# Patient Record
Sex: MALE | Race: WHITE | NOT HISPANIC OR LATINO | Employment: UNEMPLOYED | URBAN - METROPOLITAN AREA
[De-identification: names, ages, dates, MRNs, and addresses within clinical notes are randomized per-mention and may not be internally consistent; named-entity substitution may affect disease eponyms.]

---

## 2023-10-26 ENCOUNTER — TELEPHONE (OUTPATIENT)
Dept: PRIMARY CARE | Facility: CLINIC | Age: 53
End: 2023-10-26
Payer: COMMERCIAL

## 2023-10-26 DIAGNOSIS — M10.9 ACUTE GOUT OF FOOT, UNSPECIFIED CAUSE, UNSPECIFIED LATERALITY: Primary | ICD-10-CM

## 2023-10-26 RX ORDER — NAPROXEN 500 MG/1
500 TABLET ORAL 2 TIMES DAILY PRN
Qty: 14 TABLET | Refills: 0 | Status: SHIPPED | OUTPATIENT
Start: 2023-10-26 | End: 2023-11-02

## 2023-10-26 NOTE — TELEPHONE ENCOUNTER
"Pt states having gout attack and Dr Farris would prescribe him Naproxen, \"it would be gone in 2 days\". Pt requests rx.    Also states he is sure he has DM, c/o ED, tingling in feet in mornings and feet swell.   Last visit 6/9/23  next appt 11/13.   "

## 2023-11-09 PROBLEM — R51.9 FACIAL PAIN: Status: ACTIVE | Noted: 2023-11-09

## 2023-11-09 PROBLEM — R06.89 DIFFICULTY BREATHING: Status: ACTIVE | Noted: 2023-11-09

## 2023-11-09 PROBLEM — T78.3XXA ANGIOEDEMA: Status: ACTIVE | Noted: 2023-11-09

## 2023-11-09 PROBLEM — F41.9 ANXIETY: Status: ACTIVE | Noted: 2022-10-19

## 2023-11-09 PROBLEM — M25.469 KNEE JOINT EFFUSION: Status: ACTIVE | Noted: 2023-11-09

## 2023-11-09 PROBLEM — M10.9 GOUT: Status: ACTIVE | Noted: 2023-11-09

## 2023-11-09 PROBLEM — F10.11 ALCOHOL ABUSE, IN REMISSION: Status: ACTIVE | Noted: 2023-11-09

## 2023-11-09 PROBLEM — R73.9 HYPERGLYCEMIA: Status: ACTIVE | Noted: 2023-11-09

## 2023-11-09 PROBLEM — R11.2 NAUSEA AND VOMITING: Status: ACTIVE | Noted: 2023-11-09

## 2023-11-09 PROBLEM — M51.9 DISORDER OF INTERVERTEBRAL DISC OF LUMBAR SPINE: Status: ACTIVE | Noted: 2023-11-09

## 2023-11-09 PROBLEM — E78.5 HYPERLIPIDEMIA: Status: ACTIVE | Noted: 2023-11-09

## 2023-11-09 PROBLEM — R19.8 TENESMUS: Status: ACTIVE | Noted: 2023-11-09

## 2023-11-09 PROBLEM — I10 HYPERTENSION: Status: ACTIVE | Noted: 2023-11-09

## 2023-11-09 PROBLEM — E78.00 PURE HYPERCHOLESTEROLEMIA: Status: ACTIVE | Noted: 2023-11-09

## 2023-11-09 PROBLEM — K21.9 GASTROESOPHAGEAL REFLUX DISEASE: Status: ACTIVE | Noted: 2023-11-09

## 2023-11-09 PROBLEM — K59.4 RECTAL SPASM: Status: ACTIVE | Noted: 2023-11-09

## 2023-11-09 PROBLEM — B35.1 ONYCHOMYCOSIS: Status: ACTIVE | Noted: 2023-11-09

## 2023-11-09 PROBLEM — K40.20 BILATERAL INGUINAL HERNIA: Status: ACTIVE | Noted: 2023-11-09

## 2023-11-09 PROBLEM — F32.9 MAJOR DEPRESSIVE DISORDER, SINGLE EPISODE, UNSPECIFIED: Status: ACTIVE | Noted: 2023-11-09

## 2023-11-09 PROBLEM — R51.9 HEADACHE: Status: ACTIVE | Noted: 2023-11-09

## 2023-11-09 PROBLEM — F17.200 NICOTINE DEPENDENCE: Status: ACTIVE | Noted: 2023-11-09

## 2023-11-10 RX ORDER — GABAPENTIN 300 MG/1
CAPSULE ORAL
COMMUNITY
Start: 2023-08-24 | End: 2023-11-13 | Stop reason: ALTCHOICE

## 2023-11-10 RX ORDER — COLCHICINE 0.6 MG/1
TABLET ORAL
COMMUNITY
Start: 2022-12-26 | End: 2023-11-13 | Stop reason: ALTCHOICE

## 2023-11-10 RX ORDER — ESCITALOPRAM OXALATE 10 MG/1
TABLET ORAL
COMMUNITY
Start: 2022-12-23 | End: 2024-03-31 | Stop reason: HOSPADM

## 2023-11-10 RX ORDER — AMLODIPINE BESYLATE 5 MG/1
TABLET ORAL
COMMUNITY
End: 2023-11-13 | Stop reason: ALTCHOICE

## 2023-11-10 RX ORDER — KETOROLAC TROMETHAMINE 10 MG/1
TABLET, FILM COATED ORAL
COMMUNITY
Start: 2013-09-26 | End: 2023-11-13 | Stop reason: ALTCHOICE

## 2023-11-10 RX ORDER — HYDROXYZINE PAMOATE 50 MG/1
CAPSULE ORAL
COMMUNITY
Start: 2022-12-23 | End: 2023-11-13 | Stop reason: ALTCHOICE

## 2023-11-10 RX ORDER — DICYCLOMINE HYDROCHLORIDE 20 MG/1
TABLET ORAL
COMMUNITY
End: 2023-11-13 | Stop reason: ALTCHOICE

## 2023-11-10 RX ORDER — THIAMINE HCL 100 MG
TABLET ORAL
COMMUNITY
Start: 2022-12-05 | End: 2023-11-13 | Stop reason: ALTCHOICE

## 2023-11-10 RX ORDER — METOPROLOL SUCCINATE 50 MG/1
TABLET, EXTENDED RELEASE ORAL
COMMUNITY
End: 2023-11-21

## 2023-11-10 RX ORDER — FAMOTIDINE 20 MG/1
TABLET, FILM COATED ORAL
COMMUNITY
Start: 2022-10-20 | End: 2024-03-08 | Stop reason: WASHOUT

## 2023-11-10 RX ORDER — NAPROXEN 500 MG/1
TABLET ORAL
COMMUNITY
Start: 2023-11-02 | End: 2023-11-13 | Stop reason: ALTCHOICE

## 2023-11-10 RX ORDER — METHYLPHENIDATE HYDROCHLORIDE 10 MG/1
10 CAPSULE, EXTENDED RELEASE ORAL EVERY MORNING
Status: ON HOLD | COMMUNITY
Start: 2023-11-01 | End: 2024-06-04

## 2023-11-10 RX ORDER — ACAMPROSATE CALCIUM 333 MG/1
TABLET, DELAYED RELEASE ORAL
COMMUNITY
Start: 2022-12-05 | End: 2023-11-13 | Stop reason: ALTCHOICE

## 2023-11-10 RX ORDER — MIRTAZAPINE 7.5 MG/1
TABLET, FILM COATED ORAL
COMMUNITY
Start: 2023-02-14 | End: 2023-11-13 | Stop reason: ALTCHOICE

## 2023-11-10 RX ORDER — EPINEPHRINE 0.3 MG/.3ML
INJECTION SUBCUTANEOUS
COMMUNITY
Start: 2022-10-20

## 2023-11-10 RX ORDER — FOLIC ACID 1 MG/1
TABLET ORAL
COMMUNITY
Start: 2022-12-05 | End: 2023-11-13 | Stop reason: ALTCHOICE

## 2023-11-10 RX ORDER — FLUTICASONE PROPIONATE 50 MCG
SPRAY, SUSPENSION (ML) NASAL
COMMUNITY
Start: 2013-09-21 | End: 2023-11-13 | Stop reason: ALTCHOICE

## 2023-11-10 RX ORDER — NORTRIPTYLINE HYDROCHLORIDE 10 MG/1
CAPSULE ORAL
COMMUNITY
Start: 2013-09-26 | End: 2023-11-13 | Stop reason: ALTCHOICE

## 2023-11-10 RX ORDER — GABAPENTIN 100 MG/1
CAPSULE ORAL
COMMUNITY
Start: 2023-07-21 | End: 2023-11-13 | Stop reason: ALTCHOICE

## 2023-11-10 RX ORDER — BUPROPION HYDROCHLORIDE 150 MG/1
TABLET ORAL
COMMUNITY
Start: 2022-12-05 | End: 2023-11-21

## 2023-11-10 RX ORDER — ONDANSETRON 4 MG/1
TABLET, FILM COATED ORAL
COMMUNITY
Start: 2023-06-09 | End: 2023-11-13 | Stop reason: ALTCHOICE

## 2023-11-10 RX ORDER — PANTOPRAZOLE SODIUM 40 MG/1
TABLET, DELAYED RELEASE ORAL
COMMUNITY
Start: 2023-06-09 | End: 2023-11-13 | Stop reason: ALTCHOICE

## 2023-11-10 RX ORDER — CLONIDINE HYDROCHLORIDE 0.1 MG/1
TABLET ORAL
COMMUNITY
Start: 2023-01-13 | End: 2023-11-13 | Stop reason: ALTCHOICE

## 2023-11-13 ENCOUNTER — LAB (OUTPATIENT)
Dept: LAB | Facility: LAB | Age: 53
End: 2023-11-13
Payer: COMMERCIAL

## 2023-11-13 ENCOUNTER — OFFICE VISIT (OUTPATIENT)
Dept: PRIMARY CARE | Facility: CLINIC | Age: 53
End: 2023-11-13
Payer: COMMERCIAL

## 2023-11-13 VITALS
BODY MASS INDEX: 25.5 KG/M2 | OXYGEN SATURATION: 98 % | DIASTOLIC BLOOD PRESSURE: 100 MMHG | HEART RATE: 106 BPM | WEIGHT: 188 LBS | SYSTOLIC BLOOD PRESSURE: 166 MMHG | TEMPERATURE: 99 F

## 2023-11-13 DIAGNOSIS — Z13.1 SCREENING FOR DIABETES MELLITUS: ICD-10-CM

## 2023-11-13 DIAGNOSIS — F10.10 ALCOHOL ABUSE: ICD-10-CM

## 2023-11-13 DIAGNOSIS — I10 PRIMARY HYPERTENSION: Primary | ICD-10-CM

## 2023-11-13 PROBLEM — E44.0 MALNUTRITION OF MODERATE DEGREE (MULTI): Status: ACTIVE | Noted: 2022-12-02

## 2023-11-13 PROBLEM — M54.16 LUMBAR RADICULOPATHY: Status: ACTIVE | Noted: 2023-11-13

## 2023-11-13 PROBLEM — K57.90 DIVERTICULOSIS OF INTESTINE, PART UNSPECIFIED, WITHOUT PERFORATION OR ABSCESS WITHOUT BLEEDING: Status: ACTIVE | Noted: 2022-10-19

## 2023-11-13 PROBLEM — M51.26 DISPLACEMENT OF LUMBAR INTERVERTEBRAL DISC WITHOUT MYELOPATHY: Status: ACTIVE | Noted: 2023-11-13

## 2023-11-13 LAB
ALBUMIN SERPL-MCNC: 4.2 G/DL (ref 3.5–5)
ALP BLD-CCNC: 332 U/L (ref 35–125)
ALT SERPL-CCNC: 76 U/L (ref 5–40)
ANION GAP SERPL CALC-SCNC: 15 MMOL/L
AST SERPL-CCNC: 140 U/L (ref 5–40)
BILIRUB SERPL-MCNC: 0.6 MG/DL (ref 0.1–1.2)
BUN SERPL-MCNC: 8 MG/DL (ref 8–25)
CALCIUM SERPL-MCNC: 9.9 MG/DL (ref 8.5–10.4)
CHLORIDE SERPL-SCNC: 97 MMOL/L (ref 97–107)
CO2 SERPL-SCNC: 28 MMOL/L (ref 24–31)
CREAT SERPL-MCNC: 0.6 MG/DL (ref 0.4–1.6)
EST. AVERAGE GLUCOSE BLD GHB EST-MCNC: 111 MG/DL
GFR SERPL CREATININE-BSD FRML MDRD: >90 ML/MIN/1.73M*2
GLUCOSE SERPL-MCNC: 128 MG/DL (ref 65–99)
HBA1C MFR BLD: 5.5 %
POTASSIUM SERPL-SCNC: 4 MMOL/L (ref 3.4–5.1)
PROT SERPL-MCNC: 7.2 G/DL (ref 5.9–7.9)
SODIUM SERPL-SCNC: 140 MMOL/L (ref 133–145)

## 2023-11-13 PROCEDURE — 36415 COLL VENOUS BLD VENIPUNCTURE: CPT

## 2023-11-13 PROCEDURE — 83036 HEMOGLOBIN GLYCOSYLATED A1C: CPT

## 2023-11-13 PROCEDURE — 3080F DIAST BP >= 90 MM HG: CPT | Performed by: INTERNAL MEDICINE

## 2023-11-13 PROCEDURE — 99214 OFFICE O/P EST MOD 30 MIN: CPT | Performed by: INTERNAL MEDICINE

## 2023-11-13 PROCEDURE — 3077F SYST BP >= 140 MM HG: CPT | Performed by: INTERNAL MEDICINE

## 2023-11-13 PROCEDURE — 80053 COMPREHEN METABOLIC PANEL: CPT

## 2023-11-13 RX ORDER — LOSARTAN POTASSIUM 100 MG/1
100 TABLET ORAL DAILY
Qty: 90 TABLET | Refills: 1 | Status: SHIPPED | OUTPATIENT
Start: 2023-11-13 | End: 2024-03-31 | Stop reason: HOSPADM

## 2023-11-13 RX ORDER — PREDNISONE 10 MG/1
TABLET ORAL
COMMUNITY
End: 2023-11-13 | Stop reason: ALTCHOICE

## 2023-11-13 RX ORDER — CELECOXIB 200 MG/1
CAPSULE ORAL
COMMUNITY
End: 2023-11-13 | Stop reason: ALTCHOICE

## 2023-11-13 ASSESSMENT — PATIENT HEALTH QUESTIONNAIRE - PHQ9
2. FEELING DOWN, DEPRESSED OR HOPELESS: NOT AT ALL
SUM OF ALL RESPONSES TO PHQ9 QUESTIONS 1 AND 2: 0
1. LITTLE INTEREST OR PLEASURE IN DOING THINGS: NOT AT ALL

## 2023-11-13 ASSESSMENT — PAIN SCALES - GENERAL: PAINLEVEL: 0-NO PAIN

## 2023-11-13 ASSESSMENT — ENCOUNTER SYMPTOMS
DEPRESSION: 0
LOSS OF SENSATION IN FEET: 0
OCCASIONAL FEELINGS OF UNSTEADINESS: 0

## 2023-11-13 NOTE — PROGRESS NOTES
[facility]: [facility]  PROGRESS NOTE      Leonardo Garcia is a 53 y.o. male that is being seen  today for Establish Care.  Subjective   Patient is a 53-year-old male with past medical history of alcohol abuse, hypertension and anxiety who is being seen for acute visit with a complaint of numbness and tingling in his feet.  Patient was seen few months ago and was started on blood pressure medication and was recommended to follow-up for hypertension but he canceled his appointment.  Patient's blood pressure is not very well controlled.  Patient has family history of diabetes and would like to be checked for diabetes.  Patient still drinks alcohol and is not ready to quit.  Patient has been on medications for anxiety.      ROS  Negative for fever or chills  Negative for sore throat, ear pain, nasal discharge  Negative for cough, shortness of breath or wheezing  Negative for chest pain, palpitations, swelling of legs  Negative for abdominal pain, constipation, diarrhea, blood in the stools  Negative for urinary complaints  Negative for headache, dizziness, weakness or numbness  Negative for joint pain  Positive  for depression or anxiety  All other systems reviewed and were negative   Vitals:    11/13/23 0925   BP: (!) 166/100   Pulse: 106   Temp: 37.2 °C (99 °F)   SpO2: 98%      Vitals:    11/13/23 0925   Weight: 85.3 kg (188 lb)     Body mass index is 25.5 kg/m².  Physical Exam  Constitutional: Patient does not appear to be in any acute distress  Head and Face: NCAT  Eyes: Normal external exam, EOMI  ENT: Normal external inspection of ears and nose. Oropharynx normal.  Cardiovascular: RRR, S1/S2, no murmurs, rubs, or gallops, radial pulses +2, no edema of extremities  Pulmonary: CTAB, no respiratory distress.  Abdomen: +BS, soft, non-tender, nondistended, no guarding or rebound, no masses noted  MSK: No joint swelling, normal movements of all extremities. Range of motion- normal.  Skin- No  lesions, contusions, or erythema.  Peripheral puslses palpable bilaterally 2+  Neuro: AAO X3, Cranial nerves 2-12 grossly intact,DTR 2+ in all 4 limbs   Psychiatric: Judgment intact. Anxious  LABS   [unfilled]  Lab Results   Component Value Date    GLUCOSE 105 (H) 06/09/2023    CALCIUM 9.5 06/09/2023     06/09/2023    K 3.6 06/09/2023    CO2 25 06/09/2023     06/09/2023    BUN 7 (L) 06/09/2023    CREATININE 0.7 06/09/2023     Lab Results   Component Value Date     (H) 06/09/2023     (H) 06/09/2023    ALKPHOS 120 06/09/2023    BILITOT 0.3 06/09/2023     Lab Results   Component Value Date    WBC 6.0 06/09/2023    HGB 14.3 06/09/2023    HCT 41.6 06/09/2023    MCV 96.5 06/09/2023     06/09/2023     Lab Results   Component Value Date    CHOL 209 (H) 01/24/2020     Lab Results   Component Value Date    HDL 30 (L) 01/24/2020     Lab Results   Component Value Date    LDLCALC 144 (H) 01/24/2020     Lab Results   Component Value Date    TRIG 173 (H) 01/24/2020     Lab Results   Component Value Date    HGBA1C 5.9 01/24/2020     Other labs not included in the list above were reviewed either before or during this encounter.    History    History reviewed. No pertinent past medical history.  Past Surgical History:   Procedure Laterality Date    SINUS SURGERY  04/29/2013    Sinus Surgery     Family History   Problem Relation Name Age of Onset    Hypertension Mother      Heart attack Father      Brain cancer Father      Diabetes Father      Cancer Father       Allergies   Allergen Reactions    Clams Shortness of breath     Current Outpatient Medications on File Prior to Visit   Medication Sig Dispense Refill    buPROPion XL (Wellbutrin XL) 150 mg 24 hr tablet buPROPion HCl ER (XL) 150 MG 1 tablet in the morning Orally Once a day for 30 days Active      EPINEPHrine 0.3 mg/0.3 mL injection syringe INJECT INTO THE MUSCLE ONCE AS NEEDED FOR ALLERGIC REACTIONS      escitalopram (Lexapro) 10 mg tablet 1  tablet Orally Once a day for 30 days      famotidine (Pepcid) 20 mg tablet TAKE 1 TABLET BY MOUTH EVERY DAY      methylphenidate LA (Ritalin LA) 10 mg 24 hr capsule       metoprolol succinate XL (Toprol-XL) 50 mg 24 hr tablet TAKE 1 TABLET BY MOUTH EVERY DAY      [DISCONTINUED] acamprosate (Campral) 333 mg EC tablet       [DISCONTINUED] amLODIPine (Norvasc) 5 mg tablet amLODIPine 5 mg Tablet, Ordered By: Eliel Duvall MD Directions: 1 tablet oral daily      [DISCONTINUED] celecoxib (CeleBREX) 200 mg capsule Take 1 capsule once A DAY as needed by oral route.      [DISCONTINUED] cloNIDine (Catapres) 0.1 mg tablet       [DISCONTINUED] colchicine 0.6 mg tablet TAKE 1 TABLET BY MOUTH ONCE DAILY AS NEEDED FOR GOUT PAIN      [DISCONTINUED] dicyclomine (Bentyl) 20 mg tablet dicyclomine 20 mg Tablet, Ordered By: Eliel Duvall MD Directions: 1 tablet oral every six hours PRN pain      [DISCONTINUED] fluticasone (Flonase) 50 mcg/actuation nasal spray Fluticasone Propionate 50 MCG/ACT Nasal Suspension   Quantity: 16  Refills: 0        Start : 21-Sep-2013   Active      [DISCONTINUED] folic acid (Folvite) 1 mg tablet       [DISCONTINUED] gabapentin (Neurontin) 100 mg capsule       [DISCONTINUED] gabapentin (Neurontin) 300 mg capsule       [DISCONTINUED] hydrOXYzine pamoate (Vistaril) 50 mg capsule TAKE 1 CAPSULE BY MOUTH THREE TIMES DAILY AS NEEDED FOR ANXIETY      [DISCONTINUED] ketorolac (Toradol) 10 mg tablet TAKE 1 TABLET 3 TIMES DAILY AS NEEDED.      [DISCONTINUED] mirtazapine (Remeron) 7.5 mg tablet       [DISCONTINUED] naproxen (Naprosyn) 500 mg tablet       [DISCONTINUED] nortriptyline (Pamelor) 10 mg capsule TAKE 2 CAPSULE Bedtime      [DISCONTINUED] ondansetron (Zofran) 4 mg tablet Ondansetron HCl 4 MG 1 tablet Orally every 8 hrs as needed for 15 day(s) 09 Jun, 2023 Active      [DISCONTINUED] pantoprazole (ProtoNix) 40 mg EC tablet Pantoprazole Sodium 40 MG 1 tablet Orally Once a day for 30 day(s) 09 Jun, 2023  Active      [DISCONTINUED] predniSONE (Deltasone) 10 mg tablet 7 tablets po day #1, decrease by 10 mg a day for total 7 days therapy      [DISCONTINUED] Vitamin B-1 100 mg tablet        No current facility-administered medications on file prior to visit.       There is no immunization history on file for this patient.  Patient's medical history was reviewed and updated either before or during this encounter.  ASSESSMENT / PLAN:  Diagnoses and all orders for this visit:  Primary hypertension  -     losartan (Cozaar) 100 mg tablet; Take 1 tablet (100 mg) by mouth once daily.  Screening for diabetes mellitus  -     Hemoglobin A1C; Future  Alcohol abuse  -     Comprehensive Metabolic Panel; Future    Patient's blood pressure is not very well controlled.  Patient is still abusing alcohol.  Patient has been on antianxiety medication.  Patient was taking metoprolol.  Patient is being started on losartan.  Patient told that he is going to leave for [place] in the next 2 weeks and will be there for next 4 months.  Patient recommended to establish with physician over there in [place] for follow-up of his blood pressure.    Gabino Hoffman MD

## 2024-02-08 ENCOUNTER — APPOINTMENT (OUTPATIENT)
Dept: GASTROENTEROLOGY | Facility: CLINIC | Age: 54
End: 2024-02-08
Payer: MEDICARE

## 2024-03-05 DIAGNOSIS — I10 PRIMARY HYPERTENSION: ICD-10-CM

## 2024-03-05 RX ORDER — FOLIC ACID 1 MG/1
1 TABLET ORAL DAILY
COMMUNITY
Start: 2024-01-15 | End: 2024-03-08 | Stop reason: SDUPTHER

## 2024-03-05 RX ORDER — SPIRONOLACTONE 50 MG/1
150 TABLET, FILM COATED ORAL DAILY
COMMUNITY
Start: 2024-01-05 | End: 2024-03-08 | Stop reason: WASHOUT

## 2024-03-05 RX ORDER — NAPROXEN 500 MG/1
500 TABLET ORAL AS NEEDED
COMMUNITY
End: 2024-03-08 | Stop reason: SDUPTHER

## 2024-03-05 RX ORDER — FUROSEMIDE 40 MG/1
40 TABLET ORAL DAILY
COMMUNITY
Start: 2024-01-05 | End: 2024-03-08 | Stop reason: WASHOUT

## 2024-03-05 RX ORDER — PROPRANOLOL HYDROCHLORIDE 10 MG/1
10 TABLET ORAL DAILY
COMMUNITY
Start: 2024-01-05 | End: 2024-03-08 | Stop reason: WASHOUT

## 2024-03-05 NOTE — TELEPHONE ENCOUNTER
Refill    [facility] [place]     Losartan 100 mg once daily - out of med   Naproxen 500 mg  PRN    LV 11/23/23  [place] - 3/8/24 transferring to Dr Wood

## 2024-03-06 PROBLEM — K59.4 ANAL SPASM: Status: ACTIVE | Noted: 2023-11-09

## 2024-03-06 RX ORDER — NAPROXEN 500 MG/1
500 TABLET ORAL AS NEEDED
Qty: 90 TABLET | Refills: 0 | OUTPATIENT
Start: 2024-03-06

## 2024-03-06 RX ORDER — LOSARTAN POTASSIUM 100 MG/1
100 TABLET ORAL DAILY
Qty: 90 TABLET | Refills: 0 | OUTPATIENT
Start: 2024-03-06

## 2024-03-08 ENCOUNTER — OFFICE VISIT (OUTPATIENT)
Dept: PRIMARY CARE | Facility: CLINIC | Age: 54
End: 2024-03-08
Payer: MEDICARE

## 2024-03-08 ENCOUNTER — LAB (OUTPATIENT)
Dept: LAB | Facility: LAB | Age: 54
End: 2024-03-08
Payer: MEDICARE

## 2024-03-08 VITALS
SYSTOLIC BLOOD PRESSURE: 126 MMHG | WEIGHT: 180 LBS | HEART RATE: 85 BPM | OXYGEN SATURATION: 97 % | BODY MASS INDEX: 24.38 KG/M2 | DIASTOLIC BLOOD PRESSURE: 80 MMHG | HEIGHT: 72 IN | TEMPERATURE: 97.5 F

## 2024-03-08 DIAGNOSIS — Z76.89 ENCOUNTER TO ESTABLISH CARE WITH NEW DOCTOR: Primary | ICD-10-CM

## 2024-03-08 DIAGNOSIS — I10 PRIMARY HYPERTENSION: ICD-10-CM

## 2024-03-08 DIAGNOSIS — M10.9 GOUTY ARTHRITIS: ICD-10-CM

## 2024-03-08 DIAGNOSIS — Z12.5 ENCOUNTER FOR PROSTATE CANCER SCREENING: ICD-10-CM

## 2024-03-08 DIAGNOSIS — R73.02 GLUCOSE INTOLERANCE (IMPAIRED GLUCOSE TOLERANCE): ICD-10-CM

## 2024-03-08 DIAGNOSIS — E55.9 VITAMIN D DEFICIENCY: ICD-10-CM

## 2024-03-08 DIAGNOSIS — Z11.59 NEED FOR HEPATITIS C SCREENING TEST: ICD-10-CM

## 2024-03-08 DIAGNOSIS — E78.00 PURE HYPERCHOLESTEROLEMIA: ICD-10-CM

## 2024-03-08 DIAGNOSIS — K70.31 ALCOHOLIC CIRRHOSIS OF LIVER WITH ASCITES (MULTI): ICD-10-CM

## 2024-03-08 DIAGNOSIS — Z01.89 ENCOUNTER FOR ROUTINE LABORATORY TESTING: ICD-10-CM

## 2024-03-08 DIAGNOSIS — K21.9 GASTROESOPHAGEAL REFLUX DISEASE WITHOUT ESOPHAGITIS: ICD-10-CM

## 2024-03-08 PROBLEM — E78.5 HYPERLIPIDEMIA: Status: RESOLVED | Noted: 2023-11-09 | Resolved: 2024-03-08

## 2024-03-08 LAB
ALBUMIN SERPL-MCNC: 4.2 G/DL (ref 3.5–5)
ALP BLD-CCNC: 359 U/L (ref 35–125)
ALT SERPL-CCNC: 18 U/L (ref 5–40)
ANION GAP SERPL CALC-SCNC: 12 MMOL/L
AST SERPL-CCNC: 34 U/L (ref 5–40)
BILIRUB SERPL-MCNC: 1.7 MG/DL (ref 0.1–1.2)
BUN SERPL-MCNC: 8 MG/DL (ref 8–25)
CALCIUM SERPL-MCNC: 10 MG/DL (ref 8.5–10.4)
CHLORIDE SERPL-SCNC: 96 MMOL/L (ref 97–107)
CO2 SERPL-SCNC: 28 MMOL/L (ref 24–31)
CREAT SERPL-MCNC: 0.7 MG/DL (ref 0.4–1.6)
EGFRCR SERPLBLD CKD-EPI 2021: >90 ML/MIN/1.73M*2
GLUCOSE SERPL-MCNC: 131 MG/DL (ref 65–99)
POTASSIUM SERPL-SCNC: 4.1 MMOL/L (ref 3.4–5.1)
PROT SERPL-MCNC: 7.3 G/DL (ref 5.9–7.9)
SODIUM SERPL-SCNC: 136 MMOL/L (ref 133–145)
URATE SERPL-MCNC: 6.3 MG/DL (ref 3.6–7.7)

## 2024-03-08 PROCEDURE — 84550 ASSAY OF BLOOD/URIC ACID: CPT

## 2024-03-08 PROCEDURE — 80053 COMPREHEN METABOLIC PANEL: CPT

## 2024-03-08 PROCEDURE — 3074F SYST BP LT 130 MM HG: CPT | Performed by: INTERNAL MEDICINE

## 2024-03-08 PROCEDURE — 99214 OFFICE O/P EST MOD 30 MIN: CPT | Performed by: INTERNAL MEDICINE

## 2024-03-08 PROCEDURE — 3079F DIAST BP 80-89 MM HG: CPT | Performed by: INTERNAL MEDICINE

## 2024-03-08 PROCEDURE — 36415 COLL VENOUS BLD VENIPUNCTURE: CPT

## 2024-03-08 PROCEDURE — 4004F PT TOBACCO SCREEN RCVD TLK: CPT | Performed by: INTERNAL MEDICINE

## 2024-03-08 RX ORDER — FOLIC ACID 1 MG/1
1 TABLET ORAL DAILY
Qty: 90 TABLET | Refills: 0 | Status: ON HOLD | OUTPATIENT
Start: 2024-03-08 | End: 2024-06-04

## 2024-03-08 RX ORDER — OMEPRAZOLE 20 MG/1
20 CAPSULE, DELAYED RELEASE ORAL DAILY
Qty: 30 CAPSULE | Refills: 11 | Status: SHIPPED | OUTPATIENT
Start: 2024-03-08 | End: 2025-03-08

## 2024-03-08 RX ORDER — NAPROXEN 500 MG/1
500 TABLET ORAL
Qty: 60 TABLET | Refills: 0 | Status: SHIPPED | OUTPATIENT
Start: 2024-03-08 | End: 2024-03-31 | Stop reason: HOSPADM

## 2024-03-08 RX ORDER — OMEPRAZOLE 20 MG/1
20 TABLET, DELAYED RELEASE ORAL
COMMUNITY
End: 2024-03-08 | Stop reason: WASHOUT

## 2024-03-08 ASSESSMENT — PATIENT HEALTH QUESTIONNAIRE - PHQ9
10. IF YOU CHECKED OFF ANY PROBLEMS, HOW DIFFICULT HAVE THESE PROBLEMS MADE IT FOR YOU TO DO YOUR WORK, TAKE CARE OF THINGS AT HOME, OR GET ALONG WITH OTHER PEOPLE: SOMEWHAT DIFFICULT
7. TROUBLE CONCENTRATING ON THINGS, SUCH AS READING THE NEWSPAPER OR WATCHING TELEVISION: NEARLY EVERY DAY
1. LITTLE INTEREST OR PLEASURE IN DOING THINGS: NEARLY EVERY DAY
5. POOR APPETITE OR OVEREATING: SEVERAL DAYS
6. FEELING BAD ABOUT YOURSELF - OR THAT YOU ARE A FAILURE OR HAVE LET YOURSELF OR YOUR FAMILY DOWN: MORE THAN HALF THE DAYS
9. THOUGHTS THAT YOU WOULD BE BETTER OFF DEAD, OR OF HURTING YOURSELF: NOT AT ALL
4. FEELING TIRED OR HAVING LITTLE ENERGY: NEARLY EVERY DAY
2. FEELING DOWN, DEPRESSED OR HOPELESS: SEVERAL DAYS
SUM OF ALL RESPONSES TO PHQ QUESTIONS 1-9: 13
8. MOVING OR SPEAKING SO SLOWLY THAT OTHER PEOPLE COULD HAVE NOTICED. OR THE OPPOSITE, BEING SO FIGETY OR RESTLESS THAT YOU HAVE BEEN MOVING AROUND A LOT MORE THAN USUAL: NOT AT ALL
3. TROUBLE FALLING OR STAYING ASLEEP OR SLEEPING TOO MUCH: NOT AT ALL
SUM OF ALL RESPONSES TO PHQ9 QUESTIONS 1 AND 2: 4

## 2024-03-08 ASSESSMENT — ENCOUNTER SYMPTOMS
OCCASIONAL FEELINGS OF UNSTEADINESS: 0
DEPRESSION: 1
LOSS OF SENSATION IN FEET: 0

## 2024-03-08 ASSESSMENT — PAIN SCALES - GENERAL: PAINLEVEL: 0-NO PAIN

## 2024-03-08 NOTE — PROGRESS NOTES
[facility]: [facility]  PROGRESS NOTE      Leonardo Garcia is a 53 y.o. male that is presenting today for Follow-up.    Assessment/Plan   Diagnoses and all orders for this visit:  Encounter to establish care with new doctor     - Reviewed patient's available records, discussed PMH, Current active problems Meds and allergies.  Primary hypertension  Under control with current treatment   Continue Metoprolol  Alcoholic cirrhosis of liver with ascites (CMS/HCC)  -     folic acid (Folvite) 1 mg tablet; Take 1 tablet (1 mg) by mouth once daily.  -     Comprehensive metabolic panel; Future  -     Referral to Gastroenterology; Future  Gouty arthritis  -     naproxen (Naprosyn) 500 mg tablet; Take 1 tablet (500 mg) by mouth 2 times a day with meals. As needed  -     Uric acid; Future  Pure hypercholesterolemia      Per previous BW results        -     Lipid Panel; Future  Gastroesophageal reflux disease without esophagitis  -     omeprazole (PriLOSEC) 20 mg DR capsule; Take 1 capsule (20 mg) by mouth once daily. Do not crush or chew.  Glucose intolerance (impaired glucose tolerance)  -     Hemoglobin A1C; Future  -     TSH with reflex to Free T4 if abnormal; Future  Encounter for routine laboratory testing  -     Comprehensive Metabolic Panel; Future  -     CBC and Auto Differential; Future  -     Lipid Panel; Future  -     Hemoglobin A1C; Future  -     Vitamin D 25-Hydroxy,Total (for eval of Vitamin D levels); Future  -     TSH with reflex to Free T4 if abnormal; Future  -     Prostate Specific Antigen; Future  Vitamin D deficiency  -     Vitamin D 25-Hydroxy,Total (for eval of Vitamin D levels); Future  Encounter for prostate cancer screening  -     Prostate Specific Antigen; Future  Need for hepatitis C screening test  -     Hepatitis C antibody; Future  Other orders  -     Follow Up In Primary Care; Future  Subjective   HPI  - Patient is here today to establish care with me (* Saw VD ) and has a  form to complete for going back to work    - Patient denies any symptoms or concerns at this time.    - patient denies any adverse reactions to or concerns with his/her meds.    Review of Systems   All pertinent POSITIVES as noted per HPI.  All other systems have been reviewed and are NEGATIVE and /or Noncontributory to this patient current visit or complaint.    Objective   Vitals:    03/08/24 0829   BP: 126/80   Pulse: 85   Temp: 36.4 °C (97.5 °F)   SpO2: 97%      Body mass index is 24.41 kg/m².  Physical Exam  Vitals and nursing note reviewed.   Constitutional:       Appearance: Normal appearance.   HENT:      Head: Normocephalic and atraumatic.   Neck:      Vascular: No carotid bruit.   Cardiovascular:      Rate and Rhythm: Normal rate and regular rhythm.      Pulses: Normal pulses.      Heart sounds: Normal heart sounds.   Pulmonary:      Effort: Pulmonary effort is normal.      Breath sounds: Normal breath sounds.   Abdominal:      General: Abdomen is flat. Bowel sounds are normal.      Palpations: Abdomen is soft.   Musculoskeletal:         General: No swelling. Normal range of motion.      Cervical back: Neck supple.   Lymphadenopathy:      Cervical: No cervical adenopathy.   Skin:     General: Skin is warm and dry.   Neurological:      Mental Status: He is alert.     Diagnostic Results   Lab Results   Component Value Date    GLUCOSE 131 (H) 03/08/2024    CALCIUM 10.0 03/08/2024     03/08/2024    K 4.1 03/08/2024    CO2 28 03/08/2024    CL 96 (L) 03/08/2024    BUN 8 03/08/2024    CREATININE 0.70 03/08/2024     Lab Results   Component Value Date    ALT 18 03/08/2024    AST 34 03/08/2024    ALKPHOS 359 (H) 03/08/2024    BILITOT 1.7 (H) 03/08/2024     Lab Results   Component Value Date    WBC 6.0 06/09/2023    HGB 14.3 06/09/2023    HCT 41.6 06/09/2023    MCV 96.5 06/09/2023     06/09/2023     Lab Results   Component Value Date    CHOL 209 (H) 01/24/2020     Lab Results   Component Value Date     "HDL 30 (L) 01/24/2020     Lab Results   Component Value Date    LDLCALC 144 (H) 01/24/2020     Lab Results   Component Value Date    TRIG 173 (H) 01/24/2020     No components found for: \"CHOLHDL\"  Lab Results   Component Value Date    HGBA1C 5.5 11/13/2023     Other labs not included in the list above were reviewed either before or during this encounter.    History    History reviewed. No pertinent past medical history.  Past Surgical History:   Procedure Laterality Date    SINUS SURGERY  04/29/2013    Sinus Surgery     Family History   Problem Relation Name Age of Onset    Hypertension Mother      Heart attack Father      Brain cancer Father      Diabetes Father      Cancer Father       Social History     Socioeconomic History    Marital status:      Spouse name: Not on file    Number of children: Not on file    Years of education: Not on file    Highest education level: Not on file   Occupational History    Not on file   Tobacco Use    Smoking status: Every Day     Packs/day: .5     Types: Cigarettes    Smokeless tobacco: Never   Vaping Use    Vaping Use: Some days    Substances: Nicotine    Devices: Disposable, Pre-filled or refillable cartridge   Substance and Sexual Activity    Alcohol use: Yes    Drug use: Never    Sexual activity: Not on file   Other Topics Concern    Not on file   Social History Narrative    Not on file     Social Determinants of Health     Financial Resource Strain: Not on file   Food Insecurity: Not on file   Transportation Needs: Not on file   Physical Activity: Not on file   Stress: Not on file   Social Connections: Not on file   Intimate Partner Violence: Not on file   Housing Stability: Not on file     Allergies   Allergen Reactions    Clams Shortness of breath     Current Outpatient Medications on File Prior to Visit   Medication Sig Dispense Refill    buPROPion XL (Wellbutrin XL) 150 mg 24 hr tablet TAKE 1 TABLET BY MOUTH EVERY DAY IN THE MORNING 90 tablet 0    EPINEPHrine 0.3 " mg/0.3 mL injection syringe INJECT INTO THE MUSCLE ONCE AS NEEDED FOR ALLERGIC REACTIONS      escitalopram (Lexapro) 10 mg tablet 1 tablet Orally Once a day for 30 days      losartan (Cozaar) 100 mg tablet Take 1 tablet (100 mg) by mouth once daily. 90 tablet 1    methylphenidate LA (Ritalin LA) 10 mg 24 hr capsule       metoprolol succinate XL (Toprol-XL) 50 mg 24 hr tablet TAKE 1 TABLET BY MOUTH EVERY DAY 90 tablet 0     No current facility-administered medications on file prior to visit.       There is no immunization history on file for this patient.  Patient's medical history was reviewed and updated either before or during this encounter.       Pascale Wood MD

## 2024-03-13 ENCOUNTER — TELEPHONE (OUTPATIENT)
Dept: PRIMARY CARE | Facility: CLINIC | Age: 54
End: 2024-03-13
Payer: MEDICARE

## 2024-03-19 ENCOUNTER — TELEPHONE (OUTPATIENT)
Dept: GASTROENTEROLOGY | Facility: CLINIC | Age: 54
End: 2024-03-19
Payer: MEDICARE

## 2024-03-26 ENCOUNTER — LAB (OUTPATIENT)
Dept: LAB | Facility: LAB | Age: 54
End: 2024-03-26
Payer: MEDICARE

## 2024-03-26 DIAGNOSIS — K70.30 ALCOHOLIC CIRRHOSIS OF LIVER WITHOUT ASCITES (MULTI): Primary | ICD-10-CM

## 2024-03-26 LAB
ALBUMIN SERPL-MCNC: 4.5 G/DL (ref 3.5–5)
ALP BLD-CCNC: 349 U/L (ref 35–125)
ALT SERPL-CCNC: 17 U/L (ref 5–40)
ANION GAP SERPL CALC-SCNC: 17 MMOL/L
AST SERPL-CCNC: 29 U/L (ref 5–40)
BASOPHILS # BLD AUTO: 0.1 X10*3/UL (ref 0–0.1)
BASOPHILS NFR BLD AUTO: 1.1 %
BILIRUB SERPL-MCNC: 1.1 MG/DL (ref 0.1–1.2)
BUN SERPL-MCNC: 62 MG/DL (ref 8–25)
CALCIUM SERPL-MCNC: 10.6 MG/DL (ref 8.5–10.4)
CHLORIDE SERPL-SCNC: 92 MMOL/L (ref 97–107)
CO2 SERPL-SCNC: 26 MMOL/L (ref 24–31)
CREAT SERPL-MCNC: 4.5 MG/DL (ref 0.4–1.6)
EGFRCR SERPLBLD CKD-EPI 2021: 15 ML/MIN/1.73M*2
EOSINOPHIL # BLD AUTO: 0.11 X10*3/UL (ref 0–0.7)
EOSINOPHIL NFR BLD AUTO: 1.2 %
ERYTHROCYTE [DISTWIDTH] IN BLOOD BY AUTOMATED COUNT: 13.2 % (ref 11.5–14.5)
GLUCOSE SERPL-MCNC: 122 MG/DL (ref 65–99)
HCT VFR BLD AUTO: 49.4 % (ref 41–52)
HGB BLD-MCNC: 16.3 G/DL (ref 13.5–17.5)
IMM GRANULOCYTES # BLD AUTO: 0.03 X10*3/UL (ref 0–0.7)
IMM GRANULOCYTES NFR BLD AUTO: 0.3 % (ref 0–0.9)
LYMPHOCYTES # BLD AUTO: 1.88 X10*3/UL (ref 1.2–4.8)
LYMPHOCYTES NFR BLD AUTO: 20.3 %
MCH RBC QN AUTO: 33.1 PG (ref 26–34)
MCHC RBC AUTO-ENTMCNC: 33 G/DL (ref 32–36)
MCV RBC AUTO: 100 FL (ref 80–100)
MONOCYTES # BLD AUTO: 0.81 X10*3/UL (ref 0.1–1)
MONOCYTES NFR BLD AUTO: 8.7 %
NEUTROPHILS # BLD AUTO: 6.33 X10*3/UL (ref 1.2–7.7)
NEUTROPHILS NFR BLD AUTO: 68.4 %
NRBC BLD-RTO: 0 /100 WBCS (ref 0–0)
PLATELET # BLD AUTO: 203 X10*3/UL (ref 150–450)
POTASSIUM SERPL-SCNC: 4.6 MMOL/L (ref 3.4–5.1)
PROT SERPL-MCNC: 8.3 G/DL (ref 5.9–7.9)
RBC # BLD AUTO: 4.93 X10*6/UL (ref 4.5–5.9)
SODIUM SERPL-SCNC: 135 MMOL/L (ref 133–145)
WBC # BLD AUTO: 9.3 X10*3/UL (ref 4.4–11.3)

## 2024-03-26 PROCEDURE — 84165 PROTEIN E-PHORESIS SERUM: CPT

## 2024-03-26 PROCEDURE — 86334 IMMUNOFIX E-PHORESIS SERUM: CPT

## 2024-03-26 PROCEDURE — 85025 COMPLETE CBC W/AUTO DIFF WBC: CPT

## 2024-03-26 PROCEDURE — 86320 SERUM IMMUNOELECTROPHORESIS: CPT | Performed by: INTERNAL MEDICINE

## 2024-03-26 PROCEDURE — 80053 COMPREHEN METABOLIC PANEL: CPT

## 2024-03-26 PROCEDURE — 84165 PROTEIN E-PHORESIS SERUM: CPT | Performed by: INTERNAL MEDICINE

## 2024-03-26 PROCEDURE — 36415 COLL VENOUS BLD VENIPUNCTURE: CPT

## 2024-03-28 ENCOUNTER — HOSPITAL ENCOUNTER (INPATIENT)
Facility: HOSPITAL | Age: 54
LOS: 3 days | Discharge: HOME | DRG: 684 | End: 2024-03-31
Attending: STUDENT IN AN ORGANIZED HEALTH CARE EDUCATION/TRAINING PROGRAM | Admitting: INTERNAL MEDICINE
Payer: MEDICARE

## 2024-03-28 ENCOUNTER — APPOINTMENT (OUTPATIENT)
Dept: RADIOLOGY | Facility: HOSPITAL | Age: 54
DRG: 684 | End: 2024-03-28
Payer: MEDICARE

## 2024-03-28 DIAGNOSIS — K70.31 ALCOHOLIC CIRRHOSIS OF LIVER WITH ASCITES (MULTI): ICD-10-CM

## 2024-03-28 DIAGNOSIS — N17.9 ACUTE RENAL FAILURE (ARF) (CMS-HCC): Primary | ICD-10-CM

## 2024-03-28 DIAGNOSIS — I10 HYPERTENSION, UNSPECIFIED TYPE: ICD-10-CM

## 2024-03-28 LAB
ALBUMIN SERPL-MCNC: 4.9 G/DL (ref 3.5–5)
ALP BLD-CCNC: 358 U/L (ref 35–125)
ALT SERPL-CCNC: 22 U/L (ref 5–40)
AMPHETAMINES UR QL SCN>1000 NG/ML: NEGATIVE
ANION GAP SERPL CALC-SCNC: 15 MMOL/L
APPEARANCE UR: CLEAR
AST SERPL-CCNC: 34 U/L (ref 5–40)
BACTERIA #/AREA URNS AUTO: ABNORMAL /HPF
BARBITURATES UR QL SCN>300 NG/ML: NEGATIVE
BASOPHILS # BLD AUTO: 0.1 X10*3/UL (ref 0–0.1)
BASOPHILS NFR BLD AUTO: 1.2 %
BENZODIAZ UR QL SCN>300 NG/ML: NEGATIVE
BILIRUB SERPL-MCNC: 1.4 MG/DL (ref 0.1–1.2)
BILIRUB UR STRIP.AUTO-MCNC: NEGATIVE MG/DL
BUN SERPL-MCNC: 63 MG/DL (ref 8–25)
BZE UR QL SCN>300 NG/ML: NEGATIVE
CALCIUM SERPL-MCNC: 11.4 MG/DL (ref 8.5–10.4)
CANNABINOIDS UR QL SCN>50 NG/ML: POSITIVE
CHLORIDE SERPL-SCNC: 90 MMOL/L (ref 97–107)
CK SERPL-CCNC: 41 U/L (ref 24–195)
CO2 SERPL-SCNC: 28 MMOL/L (ref 24–31)
COLOR UR: ABNORMAL
CREAT SERPL-MCNC: 5 MG/DL (ref 0.4–1.6)
CREAT UR-MCNC: 94.2 MG/DL
EGFRCR SERPLBLD CKD-EPI 2021: 13 ML/MIN/1.73M*2
EOSINOPHIL # BLD AUTO: 0.09 X10*3/UL (ref 0–0.7)
EOSINOPHIL NFR BLD AUTO: 1.1 %
ERYTHROCYTE [DISTWIDTH] IN BLOOD BY AUTOMATED COUNT: 13.1 % (ref 11.5–14.5)
ETHANOL SERPL-MCNC: <0.01 G/DL
FENTANYL+NORFENTANYL UR QL SCN: NEGATIVE
FLUAV RNA RESP QL NAA+PROBE: NOT DETECTED
FLUBV RNA RESP QL NAA+PROBE: NOT DETECTED
GLUCOSE SERPL-MCNC: 146 MG/DL (ref 65–99)
GLUCOSE UR STRIP.AUTO-MCNC: ABNORMAL MG/DL
HCT VFR BLD AUTO: 52 % (ref 41–52)
HGB BLD-MCNC: 17.5 G/DL (ref 13.5–17.5)
IMM GRANULOCYTES # BLD AUTO: 0.01 X10*3/UL (ref 0–0.7)
IMM GRANULOCYTES NFR BLD AUTO: 0.1 % (ref 0–0.9)
KETONES UR STRIP.AUTO-MCNC: NEGATIVE MG/DL
LEUKOCYTE ESTERASE UR QL STRIP.AUTO: NEGATIVE
LYMPHOCYTES # BLD AUTO: 1.81 X10*3/UL (ref 1.2–4.8)
LYMPHOCYTES NFR BLD AUTO: 22.2 %
MCH RBC QN AUTO: 33 PG (ref 26–34)
MCHC RBC AUTO-ENTMCNC: 33.7 G/DL (ref 32–36)
MCV RBC AUTO: 98 FL (ref 80–100)
METHADONE UR QL SCN>300 NG/ML: NEGATIVE
MONOCYTES # BLD AUTO: 0.63 X10*3/UL (ref 0.1–1)
MONOCYTES NFR BLD AUTO: 7.7 %
NEUTROPHILS # BLD AUTO: 5.5 X10*3/UL (ref 1.2–7.7)
NEUTROPHILS NFR BLD AUTO: 67.7 %
NITRITE UR QL STRIP.AUTO: NEGATIVE
NRBC BLD-RTO: 0 /100 WBCS (ref 0–0)
OPIATES UR QL SCN>300 NG/ML: NEGATIVE
OXYCODONE UR QL: NEGATIVE
PCP UR QL SCN>25 NG/ML: NEGATIVE
PH UR STRIP.AUTO: 5.5 [PH]
PHOSPHATE SERPL-MCNC: 6 MG/DL (ref 2.5–4.5)
PLATELET # BLD AUTO: 233 X10*3/UL (ref 150–450)
POTASSIUM SERPL-SCNC: 5.1 MMOL/L (ref 3.4–5.1)
PROT SERPL-MCNC: 10.2 G/DL (ref 5.9–7.9)
PROT SERPL-MCNC: 8.9 G/DL (ref 6.4–8.2)
PROT UR STRIP.AUTO-MCNC: ABNORMAL MG/DL
PROT UR-MCNC: 74 MG/DL
PROT/CREAT UR: 0.79 MG/MG CREAT
RBC # BLD AUTO: 5.31 X10*6/UL (ref 4.5–5.9)
RBC # UR STRIP.AUTO: ABNORMAL /UL
RBC #/AREA URNS AUTO: ABNORMAL /HPF
SARS-COV-2 RNA RESP QL NAA+PROBE: NOT DETECTED
SODIUM SERPL-SCNC: 133 MMOL/L (ref 133–145)
SP GR UR STRIP.AUTO: 1.01
UROBILINOGEN UR STRIP.AUTO-MCNC: NORMAL MG/DL
WBC # BLD AUTO: 8.1 X10*3/UL (ref 4.4–11.3)
WBC #/AREA URNS AUTO: ABNORMAL /HPF

## 2024-03-28 PROCEDURE — 76700 US EXAM ABDOM COMPLETE: CPT

## 2024-03-28 PROCEDURE — 36415 COLL VENOUS BLD VENIPUNCTURE: CPT | Performed by: STUDENT IN AN ORGANIZED HEALTH CARE EDUCATION/TRAINING PROGRAM

## 2024-03-28 PROCEDURE — 82077 ASSAY SPEC XCP UR&BREATH IA: CPT | Performed by: PHYSICIAN ASSISTANT

## 2024-03-28 PROCEDURE — 84166 PROTEIN E-PHORESIS/URINE/CSF: CPT | Performed by: INTERNAL MEDICINE

## 2024-03-28 PROCEDURE — 2500000004 HC RX 250 GENERAL PHARMACY W/ HCPCS (ALT 636 FOR OP/ED): Performed by: PHYSICIAN ASSISTANT

## 2024-03-28 PROCEDURE — 80307 DRUG TEST PRSMV CHEM ANLYZR: CPT | Performed by: PHYSICIAN ASSISTANT

## 2024-03-28 PROCEDURE — 76770 US EXAM ABDO BACK WALL COMP: CPT | Performed by: RADIOLOGY

## 2024-03-28 PROCEDURE — 87636 SARSCOV2 & INF A&B AMP PRB: CPT | Performed by: PHYSICIAN ASSISTANT

## 2024-03-28 PROCEDURE — 51798 US URINE CAPACITY MEASURE: CPT

## 2024-03-28 PROCEDURE — 86335 IMMUNFIX E-PHORSIS/URINE/CSF: CPT | Mod: TRILAB | Performed by: INTERNAL MEDICINE

## 2024-03-28 PROCEDURE — 99285 EMERGENCY DEPT VISIT HI MDM: CPT | Mod: 25

## 2024-03-28 PROCEDURE — 84100 ASSAY OF PHOSPHORUS: CPT | Performed by: INTERNAL MEDICINE

## 2024-03-28 PROCEDURE — 82570 ASSAY OF URINE CREATININE: CPT | Performed by: INTERNAL MEDICINE

## 2024-03-28 PROCEDURE — 76770 US EXAM ABDO BACK WALL COMP: CPT

## 2024-03-28 PROCEDURE — 83970 ASSAY OF PARATHORMONE: CPT | Mod: TRILAB | Performed by: INTERNAL MEDICINE

## 2024-03-28 PROCEDURE — 80053 COMPREHEN METABOLIC PANEL: CPT | Performed by: STUDENT IN AN ORGANIZED HEALTH CARE EDUCATION/TRAINING PROGRAM

## 2024-03-28 PROCEDURE — 36415 COLL VENOUS BLD VENIPUNCTURE: CPT | Performed by: PHYSICIAN ASSISTANT

## 2024-03-28 PROCEDURE — 1100000001 HC PRIVATE ROOM DAILY

## 2024-03-28 PROCEDURE — 2500000001 HC RX 250 WO HCPCS SELF ADMINISTERED DRUGS (ALT 637 FOR MEDICARE OP): Performed by: NURSE PRACTITIONER

## 2024-03-28 PROCEDURE — 82550 ASSAY OF CK (CPK): CPT | Performed by: INTERNAL MEDICINE

## 2024-03-28 PROCEDURE — 82652 VIT D 1 25-DIHYDROXY: CPT | Performed by: INTERNAL MEDICINE

## 2024-03-28 PROCEDURE — 76705 ECHO EXAM OF ABDOMEN: CPT

## 2024-03-28 PROCEDURE — 86325 OTHER IMMUNOELECTROPHORESIS: CPT | Performed by: INTERNAL MEDICINE

## 2024-03-28 PROCEDURE — 2500000004 HC RX 250 GENERAL PHARMACY W/ HCPCS (ALT 636 FOR OP/ED): Performed by: INTERNAL MEDICINE

## 2024-03-28 PROCEDURE — 82306 VITAMIN D 25 HYDROXY: CPT | Mod: TRILAB | Performed by: INTERNAL MEDICINE

## 2024-03-28 PROCEDURE — 2500000004 HC RX 250 GENERAL PHARMACY W/ HCPCS (ALT 636 FOR OP/ED)

## 2024-03-28 PROCEDURE — C9113 INJ PANTOPRAZOLE SODIUM, VIA: HCPCS

## 2024-03-28 PROCEDURE — 76705 ECHO EXAM OF ABDOMEN: CPT | Performed by: RADIOLOGY

## 2024-03-28 PROCEDURE — 85025 COMPLETE CBC W/AUTO DIFF WBC: CPT | Performed by: STUDENT IN AN ORGANIZED HEALTH CARE EDUCATION/TRAINING PROGRAM

## 2024-03-28 PROCEDURE — 81001 URINALYSIS AUTO W/SCOPE: CPT | Performed by: STUDENT IN AN ORGANIZED HEALTH CARE EDUCATION/TRAINING PROGRAM

## 2024-03-28 RX ORDER — PANTOPRAZOLE SODIUM 40 MG/10ML
40 INJECTION, POWDER, LYOPHILIZED, FOR SOLUTION INTRAVENOUS 2 TIMES DAILY
Status: COMPLETED | OUTPATIENT
Start: 2024-03-28 | End: 2024-03-31

## 2024-03-28 RX ORDER — SPIRONOLACTONE 50 MG/1
50 TABLET, FILM COATED ORAL DAILY
COMMUNITY
Start: 2024-03-12 | End: 2024-03-28 | Stop reason: ENTERED-IN-ERROR

## 2024-03-28 RX ORDER — METOPROLOL SUCCINATE 50 MG/1
50 TABLET, EXTENDED RELEASE ORAL DAILY
Status: DISCONTINUED | OUTPATIENT
Start: 2024-03-29 | End: 2024-03-31 | Stop reason: HOSPADM

## 2024-03-28 RX ORDER — FUROSEMIDE 40 MG/1
40 TABLET ORAL DAILY
Status: DISCONTINUED | OUTPATIENT
Start: 2024-03-28 | End: 2024-03-31 | Stop reason: HOSPADM

## 2024-03-28 RX ORDER — FUROSEMIDE 40 MG/1
40 TABLET ORAL DAILY
COMMUNITY
Start: 2024-03-12 | End: 2024-03-31 | Stop reason: HOSPADM

## 2024-03-28 RX ORDER — SODIUM CHLORIDE 9 MG/ML
80 INJECTION, SOLUTION INTRAVENOUS CONTINUOUS
Status: DISCONTINUED | OUTPATIENT
Start: 2024-03-28 | End: 2024-03-31

## 2024-03-28 RX ORDER — BUPROPION HYDROCHLORIDE 150 MG/1
150 TABLET ORAL
Status: DISCONTINUED | OUTPATIENT
Start: 2024-03-29 | End: 2024-03-31 | Stop reason: HOSPADM

## 2024-03-28 RX ORDER — LANOLIN ALCOHOL/MO/W.PET/CERES
100 CREAM (GRAM) TOPICAL DAILY
Status: DISCONTINUED | OUTPATIENT
Start: 2024-03-28 | End: 2024-03-31 | Stop reason: HOSPADM

## 2024-03-28 RX ORDER — IBUPROFEN 200 MG
1 TABLET ORAL DAILY
Status: DISCONTINUED | OUTPATIENT
Start: 2024-03-29 | End: 2024-03-31 | Stop reason: HOSPADM

## 2024-03-28 RX ORDER — HEPARIN SODIUM 5000 [USP'U]/ML
5000 INJECTION, SOLUTION INTRAVENOUS; SUBCUTANEOUS EVERY 8 HOURS SCHEDULED
Status: DISCONTINUED | OUTPATIENT
Start: 2024-03-28 | End: 2024-03-31 | Stop reason: HOSPADM

## 2024-03-28 RX ORDER — HEPARIN SODIUM 5000 [USP'U]/ML
5000 INJECTION, SOLUTION INTRAVENOUS; SUBCUTANEOUS EVERY 8 HOURS SCHEDULED
Status: DISCONTINUED | OUTPATIENT
Start: 2024-03-28 | End: 2024-03-28

## 2024-03-28 RX ORDER — POLYETHYLENE GLYCOL 3350 17 G/17G
17 POWDER, FOR SOLUTION ORAL DAILY PRN
Status: DISCONTINUED | OUTPATIENT
Start: 2024-03-28 | End: 2024-03-31 | Stop reason: HOSPADM

## 2024-03-28 RX ORDER — PANTOPRAZOLE SODIUM 20 MG/1
20 TABLET, DELAYED RELEASE ORAL
Status: DISCONTINUED | OUTPATIENT
Start: 2024-03-29 | End: 2024-03-28

## 2024-03-28 RX ORDER — MULTIVIT-MIN/IRON FUM/FOLIC AC 7.5 MG-4
1 TABLET ORAL DAILY
Status: DISCONTINUED | OUTPATIENT
Start: 2024-03-28 | End: 2024-03-31 | Stop reason: HOSPADM

## 2024-03-28 RX ORDER — LOSARTAN POTASSIUM 100 MG/1
100 TABLET ORAL DAILY
Status: DISCONTINUED | OUTPATIENT
Start: 2024-03-28 | End: 2024-03-31 | Stop reason: HOSPADM

## 2024-03-28 RX ORDER — ESCITALOPRAM OXALATE 10 MG/1
10 TABLET ORAL DAILY
Status: DISCONTINUED | OUTPATIENT
Start: 2024-03-29 | End: 2024-03-31 | Stop reason: HOSPADM

## 2024-03-28 RX ORDER — ONDANSETRON HYDROCHLORIDE 2 MG/ML
4 INJECTION, SOLUTION INTRAVENOUS EVERY 8 HOURS PRN
Status: DISCONTINUED | OUTPATIENT
Start: 2024-03-28 | End: 2024-03-31 | Stop reason: HOSPADM

## 2024-03-28 RX ORDER — FOLIC ACID 1 MG/1
1 TABLET ORAL DAILY
Status: DISCONTINUED | OUTPATIENT
Start: 2024-03-29 | End: 2024-03-31 | Stop reason: HOSPADM

## 2024-03-28 RX ORDER — ONDANSETRON 4 MG/1
4 TABLET, ORALLY DISINTEGRATING ORAL EVERY 8 HOURS PRN
Status: DISCONTINUED | OUTPATIENT
Start: 2024-03-28 | End: 2024-03-31 | Stop reason: HOSPADM

## 2024-03-28 RX ADMIN — Medication 100 MG: at 14:28

## 2024-03-28 RX ADMIN — SODIUM CHLORIDE 80 ML/HR: 900 INJECTION, SOLUTION INTRAVENOUS at 15:51

## 2024-03-28 RX ADMIN — Medication 1 TABLET: at 14:28

## 2024-03-28 RX ADMIN — PANTOPRAZOLE SODIUM 40 MG: 40 INJECTION, POWDER, FOR SOLUTION INTRAVENOUS at 22:11

## 2024-03-28 RX ADMIN — SODIUM CHLORIDE 1000 ML: 900 INJECTION, SOLUTION INTRAVENOUS at 10:20

## 2024-03-28 SDOH — SOCIAL STABILITY: SOCIAL INSECURITY: HAS ANYONE EVER THREATENED TO HURT YOUR FAMILY OR YOUR PETS?: NO

## 2024-03-28 SDOH — SOCIAL STABILITY: SOCIAL INSECURITY: ABUSE: ADULT

## 2024-03-28 SDOH — SOCIAL STABILITY: SOCIAL INSECURITY: DO YOU FEEL UNSAFE GOING BACK TO THE PLACE WHERE YOU ARE LIVING?: NO

## 2024-03-28 SDOH — SOCIAL STABILITY: SOCIAL INSECURITY: DOES ANYONE TRY TO KEEP YOU FROM HAVING/CONTACTING OTHER FRIENDS OR DOING THINGS OUTSIDE YOUR HOME?: NO

## 2024-03-28 SDOH — SOCIAL STABILITY: SOCIAL INSECURITY: ARE YOU OR HAVE YOU BEEN THREATENED OR ABUSED PHYSICALLY, EMOTIONALLY, OR SEXUALLY BY ANYONE?: NO

## 2024-03-28 SDOH — SOCIAL STABILITY: SOCIAL INSECURITY: ARE THERE ANY APPARENT SIGNS OF INJURIES/BEHAVIORS THAT COULD BE RELATED TO ABUSE/NEGLECT?: NO

## 2024-03-28 SDOH — SOCIAL STABILITY: SOCIAL INSECURITY: HAVE YOU HAD THOUGHTS OF HARMING ANYONE ELSE?: NO

## 2024-03-28 SDOH — SOCIAL STABILITY: SOCIAL INSECURITY: DO YOU FEEL ANYONE HAS EXPLOITED OR TAKEN ADVANTAGE OF YOU FINANCIALLY OR OF YOUR PERSONAL PROPERTY?: NO

## 2024-03-28 SDOH — SOCIAL STABILITY: SOCIAL INSECURITY: WERE YOU ABLE TO COMPLETE ALL THE BEHAVIORAL HEALTH SCREENINGS?: YES

## 2024-03-28 ASSESSMENT — PAIN SCALES - GENERAL
PAINLEVEL_OUTOF10: 0 - NO PAIN

## 2024-03-28 ASSESSMENT — ENCOUNTER SYMPTOMS
UNEXPECTED WEIGHT CHANGE: 0
CHOKING: 0
DIZZINESS: 0
SHORTNESS OF BREATH: 0
EYES NEGATIVE: 1
BLOOD IN STOOL: 0
FREQUENCY: 0
SINUS PRESSURE: 0
DIARRHEA: 1
SINUS PAIN: 0
VOMITING: 1
PSYCHIATRIC NEGATIVE: 1
NAUSEA: 1
WOUND: 0
HALLUCINATIONS: 0
GASTROINTESTINAL NEGATIVE: 1
BACK PAIN: 0
WHEEZING: 0
MUSCULOSKELETAL NEGATIVE: 1
POLYDIPSIA: 0
TROUBLE SWALLOWING: 0
NEUROLOGICAL NEGATIVE: 1
FEVER: 0
AGITATION: 0
MYALGIAS: 0
ROS GI COMMENTS: HEMATEMESIS
COLOR CHANGE: 0
FATIGUE: 1
DIFFICULTY URINATING: 0
CARDIOVASCULAR NEGATIVE: 1
CONFUSION: 0
APPETITE CHANGE: 1
CHEST TIGHTNESS: 0
RESPIRATORY NEGATIVE: 1
CONSTITUTIONAL NEGATIVE: 1
COUGH: 0
APNEA: 0
ENDOCRINE NEGATIVE: 1
PALPITATIONS: 0
ACTIVITY CHANGE: 1
POLYPHAGIA: 0
ARTHRALGIAS: 0
CHILLS: 0
HEADACHES: 0
HEMATOLOGIC/LYMPHATIC NEGATIVE: 1
DIAPHORESIS: 0
ABDOMINAL PAIN: 0
STRIDOR: 0
ABDOMINAL DISTENTION: 0
ALLERGIC/IMMUNOLOGIC NEGATIVE: 1
DYSURIA: 0
HEMATURIA: 0
SORE THROAT: 0

## 2024-03-28 ASSESSMENT — COGNITIVE AND FUNCTIONAL STATUS - GENERAL
DAILY ACTIVITIY SCORE: 24
MOBILITY SCORE: 24
PATIENT BASELINE BEDBOUND: NO
MOBILITY SCORE: 24
DAILY ACTIVITIY SCORE: 24

## 2024-03-28 ASSESSMENT — ACTIVITIES OF DAILY LIVING (ADL)
ADEQUATE_TO_COMPLETE_ADL: YES
BATHING: INDEPENDENT
PATIENT'S MEMORY ADEQUATE TO SAFELY COMPLETE DAILY ACTIVITIES?: YES
JUDGMENT_ADEQUATE_SAFELY_COMPLETE_DAILY_ACTIVITIES: YES
GROOMING: INDEPENDENT
HEARING - RIGHT EAR: FUNCTIONAL
WALKS IN HOME: INDEPENDENT
FEEDING YOURSELF: INDEPENDENT
TOILETING: INDEPENDENT
HEARING - LEFT EAR: FUNCTIONAL
DRESSING YOURSELF: INDEPENDENT
LACK_OF_TRANSPORTATION: NO

## 2024-03-28 ASSESSMENT — LIFESTYLE VARIABLES
PAROXYSMAL SWEATS: NO SWEAT VISIBLE
NAUSEA AND VOMITING: NO NAUSEA AND NO VOMITING
ORIENTATION AND CLOUDING OF SENSORIUM: ORIENTED AND CAN DO SERIAL ADDITIONS
AUDITORY DISTURBANCES: NOT PRESENT
HEADACHE, FULLNESS IN HEAD: NOT PRESENT
SKIP TO QUESTIONS 9-10: 0
AGITATION: NORMAL ACTIVITY
AUDIT-C TOTAL SCORE: 9
ANXIETY: MILDLY ANXIOUS
ANXIETY: NO ANXIETY, AT EASE
PAROXYSMAL SWEATS: BARELY PERCEPTIBLE SWEATING, PALMS MOIST
HOW OFTEN DO YOU HAVE A DRINK CONTAINING ALCOHOL: 4 OR MORE TIMES A WEEK
TOTAL SCORE: 3
VISUAL DISTURBANCES: NOT PRESENT
HOW MANY STANDARD DRINKS CONTAINING ALCOHOL DO YOU HAVE ON A TYPICAL DAY: 3 OR 4
AUDIT-C TOTAL SCORE: 9
HEADACHE, FULLNESS IN HEAD: NOT PRESENT
TREMOR: NOT VISIBLE, BUT CAN BE FELT FINGERTIP TO FINGERTIP
PRESCIPTION_ABUSE_PAST_12_MONTHS: NO
HOW OFTEN DO YOU HAVE 6 OR MORE DRINKS ON ONE OCCASION: DAILY OR ALMOST DAILY
NAUSEA AND VOMITING: NO NAUSEA AND NO VOMITING
TOTAL SCORE: 2
VISUAL DISTURBANCES: NOT PRESENT
AGITATION: SOMEWHAT MORE THAN NORMAL ACTIVITY
AUDITORY DISTURBANCES: NOT PRESENT
TREMOR: NOT VISIBLE, BUT CAN BE FELT FINGERTIP TO FINGERTIP
SUBSTANCE_ABUSE_PAST_12_MONTHS: YES
ORIENTATION AND CLOUDING OF SENSORIUM: ORIENTED AND CAN DO SERIAL ADDITIONS

## 2024-03-28 ASSESSMENT — PATIENT HEALTH QUESTIONNAIRE - PHQ9
2. FEELING DOWN, DEPRESSED OR HOPELESS: NOT AT ALL
SUM OF ALL RESPONSES TO PHQ9 QUESTIONS 1 & 2: 0
1. LITTLE INTEREST OR PLEASURE IN DOING THINGS: NOT AT ALL

## 2024-03-28 ASSESSMENT — COLUMBIA-SUICIDE SEVERITY RATING SCALE - C-SSRS
6. HAVE YOU EVER DONE ANYTHING, STARTED TO DO ANYTHING, OR PREPARED TO DO ANYTHING TO END YOUR LIFE?: NO
1. IN THE PAST MONTH, HAVE YOU WISHED YOU WERE DEAD OR WISHED YOU COULD GO TO SLEEP AND NOT WAKE UP?: NO
2. HAVE YOU ACTUALLY HAD ANY THOUGHTS OF KILLING YOURSELF?: NO

## 2024-03-28 ASSESSMENT — PAIN - FUNCTIONAL ASSESSMENT: PAIN_FUNCTIONAL_ASSESSMENT: 0-10

## 2024-03-28 NOTE — CONSULTS
"Consults    Reason For Consult  Acute kidney injury    History Of Present Illness  Leonardo Garcia \"Aamir\" is a 53 y.o. male with a past medical history of alcohol abuse, alcoholic liver cirrhosis, hypertension who presented to the hospital with nausea, hematemesis, diarrhea, decreased urine output today.  Patient was also sent here for acute kidney injury.  He denies having seen a nephrologist before.  He was also found to be hypercalcemic.  Denies any use of calcium or vitamin D supplements at home.  He was started on Lasix and spironolactone 2 weeks ago he says.  Not on any thiazide diuretics however.  He does use naproxen rarely.  He is alert and oriented x 3. Says he is a heavy drinker. We are consulted for management of acute kidney injury.     Past Medical History  He has a past medical history of Alcohol dependence (CMS/HCC), Alcoholic cirrhosis of liver with ascites (CMS/HCC), GERD (gastroesophageal reflux disease), Hyperlipidemia, and Hypertension.    Surgical History  He has a past surgical history that includes Sinus surgery (2013) and Paracentesis.     Social History  He reports that he has been smoking cigarettes. He has been smoking an average of .5 packs per day. He has never used smokeless tobacco. He reports current alcohol use. He reports that he does not use drugs.    Family History  Family History   Problem Relation Name Age of Onset    Hypertension Mother           age 64    Heart attack Father           at age 62    Other (Glioblastoma) Father      Diabetes Father      Cancer Father          Allergies  Clams    Review of Systems   Constitutional:  Negative for chills and fever.   HENT:  Negative for congestion and sinus pain.    Respiratory:  Negative for cough, chest tightness and shortness of breath.    Cardiovascular:  Negative for chest pain and leg swelling.   Gastrointestinal:  Positive for diarrhea, nausea and vomiting.        Hematemesis   Endocrine: Negative for polydipsia, " polyphagia and polyuria.   Genitourinary:  Positive for decreased urine volume. Negative for dysuria and hematuria.   Musculoskeletal:  Negative for arthralgias and myalgias.   Skin:  Negative for color change and rash.   Neurological:  Negative for dizziness, syncope and headaches.   Psychiatric/Behavioral:  Negative for confusion and hallucinations.           Physical Exam  Constitutional:       General: He is awake. He is not in acute distress.     Appearance: He is not toxic-appearing.   HENT:      Head: Normocephalic and atraumatic.      Mouth/Throat:      Mouth: Mucous membranes are moist.   Eyes:      General: No scleral icterus.     Comments: Conjunctiva clear   Neck:      Vascular: No JVD.   Cardiovascular:      Rate and Rhythm: Regular rhythm.      Heart sounds:      No friction rub.   Pulmonary:      Effort: Pulmonary effort is normal.      Breath sounds: Normal breath sounds.   Abdominal:      General: Bowel sounds are normal.      Palpations: Abdomen is soft.      Tenderness: There is no guarding or rebound.   Musculoskeletal:      Cervical back: Neck supple.      Right lower leg: No edema.      Left lower leg: No edema.   Skin:     General: Skin is warm and dry.   Neurological:      Mental Status: He is alert and oriented to person, place, and time.      Comments: Cooperative with exam   Psychiatric:         Mood and Affect: Mood and affect normal.            Last Recorded Vitals  Blood pressure 103/70, pulse 61, temperature 37 °C (98.6 °F), temperature source Tympanic, resp. rate 19, height 1.829 m (6'), weight 72.1 kg (158 lb 15.2 oz), SpO2 96 %.    Relevant Results  Results for orders placed or performed during the hospital encounter of 03/28/24 (from the past 24 hour(s))   Comprehensive metabolic panel   Result Value Ref Range    Glucose 146 (H) 65 - 99 mg/dL    Sodium 133 133 - 145 mmol/L    Potassium 5.1 3.4 - 5.1 mmol/L    Chloride 90 (L) 97 - 107 mmol/L    Bicarbonate 28 24 - 31 mmol/L    Urea  Nitrogen 63 (H) 8 - 25 mg/dL    Creatinine 5.00 (H) 0.40 - 1.60 mg/dL    eGFR 13 (L) >60 mL/min/1.73m*2    Calcium 11.4 (H) 8.5 - 10.4 mg/dL    Albumin 4.9 3.5 - 5.0 g/dL    Alkaline Phosphatase 358 (H) 35 - 125 U/L    Total Protein 10.2 (H) 5.9 - 7.9 g/dL    AST 34 5 - 40 U/L    Bilirubin, Total 1.4 (H) 0.1 - 1.2 mg/dL    ALT 22 5 - 40 U/L    Anion Gap 15 <=19 mmol/L   CBC and Auto Differential   Result Value Ref Range    WBC 8.1 4.4 - 11.3 x10*3/uL    nRBC 0.0 0.0 - 0.0 /100 WBCs    RBC 5.31 4.50 - 5.90 x10*6/uL    Hemoglobin 17.5 13.5 - 17.5 g/dL    Hematocrit 52.0 41.0 - 52.0 %    MCV 98 80 - 100 fL    MCH 33.0 26.0 - 34.0 pg    MCHC 33.7 32.0 - 36.0 g/dL    RDW 13.1 11.5 - 14.5 %    Platelets 233 150 - 450 x10*3/uL    Neutrophils % 67.7 40.0 - 80.0 %    Immature Granulocytes %, Automated 0.1 0.0 - 0.9 %    Lymphocytes % 22.2 13.0 - 44.0 %    Monocytes % 7.7 2.0 - 10.0 %    Eosinophils % 1.1 0.0 - 6.0 %    Basophils % 1.2 0.0 - 2.0 %    Neutrophils Absolute 5.50 1.20 - 7.70 x10*3/uL    Immature Granulocytes Absolute, Automated 0.01 0.00 - 0.70 x10*3/uL    Lymphocytes Absolute 1.81 1.20 - 4.80 x10*3/uL    Monocytes Absolute 0.63 0.10 - 1.00 x10*3/uL    Eosinophils Absolute 0.09 0.00 - 0.70 x10*3/uL    Basophils Absolute 0.10 0.00 - 0.10 x10*3/uL   Urinalysis with Reflex Microscopic   Result Value Ref Range    Color, Urine Light-Yellow Light-Yellow, Yellow, Dark-Yellow    Appearance, Urine Clear Clear    Specific Gravity, Urine 1.011 1.005 - 1.035    pH, Urine 5.5 5.0, 5.5, 6.0, 6.5, 7.0, 7.5, 8.0    Protein, Urine 50 (1+) (A) NEGATIVE, 10 (TRACE), 20 (TRACE) mg/dL    Glucose, Urine 150 (2+) (A) Normal mg/dL    Blood, Urine 0.03 (TRACE) (A) NEGATIVE    Ketones, Urine NEGATIVE NEGATIVE mg/dL    Bilirubin, Urine NEGATIVE NEGATIVE    Urobilinogen, Urine Normal Normal mg/dL    Nitrite, Urine NEGATIVE NEGATIVE    Leukocyte Esterase, Urine NEGATIVE NEGATIVE   Microscopic Only, Urine   Result Value Ref Range    WBC,  Urine 1-5 1-5, NONE /HPF    RBC, Urine NONE NONE, 1-2, 3-5 /HPF    Bacteria, Urine 1+ (A) NONE SEEN /HPF   Drug Screen, Urine   Result Value Ref Range    Amphetamine Screen, Urine Negative      Barbiturate Screen, Urine Negative      Benzodiazepines Screen, Urine Negative      Cannabinoid Screen, Urine Positive (A)      Cocaine Metabolite Screen, Urine Negative      Fentanyl Screen, Urine Negative       Methadone Screen, Urine Negative      Opiate Screen, Urine Negative      Oxycodone Screen, Urine Negative      PCP Screen, Urine Negative     Ethanol   Result Value Ref Range    Alcohol <0.010 0.000 - 0.010 g/dL   Sars-CoV-2 and Influenza A/B PCR   Result Value Ref Range    Flu A Result Not Detected Not Detected    Flu B Result Not Detected Not Detected    Coronavirus 2019, PCR Not Detected Not Detected          Assessment/Plan   Acute kidney injury  - Likely secondary to volume depletion with GI losses, diuretics, however rule out other etiologies  Hypercalcemia  Hypertension  Hematemesis  Alcohol abuse    Plan: Check a renal ultrasound with postvoid residual bladder scan.  Start IV fluids normal saline.  Agree with holding diuretics.  Agree with holding ARB.  Counseled on avoiding NSAIDs.  GI consulted.  Workup hypercalcemia.  Check urine protein/creatinine ratio.  Check CK level.  Hold parameters on any blood pressure medications to avoid hypotension.  No indications for dialysis at this time but will monitor closely.  Renal diet.  Thank you for your consultation.    Onesimo Gonzalez MD

## 2024-03-28 NOTE — PROGRESS NOTES
"Pharmacy Medication History Review    Leonardo Garcia \"Aamir\" is a 53 y.o. male admitted for Acute renal failure (ARF) (CMS/[facility]). Pharmacy reviewed the patient's [id number] medications and allergies for accuracy.    The list below reflectives the updated PTA list. Please review each medication in order reconciliation for additional clarification and justification.  Prior to Admission Medications   Prescriptions Last Dose Informant   EPINEPHrine 0.3 mg/0.3 mL injection syringe Unknown    Sig: INJECT INTO THE MUSCLE ONCE AS NEEDED FOR ALLERGIC REACTIONS   buPROPion XL (Wellbutrin XL) 150 mg 24 hr tablet 3/28/2024 at am    Sig: TAKE 1 TABLET BY MOUTH EVERY DAY IN THE MORNING   Patient taking differently: Take 1 tablet (150 mg) by mouth 2 times a day.   escitalopram (Lexapro) 10 mg tablet 3/28/2024 at am    Si tablet Orally Once a day for 30 days   folic acid (Folvite) 1 mg tablet 3/28/2024 at am    Sig: Take 1 tablet (1 mg) by mouth once daily.   furosemide (Lasix) 40 mg tablet 3/28/2024    Sig: Take 1 tablet (40 mg) by mouth once daily.   losartan (Cozaar) 100 mg tablet 3/28/2024 at am    Sig: Take 1 tablet (100 mg) by mouth once daily.   methylphenidate LA (Ritalin LA) 10 mg 24 hr capsule More than a month    Sig: Take 1 capsule (10 mg) by mouth once daily in the morning.   metoprolol succinate XL (Toprol-XL) 50 mg 24 hr tablet 3/28/2024 at am    Sig: TAKE 1 TABLET BY MOUTH EVERY DAY   naproxen (Naprosyn) 500 mg tablet Past Week    Sig: Take 1 tablet (500 mg) by mouth 2 times a day with meals. As needed   omeprazole (PriLOSEC) 20 mg DR capsule 3/28/2024 at am    Sig: Take 1 capsule (20 mg) by mouth once daily. Do not crush or chew.      Facility-Administered Medications: None          The list below reflectives the updated allergy list. Please review each documented allergy for additional clarification and justification.  Allergies  Reviewed by Catrachita Diallo CPhT on 3/28/2024        Severity Reactions " Comments    Clams High Shortness of breath             Below are additional concerns with the patient's PTA list.  - pt was advised to stop taking Spironolactone.  - pt has been unable to take Ritalin prescription for at least a month due to national back order.    EBEN JONES, CPhT

## 2024-03-28 NOTE — ED PROVIDER NOTES
HPI   Chief Complaint   Patient presents with    abnormal labs     Patient has abnormal labs, specifically, a high creatinine. Blood draw was 2 days ago.       This is a 53-year-old male with a past medical history of alcohol induced cirrhosis with ascites who presents emergency department due to abnormal labs.  He was seen by his gastroenterologist, Dr. Sullivan on March 26 and had labs done.  His creatinine was elevated at 4.5 and GI physician called him telling him to come to the emergency department.  Patient states that his last drink was yesterday.  He drank liquor.  He cannot quantify how much.  He has not had anything to drink today.  He states that he started a new medication for his ascites 2 weeks ago and he believes it is spironolactone.  He states that since during that medication he has been urinating frequently.  He denies any back pain or abdominal pain.  No nausea or vomiting.  No fever or chills.  No diarrhea or constipation.  He states that he did not take the medication today and has urinated a small amount.  Patient states that he is been to an inpatient alcohol detox facility prior and is attempting to go back to that facility.       Please see HPI for pertinent positive and negative ROS.                    Tristin Coma Scale Score: 15                     Patient History   History reviewed. No pertinent past medical history.  Past Surgical History:   Procedure Laterality Date    SINUS SURGERY  04/29/2013    Sinus Surgery     Family History   Problem Relation Name Age of Onset    Hypertension Mother      Heart attack Father      Brain cancer Father      Diabetes Father      Cancer Father       Social History     Tobacco Use    Smoking status: Every Day     Packs/day: .5     Types: Cigarettes    Smokeless tobacco: Never   Vaping Use    Vaping Use: Some days    Substances: Nicotine    Devices: Disposable, Pre-filled or refillable cartridge   Substance Use Topics    Alcohol use: Yes    Drug use:  Never       Physical Exam   ED Triage Vitals [03/28/24 0937]   Temperature Heart Rate Respirations BP   36 °C (96.8 °F) 66 18 106/69      Pulse Ox Temp Source Heart Rate Source Patient Position   100 % Temporal -- Sitting      BP Location FiO2 (%)     Right arm --       Physical Exam  GENERAL APPEARANCE: Awake and alert. No acute distress.   VITAL SIGNS: As per the nurses' triage record.  HEENT: Normocephalic, atraumatic. Extraocular muscles are intact. Conjunctiva are pink. Negative scleral icterus. Mucous membranes are moist. Tongue in the midline. Oropharynx clear, uvula midline.  No fasciculations of the tongue.  NECK: Soft, nontender and supple, full gross ROM, no meningeal signs.  CHEST: Nontender to palpation. Clear to auscultation bilaterally. No rales, rhonchi, or wheezing. Symmetric rise and fall of chest wall.   HEART: Clear S1 and S2. Regular rate and rhythm. No murmurs appreciated on auscultation.  Strong and equal pulses in the extremities.  ABDOMEN: Soft, nontender, nondistended, positive bowel sounds, no palpable masses.  Negative CVA tenderness bilaterally.  MUSCULOSKELETAL: The calves are nontender to palpation. Full gross active range of motion. Ambulating on own with no acute difficulties  NEUROLOGICAL: Awake, alert and oriented x 3. Motor power intact in the upper and lower extremities. Sensation is intact to light touch in the upper and lower extremities. Patient answering questions appropriately.   IMMUNOLOGICAL: No lymphatic streaking noted  DERMATOLOGIC: Warm and dry without petechiae, rashes, or ecchymosis noted on visible skin.   PYSCH: Cooperative with appropriate mood and affect.  ED Course & MDM   ED Course as of 03/28/24 1120   u Mar 28, 2024   1106 Bladder scan shows 65 cc of urine in bladder. [SC]      ED Course User Index  [SC] Didi Peres PA-C         Diagnoses as of 03/28/24 1120   Acute renal failure (ARF) (CMS/[facility])       Medical Decision Making  Parts of this chart  have been completed using voice recognition software. Please excuse any errors of transcription.  My thought process and reason for plan has been formulated from the time that I saw the patient until the time of disposition and is not specific to one specific moment during their visit and furthermore my MDM encompasses this entire chart and not only this text box.      HPI: Detailed above.    Exam: A medically appropriate exam performed, outlined above, given the known history and presentation.    History obtained from: Patient    Medications given during visit:  Medications   sodium chloride 0.9 % bolus 1,000 mL (1,000 mL intravenous New Bag 3/28/24 1020)        Diagnostic/tests  Labs Reviewed   COMPREHENSIVE METABOLIC PANEL - Abnormal       Result Value    Glucose 146 (*)     Sodium 133      Potassium 5.1      Chloride 90 (*)     Bicarbonate 28      Urea Nitrogen 63 (*)     Creatinine 5.00 (*)     eGFR 13 (*)     Calcium 11.4 (*)     Albumin 4.9      Alkaline Phosphatase 358 (*)     Total Protein 10.2 (*)     AST 34      Bilirubin, Total 1.4 (*)     ALT 22      Anion Gap 15     URINALYSIS WITH REFLEX MICROSCOPIC - Abnormal    Color, Urine Light-Yellow      Appearance, Urine Clear      Specific Gravity, Urine 1.011      pH, Urine 5.5      Protein, Urine 50 (1+) (*)     Glucose, Urine 150 (2+) (*)     Blood, Urine 0.03 (TRACE) (*)     Ketones, Urine NEGATIVE      Bilirubin, Urine NEGATIVE      Urobilinogen, Urine Normal      Nitrite, Urine NEGATIVE      Leukocyte Esterase, Urine NEGATIVE     MICROSCOPIC ONLY, URINE - Abnormal    WBC, Urine 1-5      RBC, Urine NONE      Bacteria, Urine 1+ (*)    ALCOHOL - Normal    Alcohol <0.010     SARS-COV-2 AND INFLUENZA A/B PCR - Normal    Flu A Result Not Detected      Flu B Result Not Detected      Coronavirus 2019, PCR Not Detected      Narrative:     This assay has received FDA Emergency Use Authorization (EUA) and  is only authorized for the duration of time that  circumstances exist to justify the authorization of the emergency use of in vitro diagnostic tests for the detection of SARS-CoV-2 virus and/or diagnosis of COVID-19 infection under section 564(b)(1) of the Act, 21 U.S.C. 360bbb-3(b)(1). Testing for SARS-CoV-2 is only recommended for patients who meet current clinical and/or epidemiological criteria as defined by federal, state, or local public health directives. This assay is an in vitro diagnostic nucleic acid amplification test for the qualitative detection of SARS-CoV-2, Influenza A, and Influenza B from nasopharyngeal specimens and has been validated for use at [facility]. Negative results do not preclude COVID-19 infections or Influenza A/B infections, and should not be used as the sole basis for diagnosis, treatment, or other management decisions. If Influenza A/B and RSV PCR results are negative, testing for Parainfluenza virus, Adenovirus and Metapneumovirus is routinely performed for [facility] pediatric oncology and intensive care inpatients, and is available on other patients by placing an add-on request.    CBC WITH AUTO DIFFERENTIAL    WBC 8.1      nRBC 0.0      RBC 5.31      Hemoglobin 17.5      Hematocrit 52.0      MCV 98      MCH 33.0      MCHC 33.7      RDW 13.1      Platelets 233      Neutrophils % 67.7      Immature Granulocytes %, Automated 0.1      Lymphocytes % 22.2      Monocytes % 7.7      Eosinophils % 1.1      Basophils % 1.2      Neutrophils Absolute 5.50      Immature Granulocytes Absolute, Automated 0.01      Lymphocytes Absolute 1.81      Monocytes Absolute 0.63      Eosinophils Absolute 0.09      Basophils Absolute 0.10     DRUG SCREEN,URINE    Amphetamine Screen, Urine Negative      Barbiturate Screen, Urine Negative      Benzodiazepines Screen, Urine Negative      Cannabinoid Screen, Urine        Cocaine Metabolite Screen, Urine Negative      Fentanyl Screen, Urine Negative      Methadone Screen, Urine Negative       Opiate Screen, Urine Negative      Oxycodone Screen, Urine Negative      PCP Screen, Urine Negative      Narrative:     These toxicological screening tests provide unconfirmed qualitative measurements to aid in treatment and diagnosis in cases of drug use or overdose. This test is used only for medical purposes. A positive result does not indicate or measure intoxication. For specific test performance or pathologist consultation, please contact the Laboratory.    The following threshold concentrations are used for these analyses.Values at or above the threshold concentration are reported as positive. Values below the threshold are reported as negative.    Drug /Screening Threshold                                                                                                 THC/CANNABINOIDS................50 ng/ml  METHADONE......................300 ng/ml  COCAINE METABOLITES............300 ng/ml  BENZODIAZEPINE.................300 ng/ml  PCP.............................25 ng/ml  OPIATE.........................300 ng/ml  AMPHETAMINE/ECSTASY...........1000 ng/ml  BARBITURATE....................200 ng/ml  OXYCODONE......................100 ng/ml  FENTANYL.........................5 ng/ml          No orders to display        Considerations/further MDM:  Patient was seen in conjucntion with my supervising physician,  Dr. aPstrana. Please refer to his note.    Patient presents afebrile, no tachycardia, normotensive, no tachypnea, 100% on room air    Differential diagnosis includes but is not limited to acute kidney injury versus electrolyte disturbance    Labs from March 26, 2024 revealed a creatinine of 4.5, BUN of 62, GFR of 15, sodium of 135, and potassium of 4.6.  Today, March 28 creatinine 5.0, BUN 63, GFR 13, sodium 133, potassium 5.1.  Patient's baseline creatinine is 0.7 2 weeks ago    Bladder scan revealed 65 mL in bladder.  Patient does state that he has noted a decrease in urine production since stopping the  spironolactone yesterday.  No evidence of infection.  Alkaline phosphatase elevated but likely due to alcoholic induced cirrhosis.  Total bilirubin, AST, ALT and albumin unremarkable for cirrhosis.     Alcohol level less than 0.010, urine does not show evidence of UTI, laboratory evaluation unremarkable.    The patient was evaluated in the emergency department and an etiology requiring emergent treatment or admission to hospital was identified.  The patient/family was counseled on clinical expression, expectations, and plan along with recommendations for admission.  All questions were answered and involved parties were understanding and agreeable to course of treatment.  Case was discussed with admitting physician, Cecilia.  Bed type ED treatment and further ED work-up decided by joint decision making with admitting team and any consultants.  Patient stable for admission per my assessment and further management of patient will be deferred to the inpatient setting.    Procedure  Procedures     Didi Peres PA-C  03/28/24 1120

## 2024-03-28 NOTE — H&P
History Of Present Illness  Aamir Garcia is a 53 y.o. male with a history of alcohol dependence, alcoholic cirrhosis of liver with ascites, GERD, hyperlipidemia, hypertension who was sent to the ED by his GI Dr. Sullivan for abnormal labs, elevated BUN and creatinine.  Patient was diagnosed with alcoholic liver disease in January of this year while traveling in [place]. He was hospitalized and had a paracentesis where several liters were removed. Patient came back to [place] and has established a PCP, Dr Wood and GI specialist Dr. Porter. Lab work from 3/8/2024 shows BUN of 28 and creatine of 0.7, today BUN is 63 and creatinine 5. Patient had been taking lasix and started on spirolactone recently. He also endorses using Naproxen frequently. For the past week patient has been weak, nauseous, had a decrease in urinary output, and diarrhea. Yesterday he vomited a large amount of bright red blood one time and nothing since then. Patient drinks several servings of liquor daily (unwilling to give specific amounts) and admits to dependency. Last drink was yesterday evening.  Patient denies any fever or chills.      Past Medical History  Past Medical History:   Diagnosis Date    Alcohol dependence (CMS/HCC)     Alcoholic cirrhosis of liver with ascites (CMS/HCC)     GERD (gastroesophageal reflux disease)     Hyperlipidemia     Hypertension        Surgical History  Past Surgical History:   Procedure Laterality Date    PARACENTESIS      2x in 2024    SINUS SURGERY  2013    Sinus Surgery        Social History  He reports that he has been smoking cigarettes. He has been smoking an average of .5 packs per day. He has never used smokeless tobacco. He reports current alcohol use. He reports that he does not use drugs.    Family History  Family History   Problem Relation Name Age of Onset    Hypertension Mother           age 64    Heart attack Father           at age 62    Other (Glioblastoma) Father       Diabetes Father      Cancer Father          Allergies  Clams    Review of Systems   Constitutional:  Positive for activity change, appetite change and fatigue. Negative for chills, diaphoresis, fever and unexpected weight change.   HENT:  Negative for congestion, postnasal drip, sinus pressure, sinus pain, sore throat and trouble swallowing.    Respiratory:  Negative for apnea, cough, choking, chest tightness, shortness of breath, wheezing and stridor.    Cardiovascular:  Negative for chest pain, palpitations and leg swelling.   Gastrointestinal:  Positive for diarrhea, nausea and vomiting. Negative for abdominal distention, abdominal pain and blood in stool.   Genitourinary:  Positive for decreased urine volume. Negative for difficulty urinating, frequency, hematuria, penile swelling, scrotal swelling, testicular pain and urgency.   Musculoskeletal:  Negative for arthralgias and back pain.   Skin:  Negative for pallor, rash and wound.   Psychiatric/Behavioral:  Negative for agitation, behavioral problems, confusion and hallucinations.         Physical Exam  Constitutional:       Appearance: Normal appearance.   Cardiovascular:      Rate and Rhythm: Normal rate and regular rhythm.      Pulses: Normal pulses.      Heart sounds: Normal heart sounds.   Pulmonary:      Effort: Pulmonary effort is normal.      Breath sounds: Normal breath sounds.   Abdominal:      General: Abdomen is flat. Bowel sounds are normal. There is no distension.      Palpations: Abdomen is soft.      Tenderness: There is no abdominal tenderness. There is no right CVA tenderness, left CVA tenderness, guarding or rebound.   Musculoskeletal:         General: Normal range of motion.   Skin:     General: Skin is warm and dry.   Neurological:      Mental Status: He is alert.          Last Recorded Vitals  Blood pressure 103/70, pulse 61, temperature 37 °C (98.6 °F), temperature source Tympanic, resp. rate 19, height 1.829 m (6'), weight 72.1 kg (158  lb 15.2 oz), SpO2 96 %.    Relevant Results        Results for orders placed or performed during the hospital encounter of 03/28/24 (from the past 24 hour(s))   Comprehensive metabolic panel   Result Value Ref Range    Glucose 146 (H) 65 - 99 mg/dL    Sodium 133 133 - 145 mmol/L    Potassium 5.1 3.4 - 5.1 mmol/L    Chloride 90 (L) 97 - 107 mmol/L    Bicarbonate 28 24 - 31 mmol/L    Urea Nitrogen 63 (H) 8 - 25 mg/dL    Creatinine 5.00 (H) 0.40 - 1.60 mg/dL    eGFR 13 (L) >60 mL/min/1.73m*2    Calcium 11.4 (H) 8.5 - 10.4 mg/dL    Albumin 4.9 3.5 - 5.0 g/dL    Alkaline Phosphatase 358 (H) 35 - 125 U/L    Total Protein 10.2 (H) 5.9 - 7.9 g/dL    AST 34 5 - 40 U/L    Bilirubin, Total 1.4 (H) 0.1 - 1.2 mg/dL    ALT 22 5 - 40 U/L    Anion Gap 15 <=19 mmol/L   CBC and Auto Differential   Result Value Ref Range    WBC 8.1 4.4 - 11.3 x10*3/uL    nRBC 0.0 0.0 - 0.0 /100 WBCs    RBC 5.31 4.50 - 5.90 x10*6/uL    Hemoglobin 17.5 13.5 - 17.5 g/dL    Hematocrit 52.0 41.0 - 52.0 %    MCV 98 80 - 100 fL    MCH 33.0 26.0 - 34.0 pg    MCHC 33.7 32.0 - 36.0 g/dL    RDW 13.1 11.5 - 14.5 %    Platelets 233 150 - 450 x10*3/uL    Neutrophils % 67.7 40.0 - 80.0 %    Immature Granulocytes %, Automated 0.1 0.0 - 0.9 %    Lymphocytes % 22.2 13.0 - 44.0 %    Monocytes % 7.7 2.0 - 10.0 %    Eosinophils % 1.1 0.0 - 6.0 %    Basophils % 1.2 0.0 - 2.0 %    Neutrophils Absolute 5.50 1.20 - 7.70 x10*3/uL    Immature Granulocytes Absolute, Automated 0.01 0.00 - 0.70 x10*3/uL    Lymphocytes Absolute 1.81 1.20 - 4.80 x10*3/uL    Monocytes Absolute 0.63 0.10 - 1.00 x10*3/uL    Eosinophils Absolute 0.09 0.00 - 0.70 x10*3/uL    Basophils Absolute 0.10 0.00 - 0.10 x10*3/uL   Urinalysis with Reflex Microscopic   Result Value Ref Range    Color, Urine Light-Yellow Light-Yellow, Yellow, Dark-Yellow    Appearance, Urine Clear Clear    Specific Gravity, Urine 1.011 1.005 - 1.035    pH, Urine 5.5 5.0, 5.5, 6.0, 6.5, 7.0, 7.5, 8.0    Protein, Urine 50 (1+) (A)  NEGATIVE, 10 (TRACE), 20 (TRACE) mg/dL    Glucose, Urine 150 (2+) (A) Normal mg/dL    Blood, Urine 0.03 (TRACE) (A) NEGATIVE    Ketones, Urine NEGATIVE NEGATIVE mg/dL    Bilirubin, Urine NEGATIVE NEGATIVE    Urobilinogen, Urine Normal Normal mg/dL    Nitrite, Urine NEGATIVE NEGATIVE    Leukocyte Esterase, Urine NEGATIVE NEGATIVE   Microscopic Only, Urine   Result Value Ref Range    WBC, Urine 1-5 1-5, NONE /HPF    RBC, Urine NONE NONE, 1-2, 3-5 /HPF    Bacteria, Urine 1+ (A) NONE SEEN /HPF   Drug Screen, Urine   Result Value Ref Range    Amphetamine Screen, Urine Negative      Barbiturate Screen, Urine Negative      Benzodiazepines Screen, Urine Negative      Cannabinoid Screen, Urine Positive (A)      Cocaine Metabolite Screen, Urine Negative      Fentanyl Screen, Urine Negative       Methadone Screen, Urine Negative      Opiate Screen, Urine Negative      Oxycodone Screen, Urine Negative      PCP Screen, Urine Negative     Ethanol   Result Value Ref Range    Alcohol <0.010 0.000 - 0.010 g/dL   Sars-CoV-2 and Influenza A/B PCR   Result Value Ref Range    Flu A Result Not Detected Not Detected    Flu B Result Not Detected Not Detected    Coronavirus 2019, PCR Not Detected Not Detected          Assessment/Plan   Principal Problem:    Acute renal failure (ARF) (CMS/[facility])    Acute Renal Failure  Consult nephrology, Dr. Gonzalez  Hold diuretics  Potasium restricted diet    Cirrhosis with Ascites and Alcohol dependency  DVT/GI prophylaxis  Heparin subcutaneous  Sequential TEDs  Protonix  CIWA    Plan of Care  Home medication reviewed.  Advanced directives discussed with patient. He does not had a Living Will. He wishes to be a Full Code.     Plan of care reviewed with patient and collaborating physician, Dr. Brooks.            I spent 50 minutes in the professional and overall care of this patient.      Bina Ricci, APRN-CNP

## 2024-03-28 NOTE — CARE PLAN
Problem: Pain  Goal: My pain/discomfort is manageable  Outcome: Progressing     Problem: Safety  Goal: Patient will be injury free during hospitalization  Outcome: Progressing  Goal: I will remain free of falls  Outcome: Progressing     Problem: Daily Care  Goal: Daily care needs are met  Outcome: Progressing     Problem: Psychosocial Needs  Goal: Demonstrates ability to cope with hospitalization/illness  Outcome: Progressing  Goal: Collaborate with me, my family, and caregiver to identify my specific goals  Outcome: Progressing  Flowsheets (Taken 3/28/2024 7150)  Cultural Requests During Hospitalization: denies  Spiritual Requests During Hospitalization: denies     Problem: Discharge Barriers  Goal: My discharge needs are met  Outcome: Progressing   The patient's goals for the shift include      The clinical goals for the shift include saftey and comfort    Over the shift, the patient did not make progress toward the following goals. Barriers to progression include kidney injury. Recommendations to address these barriers include renal diet, ivf.

## 2024-03-29 LAB
25(OH)D3 SERPL-MCNC: 32 NG/ML (ref 30–100)
AFP SERPL-MCNC: <4 NG/ML (ref 0–9)
ANION GAP SERPL CALC-SCNC: 12 MMOL/L
BUN SERPL-MCNC: 52 MG/DL (ref 8–25)
CALCIUM SERPL-MCNC: 10 MG/DL (ref 8.5–10.4)
CHLORIDE SERPL-SCNC: 99 MMOL/L (ref 97–107)
CO2 SERPL-SCNC: 24 MMOL/L (ref 24–31)
CREAT SERPL-MCNC: 3.2 MG/DL (ref 0.4–1.6)
EGFRCR SERPLBLD CKD-EPI 2021: 22 ML/MIN/1.73M*2
ERYTHROCYTE [DISTWIDTH] IN BLOOD BY AUTOMATED COUNT: 12.8 % (ref 11.5–14.5)
GLUCOSE SERPL-MCNC: 110 MG/DL (ref 65–99)
HAV IGM SER QL: NONREACTIVE
HBV CORE IGM SER QL: NONREACTIVE
HBV SURFACE AG SERPL QL IA: NONREACTIVE
HCT VFR BLD AUTO: 45 % (ref 41–52)
HCV AB SER QL: NONREACTIVE
HGB BLD-MCNC: 15.7 G/DL (ref 13.5–17.5)
INR PPP: 1.3 (ref 0.9–1.2)
MCH RBC QN AUTO: 34 PG (ref 26–34)
MCHC RBC AUTO-ENTMCNC: 34.9 G/DL (ref 32–36)
MCV RBC AUTO: 97 FL (ref 80–100)
NRBC BLD-RTO: 0 /100 WBCS (ref 0–0)
PLATELET # BLD AUTO: 155 X10*3/UL (ref 150–450)
POTASSIUM SERPL-SCNC: 4 MMOL/L (ref 3.4–5.1)
PROT UR-ACNC: 90 MG/DL (ref 5–25)
PROTHROMBIN TIME: 13.2 SECONDS (ref 9.3–12.7)
PTH-INTACT SERPL-MCNC: 56.4 PG/ML (ref 18.5–88)
RBC # BLD AUTO: 4.62 X10*6/UL (ref 4.5–5.9)
SODIUM SERPL-SCNC: 135 MMOL/L (ref 133–145)
TSH SERPL DL<=0.05 MIU/L-ACNC: 1.38 MIU/L (ref 0.27–4.2)
WBC # BLD AUTO: 6.9 X10*3/UL (ref 4.4–11.3)

## 2024-03-29 PROCEDURE — 82105 ALPHA-FETOPROTEIN SERUM: CPT | Mod: TRILAB,WESLAB

## 2024-03-29 PROCEDURE — 36415 COLL VENOUS BLD VENIPUNCTURE: CPT | Performed by: NURSE PRACTITIONER

## 2024-03-29 PROCEDURE — 85610 PROTHROMBIN TIME: CPT | Performed by: NURSE PRACTITIONER

## 2024-03-29 PROCEDURE — 85027 COMPLETE CBC AUTOMATED: CPT | Performed by: NURSE PRACTITIONER

## 2024-03-29 PROCEDURE — C9113 INJ PANTOPRAZOLE SODIUM, VIA: HCPCS

## 2024-03-29 PROCEDURE — 2500000004 HC RX 250 GENERAL PHARMACY W/ HCPCS (ALT 636 FOR OP/ED): Performed by: INTERNAL MEDICINE

## 2024-03-29 PROCEDURE — 1100000001 HC PRIVATE ROOM DAILY

## 2024-03-29 PROCEDURE — 2500000001 HC RX 250 WO HCPCS SELF ADMINISTERED DRUGS (ALT 637 FOR MEDICARE OP): Performed by: NURSE PRACTITIONER

## 2024-03-29 PROCEDURE — 80048 BASIC METABOLIC PNL TOTAL CA: CPT | Performed by: NURSE PRACTITIONER

## 2024-03-29 PROCEDURE — 2500000001 HC RX 250 WO HCPCS SELF ADMINISTERED DRUGS (ALT 637 FOR MEDICARE OP): Performed by: INTERNAL MEDICINE

## 2024-03-29 PROCEDURE — 2500000004 HC RX 250 GENERAL PHARMACY W/ HCPCS (ALT 636 FOR OP/ED): Performed by: NURSE PRACTITIONER

## 2024-03-29 PROCEDURE — S4991 NICOTINE PATCH NONLEGEND: HCPCS | Performed by: NURSE PRACTITIONER

## 2024-03-29 PROCEDURE — 2500000004 HC RX 250 GENERAL PHARMACY W/ HCPCS (ALT 636 FOR OP/ED)

## 2024-03-29 PROCEDURE — 2500000002 HC RX 250 W HCPCS SELF ADMINISTERED DRUGS (ALT 637 FOR MEDICARE OP, ALT 636 FOR OP/ED): Performed by: NURSE PRACTITIONER

## 2024-03-29 PROCEDURE — 80074 ACUTE HEPATITIS PANEL: CPT | Mod: TRILAB,WESLAB

## 2024-03-29 PROCEDURE — 2500000005 HC RX 250 GENERAL PHARMACY W/O HCPCS: Performed by: NURSE PRACTITIONER

## 2024-03-29 PROCEDURE — 84443 ASSAY THYROID STIM HORMONE: CPT

## 2024-03-29 RX ADMIN — ESCITALOPRAM OXALATE 10 MG: 10 TABLET ORAL at 10:02

## 2024-03-29 RX ADMIN — Medication 1 PATCH: at 10:02

## 2024-03-29 RX ADMIN — ONDANSETRON 4 MG: 4 TABLET, ORALLY DISINTEGRATING ORAL at 19:17

## 2024-03-29 RX ADMIN — FOLIC ACID 1 MG: 1 TABLET ORAL at 10:02

## 2024-03-29 RX ADMIN — METOPROLOL SUCCINATE 50 MG: 50 TABLET, EXTENDED RELEASE ORAL at 10:02

## 2024-03-29 RX ADMIN — PANTOPRAZOLE SODIUM 40 MG: 40 INJECTION, POWDER, FOR SOLUTION INTRAVENOUS at 10:02

## 2024-03-29 RX ADMIN — PANTOPRAZOLE SODIUM 40 MG: 40 INJECTION, POWDER, FOR SOLUTION INTRAVENOUS at 20:28

## 2024-03-29 RX ADMIN — SODIUM CHLORIDE 80 ML/HR: 900 INJECTION, SOLUTION INTRAVENOUS at 01:33

## 2024-03-29 RX ADMIN — Medication 100 MG: at 10:02

## 2024-03-29 RX ADMIN — Medication 1 TABLET: at 10:02

## 2024-03-29 RX ADMIN — BUPROPION HYDROCHLORIDE 150 MG: 150 TABLET, EXTENDED RELEASE ORAL at 10:02

## 2024-03-29 RX ADMIN — SODIUM CHLORIDE 80 ML/HR: 900 INJECTION, SOLUTION INTRAVENOUS at 13:59

## 2024-03-29 ASSESSMENT — LIFESTYLE VARIABLES
TREMOR: NO TREMOR
TOTAL SCORE: 0
PAROXYSMAL SWEATS: NO SWEAT VISIBLE
NAUSEA AND VOMITING: NO NAUSEA AND NO VOMITING
TOTAL SCORE: 0
HEADACHE, FULLNESS IN HEAD: NOT PRESENT
TREMOR: NO TREMOR
AGITATION: NORMAL ACTIVITY
TOTAL SCORE: 0
ANXIETY: NO ANXIETY, AT EASE
ORIENTATION AND CLOUDING OF SENSORIUM: ORIENTED AND CAN DO SERIAL ADDITIONS
VISUAL DISTURBANCES: NOT PRESENT
ANXIETY: NO ANXIETY, AT EASE
AUDITORY DISTURBANCES: NOT PRESENT
AUDITORY DISTURBANCES: NOT PRESENT
PAROXYSMAL SWEATS: NO SWEAT VISIBLE
PULSE: 70
NAUSEA AND VOMITING: NO NAUSEA AND NO VOMITING
ANXIETY: NO ANXIETY, AT EASE
ANXIETY: NO ANXIETY, AT EASE
AGITATION: NORMAL ACTIVITY
BLOOD PRESSURE: 125/74
AGITATION: NORMAL ACTIVITY
TOTAL SCORE: 0
ORIENTATION AND CLOUDING OF SENSORIUM: ORIENTED AND CAN DO SERIAL ADDITIONS
NAUSEA AND VOMITING: NO NAUSEA AND NO VOMITING
NAUSEA AND VOMITING: NO NAUSEA AND NO VOMITING
ANXIETY: NO ANXIETY, AT EASE
PAROXYSMAL SWEATS: NO SWEAT VISIBLE
NAUSEA AND VOMITING: NO NAUSEA AND NO VOMITING
NAUSEA AND VOMITING: NO NAUSEA AND NO VOMITING
AUDITORY DISTURBANCES: NOT PRESENT
ANXIETY: NO ANXIETY, AT EASE
ORIENTATION AND CLOUDING OF SENSORIUM: ORIENTED AND CAN DO SERIAL ADDITIONS
VISUAL DISTURBANCES: NOT PRESENT
ORIENTATION AND CLOUDING OF SENSORIUM: ORIENTED AND CAN DO SERIAL ADDITIONS
AGITATION: NORMAL ACTIVITY
TREMOR: NO TREMOR
AUDITORY DISTURBANCES: NOT PRESENT
AUDITORY DISTURBANCES: NOT PRESENT
HEADACHE, FULLNESS IN HEAD: NOT PRESENT
ANXIETY: NO ANXIETY, AT EASE
PULSE: 66
TREMOR: NO TREMOR
VISUAL DISTURBANCES: NOT PRESENT
HEADACHE, FULLNESS IN HEAD: NOT PRESENT
HEADACHE, FULLNESS IN HEAD: NOT PRESENT
TREMOR: NO TREMOR
PAROXYSMAL SWEATS: NO SWEAT VISIBLE
AGITATION: NORMAL ACTIVITY
AGITATION: NORMAL ACTIVITY
TOTAL SCORE: 0
TREMOR: NO TREMOR
PAROXYSMAL SWEATS: NO SWEAT VISIBLE
AGITATION: NORMAL ACTIVITY
ORIENTATION AND CLOUDING OF SENSORIUM: ORIENTED AND CAN DO SERIAL ADDITIONS
HEADACHE, FULLNESS IN HEAD: NOT PRESENT
TOTAL SCORE: 0
TREMOR: NO TREMOR
TOTAL SCORE: 0
HEADACHE, FULLNESS IN HEAD: NOT PRESENT
HEADACHE, FULLNESS IN HEAD: NOT PRESENT
ORIENTATION AND CLOUDING OF SENSORIUM: ORIENTED AND CAN DO SERIAL ADDITIONS
AUDITORY DISTURBANCES: NOT PRESENT
ANXIETY: NO ANXIETY, AT EASE
AGITATION: NORMAL ACTIVITY
VISUAL DISTURBANCES: NOT PRESENT
TREMOR: NO TREMOR
AGITATION: NORMAL ACTIVITY
NAUSEA AND VOMITING: NO NAUSEA AND NO VOMITING
VISUAL DISTURBANCES: NOT PRESENT
PAROXYSMAL SWEATS: NO SWEAT VISIBLE
TOTAL SCORE: 0
PAROXYSMAL SWEATS: NO SWEAT VISIBLE
VISUAL DISTURBANCES: NOT PRESENT
TOTAL SCORE: 0
ORIENTATION AND CLOUDING OF SENSORIUM: ORIENTED AND CAN DO SERIAL ADDITIONS
VISUAL DISTURBANCES: NOT PRESENT
AUDITORY DISTURBANCES: NOT PRESENT
PAROXYSMAL SWEATS: NO SWEAT VISIBLE
VISUAL DISTURBANCES: NOT PRESENT
VISUAL DISTURBANCES: NOT PRESENT
HEADACHE, FULLNESS IN HEAD: NOT PRESENT
NAUSEA AND VOMITING: NO NAUSEA AND NO VOMITING
ORIENTATION AND CLOUDING OF SENSORIUM: ORIENTED AND CAN DO SERIAL ADDITIONS
HEADACHE, FULLNESS IN HEAD: NOT PRESENT
AUDITORY DISTURBANCES: NOT PRESENT
TREMOR: NO TREMOR
AUDITORY DISTURBANCES: NOT PRESENT
BLOOD PRESSURE: 112/66
ORIENTATION AND CLOUDING OF SENSORIUM: ORIENTED AND CAN DO SERIAL ADDITIONS
PAROXYSMAL SWEATS: NO SWEAT VISIBLE
NAUSEA AND VOMITING: NO NAUSEA AND NO VOMITING
ANXIETY: NO ANXIETY, AT EASE

## 2024-03-29 ASSESSMENT — COGNITIVE AND FUNCTIONAL STATUS - GENERAL
MOBILITY SCORE: 24
MOBILITY SCORE: 24
DAILY ACTIVITIY SCORE: 24
DAILY ACTIVITIY SCORE: 24

## 2024-03-29 ASSESSMENT — PAIN SCALES - GENERAL
PAINLEVEL_OUTOF10: 0 - NO PAIN
PAINLEVEL_OUTOF10: 0 - NO PAIN

## 2024-03-29 NOTE — CONSULTS
Inpatient consult to gastroenterology  Consult performed by: LILO López-CNP  Consult ordered by: LILO Molina-BALTA          Reason For Consult  Alcohol Cirrhosis    History Of Present Illness  Aamir Garcia is a 53 y.o. male presenting with past medical history of alcohol induced cirrhosis with ascites who presents emergency department due to abnormal labs.  He was seen by his Dr. Sullivan on March 27 for follow up and was advised yesterday to come to ER for acute renal failure. His creatinine was elevated at 4.5. ER labs show creatinine 5.0, BUN 63, GFR 13, sodium 133, potassium 5.1.  Patient's baseline creatinine is 0.7 2 weeks ago.  He reports he drinks daily, last drink yesterday. Has been to rehab/detox facilities in the past. Patient had been taking lasix and started was started on spirolactone recently. He admits frequent use of Naproxen. Patient was seen and evaluated in ED. He states he had one episode of emesis with large amount of bright red blood nothing since then. Patient also states intermittent diarrhea, no black tarry stools.  Last colon 2020 by Dr. Swenson showed diverticulosis of large intestine without complication.  No mass or colitis seen, Biopsy negative. EGD in 2010 by Dr. Sullivan showed healing ulcerative esophagitis.     Past Medical History  He has a past medical history of Alcohol dependence (CMS/HCC), Alcoholic cirrhosis of liver with ascites (CMS/HCC), GERD (gastroesophageal reflux disease), Hyperlipidemia, and Hypertension.    Surgical History  He has a past surgical history that includes Sinus surgery (04/29/2013) and Paracentesis.     Social History  He reports that he has been smoking cigarettes. He has been smoking an average of .5 packs per day. He has never used smokeless tobacco. He reports current alcohol use. He reports that he does not use drugs.    Family History  Family History   Problem Relation Name Age of Onset    Hypertension Mother            age 64    Heart attack Father           at age 62    Other (Glioblastoma) Father      Diabetes Father      Cancer Father          Allergies  Clams    Review of Systems   Constitutional: Negative.    HENT: Negative.     Eyes: Negative.    Respiratory: Negative.     Cardiovascular: Negative.    Gastrointestinal: Negative.    Endocrine: Negative.    Genitourinary: Negative.    Musculoskeletal: Negative.    Skin: Negative.    Allergic/Immunologic: Negative.    Neurological: Negative.    Hematological: Negative.    Psychiatric/Behavioral: Negative.          Physical Exam  HENT:      Head: Normocephalic.      Nose: Nose normal.   Eyes:      Pupils: Pupils are equal, round, and reactive to light.   Cardiovascular:      Rate and Rhythm: Normal rate.   Pulmonary:      Effort: Pulmonary effort is normal.   Abdominal:      Palpations: Abdomen is soft.   Musculoskeletal:         General: Normal range of motion.      Cervical back: Normal range of motion.   Skin:     General: Skin is warm.   Neurological:      General: No focal deficit present.      Mental Status: He is alert.   Psychiatric:         Mood and Affect: Mood normal.          Last Recorded Vitals  Blood pressure 124/77, pulse 68, temperature 37.3 °C (99.1 °F), temperature source Temporal, resp. rate 16, height 1.829 m (6'), weight 72.1 kg (158 lb 15.2 oz), SpO2 100 %.    Relevant Results  US renal complete    Result Date: 3/28/2024  Interpreted By:  Julio Valentine, STUDY: US RENAL COMPLETE;  3/28/2024 3:03 pm   INDICATION: Signs/Symptoms:DIMITRI.   COMPARISON: None.   ACCESSION NUMBER(S): [id number]   ORDERING CLINICIAN: TYSON ARRINGTON   TECHNIQUE: Multiple images of the kidneys were obtained  .   FINDINGS: RIGHT KIDNEY: The right kidney measures 11.0 cm in length. The renal cortical echogenicity and thickness are within normal limits. No hydronephrosis is present; no evidence of nephrolithiasis.   LEFT KIDNEY: The left kidney measures 12.0 cm in length. The  renal cortical echogenicity and thickness are within normal limits. No hydronephrosis is present; no evidence of nephrolithiasis.   BLADDER: The bladder is normally distended. No evidence for wall thickening. Ureteral jets are noted. Prevoid bladder volume = 128.6 cc. Postvoid residual = 0.04%.       Renal and bladder ultrasound is within normal limits.   MACRO: None   Signed by: Julio Valentine 3/28/2024 3:28 PM Dictation workstation:   [id number]      Scheduled medications  [START ON 3/29/2024] buPROPion XL, 150 mg, oral, Daily before breakfast  [START ON 3/29/2024] escitalopram, 10 mg, oral, Daily  [START ON 3/29/2024] folic acid, 1 mg, oral, Daily  [Held by provider] furosemide, 40 mg, oral, Daily  heparin (porcine), 5,000 Units, subcutaneous, q8h ELIANE  [Held by provider] losartan, 100 mg, oral, Daily  [START ON 3/29/2024] metoprolol succinate XL, 50 mg, oral, Daily  multivitamin with minerals, 1 tablet, oral, Daily  [START ON 3/29/2024] nicotine, 1 patch, transdermal, Daily  [START ON 3/29/2024] pantoprazole, 20 mg, oral, Daily before breakfast  thiamine, 100 mg, oral, Daily      Continuous medications  sodium chloride 0.9%, 80 mL/hr, Last Rate: 80 mL/hr (03/28/24 1551)      PRN medications  PRN medications: ondansetron ODT **OR** ondansetron, polyethylene glycol  Results for orders placed or performed during the hospital encounter of 03/28/24 (from the past 24 hour(s))   Comprehensive metabolic panel   Result Value Ref Range    Glucose 146 (H) 65 - 99 mg/dL    Sodium 133 133 - 145 mmol/L    Potassium 5.1 3.4 - 5.1 mmol/L    Chloride 90 (L) 97 - 107 mmol/L    Bicarbonate 28 24 - 31 mmol/L    Urea Nitrogen 63 (H) 8 - 25 mg/dL    Creatinine 5.00 (H) 0.40 - 1.60 mg/dL    eGFR 13 (L) >60 mL/min/1.73m*2    Calcium 11.4 (H) 8.5 - 10.4 mg/dL    Albumin 4.9 3.5 - 5.0 g/dL    Alkaline Phosphatase 358 (H) 35 - 125 U/L    Total Protein 10.2 (H) 5.9 - 7.9 g/dL    AST 34 5 - 40 U/L    Bilirubin, Total 1.4 (H) 0.1 - 1.2  mg/dL    ALT 22 5 - 40 U/L    Anion Gap 15 <=19 mmol/L   CBC and Auto Differential   Result Value Ref Range    WBC 8.1 4.4 - 11.3 x10*3/uL    nRBC 0.0 0.0 - 0.0 /100 WBCs    RBC 5.31 4.50 - 5.90 x10*6/uL    Hemoglobin 17.5 13.5 - 17.5 g/dL    Hematocrit 52.0 41.0 - 52.0 %    MCV 98 80 - 100 fL    MCH 33.0 26.0 - 34.0 pg    MCHC 33.7 32.0 - 36.0 g/dL    RDW 13.1 11.5 - 14.5 %    Platelets 233 150 - 450 x10*3/uL    Neutrophils % 67.7 40.0 - 80.0 %    Immature Granulocytes %, Automated 0.1 0.0 - 0.9 %    Lymphocytes % 22.2 13.0 - 44.0 %    Monocytes % 7.7 2.0 - 10.0 %    Eosinophils % 1.1 0.0 - 6.0 %    Basophils % 1.2 0.0 - 2.0 %    Neutrophils Absolute 5.50 1.20 - 7.70 x10*3/uL    Immature Granulocytes Absolute, Automated 0.01 0.00 - 0.70 x10*3/uL    Lymphocytes Absolute 1.81 1.20 - 4.80 x10*3/uL    Monocytes Absolute 0.63 0.10 - 1.00 x10*3/uL    Eosinophils Absolute 0.09 0.00 - 0.70 x10*3/uL    Basophils Absolute 0.10 0.00 - 0.10 x10*3/uL   Urinalysis with Reflex Microscopic   Result Value Ref Range    Color, Urine Light-Yellow Light-Yellow, Yellow, Dark-Yellow    Appearance, Urine Clear Clear    Specific Gravity, Urine 1.011 1.005 - 1.035    pH, Urine 5.5 5.0, 5.5, 6.0, 6.5, 7.0, 7.5, 8.0    Protein, Urine 50 (1+) (A) NEGATIVE, 10 (TRACE), 20 (TRACE) mg/dL    Glucose, Urine 150 (2+) (A) Normal mg/dL    Blood, Urine 0.03 (TRACE) (A) NEGATIVE    Ketones, Urine NEGATIVE NEGATIVE mg/dL    Bilirubin, Urine NEGATIVE NEGATIVE    Urobilinogen, Urine Normal Normal mg/dL    Nitrite, Urine NEGATIVE NEGATIVE    Leukocyte Esterase, Urine NEGATIVE NEGATIVE   Microscopic Only, Urine   Result Value Ref Range    WBC, Urine 1-5 1-5, NONE /HPF    RBC, Urine NONE NONE, 1-2, 3-5 /HPF    Bacteria, Urine 1+ (A) NONE SEEN /HPF   Drug Screen, Urine   Result Value Ref Range    Amphetamine Screen, Urine Negative      Barbiturate Screen, Urine Negative      Benzodiazepines Screen, Urine Negative      Cannabinoid Screen, Urine Positive (A)       Cocaine Metabolite Screen, Urine Negative      Fentanyl Screen, Urine Negative       Methadone Screen, Urine Negative      Opiate Screen, Urine Negative      Oxycodone Screen, Urine Negative      PCP Screen, Urine Negative     Protein, Urine Random   Result Value Ref Range    Total Protein, Urine Random 74 NOT ESTABLISHED mg/dL    Creatinine, Urine Random 94.2 NOT ESTABLISHED mg/dL    T. Protein/Creatinine Ratio 0.79 (H) <0.20 mg/mg Creat   Ethanol   Result Value Ref Range    Alcohol <0.010 0.000 - 0.010 g/dL   Creatine Kinase   Result Value Ref Range    Creatine Kinase 41 24 - 195 U/L   Phosphorus   Result Value Ref Range    Phosphorus 6.0 (H) 2.5 - 4.5 mg/dL   Sars-CoV-2 and Influenza A/B PCR   Result Value Ref Range    Flu A Result Not Detected Not Detected    Flu B Result Not Detected Not Detected    Coronavirus 2019, PCR Not Detected Not Detected        Assessment/Plan   Decompensated Alcoholic Cirrhosis   Will calculate MELD score with INR results   HCC- AFP for survelliance  HE: Monitor mental status, Currently A/O x 3 . No asterixis .No lactulose indicated .  EV: Will need EGD to monitor for varices, timing per attending.  Last EGD 2010. Patient reported one episode of hematemesis yesterday , nothing since arrival   Ascites: Will order US. Diuretics on hold due to DIMITRI    Low NA diet   Hep panel ordered       Hematemesis  1 episode reported PTA, Possible varices vs josué wise tear  EGD recommended, timing per attending   Changed PPI to IV bid   H/H stable     Acute kidney injury   Managed per nephrology     Nano Joya, APRN-CNP

## 2024-03-29 NOTE — PROGRESS NOTES
Aamir Garcia is a 53 y.o. male on day 1 of admission presenting with Acute renal failure (ARF) (CMS/[facility]).      Subjective   Patient reports fatigue and diarrhea otherwise no complaints.  Creatinine is trending down to 3.2.       Objective          Vitals 24HR  Heart Rate:  [61-76]   Temp:  [36.8 °C (98.2 °F)-37.3 °C (99.1 °F)]   Resp:  [15-19]   BP: (100-125)/(66-80)   SpO2:  [96 %-100 %]       Intake/Output last 3 Shifts:    Intake/Output Summary (Last 24 hours) at 3/29/2024 1207  Last data filed at 3/29/2024 0400  Gross per 24 hour   Intake 782.67 ml   Output 550 ml   Net 232.67 ml       Physical Exam  Constitutional:       General: He is awake. He is not in acute distress.  Cardiovascular:      Rate and Rhythm: Regular rhythm.      Heart sounds:      No friction rub.   Pulmonary:      Effort: Pulmonary effort is normal.      Breath sounds: Normal breath sounds.   Abdominal:      General: Bowel sounds are normal.      Palpations: Abdomen is soft.      Tenderness: There is no guarding or rebound.   Musculoskeletal:      Right lower leg: No edema.      Left lower leg: No edema.   Neurological:      Mental Status: He is alert.         Relevant Results  Results for orders placed or performed during the hospital encounter of 03/28/24 (from the past 24 hour(s))   Parathyroid Hormone, Intact   Result Value Ref Range    Parathyroid Hormone, Intact 56.4 18.5 - 88.0 pg/mL   Basic metabolic panel   Result Value Ref Range    Glucose 110 (H) 65 - 99 mg/dL    Sodium 135 133 - 145 mmol/L    Potassium 4.0 3.4 - 5.1 mmol/L    Chloride 99 97 - 107 mmol/L    Bicarbonate 24 24 - 31 mmol/L    Urea Nitrogen 52 (H) 8 - 25 mg/dL    Creatinine 3.20 (H) 0.40 - 1.60 mg/dL    eGFR 22 (L) >60 mL/min/1.73m*2    Calcium 10.0 8.5 - 10.4 mg/dL    Anion Gap 12 <=19 mmol/L   CBC   Result Value Ref Range    WBC 6.9 4.4 - 11.3 x10*3/uL    nRBC 0.0 0.0 - 0.0 /100 WBCs    RBC 4.62 4.50 - 5.90 x10*6/uL    Hemoglobin 15.7 13.5 - 17.5 g/dL    Hematocrit  45.0 41.0 - 52.0 %    MCV 97 80 - 100 fL    MCH 34.0 26.0 - 34.0 pg    MCHC 34.9 32.0 - 36.0 g/dL    RDW 12.8 11.5 - 14.5 %    Platelets 155 150 - 450 x10*3/uL   Protime-INR   Result Value Ref Range    Protime 13.2 (H) 9.3 - 12.7 seconds    INR 1.3 (H) 0.9 - 1.2   TSH   Result Value Ref Range    Thyroid Stimulating Hormone 1.38 0.27 - 4.20 mIU/L   Hepatitis Panel, Acute   Result Value Ref Range    Hepatitis B Surface AG Nonreactive Nonreactive    Hepatitis A  AB- IgM Nonreactive Nonreactive    Hepatitis B Core AB; IgM Nonreactive Nonreactive    Hepatitis C AB Nonreactive Nonreactive   Alpha-Fetoprotein   Result Value Ref Range    Alpha-Fetoprotein <4 0 - 9 ng/mL            Assessment/Plan   Acute kidney injury  - Likely secondary to volume depletion with GI losses, diuretics, improving  Hypercalcemia, resolved however the inappropriately normal PTH on presentation with a high calcium indicates possible primary hyperparathyroidism which will need to be worked up  Hypertension  Hematemesis  Alcohol abuse     Plan: Continue IV normal saline.  Continue holding diuretics, ARB.  Counseled on avoiding NSAIDs.  GI consulted.  Workup hypercalcemia is pending and he will need a parathyroid nuclear imaging study in the near future, can be done as an outpatient.  Hold parameters on any blood pressure medications to avoid hypotension.  No indications for dialysis at this time.  Renal diet.     Onesimo Gonzalez MD

## 2024-03-29 NOTE — PROGRESS NOTES
Aamir Garcia is a 53 y.o. male on day 1 of admission presenting with Acute renal failure (ARF) (CMS/[facility]).    Subjective   Patient denies black or bloody stools. He is still nauseated. Denies further hematemesis        Objective     Physical Exam  Vitals reviewed.   Constitutional:       General: He is awake.      Appearance: Normal appearance.   HENT:      Head: Normocephalic and atraumatic.      Mouth/Throat:      Mouth: Mucous membranes are moist.   Cardiovascular:      Rate and Rhythm: Normal rate and regular rhythm.   Pulmonary:      Effort: Pulmonary effort is normal.      Breath sounds: Normal breath sounds.   Abdominal:      General: There is no distension.      Palpations: Abdomen is soft.      Tenderness: There is no abdominal tenderness. There is no guarding.   Musculoskeletal:      Cervical back: Normal range of motion and neck supple.   Skin:     General: Skin is warm and dry.   Neurological:      General: No focal deficit present.      Mental Status: He is alert and oriented to person, place, and time. Mental status is at baseline.   Psychiatric:         Attention and Perception: Attention and perception normal.         Mood and Affect: Mood normal.         Behavior: Behavior normal.         Last Recorded Vitals  Blood pressure 125/74, pulse 70, temperature 37.3 °C (99.1 °F), temperature source Oral, resp. rate 18, height 1.829 m (6'), weight 72.1 kg (158 lb 15.2 oz), SpO2 97 %.  Intake/Output last 3 Shifts:  I/O last 3 completed shifts:  In: 782.7 (10.9 mL/kg) [I.V.:782.7 (10.9 mL/kg)]  Out: 550 (7.6 mL/kg) [Urine:550 (0.2 mL/kg/hr)]  Weight: 72.1 kg     Relevant Results              Results for orders placed or performed during the hospital encounter of 03/28/24 (from the past 24 hour(s))   Parathyroid Hormone, Intact   Result Value Ref Range    Parathyroid Hormone, Intact 56.4 18.5 - 88.0 pg/mL   Basic metabolic panel   Result Value Ref Range    Glucose 110 (H) 65 - 99 mg/dL    Sodium 135 133 - 145  mmol/L    Potassium 4.0 3.4 - 5.1 mmol/L    Chloride 99 97 - 107 mmol/L    Bicarbonate 24 24 - 31 mmol/L    Urea Nitrogen 52 (H) 8 - 25 mg/dL    Creatinine 3.20 (H) 0.40 - 1.60 mg/dL    eGFR 22 (L) >60 mL/min/1.73m*2    Calcium 10.0 8.5 - 10.4 mg/dL    Anion Gap 12 <=19 mmol/L   CBC   Result Value Ref Range    WBC 6.9 4.4 - 11.3 x10*3/uL    nRBC 0.0 0.0 - 0.0 /100 WBCs    RBC 4.62 4.50 - 5.90 x10*6/uL    Hemoglobin 15.7 13.5 - 17.5 g/dL    Hematocrit 45.0 41.0 - 52.0 %    MCV 97 80 - 100 fL    MCH 34.0 26.0 - 34.0 pg    MCHC 34.9 32.0 - 36.0 g/dL    RDW 12.8 11.5 - 14.5 %    Platelets 155 150 - 450 x10*3/uL   Protime-INR   Result Value Ref Range    Protime 13.2 (H) 9.3 - 12.7 seconds    INR 1.3 (H) 0.9 - 1.2   TSH   Result Value Ref Range    Thyroid Stimulating Hormone 1.38 0.27 - 4.20 mIU/L   Hepatitis Panel, Acute   Result Value Ref Range    Hepatitis B Surface AG Nonreactive Nonreactive    Hepatitis A  AB- IgM Nonreactive Nonreactive    Hepatitis B Core AB; IgM Nonreactive Nonreactive    Hepatitis C AB Nonreactive Nonreactive     US abdomen complete    Result Date: 3/29/2024  Interpreted By:  Phil Mckeon, STUDY: US ABDOMEN COMPLETE; 3/28/2024 9:34 pm   INDICATION: Signs/Symptoms:ascites.  Ascites check.   COMPARISON: None.   ACCESSION NUMBER(S): [id number]   ORDERING CLINICIAN: BIRD ANN   TECHNIQUE: Grayscale and color Doppler imaging of the abdomen was performed.   FINDINGS: Limited examination was performed for quantification of ascites. Please note that solid organ assessment was not undertaken for this examination. Examination demonstrates a small amount of ascites adjacent to the liver and within the mid pelvis.       Small amount of ascites as above..   MACRO: None.   Signed by: Phil Mckeon 3/29/2024 9:22 AM Dictation workstation:   [id number]    US renal complete    Result Date: 3/28/2024  Interpreted By:  Julio Valentine, STUDY: US RENAL COMPLETE;  3/28/2024 3:03 pm   INDICATION:  Signs/Symptoms:DIMITRI.   COMPARISON: None.   ACCESSION NUMBER(S): [id number]   ORDERING CLINICIAN: TYSON ARRINGTON   TECHNIQUE: Multiple images of the kidneys were obtained  .   FINDINGS: RIGHT KIDNEY: The right kidney measures 11.0 cm in length. The renal cortical echogenicity and thickness are within normal limits. No hydronephrosis is present; no evidence of nephrolithiasis.   LEFT KIDNEY: The left kidney measures 12.0 cm in length. The renal cortical echogenicity and thickness are within normal limits. No hydronephrosis is present; no evidence of nephrolithiasis.   BLADDER: The bladder is normally distended. No evidence for wall thickening. Ureteral jets are noted. Prevoid bladder volume = 128.6 cc. Postvoid residual = 0.04%.       Renal and bladder ultrasound is within normal limits.   MACRO: None   Signed by: Julio Valentine 3/28/2024 3:28 PM Dictation workstation:   [id number]             Malnutrition Diagnosis Status: New  Malnutrition Diagnosis: Moderate malnutrition related to acute disease or injury  As Evidenced by: mild subcutaneous fat loss and muscle wasting, 22# (12.2%) wt loss over ~3 weeks (3/8-3/28), po intake < 75% estimated needs for > 7 days  I agree with the dietitian's malnutrition diagnosis.      Assessment/Plan   Principal Problem:    Acute renal failure (ARF) (CMS/HCC)    Decompensated Alcoholic Cirrhosis (MELD 22)   HCC- AFP for survelliance  HE: Monitor mental status, Currently A/O x 3 . No asterixis .No lactulose indicated .  EV: Will need EGD to monitor for varices, timing per attending.  Last EGD 2010. Patient reported one episode of hematemesis yesterday , nothing since arrival   Ascites: Only small ascites. Diuretics on hold due to DIMITRI    Low NA diet.   Hep panel negative         Hematemesis  He was having a lot of dry heaving and emesis prior to bloody emesis. Given normal HH and lack of melena, this was likely MWT   -PPI BID    -If further hematemesis or melena, we can consider  inpatient endoscopy over the weekend. Otherwise, we will plan for outpatient follow up      Acute kidney injury   Managed per nephrology       I spent 20 minutes in the professional and overall care of this patient.      Anna Cunningham, APRN-CNP

## 2024-03-29 NOTE — CARE PLAN
The patient's goals for the shift include rest     The clinical goals for the shift include comfort and safety

## 2024-03-29 NOTE — NURSING NOTE
Patient does not want vitals taken every 2 hours over night.  Next set of vitals will be taken in the morning.  Will continue to monitor.

## 2024-03-29 NOTE — CARE PLAN
Problem: Pain  Goal: My pain/discomfort is manageable  Outcome: Progressing     Problem: Safety  Goal: Patient will be injury free during hospitalization  Outcome: Progressing  Goal: I will remain free of falls  Outcome: Progressing     Problem: Daily Care  Goal: Daily care needs are met  Outcome: Progressing     Problem: Psychosocial Needs  Goal: Demonstrates ability to cope with hospitalization/illness  Outcome: Progressing  Goal: Collaborate with me, my family, and caregiver to identify my specific goals  Outcome: Progressing     Problem: Discharge Barriers  Goal: My discharge needs are met  Outcome: Progressing   The patient's goals for the shift include      The clinical goals for the shift include comfort and safety

## 2024-03-29 NOTE — PROGRESS NOTES
03/29/24 1216   Discharge Planning   Living Arrangements Alone   Support Systems Family members   Assistance Needed Independent   Number of Stairs to Enter Residence 1   Number of Stairs Within Residence 0   Do you have animals or pets at home? Yes   Type of Animals or Pets 1 new dog   Who is requesting discharge planning? Provider   Home or Post Acute Services None   Patient expects to be discharged to: Home   Does the patient need discharge transport arranged? No       Home no needs when medically clear.

## 2024-03-29 NOTE — PROGRESS NOTES
Aamir Garcia is a 53 y.o. male on day 1 of admission presenting with Acute renal failure (ARF) (CMS/HCC).      Subjective   Patient is resting in bed. He has no signs or symptoms of alcohol withdrawal. He denies any chest pain or sob.        Objective     Last Recorded Vitals  /66   Pulse 66   Temp 36.8 °C (98.2 °F) (Temporal)   Resp 17   Wt 72.1 kg (158 lb 15.2 oz)   SpO2 98%   Intake/Output last 3 Shifts:    Intake/Output Summary (Last 24 hours) at 3/29/2024 1446  Last data filed at 3/29/2024 1100  Gross per 24 hour   Intake 1082.67 ml   Output 900 ml   Net 182.67 ml       Admission Weight  Weight: 72.1 kg (158 lb 15.2 oz) (03/28/24 0937)    Daily Weight  03/28/24 : 72.1 kg (158 lb 15.2 oz)    Image Results  US abdomen complete  Narrative: Interpreted By:  Phil Mckeon,   STUDY:  US ABDOMEN COMPLETE; 3/28/2024 9:34 pm      INDICATION:  Signs/Symptoms:ascites.  Ascites check.      COMPARISON:  None.      ACCESSION NUMBER(S):  [id number]      ORDERING CLINICIAN:  BIRD ANN      TECHNIQUE:  Grayscale and color Doppler imaging of the abdomen was performed.      FINDINGS:  Limited examination was performed for quantification of ascites.  Please note that solid organ assessment was not undertaken for this  examination. Examination demonstrates a small amount of ascites  adjacent to the liver and within the mid pelvis.      Impression: Small amount of ascites as above..      MACRO:  None.      Signed by: Phil Mckeon 3/29/2024 9:22 AM  Dictation workstation:   [id number]      Physical Exam  Constitutional:       Appearance: Normal appearance.   Cardiovascular:      Rate and Rhythm: Normal rate and regular rhythm.      Pulses: Normal pulses.      Heart sounds: Normal heart sounds.   Pulmonary:      Effort: Pulmonary effort is normal.      Breath sounds: Normal breath sounds.   Abdominal:      General: Abdomen is flat. Bowel sounds are normal.      Palpations: Abdomen is soft.   Musculoskeletal:          General: Normal range of motion.   Skin:     General: Skin is warm and dry.   Neurological:      Mental Status: He is alert and oriented to person, place, and time.         Relevant Results             Results for orders placed or performed during the hospital encounter of 03/28/24 (from the past 24 hour(s))   Parathyroid Hormone, Intact   Result Value Ref Range    Parathyroid Hormone, Intact 56.4 18.5 - 88.0 pg/mL   Basic metabolic panel   Result Value Ref Range    Glucose 110 (H) 65 - 99 mg/dL    Sodium 135 133 - 145 mmol/L    Potassium 4.0 3.4 - 5.1 mmol/L    Chloride 99 97 - 107 mmol/L    Bicarbonate 24 24 - 31 mmol/L    Urea Nitrogen 52 (H) 8 - 25 mg/dL    Creatinine 3.20 (H) 0.40 - 1.60 mg/dL    eGFR 22 (L) >60 mL/min/1.73m*2    Calcium 10.0 8.5 - 10.4 mg/dL    Anion Gap 12 <=19 mmol/L   CBC   Result Value Ref Range    WBC 6.9 4.4 - 11.3 x10*3/uL    nRBC 0.0 0.0 - 0.0 /100 WBCs    RBC 4.62 4.50 - 5.90 x10*6/uL    Hemoglobin 15.7 13.5 - 17.5 g/dL    Hematocrit 45.0 41.0 - 52.0 %    MCV 97 80 - 100 fL    MCH 34.0 26.0 - 34.0 pg    MCHC 34.9 32.0 - 36.0 g/dL    RDW 12.8 11.5 - 14.5 %    Platelets 155 150 - 450 x10*3/uL   Protime-INR   Result Value Ref Range    Protime 13.2 (H) 9.3 - 12.7 seconds    INR 1.3 (H) 0.9 - 1.2   TSH   Result Value Ref Range    Thyroid Stimulating Hormone 1.38 0.27 - 4.20 mIU/L   Hepatitis Panel, Acute   Result Value Ref Range    Hepatitis B Surface AG Nonreactive Nonreactive    Hepatitis A  AB- IgM Nonreactive Nonreactive    Hepatitis B Core AB; IgM Nonreactive Nonreactive    Hepatitis C AB Nonreactive Nonreactive   Alpha-Fetoprotein   Result Value Ref Range    Alpha-Fetoprotein <4 0 - 9 ng/mL       Assessment/Plan                  Principal Problem:    Acute renal failure (ARF) (CMS/HCC)  Acute Renal Failure  Consult nephrology, Dr. Azem  Renal functtion improving with IV fluids  Hold diuretics and ARB  Renal/Potasium restricted diet     Cirrhosis with Ascites and Alcohol  dependency  DVT/GI prophylaxis  Heparin subcutaneous  Sequential TEDs  Protonix  CIWA     Plan of Care  Home medication reviewed.  Advanced directives discussed with patient. He does not had a Living Will. He wishes to be a Full Code.      Plan of care reviewed with patient and collaborating physician, Dr. Brooks.         Malnutrition Diagnosis Status: New  Malnutrition Diagnosis: Moderate malnutrition related to acute disease or injury  As Evidenced by: mild subcutaneous fat loss and muscle wasting, 22# (12.2%) wt loss over ~3 weeks (3/8-3/28), po intake < 75% estimated needs for > 7 days  I agree with the dietitian's malnutrition diagnosis.         Bina Ricci, APRN-CNP

## 2024-03-29 NOTE — CONSULTS
"Nutrition Assessement Note    Nutrition Assessment    Reason for Assessment: Admission nursing screening (MST 3)    Reason for Hospital Admission:  Leonardo Garcia \"Onofre" is a 53 y.o. male who is admitted for decompensated alcoholic cirrhosis, DIMITRI. Hematemesis - plan for EGD per GI.     Nutrition History:  Food and Nutrient History: reports a poor appetite and wt loss over the past couple weeks. states he becomes full quickly when eating.    Anthropometrics:  Ht: 182.9 cm (6'), Wt: 72.1 kg (158 lb 15.2 oz), BMI: 21.55  IBW/kg (Dietitian Calculated): 80.91 kg  Percent of IBW: 89 %    Weight Change:  Daily Weight  03/28/24 : 72.1 kg (158 lb 15.2 oz)  03/08/24 : 81.6 kg (180 lb)  11/13/23 : 85.3 kg (188 lb)  06/09/23 : 88 kg (194 lb)  10/24/22 : 91.2 kg (201 lb)  09/29/21 : 92.5 kg (204 lb)    Significant Weight Loss: Yes  Interpretation of Weight Loss:  (22# (12.2%) wt loss over ~3 weeks (3/8-3/28))    Nutrition Focused Physical Exam Findings: mild deficits  Subcutaneous Fat Loss  Orbital Fat Pads: Mild-Moderate (slight dark circles and slight hollowing)    Muscle Wasting  Temporalis: Mild-Moderate (slight depression)  Pectoralis (Clavicular Region): Mild-Moderate (some protrusion of clavicle)  Deltoid/Trapezius: Mild-Moderate (slight protrusion of acromion process)    Edema  Edema Location: denies ascites at this time    Physical Findings (Nutrition Deficiency/Toxicity)  Skin: Positive (slightly jaundice)    Nutrition Significant Labs:  Lab Results   Component Value Date    WBC 6.9 03/29/2024    HGB 15.7 03/29/2024    HCT 45.0 03/29/2024     03/29/2024    CHOL 209 (H) 01/24/2020    TRIG 173 (H) 01/24/2020    HDL 30 (L) 01/24/2020    ALT 22 03/28/2024    AST 34 03/28/2024     03/29/2024    K 4.0 03/29/2024    CL 99 03/29/2024    CREATININE 3.20 (H) 03/29/2024    BUN 52 (H) 03/29/2024    CO2 24 03/29/2024    TSH 1.38 03/29/2024    INR 1.3 (H) 03/29/2024    HGBA1C 5.5 11/13/2023       Current " Facility-Administered Medications:     buPROPion XL (Wellbutrin XL) 24 hr tablet 150 mg, 150 mg, oral, Daily before breakfast, Bina A Radhames, APRN-CNP, 150 mg at 03/29/24 1002    escitalopram (Lexapro) tablet 10 mg, 10 mg, oral, Daily, Bina A Radhames, APRN-CNP, 10 mg at 03/29/24 1002    folic acid (Folvite) tablet 1 mg, 1 mg, oral, Daily, Bina A Radhames, APRN-CNP, 1 mg at 03/29/24 1002    [Held by provider] furosemide (Lasix) tablet 40 mg, 40 mg, oral, Daily, Bina A Radhames, APRN-CNP    heparin (porcine) injection 5,000 Units, 5,000 Units, subcutaneous, q8h ELIANE, Bina A Radhames, APRN-CNP    [Held by provider] losartan (Cozaar) tablet 100 mg, 100 mg, oral, Daily, Bina A Radhames, APRN-CNP    metoprolol succinate XL (Toprol-XL) 24 hr tablet 50 mg, 50 mg, oral, Daily, Onesimo Gonzalez MD, 50 mg at 03/29/24 1002    multivitamin with minerals 1 tablet, 1 tablet, oral, Daily, Bina A Radhames, APRN-CNP, 1 tablet at 03/29/24 1002    nicotine (Nicoderm CQ) 14 mg/24 hr patch 1 patch, 1 patch, transdermal, Daily, Bina A Radhames, APRN-CNP, 1 patch at 03/29/24 1002    ondansetron ODT (Zofran-ODT) disintegrating tablet 4 mg, 4 mg, oral, q8h PRN **OR** ondansetron (Zofran) injection 4 mg, 4 mg, intravenous, q8h PRN, Bina A Radhames, APRN-CNP    pantoprazole (ProtoNix) injection 40 mg, 40 mg, intravenous, BID, Nano LINDSEY Wallaceiano, APRN-CNP, 40 mg at 03/29/24 1002    polyethylene glycol (Glycolax, Miralax) packet 17 g, 17 g, oral, Daily PRN, Bina A Radhames, APRN-CNP    sodium chloride 0.9% infusion, 80 mL/hr, intravenous, Continuous, Onesimo Gonzalez MD, Last Rate: 80 mL/hr at 03/29/24 0138, 80 mL/hr at 03/29/24 0138    thiamine (Vitamin B-1) tablet 100 mg, 100 mg, oral, Daily, LILO Molina-CNP, 100 mg at 03/29/24 1002    Dietary Orders (From admission, onward)       Start     Ordered    03/28/24 1352  Adult diet Potassium restricted 2 gm (50mEq)  Diet effective now        Question:  Diet type  Answer:  Potassium  restricted 2 gm (50mEq)    03/28/24 1352                  Estimated Needs:   Estimated Energy Needs  Total Energy Estimated Needs (kCal): 2155 kCal  Total Estimated Energy Need per Day (kCal/kg): 30 kCal/kg  Method for Estimating Needs: actual wt    Estimated Protein Needs  Total Protein Estimated Needs (g): 86 g  Total Protein Estimated Needs (g/kg): 1.2 g/kg  Method for Estimating Needs: actual wt    Estimated Fluid Needs  Method for Estimating Needs: <2000 ml/day        Nutrition Diagnosis   Nutrition Diagnosis:  Malnutrition Diagnosis  Patient has Malnutrition Diagnosis: Yes  Diagnosis Status: New  Malnutrition Diagnosis: Moderate malnutrition related to acute disease or injury  As Evidenced by: mild subcutaneous fat loss and muscle wasting, 22# (12.2%) wt loss over ~3 weeks (3/8-3/28), po intake < 75% estimated needs for > 7 days       Nutrition Interventions/Recommendations   Nutrition Interventions and Recommendations:    Nutrition Prescription:  Individualized Nutrition Prescription Provided for : 2155 kcals and 86g protein to be provided via diet and supplements    Nutrition Interventions:   Food and/or Nutrient Delivery Interventions  Interventions: Meals and snacks, Medical food supplement  Meals and Snacks: Mineral-modified diet  Goal: pt may benefit from low Na+ diet - declined wanting diet restriction at this time  Medical Food Supplement: Commercial beverage  Goal: chocolate ensure compact TID to provide 220 kcals and 9g protein each    Education Documentation  No documentation found.           Nutrition Monitoring and Evaluation   Monitoring/Evaluation:   Food/Nutrient Related History Monitoring  Monitoring and Evaluation Plan: Energy intake  Energy Intake: Estimated energy intake  Criteria: pt to consume >/= 75% estimated needs    Body Composition/Growth/Weight History  Monitoring and Evaluation Plan: Weight  Weight: Measured weight  Criteria: pt will maintain wt at this time       Time  Spent/Follow-up:   Follow Up  Time Spent (min): 30 minutes  Last Date of Nutrition Visit: 03/29/24  Nutrition Follow-Up Needed?: 5-7 days  Follow up Comment: 4/3/24

## 2024-03-30 LAB
1,25(OH)2D3 SERPL-MCNC: 12.1 PG/ML (ref 19.9–79.3)
ANION GAP SERPL CALC-SCNC: 8 MMOL/L
BUN SERPL-MCNC: 33 MG/DL (ref 8–25)
CALCIUM SERPL-MCNC: 9.6 MG/DL (ref 8.5–10.4)
CHLORIDE SERPL-SCNC: 105 MMOL/L (ref 97–107)
CO2 SERPL-SCNC: 23 MMOL/L (ref 24–31)
CREAT SERPL-MCNC: 1.8 MG/DL (ref 0.4–1.6)
EGFRCR SERPLBLD CKD-EPI 2021: 44 ML/MIN/1.73M*2
ERYTHROCYTE [DISTWIDTH] IN BLOOD BY AUTOMATED COUNT: 12.7 % (ref 11.5–14.5)
GLUCOSE SERPL-MCNC: 96 MG/DL (ref 65–99)
HCT VFR BLD AUTO: 45.3 % (ref 41–52)
HGB BLD-MCNC: 15.2 G/DL (ref 13.5–17.5)
MCH RBC QN AUTO: 32.8 PG (ref 26–34)
MCHC RBC AUTO-ENTMCNC: 33.6 G/DL (ref 32–36)
MCV RBC AUTO: 98 FL (ref 80–100)
NRBC BLD-RTO: 0 /100 WBCS (ref 0–0)
PLATELET # BLD AUTO: 132 X10*3/UL (ref 150–450)
POTASSIUM SERPL-SCNC: 4.3 MMOL/L (ref 3.4–5.1)
RBC # BLD AUTO: 4.64 X10*6/UL (ref 4.5–5.9)
SODIUM SERPL-SCNC: 136 MMOL/L (ref 133–145)
WBC # BLD AUTO: 5.8 X10*3/UL (ref 4.4–11.3)

## 2024-03-30 PROCEDURE — 2500000004 HC RX 250 GENERAL PHARMACY W/ HCPCS (ALT 636 FOR OP/ED): Performed by: NURSE PRACTITIONER

## 2024-03-30 PROCEDURE — 2500000001 HC RX 250 WO HCPCS SELF ADMINISTERED DRUGS (ALT 637 FOR MEDICARE OP): Performed by: NURSE PRACTITIONER

## 2024-03-30 PROCEDURE — 2500000004 HC RX 250 GENERAL PHARMACY W/ HCPCS (ALT 636 FOR OP/ED): Performed by: INTERNAL MEDICINE

## 2024-03-30 PROCEDURE — 1100000001 HC PRIVATE ROOM DAILY

## 2024-03-30 PROCEDURE — 2500000004 HC RX 250 GENERAL PHARMACY W/ HCPCS (ALT 636 FOR OP/ED)

## 2024-03-30 PROCEDURE — 36415 COLL VENOUS BLD VENIPUNCTURE: CPT | Performed by: NURSE PRACTITIONER

## 2024-03-30 PROCEDURE — 2500000002 HC RX 250 W HCPCS SELF ADMINISTERED DRUGS (ALT 637 FOR MEDICARE OP, ALT 636 FOR OP/ED): Performed by: NURSE PRACTITIONER

## 2024-03-30 PROCEDURE — 85027 COMPLETE CBC AUTOMATED: CPT | Performed by: NURSE PRACTITIONER

## 2024-03-30 PROCEDURE — C9113 INJ PANTOPRAZOLE SODIUM, VIA: HCPCS

## 2024-03-30 PROCEDURE — 2500000001 HC RX 250 WO HCPCS SELF ADMINISTERED DRUGS (ALT 637 FOR MEDICARE OP): Performed by: INTERNAL MEDICINE

## 2024-03-30 PROCEDURE — S4991 NICOTINE PATCH NONLEGEND: HCPCS | Performed by: NURSE PRACTITIONER

## 2024-03-30 PROCEDURE — 80048 BASIC METABOLIC PNL TOTAL CA: CPT | Performed by: NURSE PRACTITIONER

## 2024-03-30 RX ORDER — ACETAMINOPHEN 500 MG
5 TABLET ORAL DAILY
Status: DISCONTINUED | OUTPATIENT
Start: 2024-03-30 | End: 2024-03-31 | Stop reason: HOSPADM

## 2024-03-30 RX ADMIN — ESCITALOPRAM OXALATE 10 MG: 10 TABLET ORAL at 08:00

## 2024-03-30 RX ADMIN — METOPROLOL SUCCINATE 50 MG: 50 TABLET, EXTENDED RELEASE ORAL at 08:00

## 2024-03-30 RX ADMIN — ONDANSETRON 4 MG: 2 INJECTION INTRAMUSCULAR; INTRAVENOUS at 08:05

## 2024-03-30 RX ADMIN — FOLIC ACID 1 MG: 1 TABLET ORAL at 08:00

## 2024-03-30 RX ADMIN — BUPROPION HYDROCHLORIDE 150 MG: 150 TABLET, EXTENDED RELEASE ORAL at 08:00

## 2024-03-30 RX ADMIN — SODIUM CHLORIDE 80 ML/HR: 900 INJECTION, SOLUTION INTRAVENOUS at 20:29

## 2024-03-30 RX ADMIN — SODIUM CHLORIDE 80 ML/HR: 900 INJECTION, SOLUTION INTRAVENOUS at 01:22

## 2024-03-30 RX ADMIN — Medication 1 PATCH: at 08:00

## 2024-03-30 RX ADMIN — PANTOPRAZOLE SODIUM 40 MG: 40 INJECTION, POWDER, FOR SOLUTION INTRAVENOUS at 20:29

## 2024-03-30 RX ADMIN — Medication 5 MG: at 20:29

## 2024-03-30 RX ADMIN — Medication 100 MG: at 08:00

## 2024-03-30 RX ADMIN — PANTOPRAZOLE SODIUM 40 MG: 40 INJECTION, POWDER, FOR SOLUTION INTRAVENOUS at 08:00

## 2024-03-30 RX ADMIN — Medication 1 TABLET: at 08:00

## 2024-03-30 ASSESSMENT — LIFESTYLE VARIABLES
AGITATION: NORMAL ACTIVITY
NAUSEA AND VOMITING: NO NAUSEA AND NO VOMITING
HEADACHE, FULLNESS IN HEAD: NOT PRESENT
ORIENTATION AND CLOUDING OF SENSORIUM: ORIENTED AND CAN DO SERIAL ADDITIONS
PAROXYSMAL SWEATS: NO SWEAT VISIBLE
VISUAL DISTURBANCES: NOT PRESENT
AGITATION: NORMAL ACTIVITY
TOTAL SCORE: 0
TREMOR: NO TREMOR
AUDITORY DISTURBANCES: NOT PRESENT
ORIENTATION AND CLOUDING OF SENSORIUM: ORIENTED AND CAN DO SERIAL ADDITIONS
ANXIETY: NO ANXIETY, AT EASE
ORIENTATION AND CLOUDING OF SENSORIUM: ORIENTED AND CAN DO SERIAL ADDITIONS
TOTAL SCORE: 0
NAUSEA AND VOMITING: NO NAUSEA AND NO VOMITING
ORIENTATION AND CLOUDING OF SENSORIUM: ORIENTED AND CAN DO SERIAL ADDITIONS
ANXIETY: NO ANXIETY, AT EASE
AUDITORY DISTURBANCES: NOT PRESENT
VISUAL DISTURBANCES: NOT PRESENT
ANXIETY: NO ANXIETY, AT EASE
HEADACHE, FULLNESS IN HEAD: NOT PRESENT
NAUSEA AND VOMITING: NO NAUSEA AND NO VOMITING
HEADACHE, FULLNESS IN HEAD: NOT PRESENT
VISUAL DISTURBANCES: NOT PRESENT
VISUAL DISTURBANCES: NOT PRESENT
AGITATION: NORMAL ACTIVITY
PAROXYSMAL SWEATS: NO SWEAT VISIBLE
TOTAL SCORE: 1
NAUSEA AND VOMITING: NO NAUSEA AND NO VOMITING
PAROXYSMAL SWEATS: NO SWEAT VISIBLE
TOTAL SCORE: 0
AUDITORY DISTURBANCES: NOT PRESENT
ANXIETY: NO ANXIETY, AT EASE
TREMOR: NO TREMOR
AGITATION: NORMAL ACTIVITY
ORIENTATION AND CLOUDING OF SENSORIUM: ORIENTED AND CAN DO SERIAL ADDITIONS
TREMOR: NO TREMOR
HEADACHE, FULLNESS IN HEAD: NOT PRESENT
ANXIETY: NO ANXIETY, AT EASE
PAROXYSMAL SWEATS: NO SWEAT VISIBLE
AUDITORY DISTURBANCES: NOT PRESENT
AUDITORY DISTURBANCES: NOT PRESENT
HEADACHE, FULLNESS IN HEAD: NOT PRESENT
NAUSEA AND VOMITING: MILD NAUSEA WITH NO VOMITING
PAROXYSMAL SWEATS: NO SWEAT VISIBLE
TREMOR: NO TREMOR
AGITATION: NORMAL ACTIVITY
VISUAL DISTURBANCES: NOT PRESENT
TREMOR: NO TREMOR
TOTAL SCORE: 0

## 2024-03-30 ASSESSMENT — COGNITIVE AND FUNCTIONAL STATUS - GENERAL
MOBILITY SCORE: 24
MOBILITY SCORE: 24
DAILY ACTIVITIY SCORE: 24
DAILY ACTIVITIY SCORE: 24

## 2024-03-30 ASSESSMENT — PAIN SCALES - GENERAL
PAINLEVEL_OUTOF10: 0 - NO PAIN
PAINLEVEL_OUTOF10: 0 - NO PAIN

## 2024-03-30 ASSESSMENT — PAIN - FUNCTIONAL ASSESSMENT: PAIN_FUNCTIONAL_ASSESSMENT: 0-10

## 2024-03-30 NOTE — PROGRESS NOTES
Aamir Garcia is a 54 y.o. male on day 2 of admission presenting with Acute renal failure (ARF) (CMS/HCC).    Subjective   Patient is tolerating a diet. No reported GI blood loss since arrival        Objective     Physical Exam  Vitals reviewed.   Constitutional:       General: He is awake.      Appearance: Normal appearance.   HENT:      Head: Normocephalic and atraumatic.      Mouth/Throat:      Mouth: Mucous membranes are moist.   Cardiovascular:      Rate and Rhythm: Normal rate and regular rhythm.   Pulmonary:      Effort: Pulmonary effort is normal.      Breath sounds: Normal breath sounds.   Abdominal:      General: There is no distension.      Palpations: Abdomen is soft.      Tenderness: There is no abdominal tenderness. There is no guarding.   Musculoskeletal:      Cervical back: Normal range of motion and neck supple.   Skin:     General: Skin is warm and dry.   Neurological:      General: No focal deficit present.      Mental Status: He is alert and oriented to person, place, and time. Mental status is at baseline.   Psychiatric:         Attention and Perception: Attention and perception normal.         Mood and Affect: Mood normal.         Behavior: Behavior normal.         Last Recorded Vitals  Blood pressure 121/69, pulse 58, temperature 36.7 °C (98.1 °F), temperature source Temporal, resp. rate 17, height 1.829 m (6'), weight 72.1 kg (158 lb 15.2 oz), SpO2 95 %.  Intake/Output last 3 Shifts:  I/O last 3 completed shifts:  In: 2312 (32.1 mL/kg) [P.O.:300; I.V.:2012 (27.9 mL/kg)]  Out: 900 (12.5 mL/kg) [Urine:900 (0.3 mL/kg/hr)]  Weight: 72.1 kg     Relevant Results              Results for orders placed or performed during the hospital encounter of 03/28/24 (from the past 24 hour(s))   CBC   Result Value Ref Range    WBC 5.8 4.4 - 11.3 x10*3/uL    nRBC 0.0 0.0 - 0.0 /100 WBCs    RBC 4.64 4.50 - 5.90 x10*6/uL    Hemoglobin 15.2 13.5 - 17.5 g/dL    Hematocrit 45.3 41.0 - 52.0 %    MCV 98 80 - 100 fL    MCH  32.8 26.0 - 34.0 pg    MCHC 33.6 32.0 - 36.0 g/dL    RDW 12.7 11.5 - 14.5 %    Platelets 132 (L) 150 - 450 x10*3/uL   Basic Metabolic Panel   Result Value Ref Range    Glucose 96 65 - 99 mg/dL    Sodium 136 133 - 145 mmol/L    Potassium 4.3 3.4 - 5.1 mmol/L    Chloride 105 97 - 107 mmol/L    Bicarbonate 23 (L) 24 - 31 mmol/L    Urea Nitrogen 33 (H) 8 - 25 mg/dL    Creatinine 1.80 (H) 0.40 - 1.60 mg/dL    eGFR 44 (L) >60 mL/min/1.73m*2    Calcium 9.6 8.5 - 10.4 mg/dL    Anion Gap 8 <=19 mmol/L     US abdomen complete    Result Date: 3/29/2024  Interpreted By:  Phil Mckeon, STUDY: US ABDOMEN COMPLETE; 3/28/2024 9:34 pm   INDICATION: Signs/Symptoms:ascites.  Ascites check.   COMPARISON: None.   ACCESSION NUMBER(S): [id number]   ORDERING CLINICIAN: BIRD ANN   TECHNIQUE: Grayscale and color Doppler imaging of the abdomen was performed.   FINDINGS: Limited examination was performed for quantification of ascites. Please note that solid organ assessment was not undertaken for this examination. Examination demonstrates a small amount of ascites adjacent to the liver and within the mid pelvis.       Small amount of ascites as above..   MACRO: None.   Signed by: Phil Mckeon 3/29/2024 9:22 AM Dictation workstation:   [id number]    US renal complete    Result Date: 3/28/2024  Interpreted By:  Julio Valentine, STUDY: US RENAL COMPLETE;  3/28/2024 3:03 pm   INDICATION: Signs/Symptoms:DIMITRI.   COMPARISON: None.   ACCESSION NUMBER(S): [id number]   ORDERING CLINICIAN: TYSON ARRINGTON   TECHNIQUE: Multiple images of the kidneys were obtained  .   FINDINGS: RIGHT KIDNEY: The right kidney measures 11.0 cm in length. The renal cortical echogenicity and thickness are within normal limits. No hydronephrosis is present; no evidence of nephrolithiasis.   LEFT KIDNEY: The left kidney measures 12.0 cm in length. The renal cortical echogenicity and thickness are within normal limits. No hydronephrosis is present; no evidence of  nephrolithiasis.   BLADDER: The bladder is normally distended. No evidence for wall thickening. Ureteral jets are noted. Prevoid bladder volume = 128.6 cc. Postvoid residual = 0.04%.       Renal and bladder ultrasound is within normal limits.   MACRO: None   Signed by: Julio Valentine 3/28/2024 3:28 PM Dictation workstation:   [id number]             Malnutrition Diagnosis Status: New  Malnutrition Diagnosis: Moderate malnutrition related to acute disease or injury  As Evidenced by: mild subcutaneous fat loss and muscle wasting, 22# (12.2%) wt loss over ~3 weeks (3/8-3/28), po intake < 75% estimated needs for > 7 days  I agree with the dietitian's malnutrition diagnosis.      Assessment/Plan   Principal Problem:    Acute renal failure (ARF) (CMS/HCC)    Decompensated Alcoholic Cirrhosis (MELD 22)   HCC- AFP for survelliance  HE: Monitor mental status, Currently A/O x 3 . No asterixis. No lactulose indicated.   EV: Outpatient EGD with Dr Sullivan   Ascites: Only small ascites. Diuretics on hold due to DIMITRI    Low NA diet.   Hep panel negative         Hematemesis  He was having a lot of dry heaving and emesis prior to bloody emesis. Given normal HH and lack of melena, this was likely MWT   -PPI daily at discharged    -Recommend outpatient EGD      Acute kidney injury   Managed per nephrology    GI will sign off at this time with plan for outpatient follow up with Dr Sullivan in next 2 weeks        I spent 20 minutes in the professional and overall care of this patient.      Anna Cunningham, APRN-CNP

## 2024-03-30 NOTE — CARE PLAN
The patient's goals for the shift include rest     The clinical goals for the shift include no nausea      Problem: Pain  Goal: My pain/discomfort is manageable  Outcome: Progressing     Problem: Safety  Goal: Patient will be injury free during hospitalization  Outcome: Progressing  Goal: I will remain free of falls  Outcome: Progressing     Problem: Daily Care  Goal: Daily care needs are met  Outcome: Progressing     Problem: Psychosocial Needs  Goal: Demonstrates ability to cope with hospitalization/illness  Outcome: Progressing  Goal: Collaborate with me, my family, and caregiver to identify my specific goals  Outcome: Progressing     Problem: Discharge Barriers  Goal: My discharge needs are met  Outcome: Progressing

## 2024-03-30 NOTE — NURSING NOTE
Pt would like to rest and not be woken up for midnight vitals.  Will start vitals again in the morning.  Will continue to monitor.

## 2024-03-30 NOTE — PROGRESS NOTES
Aamir Garcia is a 54 y.o. male on day 2 of admission presenting with Acute renal failure (ARF) (CMS/HCC).      Subjective   Patient resting in bed comfortably. He denies any pain or sob.        Objective     Last Recorded Vitals  /54 (BP Location: Left arm, Patient Position: Lying)   Pulse 70   Temp 36.5 °C (97.7 °F) (Temporal)   Resp 17   Wt 72.1 kg (158 lb 15.2 oz)   SpO2 96%   Intake/Output last 3 Shifts:    Intake/Output Summary (Last 24 hours) at 3/30/2024 1559  Last data filed at 3/30/2024 0900  Gross per 24 hour   Intake 1579.33 ml   Output --   Net 1579.33 ml       Admission Weight  Weight: 72.1 kg (158 lb 15.2 oz) (03/28/24 0937)    Daily Weight  03/28/24 : 72.1 kg (158 lb 15.2 oz)    Image Results  US abdomen complete  Narrative: Interpreted By:  Phil Mckeon,   STUDY:  US ABDOMEN COMPLETE; 3/28/2024 9:34 pm      INDICATION:  Signs/Symptoms:ascites.  Ascites check.      COMPARISON:  None.      ACCESSION NUMBER(S):  [id number]      ORDERING CLINICIAN:  BIRD ANN      TECHNIQUE:  Grayscale and color Doppler imaging of the abdomen was performed.      FINDINGS:  Limited examination was performed for quantification of ascites.  Please note that solid organ assessment was not undertaken for this  examination. Examination demonstrates a small amount of ascites  adjacent to the liver and within the mid pelvis.      Impression: Small amount of ascites as above..      MACRO:  None.      Signed by: Phil Mckeon 3/29/2024 9:22 AM  Dictation workstation:   [id number]      Physical Exam  Constitutional:       Appearance: Normal appearance.   Cardiovascular:      Rate and Rhythm: Normal rate and regular rhythm.      Pulses: Normal pulses.      Heart sounds: Normal heart sounds.   Pulmonary:      Effort: Pulmonary effort is normal.      Breath sounds: Normal breath sounds.   Abdominal:      General: Abdomen is flat. Bowel sounds are normal.      Palpations: Abdomen is soft.   Musculoskeletal:          General: Normal range of motion.   Skin:     General: Skin is warm and dry.   Neurological:      Mental Status: He is alert and oriented to person, place, and time.         Relevant Results             Results for orders placed or performed during the hospital encounter of 03/28/24 (from the past 24 hour(s))   CBC   Result Value Ref Range    WBC 5.8 4.4 - 11.3 x10*3/uL    nRBC 0.0 0.0 - 0.0 /100 WBCs    RBC 4.64 4.50 - 5.90 x10*6/uL    Hemoglobin 15.2 13.5 - 17.5 g/dL    Hematocrit 45.3 41.0 - 52.0 %    MCV 98 80 - 100 fL    MCH 32.8 26.0 - 34.0 pg    MCHC 33.6 32.0 - 36.0 g/dL    RDW 12.7 11.5 - 14.5 %    Platelets 132 (L) 150 - 450 x10*3/uL   Basic Metabolic Panel   Result Value Ref Range    Glucose 96 65 - 99 mg/dL    Sodium 136 133 - 145 mmol/L    Potassium 4.3 3.4 - 5.1 mmol/L    Chloride 105 97 - 107 mmol/L    Bicarbonate 23 (L) 24 - 31 mmol/L    Urea Nitrogen 33 (H) 8 - 25 mg/dL    Creatinine 1.80 (H) 0.40 - 1.60 mg/dL    eGFR 44 (L) >60 mL/min/1.73m*2    Calcium 9.6 8.5 - 10.4 mg/dL    Anion Gap 8 <=19 mmol/L       Assessment/Plan                  Principal Problem:    Acute renal failure (ARF) (CMS/[facility])  Acute Renal Failure  Consult nephrology, Dr. Gonzalez  Renal functtion improving with IV fluids  Hold diuretics and ARB  Renal/Potasium restricted diet     Cirrhosis with Ascites and Alcohol dependency  DVT/GI prophylaxis  Heparin subcutaneous  Sequential TEDs  Protonix  CIWA     Plan of Care  Patient plans to go home at discharge possibly tomorrow.      Plan of care reviewed with patient and collaborating physician.        Malnutrition Diagnosis Status: New  Malnutrition Diagnosis: Moderate malnutrition related to acute disease or injury  As Evidenced by: mild subcutaneous fat loss and muscle wasting, 22# (12.2%) wt loss over ~3 weeks (3/8-3/28), po intake < 75% estimated needs for > 7 days  I agree with the dietitian's malnutrition diagnosis.         Bina Ricci, APRN-CNP

## 2024-03-30 NOTE — CARE PLAN
The patient's goals for the shift include  rest    The clinical goals for the shift include monitor labs, safety

## 2024-03-31 VITALS
RESPIRATION RATE: 16 BRPM | OXYGEN SATURATION: 97 % | HEIGHT: 72 IN | BODY MASS INDEX: 21.53 KG/M2 | HEART RATE: 68 BPM | SYSTOLIC BLOOD PRESSURE: 115 MMHG | DIASTOLIC BLOOD PRESSURE: 68 MMHG | TEMPERATURE: 97.5 F | WEIGHT: 158.95 LBS

## 2024-03-31 LAB
ALBUMIN SERPL-MCNC: 3.5 G/DL (ref 3.5–5)
ANION GAP SERPL CALC-SCNC: 5 MMOL/L
BUN SERPL-MCNC: 24 MG/DL (ref 8–25)
CALCIUM SERPL-MCNC: 9.7 MG/DL (ref 8.5–10.4)
CHLORIDE SERPL-SCNC: 103 MMOL/L (ref 97–107)
CO2 SERPL-SCNC: 26 MMOL/L (ref 24–31)
CREAT SERPL-MCNC: 1.5 MG/DL (ref 0.4–1.6)
EGFRCR SERPLBLD CKD-EPI 2021: 55 ML/MIN/1.73M*2
ERYTHROCYTE [DISTWIDTH] IN BLOOD BY AUTOMATED COUNT: 12.6 % (ref 11.5–14.5)
GLUCOSE SERPL-MCNC: 122 MG/DL (ref 65–99)
HCT VFR BLD AUTO: 45.2 % (ref 41–52)
HGB BLD-MCNC: 14.8 G/DL (ref 13.5–17.5)
MCH RBC QN AUTO: 33.2 PG (ref 26–34)
MCHC RBC AUTO-ENTMCNC: 32.7 G/DL (ref 32–36)
MCV RBC AUTO: 101 FL (ref 80–100)
NRBC BLD-RTO: 0 /100 WBCS (ref 0–0)
PHOSPHATE SERPL-MCNC: 3.6 MG/DL (ref 2.5–4.5)
PLATELET # BLD AUTO: 132 X10*3/UL (ref 150–450)
POTASSIUM SERPL-SCNC: 4.2 MMOL/L (ref 3.4–5.1)
RBC # BLD AUTO: 4.46 X10*6/UL (ref 4.5–5.9)
SODIUM SERPL-SCNC: 134 MMOL/L (ref 133–145)
WBC # BLD AUTO: 6.4 X10*3/UL (ref 4.4–11.3)

## 2024-03-31 PROCEDURE — 80069 RENAL FUNCTION PANEL: CPT | Performed by: INTERNAL MEDICINE

## 2024-03-31 PROCEDURE — S4991 NICOTINE PATCH NONLEGEND: HCPCS | Performed by: NURSE PRACTITIONER

## 2024-03-31 PROCEDURE — 2500000001 HC RX 250 WO HCPCS SELF ADMINISTERED DRUGS (ALT 637 FOR MEDICARE OP): Performed by: NURSE PRACTITIONER

## 2024-03-31 PROCEDURE — 2500000002 HC RX 250 W HCPCS SELF ADMINISTERED DRUGS (ALT 637 FOR MEDICARE OP, ALT 636 FOR OP/ED): Performed by: NURSE PRACTITIONER

## 2024-03-31 PROCEDURE — C9113 INJ PANTOPRAZOLE SODIUM, VIA: HCPCS

## 2024-03-31 PROCEDURE — 2500000004 HC RX 250 GENERAL PHARMACY W/ HCPCS (ALT 636 FOR OP/ED)

## 2024-03-31 PROCEDURE — 2500000001 HC RX 250 WO HCPCS SELF ADMINISTERED DRUGS (ALT 637 FOR MEDICARE OP): Performed by: INTERNAL MEDICINE

## 2024-03-31 PROCEDURE — 36415 COLL VENOUS BLD VENIPUNCTURE: CPT | Performed by: INTERNAL MEDICINE

## 2024-03-31 PROCEDURE — 85027 COMPLETE CBC AUTOMATED: CPT | Performed by: INTERNAL MEDICINE

## 2024-03-31 RX ORDER — ESCITALOPRAM OXALATE 10 MG/1
10 TABLET ORAL DAILY
Status: ON HOLD
Start: 2024-04-01 | End: 2024-06-04

## 2024-03-31 RX ORDER — IBUPROFEN 200 MG
1 TABLET ORAL DAILY
Start: 2024-04-01 | End: 2024-04-02 | Stop reason: ALTCHOICE

## 2024-03-31 RX ORDER — METOPROLOL SUCCINATE 50 MG/1
50 TABLET, EXTENDED RELEASE ORAL DAILY
Start: 2024-04-01 | End: 2024-04-03 | Stop reason: SDUPTHER

## 2024-03-31 RX ORDER — ACETAMINOPHEN 500 MG
5 TABLET ORAL DAILY
Status: ON HOLD
Start: 2024-03-31 | End: 2024-06-04

## 2024-03-31 RX ADMIN — BUPROPION HYDROCHLORIDE 150 MG: 150 TABLET, EXTENDED RELEASE ORAL at 08:55

## 2024-03-31 RX ADMIN — Medication 1 TABLET: at 08:55

## 2024-03-31 RX ADMIN — Medication 100 MG: at 08:55

## 2024-03-31 RX ADMIN — Medication 1 PATCH: at 08:56

## 2024-03-31 RX ADMIN — ESCITALOPRAM OXALATE 10 MG: 10 TABLET ORAL at 08:56

## 2024-03-31 RX ADMIN — FOLIC ACID 1 MG: 1 TABLET ORAL at 08:55

## 2024-03-31 RX ADMIN — METOPROLOL SUCCINATE 50 MG: 50 TABLET, EXTENDED RELEASE ORAL at 08:55

## 2024-03-31 RX ADMIN — PANTOPRAZOLE SODIUM 40 MG: 40 INJECTION, POWDER, FOR SOLUTION INTRAVENOUS at 08:56

## 2024-03-31 ASSESSMENT — LIFESTYLE VARIABLES
AGITATION: NORMAL ACTIVITY
ANXIETY: NO ANXIETY, AT EASE
PAROXYSMAL SWEATS: NO SWEAT VISIBLE
ORIENTATION AND CLOUDING OF SENSORIUM: ORIENTED AND CAN DO SERIAL ADDITIONS
VISUAL DISTURBANCES: NOT PRESENT
AUDITORY DISTURBANCES: NOT PRESENT
TREMOR: NO TREMOR
TOTAL SCORE: 0
NAUSEA AND VOMITING: NO NAUSEA AND NO VOMITING
HEADACHE, FULLNESS IN HEAD: NOT PRESENT

## 2024-03-31 ASSESSMENT — COGNITIVE AND FUNCTIONAL STATUS - GENERAL
MOBILITY SCORE: 24
DAILY ACTIVITIY SCORE: 24

## 2024-03-31 ASSESSMENT — PAIN - FUNCTIONAL ASSESSMENT: PAIN_FUNCTIONAL_ASSESSMENT: 0-10

## 2024-03-31 ASSESSMENT — PAIN SCALES - GENERAL: PAINLEVEL_OUTOF10: 0 - NO PAIN

## 2024-03-31 NOTE — PROGRESS NOTES
Acute kidney injury continues to improve, will monitor off of IV fluids.  Will likely be able to resume his Lasix in a couple days.  Continue to hold ARB.  Okay for discharge from renal standpoint can follow-up with us 2 weeks after discharge with renal function panel in 1 week.    Onesimo Gonzalez MD

## 2024-03-31 NOTE — CARE PLAN
Problem: Safety  Goal: Patient will be injury free during hospitalization  Outcome: Progressing  Goal: I will remain free of falls  Outcome: Progressing     Problem: Daily Care  Goal: Daily care needs are met  Outcome: Progressing     Problem: Discharge Planning  Goal: Discharge to home or other facility with appropriate resources  Outcome: Progressing   The patient's goals for the shift include      The clinical goals for the shift include maintain safety/comfort; IV hydrationn

## 2024-03-31 NOTE — DISCHARGE SUMMARY
Discharge Diagnosis  Acute renal failure (ARF) (CMS/[facility])    Issues Requiring Follow-Up  Renal function  Liver Function    Discharge Meds     Your medication list        START taking these medications        Instructions Last Dose Given Next Dose Due   melatonin 5 mg tablet      Take 1 tablet (5 mg) by mouth once daily.       nicotine 14 mg/24 hr patch  Commonly known as: Nicoderm CQ  Start taking on: April 1, 2024      Place 1 patch over 24 hours on the skin once daily. Do not start before April 1, 2024.              CHANGE how you take these medications        Instructions Last Dose Given Next Dose Due   escitalopram 10 mg tablet  Commonly known as: Lexapro  Start taking on: April 1, 2024  What changed: See the new instructions.      Take 1 tablet (10 mg) by mouth once daily. Do not start before April 1, 2024.       metoprolol succinate XL 50 mg 24 hr tablet  Commonly known as: Toprol-XL  Start taking on: April 1, 2024  What changed: additional instructions      Take 1 tablet (50 mg) by mouth once daily. Do not crush or chew. Do not start before April 1, 2024.              CONTINUE taking these medications        Instructions Last Dose Given Next Dose Due   EPINEPHrine 0.3 mg/0.3 mL injection syringe  Commonly known as: Epipen           folic acid 1 mg tablet  Commonly known as: Folvite      Take 1 tablet (1 mg) by mouth once daily.       methylphenidate LA 10 mg 24 hr capsule  Commonly known as: Ritalin LA           omeprazole 20 mg DR capsule  Commonly known as: PriLOSEC      Take 1 capsule (20 mg) by mouth once daily. Do not crush or chew.              STOP taking these medications      furosemide 40 mg tablet  Commonly known as: Lasix        losartan 100 mg tablet  Commonly known as: Cozaar        naproxen 500 mg tablet  Commonly known as: Naprosyn               ASK your doctor about these medications        Instructions Last Dose Given Next Dose Due   buPROPion  mg 24 hr tablet  Commonly known as:  Wellbutrin XL      TAKE 1 TABLET BY MOUTH EVERY DAY IN THE MORNING                 Where to Get Your Medications        Information about where to get these medications is not yet available    Ask your nurse or doctor about these medications  escitalopram 10 mg tablet  melatonin 5 mg tablet  metoprolol succinate XL 50 mg 24 hr tablet  nicotine 14 mg/24 hr patch         Test Results Pending At Discharge  Pending Labs       Order Current Status    Serum Protein Electrophoresis + Immunofixation In process    Serum Protein Electrophoresis + Immunofixation In process    Urine Protein Electrophoresis + Immunofixation In process    Urine Protein Electrophoresis + Immunofixation In process            Hospital Course   Patient is a 53 year old male with a past medical of alcohol dependence, alcoholic cirrhosis of liver with ascites, GERD, hyperlipidemia, hypertension who was sent to the ED by his GI Dr. Sullivan for abnormal labs, elevated BUN and creatinine. Patient was recently started on lasix and spirolactone with occasional use of naproxen. Patient's acute renal failure resolved with IV fluids and patient's lab work this am was BUN 24 and creatinine 1.50. Of note patient claims a large bloody emesis prior to admission. During admission he had no bleeding and his H&H remained stable. Patient is stable and will be discharged to follow up with both nephrology and GI.      Pertinent Physical Exam At Time of Discharge  Physical Exam  Constitutional:       Appearance: Normal appearance.   Cardiovascular:      Rate and Rhythm: Normal rate and regular rhythm.      Pulses: Normal pulses.      Heart sounds: Normal heart sounds.   Pulmonary:      Effort: Pulmonary effort is normal.      Breath sounds: Normal breath sounds.   Musculoskeletal:         General: Normal range of motion.   Skin:     General: Skin is warm and dry.   Neurological:      General: No focal deficit present.      Mental Status: He is alert and oriented to  person, place, and time.         Outpatient Follow-Up  Future Appointments   Date Time Provider Department Center   6/17/2024  9:00 AM Pascale Wood MD [id number] Singh-CNP

## 2024-03-31 NOTE — CARE PLAN
Problem: Pain  Goal: My pain/discomfort is manageable  Outcome: Progressing     Problem: Safety  Goal: Patient will be injury free during hospitalization  Outcome: Progressing  Goal: I will remain free of falls  Outcome: Progressing     Problem: Daily Care  Goal: Daily care needs are met  Outcome: Progressing     Problem: Psychosocial Needs  Goal: Demonstrates ability to cope with hospitalization/illness  Outcome: Progressing  Goal: Collaborate with me, my family, and caregiver to identify my specific goals  Outcome: Progressing     Problem: Discharge Barriers  Goal: My discharge needs are met  Outcome: Progressing     Problem: Discharge Planning  Goal: Discharge to home or other facility with appropriate resources  Outcome: Progressing     Problem: Fall/Injury  Goal: Not fall by end of shift  Outcome: Progressing  Goal: Be free from injury by end of the shift  Outcome: Progressing  Goal: Verbalize understanding of personal risk factors for fall in the hospital  Outcome: Progressing  Goal: Verbalize understanding of risk factor reduction measures to prevent injury from fall in the home  Outcome: Progressing  Goal: Use assistive devices by end of the shift  Outcome: Progressing  Goal: Pace activities to prevent fatigue by end of the shift  Outcome: Progressing   The patient's goals for the shift include  discharge    The clinical goals for the shift include safety/monitor labs

## 2024-04-01 ENCOUNTER — DOCUMENTATION (OUTPATIENT)
Dept: PRIMARY CARE | Facility: CLINIC | Age: 54
End: 2024-04-01
Payer: MEDICARE

## 2024-04-01 ENCOUNTER — PATIENT OUTREACH (OUTPATIENT)
Dept: PRIMARY CARE | Facility: CLINIC | Age: 54
End: 2024-04-01
Payer: MEDICARE

## 2024-04-01 ENCOUNTER — TELEPHONE (OUTPATIENT)
Dept: PRIMARY CARE | Facility: CLINIC | Age: 54
End: 2024-04-01
Payer: MEDICARE

## 2024-04-01 NOTE — TELEPHONE ENCOUNTER
----- Message from Marcela Casanova sent at 4/1/2024  1:25 PM EDT -----  Regarding: TCM Appointment    Can you please contact pt to schedule hosp  follow up within 14 days of discharge.  This patient was discharged from: [facility]  Discharge diagnosis:   Acute renal failure  Date of discharge:  03/31/2024        Thank you

## 2024-04-01 NOTE — PROGRESS NOTES
[place] [facility] [place] [facility] [place]  Discharge Diagnosis: Acute renal failure  Admission Date: 03/28/2024  Discharge Date: 03/31/2024    PCP Appointment Date: Tasked to office  Specialist Appointment Date: None  Hospital Encounter and Summary: Linked    See discharge assessment below for further details      Engagement  Call Start Time: 1320 (4/1/2024  1:22 PM)    Medications  Medications reviewed with patient/caregiver?: Yes (4/1/2024  1:22 PM)  Is the patient having any side effects they believe may be caused by any medication additions or changes?: No (4/1/2024  1:22 PM)  Does the patient have all medications ordered at discharge?: Yes (4/1/2024  1:22 PM)  Prescription Comments: Scripts given at discharge for Melatonin and Nicoderm (4/1/2024  1:22 PM)  Is the patient taking all medications as directed (includes completed medication regime)?: Yes (4/1/2024  1:22 PM)  Medication Comments: Patient denies any issues obtaining or affording medication (4/1/2024  1:22 PM)    Appointments  Does the patient have a primary care provider?: Yes (4/1/2024  1:22 PM)  Care Management Interventions: -- (Tasked to office) (4/1/2024  1:22 PM)  Has the patient kept scheduled appointments due by today?: Yes (4/1/2024  1:22 PM)    Self Management  What is the home health agency?: N/A (4/1/2024  1:22 PM)  What Durable Medical Equipment (DME) was ordered?: N/A (4/1/2024  1:22 PM)    Patient Teaching  Does the patient have access to their discharge instructions?: Yes (4/1/2024  1:22 PM)  Care Management Interventions: Reviewed instructions with patient (4/1/2024  1:22 PM)  What is the patient's perception of their health status since discharge?: Improving (4/1/2024  1:22 PM)  Is the patient/caregiver able to teach back the hierarchy of who to call/visit for symptoms/problems? PCP, Specialist, Home Health nurse, Urgent Care, ED, 911: Yes (4/1/2024  1:22 PM)  Patient/Caregiver Education Comments: CM spoke to patient via phone. He states that he  is doing okay at home. He has complaints of a gout flare in his bilateral feet. He is requesting medication from PCP, message will be sent to the clinical staff. PCP follow up appointment has been tasked to the office for scheduling. He has no questions at this time and was thankful for this call. (4/1/2024  1:22 PM)

## 2024-04-02 ENCOUNTER — OFFICE VISIT (OUTPATIENT)
Dept: PRIMARY CARE | Facility: CLINIC | Age: 54
End: 2024-04-02
Payer: MEDICARE

## 2024-04-02 VITALS
DIASTOLIC BLOOD PRESSURE: 74 MMHG | TEMPERATURE: 97.3 F | BODY MASS INDEX: 23.03 KG/M2 | SYSTOLIC BLOOD PRESSURE: 121 MMHG | HEART RATE: 86 BPM | WEIGHT: 170 LBS | HEIGHT: 72 IN | OXYGEN SATURATION: 97 %

## 2024-04-02 DIAGNOSIS — N17.9 ACUTE RENAL INJURY (CMS-HCC): ICD-10-CM

## 2024-04-02 DIAGNOSIS — M10.072 ACUTE IDIOPATHIC GOUT OF LEFT FOOT: ICD-10-CM

## 2024-04-02 DIAGNOSIS — K70.30 ALCOHOLIC CIRRHOSIS OF LIVER WITHOUT ASCITES (MULTI): ICD-10-CM

## 2024-04-02 DIAGNOSIS — I10 PRIMARY HYPERTENSION: Primary | ICD-10-CM

## 2024-04-02 PROCEDURE — 99496 TRANSJ CARE MGMT HIGH F2F 7D: CPT | Performed by: INTERNAL MEDICINE

## 2024-04-02 PROCEDURE — 3074F SYST BP LT 130 MM HG: CPT | Performed by: INTERNAL MEDICINE

## 2024-04-02 PROCEDURE — 3078F DIAST BP <80 MM HG: CPT | Performed by: INTERNAL MEDICINE

## 2024-04-02 RX ORDER — AMLODIPINE BESYLATE 5 MG/1
5 TABLET ORAL DAILY
Qty: 90 TABLET | Refills: 0 | Status: SHIPPED | OUTPATIENT
Start: 2024-04-02 | End: 2024-04-23 | Stop reason: HOSPADM

## 2024-04-02 RX ORDER — METHYLPREDNISOLONE 4 MG/1
TABLET ORAL
Qty: 21 TABLET | Refills: 0 | Status: SHIPPED | OUTPATIENT
Start: 2024-04-02 | End: 2024-04-08 | Stop reason: ALTCHOICE

## 2024-04-02 RX ORDER — POLYETHYLENE GLYCOL 3350 17 G/17G
17 POWDER, FOR SOLUTION ORAL DAILY PRN
COMMUNITY
Start: 2024-03-28 | End: 2024-06-04 | Stop reason: HOSPADM

## 2024-04-02 ASSESSMENT — PAIN SCALES - GENERAL: PAINLEVEL: 5

## 2024-04-02 ASSESSMENT — PATIENT HEALTH QUESTIONNAIRE - PHQ9
2. FEELING DOWN, DEPRESSED OR HOPELESS: NOT AT ALL
1. LITTLE INTEREST OR PLEASURE IN DOING THINGS: NOT AT ALL
SUM OF ALL RESPONSES TO PHQ9 QUESTIONS 1 AND 2: 0

## 2024-04-02 NOTE — PROGRESS NOTES
[facility]: [facility]  PROGRESS NOTE      Aamir Garcia is a 54 y.o. male that is presenting today for Follow-up (Hospital follow up from 3- discharge/Kidney failure).    Assessment/Plan   Diagnoses and all orders for this visit:  Primary hypertension    Under control with current treatment   Continue the same   Rx E-scripted 90 days x 1  -     amLODIPine (Norvasc) 5 mg tablet; Take 1 tablet (5 mg) by mouth once daily.  Acute renal injury (CMS/HCC)     Resolved improving on last BW before discharge / has FU w Nephro next week. Stressed importance of hydration and avoiding Nephrotoxic meds mostly OTC NSAIDs   Acute idiopathic gout of left foot  -     methylPREDNISolone (Medrol Dospak) 4 mg tablets; Take as directed on package.  -     Uric acid; Future  Alcoholic cirrhosis of liver without ascites (CMS/HCC)     Not planning to stop drinking and and not making any effort to join   Subjective   - Patient is here today for F/UV after hospital discharge on 4/1/24 -- Admitted on 3/29/24 with HIPOLITO due Dehydration       Started with Lt.foot acute gout while in the hospital which they did not treat and today he is in lot of pain requesting Tx      Been drinking - his last drink was 4 days ago - re-discussed     - Patient denies any  symptoms or concerns at this time.    - patient denies any adverse reactions to or concerns with his/her meds.    Review of Systems   All pertinent POSITIVES as noted per HPI.  All other systems have been reviewed and are NEGATIVE and /or Noncontributory to this patient current visit or complaint.    Objective   Vitals:    04/02/24 1507   BP: 121/74   Pulse: 86   Temp: 36.3 °C (97.3 °F)   SpO2: 97%      Body mass index is 23.06 kg/m².  Physical Exam  Vitals and nursing note reviewed.   Constitutional:       Appearance: Normal appearance.   HENT:      Head: Normocephalic and atraumatic.   Neck:      Vascular: No carotid bruit.   Cardiovascular:      Rate and Rhythm:  "Normal rate and regular rhythm.      Pulses: Normal pulses.           Dorsalis pedis pulses are 2+ on the left side.        Posterior tibial pulses are 2+ on the left side.      Heart sounds: Normal heart sounds.   Pulmonary:      Effort: Pulmonary effort is normal.      Breath sounds: Normal breath sounds.   Abdominal:      General: Abdomen is flat. Bowel sounds are normal.      Palpations: Abdomen is soft.   Musculoskeletal:         General: No swelling.      Cervical back: Neck supple.      Left foot: Decreased range of motion.        Feet:    Feet:      Right foot:      Skin integrity: Skin integrity normal.      Left foot:      Skin integrity: Erythema and warmth present.   Lymphadenopathy:      Cervical: No cervical adenopathy.   Skin:     General: Skin is warm and dry.   Neurological:      Mental Status: He is alert.   Psychiatric:         Mood and Affect: Mood normal.     Diagnostic Results   Lab Results   Component Value Date    GLUCOSE 122 (H) 03/31/2024    CALCIUM 9.7 03/31/2024     03/31/2024    K 4.2 03/31/2024    CO2 26 03/31/2024     03/31/2024    BUN 24 03/31/2024    CREATININE 1.50 03/31/2024     Lab Results   Component Value Date    ALT 22 03/28/2024    AST 34 03/28/2024    ALKPHOS 358 (H) 03/28/2024    BILITOT 1.4 (H) 03/28/2024     Lab Results   Component Value Date    WBC 6.4 03/31/2024    HGB 14.8 03/31/2024    HCT 45.2 03/31/2024     (H) 03/31/2024     (L) 03/31/2024     Lab Results   Component Value Date    CHOL 209 (H) 01/24/2020     Lab Results   Component Value Date    HDL 30 (L) 01/24/2020     Lab Results   Component Value Date    LDLCALC 144 (H) 01/24/2020     Lab Results   Component Value Date    TRIG 173 (H) 01/24/2020     No components found for: \"CHOLHDL\"  Lab Results   Component Value Date    HGBA1C 5.5 11/13/2023     Other labs not included in the list above were reviewed either before or during this encounter.    History    Past Medical History: "   Diagnosis Date    Alcohol dependence (CMS/HCC)     Alcoholic cirrhosis of liver with ascites (CMS/HCC)     GERD (gastroesophageal reflux disease)     Hyperlipidemia     Hypertension      Past Surgical History:   Procedure Laterality Date    PARACENTESIS      2x in 2024    SINUS SURGERY  2013    Sinus Surgery     Family History   Problem Relation Name Age of Onset    Hypertension Mother           age 64    Heart attack Father           at age 62    Other (Glioblastoma) Father      Diabetes Father      Cancer Father       Social History     Socioeconomic History    Marital status:      Spouse name: Not on file    Number of children: Not on file    Years of education: Not on file    Highest education level: Not on file   Occupational History    Not on file   Tobacco Use    Smoking status: Every Day     Packs/day: .5     Types: Cigarettes     Passive exposure: Current    Smokeless tobacco: Never   Vaping Use    Vaping Use: Some days    Substances: Nicotine    Devices: Disposable, Pre-filled or refillable cartridge   Substance and Sexual Activity    Alcohol use: Yes     Comment: daily liquor    Drug use: Never    Sexual activity: Not on file   Other Topics Concern    Not on file   Social History Narrative    Not on file     Social Determinants of Health     Financial Resource Strain: Low Risk  (3/28/2024)    Overall Financial Resource Strain (CARDIA)     Difficulty of Paying Living Expenses: Not very hard   Food Insecurity: Not on file   Transportation Needs: No Transportation Needs (3/28/2024)    PRAPARE - Transportation     Lack of Transportation (Medical): No     Lack of Transportation (Non-Medical): No   Physical Activity: Not on file   Stress: Not on file   Social Connections: Not on file   Intimate Partner Violence: Not on file   Housing Stability: High Risk (3/28/2024)    Housing Stability Vital Sign     Unable to Pay for Housing in the Last Year: No     Number of Places Lived in the  Last Year: 3     Unstable Housing in the Last Year: No     Allergies   Allergen Reactions    Clams Shortness of breath     Current Outpatient Medications on File Prior to Visit   Medication Sig Dispense Refill    buPROPion XL (Wellbutrin XL) 150 mg 24 hr tablet TAKE 1 TABLET BY MOUTH EVERY DAY IN THE MORNING 90 tablet 0    EPINEPHrine 0.3 mg/0.3 mL injection syringe INJECT INTO THE MUSCLE ONCE AS NEEDED FOR ALLERGIC REACTIONS      escitalopram (Lexapro) 10 mg tablet Take 1 tablet (10 mg) by mouth once daily. Do not start before April 1, 2024.      folic acid (Folvite) 1 mg tablet Take 1 tablet (1 mg) by mouth once daily. 90 tablet 0    metoprolol succinate XL (Toprol-XL) 50 mg 24 hr tablet Take 1 tablet (50 mg) by mouth once daily. Do not crush or chew. Do not start before April 1, 2024.      multivitamin with minerals tablet Take 1 tablet by mouth once daily.      omeprazole (PriLOSEC) 20 mg DR capsule Take 1 capsule (20 mg) by mouth once daily. Do not crush or chew. 30 capsule 11    melatonin 5 mg tablet Take 1 tablet (5 mg) by mouth once daily. (Patient not taking: Reported on 4/2/2024)      methylphenidate LA (Ritalin LA) 10 mg 24 hr capsule Take 1 capsule (10 mg) by mouth once daily in the morning.      nicotine (Nicoderm CQ) 14 mg/24 hr patch Place 1 patch over 24 hours on the skin once daily. Do not start before April 1, 2024. (Patient not taking: Reported on 4/2/2024)      polyethylene glycol (Glycolax, Miralax) 17 gram packet Take 17 g by mouth once daily as needed.      [DISCONTINUED] escitalopram (Lexapro) 10 mg tablet 1 tablet Orally Once a day for 30 days      [DISCONTINUED] furosemide (Lasix) 40 mg tablet Take 1 tablet (40 mg) by mouth once daily.      [DISCONTINUED] losartan (Cozaar) 100 mg tablet Take 1 tablet (100 mg) by mouth once daily. 90 tablet 1    [DISCONTINUED] metoprolol succinate XL (Toprol-XL) 50 mg 24 hr tablet TAKE 1 TABLET BY MOUTH EVERY DAY 90 tablet 0    [DISCONTINUED] naproxen  (Naprosyn) 500 mg tablet Take 1 tablet (500 mg) by mouth 2 times a day with meals. As needed 60 tablet 0    [DISCONTINUED] spironolactone (Aldactone) 50 mg tablet Take 1 tablet (50 mg) by mouth once daily.       No current facility-administered medications on file prior to visit.       There is no immunization history on file for this patient.  Patient's medical history was reviewed and updated either before or during this encounter.       Pascale Wood MD

## 2024-04-03 DIAGNOSIS — I10 HYPERTENSION, UNSPECIFIED TYPE: ICD-10-CM

## 2024-04-03 PROBLEM — K70.30 ALCOHOLIC CIRRHOSIS OF LIVER WITHOUT ASCITES (MULTI): Status: ACTIVE | Noted: 2024-03-08

## 2024-04-03 LAB
ALBUMIN MFR UR ELPH: 34.2 %
ALBUMIN: 4.6 G/DL (ref 3.4–5)
ALPHA 1 GLOBULIN: 0.4 G/DL (ref 0.2–0.6)
ALPHA 2 GLOBULIN: 0.7 G/DL (ref 0.4–1.1)
ALPHA1 GLOB MFR UR ELPH: 12.8 %
ALPHA2 GLOB MFR UR ELPH: 17.1 %
B-GLOBULIN MFR UR ELPH: 13.8 %
BETA GLOBULIN: 1.2 G/DL (ref 0.5–1.2)
GAMMA GLOB MFR UR ELPH: 22.1 %
GAMMA GLOBULIN: 2 G/DL (ref 0.5–1.4)
IMMUNOFIXATION COMMENT: ABNORMAL
IMMUNOFIXATION COMMENT: NORMAL
PATH REVIEW - SERUM IMMUNOFIXATION: ABNORMAL
PATH REVIEW - URINE IMMUNOFIXATION: NORMAL
PATH REVIEW-SERUM PROTEIN ELECTROPHORESIS: ABNORMAL
PATH REVIEW-URINE PROTEIN ELECTROPHORESIS: NORMAL
PROTEIN ELECTROPHORESIS COMMENT: ABNORMAL
URINE ELECTROPHORESIS COMMENT: NORMAL

## 2024-04-05 RX ORDER — METOPROLOL SUCCINATE 50 MG/1
50 TABLET, EXTENDED RELEASE ORAL DAILY
Qty: 90 TABLET | Refills: 1 | Status: SHIPPED | OUTPATIENT
Start: 2024-04-05

## 2024-04-05 NOTE — DOCUMENTATION CLARIFICATION NOTE
"    PATIENT:               BRIANNA CLINTON  ACCT #:                  [id number]  MRN:                       [id number]  :                       1970  ADMIT DATE:       3/28/2024 9:42 AM  DISCH DATE:        3/31/2024 12:57 PM  RESPONDING PROVIDER #:        [id number]          PROVIDER RESPONSE TEXT:    Acute kidney injury with acute tubular necrosis due to elevated Creatinine (that took >72 hours to return to baseline)    CDI QUERY TEXT:    UH_ATN    Instruction:    Based on your assessment of the patient and the clinical information, please provide the requested documentation by clicking on the appropriate radio button and enter any additional information if prompted.    Question: Please further clarify the diagnosis of acute kidney injury as    When answering this query, please exercise your independent professional judgment. The fact that a question is being asked, does not imply that any particular answer is desired or expected.    The patient's clinical indicators include:  Clinical Information: 53y.o. M presents per GI DrCole d/t abnormal labs & large hematemesis x1 prior to admit. Per H&P principal problem Acute renal failure & Cirrhosis w/ascites & alcohol dependency. VS: 36.0, 66, 18, 106/69 Map 81, 100% room air.    3/28 (H&P): \" Patient had been taking Lasix and started on Spirolactone recently. He also endorses using Naproxen frequently. For the past week patient has been weak, nauseous, had a decrease in urinary output, and diarrhea. \"    3/28 (Nephrology Consult): \" Acute kidney injury - Likely secondary to volume depletion with GI losses, diuretics. Hypercalcemia, Hypertension, Hematemesis, Alcohol abuse \"    3/29 (Dietician Consult): \" Moderate malnutrition related to acute disease or injury \"    3/31 (Discharge Summary): \" Patient's acute renal failure resolved with IV fluids and patient's lab work this am was BUN 24 and creatinine 1.50 \"    Clinical Indicators:  3/28 Creatinine 5.00, GFR 13, BUN 63, Ca " 11.4 - BUN/Creatinine ratio (Calculated) 12.6  3/29 Creatinine 3.20, GFR 22, BUN 52, Ca 10.0  3/30 Creatinine 1.80, GFR 44, BUN 33, Ca 9.6  3/31 Creatinine 1.50, GFR 55, BUN 24, Ca 9.7    3/28 (UA)  Urine Creatinine: 94.2  Protein/Creatinine Ratio 0.79  Specific Gravity: 1.011    Treatment: (EPIC MAR Info)  3/28-3/31 NS x6 L  3/28-3/31 Thiamine 100mg po x4 doses    Risk Factors: PMHx: Alcohol induced cirrhosis w/ascites, Alcoholic hepatitis, GERD, HLD, HTN, Current smoker, +MJ.  BMI: 21.56  Options provided:  -- Acute kidney injury with acute tubular necrosis due to elevated Creatinine (that took >72 hours to return to baseline)  -- Acute kidney injury without acute tubular necrosis  -- Other comments, please provide details here  -- Other - I will add my own diagnosis  -- Refer to Clinical Documentation Reviewer    Query created by: Niya Patel on 4/5/2024 9:02 AM      Electronically signed by:  KRISTIN GAMING DO 4/5/2024 9:44 AM

## 2024-04-05 NOTE — DOCUMENTATION CLARIFICATION NOTE
"    PATIENT:               BRIANNA CLINTON  ACCT #:                  [id number]  MRN:                       [id number]  :                       1970  ADMIT DATE:       3/28/2024 9:42 AM  DISCH DATE:        3/31/2024 12:57 PM  RESPONDING PROVIDER #:        [id number]          PROVIDER RESPONSE TEXT:    Hepatorenal Syndrome not present nor clinically treated during this admission    CDI QUERY TEXT:    UH_Generic    Instruction:    Based on your assessment of the patient and the clinical information, please provide the requested documentation by clicking on the appropriate radio button and enter any additional information if prompted.    Question: Please further clarify after study the diagnosis of Hepatorenal Syndrome    When answering this query, please exercise your independent professional judgment. The fact that a question is being asked, does not imply that any particular answer is desired or expected.    The patient's clinical indicators include:  Clinical Information: 53y.o. M presents per GI  d/t abnormal labs & large hematemesis x1 prior to admit. Per H&P principal problem Acute renal failure & Cirrhosis w/ascites & alcohol dependency. VS: 36.0, 66, 18, 106/69 Map 81, 100% room air.    3/28 (H&P): \" Patient was diagnosed with alcoholic liver disease in January of this year while traveling in [place]. He was hospitalized and had a paracentesis where several liters were removed. \"    3/28 (H&P): \" Cirrhosis with Ascites and Alcohol dependency \"    3/28 (Nephrology Consult): \" Acute kidney injury - Likely secondary to volume depletion with GI losses, diuretics. Hypercalcemia, Hypertension, Hematemesis, Alcohol abuse \"    3/31 (Discharge Summary): \" Patient's acute renal failure resolved with IV fluids and patient's lab work this am was BUN 24 and creatinine 1.50 \"    Clinical Indicators:  3/28 Creatinine 5.00, GFR 13, BUN 63, Ca 11.4, Alkaline Phosphatase 358, Bilirubin 1.4  3/29 Creatinine 3.20, GFR 22, BUN 52, " Ca 10.0  3/30 Creatinine 1.80, GFR 44, BUN 33, Ca 9.6  3/31 Creatinine 1.50, GFR 55, BUN 24, Ca 9.7    Treatment: (EPIC MAR Info)  3/28-3/31 NS x6 L  3/28-3/31 Thiamine 100mg po x4 doses    Risk Factors: PMHx: Alcohol induced cirrhosis w/ascites, Alcoholic hepatitis, GERD, HLD, HTN, Current smoker, +MJ.  BMI: 21.56  Options provided:  -- Hepatorenal Syndrome present & clinically treated during this admission (as evidenced by cirrhosis of liver & DIMITRI)  -- Hepatorenal Syndrome not present nor clinically treated during this admission  -- Other comments, please provide details here  -- Other - I will add my own diagnosis  -- Refer to Clinical Documentation Reviewer    Query created by: Niya Patel on 4/5/2024 9:02 AM      Electronically signed by:  KRISTIN GAMING DO 4/5/2024 9:44 AM

## 2024-04-08 ENCOUNTER — TELEPHONE (OUTPATIENT)
Dept: PRIMARY CARE | Facility: CLINIC | Age: 54
End: 2024-04-08
Payer: MEDICARE

## 2024-04-08 DIAGNOSIS — M10.479 OTHER SECONDARY ACUTE GOUT OF FOOT, UNSPECIFIED LATERALITY: Primary | ICD-10-CM

## 2024-04-08 RX ORDER — METHYLPREDNISOLONE 4 MG/1
TABLET ORAL
Qty: 21 TABLET | Refills: 0 | Status: SHIPPED | OUTPATIENT
Start: 2024-04-08 | End: 2024-04-23 | Stop reason: HOSPADM

## 2024-04-08 NOTE — TELEPHONE ENCOUNTER
- Patient needs to stop drinking alcohol and this problem will go away.  - Will re-prescribe steroid pack.

## 2024-04-08 NOTE — TELEPHONE ENCOUNTER
Nina clinton.  Pt was seen 4/2/23 and given MedrolPak for gout.  Pt states this did not help at all and now can barely walk.  Asking if you could Rx something else.  Please advise.  Ph: [phone]    [facility] [address], [place]

## 2024-04-09 RX ORDER — COLCHICINE 0.6 MG/1
0.6 TABLET ORAL 2 TIMES DAILY
Qty: 60 TABLET | Refills: 0 | Status: SHIPPED | OUTPATIENT
Start: 2024-04-09 | End: 2024-05-09

## 2024-04-09 NOTE — TELEPHONE ENCOUNTER
Notified pt Rx sent to pharm, but pt states this med not helping.  Any other comments/suggestions?  Please advise.

## 2024-04-10 NOTE — TELEPHONE ENCOUNTER
LMOM.  Notified pt Rx for colchicine sent to [facility] on [facility].  Advised to take as directed and stop when the pain has resolved as this med may cause diarrhea.

## 2024-04-11 ENCOUNTER — PATIENT OUTREACH (OUTPATIENT)
Dept: PRIMARY CARE | Facility: CLINIC | Age: 54
End: 2024-04-11
Payer: MEDICARE

## 2024-04-18 ENCOUNTER — APPOINTMENT (OUTPATIENT)
Dept: GASTROENTEROLOGY | Facility: CLINIC | Age: 54
End: 2024-04-18
Payer: MEDICARE

## 2024-04-21 ENCOUNTER — APPOINTMENT (OUTPATIENT)
Dept: RADIOLOGY | Facility: HOSPITAL | Age: 54
End: 2024-04-21
Payer: MEDICARE

## 2024-04-21 ENCOUNTER — HOSPITAL ENCOUNTER (OUTPATIENT)
Facility: HOSPITAL | Age: 54
Setting detail: OBSERVATION
Discharge: HOME | End: 2024-04-23
Attending: STUDENT IN AN ORGANIZED HEALTH CARE EDUCATION/TRAINING PROGRAM | Admitting: INTERNAL MEDICINE
Payer: MEDICARE

## 2024-04-21 DIAGNOSIS — K70.31 ALCOHOLIC CIRRHOSIS OF LIVER WITH ASCITES (MULTI): ICD-10-CM

## 2024-04-21 DIAGNOSIS — K70.30 ALCOHOLIC CIRRHOSIS OF LIVER WITHOUT ASCITES (MULTI): ICD-10-CM

## 2024-04-21 DIAGNOSIS — R10.84 GENERALIZED ABDOMINAL PAIN: Primary | ICD-10-CM

## 2024-04-21 DIAGNOSIS — K70.31 ASCITES DUE TO ALCOHOLIC CIRRHOSIS (MULTI): ICD-10-CM

## 2024-04-21 LAB
ALBUMIN SERPL-MCNC: 3.8 G/DL (ref 3.5–5)
ALP BLD-CCNC: 342 U/L (ref 35–125)
ALT SERPL-CCNC: 37 U/L (ref 5–40)
AMMONIA PLAS-SCNC: 25 UMOL/L (ref 12–45)
ANION GAP SERPL CALC-SCNC: 8 MMOL/L
AST SERPL-CCNC: 54 U/L (ref 5–40)
BASOPHILS # BLD AUTO: 0.05 X10*3/UL (ref 0–0.1)
BASOPHILS NFR BLD AUTO: 0.7 %
BILIRUB SERPL-MCNC: 1 MG/DL (ref 0.1–1.2)
BUN SERPL-MCNC: 12 MG/DL (ref 8–25)
CALCIUM SERPL-MCNC: 9.8 MG/DL (ref 8.5–10.4)
CHLORIDE SERPL-SCNC: 98 MMOL/L (ref 97–107)
CO2 SERPL-SCNC: 29 MMOL/L (ref 24–31)
CREAT SERPL-MCNC: 0.8 MG/DL (ref 0.4–1.6)
EGFRCR SERPLBLD CKD-EPI 2021: >90 ML/MIN/1.73M*2
EOSINOPHIL # BLD AUTO: 0.07 X10*3/UL (ref 0–0.7)
EOSINOPHIL NFR BLD AUTO: 1 %
ERYTHROCYTE [DISTWIDTH] IN BLOOD BY AUTOMATED COUNT: 12.7 % (ref 11.5–14.5)
GLUCOSE SERPL-MCNC: 104 MG/DL (ref 65–99)
HCT VFR BLD AUTO: 43.9 % (ref 41–52)
HGB BLD-MCNC: 14.7 G/DL (ref 13.5–17.5)
IMM GRANULOCYTES # BLD AUTO: 0.01 X10*3/UL (ref 0–0.7)
IMM GRANULOCYTES NFR BLD AUTO: 0.1 % (ref 0–0.9)
INR PPP: 1.3 (ref 0.9–1.2)
LIPASE SERPL-CCNC: 95 U/L (ref 16–63)
LYMPHOCYTES # BLD AUTO: 1.86 X10*3/UL (ref 1.2–4.8)
LYMPHOCYTES NFR BLD AUTO: 27.1 %
MAGNESIUM SERPL-MCNC: 1.5 MG/DL (ref 1.6–3.1)
MAGNESIUM SERPL-MCNC: 1.5 MG/DL (ref 1.6–3.1)
MCH RBC QN AUTO: 32.7 PG (ref 26–34)
MCHC RBC AUTO-ENTMCNC: 33.5 G/DL (ref 32–36)
MCV RBC AUTO: 98 FL (ref 80–100)
MONOCYTES # BLD AUTO: 0.63 X10*3/UL (ref 0.1–1)
MONOCYTES NFR BLD AUTO: 9.2 %
NEUTROPHILS # BLD AUTO: 4.24 X10*3/UL (ref 1.2–7.7)
NEUTROPHILS NFR BLD AUTO: 61.9 %
NRBC BLD-RTO: 0 /100 WBCS (ref 0–0)
PLATELET # BLD AUTO: 129 X10*3/UL (ref 150–450)
POTASSIUM SERPL-SCNC: 3.4 MMOL/L (ref 3.4–5.1)
PROT SERPL-MCNC: 7 G/DL (ref 5.9–7.9)
PROTHROMBIN TIME: 13.3 SECONDS (ref 9.3–12.7)
RBC # BLD AUTO: 4.49 X10*6/UL (ref 4.5–5.9)
SODIUM SERPL-SCNC: 135 MMOL/L (ref 133–145)
WBC # BLD AUTO: 6.9 X10*3/UL (ref 4.4–11.3)

## 2024-04-21 PROCEDURE — 36415 COLL VENOUS BLD VENIPUNCTURE: CPT

## 2024-04-21 PROCEDURE — 85610 PROTHROMBIN TIME: CPT

## 2024-04-21 PROCEDURE — 83690 ASSAY OF LIPASE: CPT

## 2024-04-21 PROCEDURE — 96365 THER/PROPH/DIAG IV INF INIT: CPT

## 2024-04-21 PROCEDURE — 96366 THER/PROPH/DIAG IV INF ADDON: CPT

## 2024-04-21 PROCEDURE — 80053 COMPREHEN METABOLIC PANEL: CPT

## 2024-04-21 PROCEDURE — 74177 CT ABD & PELVIS W/CONTRAST: CPT | Performed by: RADIOLOGY

## 2024-04-21 PROCEDURE — 83735 ASSAY OF MAGNESIUM: CPT | Performed by: INTERNAL MEDICINE

## 2024-04-21 PROCEDURE — 2500000006 HC RX 250 W HCPCS SELF ADMINISTERED DRUGS (ALT 637 FOR ALL PAYERS): Performed by: INTERNAL MEDICINE

## 2024-04-21 PROCEDURE — 2500000001 HC RX 250 WO HCPCS SELF ADMINISTERED DRUGS (ALT 637 FOR MEDICARE OP): Performed by: INTERNAL MEDICINE

## 2024-04-21 PROCEDURE — 74177 CT ABD & PELVIS W/CONTRAST: CPT

## 2024-04-21 PROCEDURE — G0378 HOSPITAL OBSERVATION PER HR: HCPCS

## 2024-04-21 PROCEDURE — 2550000001 HC RX 255 CONTRASTS

## 2024-04-21 PROCEDURE — 99285 EMERGENCY DEPT VISIT HI MDM: CPT | Mod: 25

## 2024-04-21 PROCEDURE — 85025 COMPLETE CBC W/AUTO DIFF WBC: CPT

## 2024-04-21 PROCEDURE — 2500000004 HC RX 250 GENERAL PHARMACY W/ HCPCS (ALT 636 FOR OP/ED)

## 2024-04-21 PROCEDURE — 2500000004 HC RX 250 GENERAL PHARMACY W/ HCPCS (ALT 636 FOR OP/ED): Performed by: INTERNAL MEDICINE

## 2024-04-21 PROCEDURE — 82140 ASSAY OF AMMONIA: CPT

## 2024-04-21 RX ORDER — MULTIVIT-MIN/IRON FUM/FOLIC AC 7.5 MG-4
1 TABLET ORAL
Status: DISCONTINUED | OUTPATIENT
Start: 2024-04-21 | End: 2024-04-23 | Stop reason: HOSPADM

## 2024-04-21 RX ORDER — ONDANSETRON HYDROCHLORIDE 2 MG/ML
4 INJECTION, SOLUTION INTRAVENOUS ONCE
Status: COMPLETED | OUTPATIENT
Start: 2024-04-21 | End: 2024-04-21

## 2024-04-21 RX ORDER — SPIRONOLACTONE 25 MG/1
25 TABLET ORAL 2 TIMES DAILY
Status: DISCONTINUED | OUTPATIENT
Start: 2024-04-21 | End: 2024-04-23 | Stop reason: HOSPADM

## 2024-04-21 RX ORDER — ACETAMINOPHEN 500 MG
5 TABLET ORAL NIGHTLY
Status: DISCONTINUED | OUTPATIENT
Start: 2024-04-21 | End: 2024-04-23 | Stop reason: HOSPADM

## 2024-04-21 RX ORDER — IBUPROFEN 200 MG
1 TABLET ORAL DAILY
Status: DISCONTINUED | OUTPATIENT
Start: 2024-04-21 | End: 2024-04-23 | Stop reason: HOSPADM

## 2024-04-21 RX ORDER — BUPROPION HYDROCHLORIDE 150 MG/1
150 TABLET ORAL
Status: DISCONTINUED | OUTPATIENT
Start: 2024-04-22 | End: 2024-04-23 | Stop reason: HOSPADM

## 2024-04-21 RX ORDER — FOLIC ACID 1 MG/1
1 TABLET ORAL DAILY
Status: DISCONTINUED | OUTPATIENT
Start: 2024-04-21 | End: 2024-04-23 | Stop reason: HOSPADM

## 2024-04-21 RX ORDER — MAGNESIUM SULFATE HEPTAHYDRATE 40 MG/ML
4 INJECTION, SOLUTION INTRAVENOUS ONCE
Status: COMPLETED | OUTPATIENT
Start: 2024-04-21 | End: 2024-04-21

## 2024-04-21 RX ORDER — POLYETHYLENE GLYCOL 3350 17 G/17G
17 POWDER, FOR SOLUTION ORAL DAILY PRN
Status: DISCONTINUED | OUTPATIENT
Start: 2024-04-21 | End: 2024-04-23 | Stop reason: HOSPADM

## 2024-04-21 RX ORDER — METOPROLOL SUCCINATE 50 MG/1
50 TABLET, EXTENDED RELEASE ORAL DAILY
Status: DISCONTINUED | OUTPATIENT
Start: 2024-04-21 | End: 2024-04-23 | Stop reason: HOSPADM

## 2024-04-21 RX ORDER — PANTOPRAZOLE SODIUM 40 MG/1
40 TABLET, DELAYED RELEASE ORAL
Status: DISCONTINUED | OUTPATIENT
Start: 2024-04-22 | End: 2024-04-23 | Stop reason: HOSPADM

## 2024-04-21 RX ORDER — ESCITALOPRAM OXALATE 10 MG/1
10 TABLET ORAL DAILY
Status: DISCONTINUED | OUTPATIENT
Start: 2024-04-21 | End: 2024-04-23 | Stop reason: HOSPADM

## 2024-04-21 RX ORDER — COLCHICINE 0.6 MG/1
0.6 TABLET ORAL 2 TIMES DAILY
Status: DISCONTINUED | OUTPATIENT
Start: 2024-04-21 | End: 2024-04-23 | Stop reason: HOSPADM

## 2024-04-21 RX ADMIN — Medication 5 MG: at 20:28

## 2024-04-21 RX ADMIN — COLCHICINE 0.6 MG: 0.6 TABLET, FILM COATED ORAL at 20:28

## 2024-04-21 RX ADMIN — SPIRONOLACTONE 25 MG: 25 TABLET ORAL at 20:28

## 2024-04-21 RX ADMIN — ESCITALOPRAM OXALATE 10 MG: 10 TABLET ORAL at 16:11

## 2024-04-21 RX ADMIN — ONDANSETRON HYDROCHLORIDE 4 MG: 2 INJECTION INTRAMUSCULAR; INTRAVENOUS at 13:10

## 2024-04-21 RX ADMIN — MAGNESIUM SULFATE IN WATER FOR 4 G: 40 INJECTION INTRAVENOUS at 18:09

## 2024-04-21 RX ADMIN — IOHEXOL 75 ML: 350 INJECTION, SOLUTION INTRAVENOUS at 13:47

## 2024-04-21 SDOH — SOCIAL STABILITY: SOCIAL NETWORK: ARE YOU MARRIED, WIDOWED, DIVORCED, SEPARATED, NEVER MARRIED, OR LIVING WITH A PARTNER?: PATIENT DECLINED

## 2024-04-21 SDOH — SOCIAL STABILITY: SOCIAL NETWORK: HOW OFTEN DO YOU GET TOGETHER WITH FRIENDS OR RELATIVES?: TWICE A WEEK

## 2024-04-21 SDOH — SOCIAL STABILITY: SOCIAL INSECURITY: HAVE YOU HAD THOUGHTS OF HARMING ANYONE ELSE?: NO

## 2024-04-21 SDOH — HEALTH STABILITY: MENTAL HEALTH
STRESS IS WHEN SOMEONE FEELS TENSE, NERVOUS, ANXIOUS, OR CAN'T SLEEP AT NIGHT BECAUSE THEIR MIND IS TROUBLED. HOW STRESSED ARE YOU?: NOT AT ALL

## 2024-04-21 SDOH — SOCIAL STABILITY: SOCIAL NETWORK
IN A TYPICAL WEEK, HOW MANY TIMES DO YOU TALK ON THE PHONE WITH FAMILY, FRIENDS, OR NEIGHBORS?: MORE THAN THREE TIMES A WEEK

## 2024-04-21 SDOH — SOCIAL STABILITY: SOCIAL INSECURITY: ABUSE: ADULT

## 2024-04-21 SDOH — ECONOMIC STABILITY: FOOD INSECURITY: WITHIN THE PAST 12 MONTHS, THE FOOD YOU BOUGHT JUST DIDN'T LAST AND YOU DIDN'T HAVE MONEY TO GET MORE.: NEVER TRUE

## 2024-04-21 SDOH — SOCIAL STABILITY: SOCIAL INSECURITY: ARE THERE ANY APPARENT SIGNS OF INJURIES/BEHAVIORS THAT COULD BE RELATED TO ABUSE/NEGLECT?: NO

## 2024-04-21 SDOH — HEALTH STABILITY: MENTAL HEALTH: HOW OFTEN DO YOU HAVE 6 OR MORE DRINKS ON ONE OCCASION?: DAILY OR ALMOST DAILY

## 2024-04-21 SDOH — ECONOMIC STABILITY: FOOD INSECURITY: WITHIN THE PAST 12 MONTHS, YOU WORRIED THAT YOUR FOOD WOULD RUN OUT BEFORE YOU GOT MONEY TO BUY MORE.: NEVER TRUE

## 2024-04-21 SDOH — HEALTH STABILITY: MENTAL HEALTH: HOW MANY STANDARD DRINKS CONTAINING ALCOHOL DO YOU HAVE ON A TYPICAL DAY?: 10 OR MORE

## 2024-04-21 SDOH — HEALTH STABILITY: MENTAL HEALTH: HOW OFTEN DO YOU HAVE A DRINK CONTAINING ALCOHOL?: 4 OR MORE TIMES A WEEK

## 2024-04-21 SDOH — SOCIAL STABILITY: SOCIAL INSECURITY: DOES ANYONE TRY TO KEEP YOU FROM HAVING/CONTACTING OTHER FRIENDS OR DOING THINGS OUTSIDE YOUR HOME?: NO

## 2024-04-21 SDOH — SOCIAL STABILITY: SOCIAL NETWORK
DO YOU BELONG TO ANY CLUBS OR ORGANIZATIONS SUCH AS CHURCH GROUPS UNIONS, FRATERNAL OR ATHLETIC GROUPS, OR SCHOOL GROUPS?: NO

## 2024-04-21 SDOH — SOCIAL STABILITY: SOCIAL NETWORK: HOW OFTEN DO YOU ATTEND CHURCH OR RELIGIOUS SERVICES?: NEVER

## 2024-04-21 SDOH — SOCIAL STABILITY: SOCIAL INSECURITY: ARE YOU OR HAVE YOU BEEN THREATENED OR ABUSED PHYSICALLY, EMOTIONALLY, OR SEXUALLY BY ANYONE?: NO

## 2024-04-21 SDOH — SOCIAL STABILITY: SOCIAL INSECURITY: DO YOU FEEL UNSAFE GOING BACK TO THE PLACE WHERE YOU ARE LIVING?: NO

## 2024-04-21 SDOH — SOCIAL STABILITY: SOCIAL INSECURITY: DO YOU FEEL ANYONE HAS EXPLOITED OR TAKEN ADVANTAGE OF YOU FINANCIALLY OR OF YOUR PERSONAL PROPERTY?: NO

## 2024-04-21 SDOH — SOCIAL STABILITY: SOCIAL INSECURITY: WERE YOU ABLE TO COMPLETE ALL THE BEHAVIORAL HEALTH SCREENINGS?: YES

## 2024-04-21 SDOH — SOCIAL STABILITY: SOCIAL INSECURITY: HAVE YOU HAD ANY THOUGHTS OF HARMING ANYONE ELSE?: NO

## 2024-04-21 SDOH — SOCIAL STABILITY: SOCIAL INSECURITY: HAS ANYONE EVER THREATENED TO HURT YOUR FAMILY OR YOUR PETS?: NO

## 2024-04-21 ASSESSMENT — ENCOUNTER SYMPTOMS
RESPIRATORY NEGATIVE: 1
HEMATOLOGIC/LYMPHATIC NEGATIVE: 1
EYES NEGATIVE: 1
ABDOMINAL DISTENTION: 1
POLYDIPSIA: 1
PSYCHIATRIC NEGATIVE: 1
CARDIOVASCULAR NEGATIVE: 1
NEUROLOGICAL NEGATIVE: 1
MUSCULOSKELETAL NEGATIVE: 1
UNEXPECTED WEIGHT CHANGE: 1
ALLERGIC/IMMUNOLOGIC NEGATIVE: 1

## 2024-04-21 ASSESSMENT — COGNITIVE AND FUNCTIONAL STATUS - GENERAL
MOBILITY SCORE: 24
MOBILITY SCORE: 24
DAILY ACTIVITIY SCORE: 24
PATIENT BASELINE BEDBOUND: NO
DAILY ACTIVITIY SCORE: 24

## 2024-04-21 ASSESSMENT — ACTIVITIES OF DAILY LIVING (ADL)
FEEDING YOURSELF: INDEPENDENT
TOILETING: INDEPENDENT
DRESSING YOURSELF: INDEPENDENT
WALKS IN HOME: INDEPENDENT
HEARING - RIGHT EAR: FUNCTIONAL
ADEQUATE_TO_COMPLETE_ADL: YES
GROOMING: INDEPENDENT
JUDGMENT_ADEQUATE_SAFELY_COMPLETE_DAILY_ACTIVITIES: YES
BATHING: INDEPENDENT
HEARING - LEFT EAR: FUNCTIONAL
LACK_OF_TRANSPORTATION: NO
PATIENT'S MEMORY ADEQUATE TO SAFELY COMPLETE DAILY ACTIVITIES?: YES

## 2024-04-21 ASSESSMENT — PAIN SCALES - GENERAL
PAINLEVEL_OUTOF10: 0 - NO PAIN
PAINLEVEL_OUTOF10: 1

## 2024-04-21 ASSESSMENT — LIFESTYLE VARIABLES
HOW OFTEN DURING THE LAST YEAR HAVE YOU HAD A FEELING OF GUILT OR REMORSE AFTER DRINKING: NEVER
HOW OFTEN DURING THE LAST YEAR HAVE YOU FAILED TO DO WHAT WAS NORMALLY EXPECTED FROM YOU BECAUSE OF DRINKING: NEVER
HOW OFTEN DURING THE LAST YEAR HAVE YOU BEEN UNABLE TO REMEMBER WHAT HAPPENED THE NIGHT BEFORE BECAUSE YOU HAD BEEN DRINKING: NEVER
SUBSTANCE_ABUSE_PAST_12_MONTHS: YES
HOW OFTEN DO YOU HAVE A DRINK CONTAINING ALCOHOL: 4 OR MORE TIMES A WEEK
PRESCIPTION_ABUSE_PAST_12_MONTHS: NO
HOW MANY STANDARD DRINKS CONTAINING ALCOHOL DO YOU HAVE ON A TYPICAL DAY: 10 OR MORE
AUDIT-C TOTAL SCORE: 12
AUDIT-C TOTAL SCORE: 12
SKIP TO QUESTIONS 9-10: 0
HOW OFTEN DURING THE LAST YEAR HAVE YOU NEEDED AN ALCOHOLIC DRINK FIRST THING IN THE MORNING TO GET YOURSELF GOING AFTER A NIGHT OF HEAVY DRINKING: NEVER
HOW OFTEN DO YOU HAVE 6 OR MORE DRINKS ON ONE OCCASION: DAILY OR ALMOST DAILY
SKIP TO QUESTIONS 9-10: 0
AUDIT-C TOTAL SCORE: 12
AUDIT TOTAL SCORE: 4
HOW OFTEN DURING THE LAST YEAR HAVE YOU FOUND THAT YOU WERE NOT ABLE TO STOP DRINKING ONCE YOU HAD STARTED: DAILY OR ALMOST DAILY

## 2024-04-21 ASSESSMENT — PATIENT HEALTH QUESTIONNAIRE - PHQ9
SUM OF ALL RESPONSES TO PHQ9 QUESTIONS 1 & 2: 0
2. FEELING DOWN, DEPRESSED OR HOPELESS: NOT AT ALL
1. LITTLE INTEREST OR PLEASURE IN DOING THINGS: NOT AT ALL

## 2024-04-21 ASSESSMENT — PAIN DESCRIPTION - DESCRIPTORS: DESCRIPTORS: PRESSURE

## 2024-04-21 ASSESSMENT — PAIN DESCRIPTION - PROGRESSION: CLINICAL_PROGRESSION: NOT CHANGED

## 2024-04-21 ASSESSMENT — PAIN DESCRIPTION - FREQUENCY: FREQUENCY: CONSTANT/CONTINUOUS

## 2024-04-21 ASSESSMENT — PAIN DESCRIPTION - LOCATION: LOCATION: ABDOMEN

## 2024-04-21 ASSESSMENT — PAIN - FUNCTIONAL ASSESSMENT: PAIN_FUNCTIONAL_ASSESSMENT: 0-10

## 2024-04-21 NOTE — CARE PLAN
The patient's goals for the shift include monitor i/o    The clinical goals for the shift include monitor i/o      Problem: Pain  Goal: My pain/discomfort is manageable  Outcome: Progressing     Problem: Safety  Goal: Patient will be injury free during hospitalization  Outcome: Progressing  Goal: I will remain free of falls  Outcome: Progressing     Problem: Daily Care  Goal: Daily care needs are met  Outcome: Progressing     Problem: Psychosocial Needs  Goal: Demonstrates ability to cope with hospitalization/illness  Outcome: Progressing  Goal: Collaborate with me, my family, and caregiver to identify my specific goals  Outcome: Progressing     Problem: Discharge Barriers  Goal: My discharge needs are met  Outcome: Progressing

## 2024-04-21 NOTE — ED PROVIDER NOTES
HPI   Chief Complaint   Patient presents with    Abdominal Pain     Abd pressure and swelling since yesterday, has had this issue before when stopped drinking states he just stopped drinking again and this is result       Patient is a 54-year-old male that presents emergency department for evaluation of abdominal discomfort.  He has a known history of alcoholic cirrhosis and recently quit drinking.  He states that over the last 2 days he has had increasing abdominal discomfort, swelling and pressure.  Nothing seems to make it better or worse.  Is been constant and moderate in severity.  He states this happen the last time he tried to quit drinking.  He does follow with gastroenterology Dr. Sullivan and previously had been put on spironolactone and furosemide.  She has not had a recent paracentesis.  He denies fever, chills, chest pain or shortness of breath.      History provided by:  Patient                      Tristin Coma Scale Score: 15                     Patient History   Past Medical History:   Diagnosis Date    Alcohol dependence (Multi)     Alcoholic cirrhosis of liver with ascites (Multi)     GERD (gastroesophageal reflux disease)     Hyperlipidemia     Hypertension      Past Surgical History:   Procedure Laterality Date    PARACENTESIS      2x in 2024    SINUS SURGERY  2013    Sinus Surgery     Family History   Problem Relation Name Age of Onset    Hypertension Mother           age 64    Heart attack Father           at age 62    Other (Glioblastoma) Father      Diabetes Father      Cancer Father       Social History     Tobacco Use    Smoking status: Every Day     Current packs/day: 0.50     Types: Cigarettes     Passive exposure: Current    Smokeless tobacco: Never   Vaping Use    Vaping status: Some Days    Substances: Nicotine    Devices: Disposable, Pre-filled or refillable cartridge   Substance Use Topics    Alcohol use: Yes     Comment: daily liquor    Drug use: Never        Physical Exam   ED Triage Vitals [04/21/24 1253]   Temperature Heart Rate Respirations BP   36.4 °C (97.5 °F) 85 18 121/73      Pulse Ox Temp Source Heart Rate Source Patient Position   99 % Oral Monitor Sitting      BP Location FiO2 (%)     Right arm --       Physical Exam  Vitals and nursing note reviewed.   Constitutional:       General: He is not in acute distress.     Appearance: He is well-developed. He is not ill-appearing.   HENT:      Head: Normocephalic and atraumatic.      Mouth/Throat:      Mouth: Mucous membranes are moist.   Eyes:      Extraocular Movements: Extraocular movements intact.      Pupils: Pupils are equal, round, and reactive to light.   Cardiovascular:      Rate and Rhythm: Normal rate and regular rhythm.   Abdominal:      General: There is distension.      Palpations: Abdomen is soft. There is fluid wave.      Tenderness: There is generalized abdominal tenderness (Mild generalized tenderness palpation without rigidity or guarding). There is no guarding or rebound.   Skin:     General: Skin is warm and dry.      Capillary Refill: Capillary refill takes less than 2 seconds.      Coloration: Skin is not jaundiced.   Neurological:      General: No focal deficit present.      Mental Status: He is alert and oriented to person, place, and time.       Recent Results (from the past 24 hour(s))   CBC and Auto Differential    Collection Time: 04/21/24  1:09 PM   Result Value Ref Range    WBC 6.9 4.4 - 11.3 x10*3/uL    nRBC 0.0 0.0 - 0.0 /100 WBCs    RBC 4.49 (L) 4.50 - 5.90 x10*6/uL    Hemoglobin 14.7 13.5 - 17.5 g/dL    Hematocrit 43.9 41.0 - 52.0 %    MCV 98 80 - 100 fL    MCH 32.7 26.0 - 34.0 pg    MCHC 33.5 32.0 - 36.0 g/dL    RDW 12.7 11.5 - 14.5 %    Platelets 129 (L) 150 - 450 x10*3/uL    Neutrophils % 61.9 40.0 - 80.0 %    Immature Granulocytes %, Automated 0.1 0.0 - 0.9 %    Lymphocytes % 27.1 13.0 - 44.0 %    Monocytes % 9.2 2.0 - 10.0 %    Eosinophils % 1.0 0.0 - 6.0 %    Basophils  % 0.7 0.0 - 2.0 %    Neutrophils Absolute 4.24 1.20 - 7.70 x10*3/uL    Immature Granulocytes Absolute, Automated 0.01 0.00 - 0.70 x10*3/uL    Lymphocytes Absolute 1.86 1.20 - 4.80 x10*3/uL    Monocytes Absolute 0.63 0.10 - 1.00 x10*3/uL    Eosinophils Absolute 0.07 0.00 - 0.70 x10*3/uL    Basophils Absolute 0.05 0.00 - 0.10 x10*3/uL   Comprehensive metabolic panel    Collection Time: 04/21/24  1:09 PM   Result Value Ref Range    Glucose 104 (H) 65 - 99 mg/dL    Sodium 135 133 - 145 mmol/L    Potassium 3.4 3.4 - 5.1 mmol/L    Chloride 98 97 - 107 mmol/L    Bicarbonate 29 24 - 31 mmol/L    Urea Nitrogen 12 8 - 25 mg/dL    Creatinine 0.80 0.40 - 1.60 mg/dL    eGFR >90 >60 mL/min/1.73m*2    Calcium 9.8 8.5 - 10.4 mg/dL    Albumin 3.8 3.5 - 5.0 g/dL    Alkaline Phosphatase 342 (H) 35 - 125 U/L    Total Protein 7.0 5.9 - 7.9 g/dL    AST 54 (H) 5 - 40 U/L    Bilirubin, Total 1.0 0.1 - 1.2 mg/dL    ALT 37 5 - 40 U/L    Anion Gap 8 <=19 mmol/L   Lipase    Collection Time: 04/21/24  1:09 PM   Result Value Ref Range    Lipase 95 (H) 16 - 63 U/L   Ammonia    Collection Time: 04/21/24  1:09 PM   Result Value Ref Range    Ammonia 25 12 - 45 umol/L         ED Course & MDM   Diagnoses as of 04/21/24 1503   Generalized abdominal pain   Ascites due to alcoholic cirrhosis (Multi)       Medical Decision Making  Patient is a 54-year-old male that presents emergency department for evaluation of abdominal pain.  Patient uncomfortable appearing but in no obvious distress on exam.  He does have significant abdominal distention with positive fluid wave however there is no rigidity, guarding or evidence of peritonitis on exam.  Blood work ordered including CBC, CMP, lipase, ammonia level which was remarkable for normal white count of 6.9, normal electrolytes, normal kidney function with a creatinine of 0.8.  He does have minimally elevated AST of 54, normal ALT of 37 and normal bilirubin of 1.  Lipase is only mildly elevated at 95 and ammonia  is normal at 25.  Patient is awake, alert and oriented here.  Vital signs have remained stable.  CT scan of the on pelvis does show significant ascites as well as cirrhosis but no evidence of obstruction, perforation, abscess, appendicitis or diverticulitis.  I discussed case with gastroenterologist Dr. Alfaro who recommended bringing patient in for observation for paracentesis and evaluation by gastroenterology.  Also discussed case with hospitalist who accepted patient for observation.        Procedure  Procedures     Reynold Pastrana, DO  04/21/24 1509

## 2024-04-21 NOTE — CONSULTS
Inpatient consult to Gastroenterology  Consult performed by: Nano Joya, APRN-CNP  Consult ordered by: Bear Brooks DO          Reason For Consult  Ascites     History Of Present Illness  Aamir Garcia is a 54 y.o. male presenting with abdominal pain with increased abdominal distension. Patient has a past medical history of alcohol induced cirrhosis with ascites. He was recently seen by our service on 3/28/24 for hematemesis. No EGD last admission due to his normal HH and lack of melena, likely MWT.  Previous admission he was admitted for for ARF. Was noted he could resume lasix few days after DC. He was instructed to follow up with Nephrology. He never resumed diuretics. He states he has not had a drink in the last 10 days , however prior to that he was drinking approximately 2 pints of vodka daily. Labs in ED showed CBC unremarkable, LFT's slightly elevated with AST of 54, normal ALT of 37 and normal bilirubin. Lipase at 95.  CT scam shows Cirrhosis with moderate volume ascites. Splenomegaly could reflect portal hypertension. Last colon 2020 by Dr. Swenson showed diverticulosis of large intestine without complication. No mass or colitis seen, Biopsy negative. Last EGD in 2010 by Dr. Sullivan showed healing ulcerative esophagitis. Patient currently denies any nausea, vomiting.  Denies any melena/hematochezia.      Past Medical History  He has a past medical history of Alcohol dependence (Multi), Alcoholic cirrhosis of liver with ascites (Multi), GERD (gastroesophageal reflux disease), Hyperlipidemia, and Hypertension.    Surgical History  He has a past surgical history that includes Sinus surgery (04/29/2013) and Paracentesis.     Social History  He reports that he has been smoking cigarettes. He has been exposed to tobacco smoke. He has never used smokeless tobacco. He reports current alcohol use. He reports that he does not use drugs.    Family History  Family History   Problem Relation Name Age  of Onset    Hypertension Mother           age 64    Heart attack Father           at age 62    Other (Glioblastoma) Father      Diabetes Father      Cancer Father          Allergies  Clams    Review of Systems   Constitutional: Negative.    HENT: Negative.     Eyes: Negative.    Respiratory: Negative.     Cardiovascular: Negative.    Gastrointestinal:  Positive for abdominal distention.   Endocrine: Negative.    Genitourinary: Negative.    Musculoskeletal: Negative.    Skin: Negative.    Allergic/Immunologic: Negative.    Neurological: Negative.    Hematological: Negative.    Psychiatric/Behavioral: Negative.          Physical Exam  HENT:      Head: Normocephalic.      Nose: Nose normal.   Eyes:      Pupils: Pupils are equal, round, and reactive to light.   Cardiovascular:      Rate and Rhythm: Normal rate.   Pulmonary:      Effort: Pulmonary effort is normal.   Abdominal:      General: There is distension.   Musculoskeletal:         General: Normal range of motion.      Cervical back: Normal range of motion.   Skin:     General: Skin is warm.   Neurological:      General: No focal deficit present.      Mental Status: He is alert.   Psychiatric:         Mood and Affect: Mood normal.          Last Recorded Vitals  Blood pressure 108/62, pulse 81, temperature 36.8 °C (98.2 °F), temperature source Oral, resp. rate 18, height 1.829 m (6'), weight 81 kg (178 lb 9.2 oz), SpO2 99%.    Relevant Results  CT abdomen pelvis w IV contrast    Result Date: 2024  Interpreted By:  Manolo Rosado, STUDY: CT ABDOMEN PELVIS W IV CONTRAST;  2024 1:48 pm   INDICATION: Signs/Symptoms:distension, hx of cirrhosis.   COMPARISON: None.   ACCESSION NUMBER(S): [id number]   ORDERING CLINICIAN: ANJELICA SAM   TECHNIQUE: CT of the abdomen and pelvis was performed.  Contiguous axial images were obtained at 3 mm slice thickness through the abdomen and pelvis. Coronal and sagittal reconstructions at 3 mm slice thickness were  performed. 75 mL Omnipaque 350 administered intravenously without immediate complication.   FINDINGS: LOWER CHEST: Mild bibasilar atelectasis.   ABDOMEN:   LIVER: Heterogeneous. A few scattered tiny low-attenuation lesions are probably cysts.   BILE DUCTS: Not dilated.   GALLBLADDER: Contracted. No calcified stones.   PANCREAS: Unremarkable   SPLEEN: Enlarged measuring 17 cm in length.   ADRENAL GLANDS: Unremarkable   KIDNEYS AND URETERS: Unremarkable without hydronephrosis.   PELVIS:   BLADDER: Nondistended.   REPRODUCTIVE ORGANS: Coarse calcification of the prostate.   BOWEL: Severe sigmoid diverticulosis without findings of diverticulitis. No findings bowel obstruction. Unremarkable mesentery.   VESSELS: Aortoiliac system is patent without aneurysm.  Major visceral branches are patent.Severe atherosclerotic disease. IVC and major branches are grossly patent. Major portal venous branches are patent.   PERITONEUM AND RETROPERITONEUM: Scattered moderate volume ascites. No free air. No abdominal or pelvic lymphadenopathy.   BONE AND ABDOMINAL WALL: No acute skeletal findings.  Sclerotic changes of the femoral heads perhaps related to avascular necrosis. Lumbar degenerative changes with slight levoscoliosis.         1.  Cirrhosis. Splenomegaly could reflect portal hypertension. 2. Moderate volume ascites.   MACRO: None.   Signed by: Manolo Rosado 4/21/2024 2:00 PM Dictation workstation:   [id number]    Scheduled medications  [START ON 4/22/2024] buPROPion XL, 150 mg, oral, Daily before breakfast  colchicine, 0.6 mg, oral, BID  escitalopram, 10 mg, oral, Daily  folic acid, 1 mg, oral, Daily  melatonin, 5 mg, oral, Nightly  metoprolol succinate XL, 50 mg, oral, Daily  multivitamin with minerals, 1 tablet, oral, Daily  nicotine, 1 patch, transdermal, Daily  [START ON 4/22/2024] pantoprazole, 40 mg, oral, Daily before breakfast  spironolactone, 25 mg, oral, BID      Continuous medications     PRN medications  PRN  medications: polyethylene glycol  Results for orders placed or performed during the hospital encounter of 04/21/24 (from the past 24 hour(s))   CBC and Auto Differential   Result Value Ref Range    WBC 6.9 4.4 - 11.3 x10*3/uL    nRBC 0.0 0.0 - 0.0 /100 WBCs    RBC 4.49 (L) 4.50 - 5.90 x10*6/uL    Hemoglobin 14.7 13.5 - 17.5 g/dL    Hematocrit 43.9 41.0 - 52.0 %    MCV 98 80 - 100 fL    MCH 32.7 26.0 - 34.0 pg    MCHC 33.5 32.0 - 36.0 g/dL    RDW 12.7 11.5 - 14.5 %    Platelets 129 (L) 150 - 450 x10*3/uL    Neutrophils % 61.9 40.0 - 80.0 %    Immature Granulocytes %, Automated 0.1 0.0 - 0.9 %    Lymphocytes % 27.1 13.0 - 44.0 %    Monocytes % 9.2 2.0 - 10.0 %    Eosinophils % 1.0 0.0 - 6.0 %    Basophils % 0.7 0.0 - 2.0 %    Neutrophils Absolute 4.24 1.20 - 7.70 x10*3/uL    Immature Granulocytes Absolute, Automated 0.01 0.00 - 0.70 x10*3/uL    Lymphocytes Absolute 1.86 1.20 - 4.80 x10*3/uL    Monocytes Absolute 0.63 0.10 - 1.00 x10*3/uL    Eosinophils Absolute 0.07 0.00 - 0.70 x10*3/uL    Basophils Absolute 0.05 0.00 - 0.10 x10*3/uL   Comprehensive metabolic panel   Result Value Ref Range    Glucose 104 (H) 65 - 99 mg/dL    Sodium 135 133 - 145 mmol/L    Potassium 3.4 3.4 - 5.1 mmol/L    Chloride 98 97 - 107 mmol/L    Bicarbonate 29 24 - 31 mmol/L    Urea Nitrogen 12 8 - 25 mg/dL    Creatinine 0.80 0.40 - 1.60 mg/dL    eGFR >90 >60 mL/min/1.73m*2    Calcium 9.8 8.5 - 10.4 mg/dL    Albumin 3.8 3.5 - 5.0 g/dL    Alkaline Phosphatase 342 (H) 35 - 125 U/L    Total Protein 7.0 5.9 - 7.9 g/dL    AST 54 (H) 5 - 40 U/L    Bilirubin, Total 1.0 0.1 - 1.2 mg/dL    ALT 37 5 - 40 U/L    Anion Gap 8 <=19 mmol/L   Lipase   Result Value Ref Range    Lipase 95 (H) 16 - 63 U/L   Ammonia   Result Value Ref Range    Ammonia 25 12 - 45 umol/L   Magnesium   Result Value Ref Range    Magnesium 1.50 (L) 1.60 - 3.10 mg/dL        Assessment/Plan     Decompensated Alcoholic Cirrhosis   2:1 ratio AST 54 normal ALT   HCC- AFP up to date, normal  on 3/29/24  HE: Monitor mental status, Start/continue lactulose, add rifaximin if needed  EV: Will need EGD to monitor for varices, however no reported hematemesis reported. Last EGD 2010.   Ascites: CT showed moderate amount of ascites. IR consulted for para. Was started on aldactone  Neg hep panel on 3/29/24  INR ordered   2-3 gm low NA diet     Nano Joya, APRN-CNP

## 2024-04-21 NOTE — H&P
History Of Present Illness  Aamir Garcia is a 54 y.o. male presenting with increasing ascites.  Patient is a 54-year-old  male known alcohol user with alcohol induced cirrhosis and multiple admissions for recurrent ascites the patient continues to drink alcohol consuming approximately 2 pints of vodka on a daily basis he stopped drinking approximately 5 days ago but has had multiple recurrences of near chronic continuous alcohol use.  He had recently been on Lasix and spironolactone but this was discontinued approximately a month ago due to renal insufficiency.  He has been drinking a great deal of water as well.  Placed on a 2 g sodium restricted 1600 mL fluid restricted diet arrangements are made for IR abdominal paracentesis he is initiated back on spironolactone at any 5 mg twice daily renal function will be monitored his Norvasc is held dissipating this patient requiring at least 2 midnights of hospitalization he will be placed in observation status at this time consultation is requested.     Past Medical History  Past Medical History:   Diagnosis Date    Alcohol dependence (Multi)     Alcoholic cirrhosis of liver with ascites (Multi)     GERD (gastroesophageal reflux disease)     Hyperlipidemia     Hypertension        Surgical History  Past Surgical History:   Procedure Laterality Date    PARACENTESIS      2x in 2024    SINUS SURGERY  2013    Sinus Surgery        Social History  He reports that he has been smoking cigarettes. He has been exposed to tobacco smoke. He has never used smokeless tobacco. He reports current alcohol use. He reports that he does not use drugs.    Family History  Family History   Problem Relation Name Age of Onset    Hypertension Mother           age 64    Heart attack Father           at age 62    Other (Glioblastoma) Father      Diabetes Father      Cancer Father          Allergies  Clams    Review of Systems   Constitutional:  Positive for unexpected  weight change.   HENT: Negative.     Eyes: Negative.    Respiratory: Negative.     Cardiovascular: Negative.    Gastrointestinal:  Positive for abdominal distention.   Endocrine: Positive for polydipsia.   Genitourinary: Negative.    Musculoskeletal: Negative.    Skin: Negative.    Allergic/Immunologic: Negative.    Neurological: Negative.    Hematological: Negative.    Psychiatric/Behavioral: Negative.     All other systems reviewed and are negative.       Physical Exam  Vitals and nursing note reviewed.   Constitutional:       Appearance: Normal appearance.   HENT:      Head: Normocephalic and atraumatic.      Mouth/Throat:      Mouth: Mucous membranes are dry.   Eyes:      Extraocular Movements: Extraocular movements intact.      Pupils: Pupils are equal, round, and reactive to light.   Cardiovascular:      Rate and Rhythm: Normal rate and regular rhythm.      Pulses: Normal pulses.      Heart sounds: Normal heart sounds.   Pulmonary:      Effort: Pulmonary effort is normal.      Breath sounds: Normal breath sounds.   Abdominal:      General: There is distension.      Palpations: Abdomen is soft.   Musculoskeletal:         General: Normal range of motion.      Cervical back: Normal range of motion and neck supple.   Skin:     General: Skin is warm and dry.      Capillary Refill: Capillary refill takes less than 2 seconds.   Neurological:      General: No focal deficit present.      Mental Status: He is alert and oriented to person, place, and time. Mental status is at baseline.   Psychiatric:         Mood and Affect: Mood normal.          Last Recorded Vitals  Blood pressure 122/80, pulse 78, temperature 37 °C (98.6 °F), temperature source Temporal, resp. rate 18, height 1.829 m (6'), weight 81 kg (178 lb 9.2 oz), SpO2 98%.    Relevant Results        Results for orders placed or performed during the hospital encounter of 04/21/24 (from the past 24 hour(s))   CBC and Auto Differential   Result Value Ref Range     WBC 6.9 4.4 - 11.3 x10*3/uL    nRBC 0.0 0.0 - 0.0 /100 WBCs    RBC 4.49 (L) 4.50 - 5.90 x10*6/uL    Hemoglobin 14.7 13.5 - 17.5 g/dL    Hematocrit 43.9 41.0 - 52.0 %    MCV 98 80 - 100 fL    MCH 32.7 26.0 - 34.0 pg    MCHC 33.5 32.0 - 36.0 g/dL    RDW 12.7 11.5 - 14.5 %    Platelets 129 (L) 150 - 450 x10*3/uL    Neutrophils % 61.9 40.0 - 80.0 %    Immature Granulocytes %, Automated 0.1 0.0 - 0.9 %    Lymphocytes % 27.1 13.0 - 44.0 %    Monocytes % 9.2 2.0 - 10.0 %    Eosinophils % 1.0 0.0 - 6.0 %    Basophils % 0.7 0.0 - 2.0 %    Neutrophils Absolute 4.24 1.20 - 7.70 x10*3/uL    Immature Granulocytes Absolute, Automated 0.01 0.00 - 0.70 x10*3/uL    Lymphocytes Absolute 1.86 1.20 - 4.80 x10*3/uL    Monocytes Absolute 0.63 0.10 - 1.00 x10*3/uL    Eosinophils Absolute 0.07 0.00 - 0.70 x10*3/uL    Basophils Absolute 0.05 0.00 - 0.10 x10*3/uL   Comprehensive metabolic panel   Result Value Ref Range    Glucose 104 (H) 65 - 99 mg/dL    Sodium 135 133 - 145 mmol/L    Potassium 3.4 3.4 - 5.1 mmol/L    Chloride 98 97 - 107 mmol/L    Bicarbonate 29 24 - 31 mmol/L    Urea Nitrogen 12 8 - 25 mg/dL    Creatinine 0.80 0.40 - 1.60 mg/dL    eGFR >90 >60 mL/min/1.73m*2    Calcium 9.8 8.5 - 10.4 mg/dL    Albumin 3.8 3.5 - 5.0 g/dL    Alkaline Phosphatase 342 (H) 35 - 125 U/L    Total Protein 7.0 5.9 - 7.9 g/dL    AST 54 (H) 5 - 40 U/L    Bilirubin, Total 1.0 0.1 - 1.2 mg/dL    ALT 37 5 - 40 U/L    Anion Gap 8 <=19 mmol/L   Lipase   Result Value Ref Range    Lipase 95 (H) 16 - 63 U/L   Ammonia   Result Value Ref Range    Ammonia 25 12 - 45 umol/L          Assessment/Plan   Principal Problem:    Alcoholic cirrhosis of liver with ascites (Multi)      Initiate low-dose spironolactone 25 mg twice daily arrange for IR large-volume abdominal paracentesis fluid restrict 1600 mL daily 2 g sodium diet GI consultation hold Norvasc spadheeraj and length of stay is 2 midnights in observation status       I spent 50 minutes in the professional and  overall care of this patient.      Bear Brooks DO

## 2024-04-21 NOTE — ED TRIAGE NOTES
TRIAGE NOTE   I saw the patient as the Clinician in Triage and performed a brief history and physical exam, established acuity, and ordered appropriate tests to develop basic plan of care. Patient will be seen by an [facility], resident and/or physician who will independently evaluate the patient. Please see subsequent provider notes for further details and disposition.     Brief HPI: In brief, Aamir Garcia is a 54 y.o. male that presents for abdominal swelling and nausea.  Patient has a history of alcoholic cirrhosis and states that he quit drinking 10 days ago.  He has a history of ascites in the past and has required paracentesis before.  He states that he is post follow-up with a kidney specialist Dr. Gonzalez he has been having issues with his kidneys as well, but states the appointment is not until the 30th of this month.  He has followed up with his GI doctor Dr. Sullivan as well.  He feels like his abdomen continues to get more more distended over the last several days.  He states he has been on a diuretic given by his GI doctor, but does not seem to be helping.  He states that he feels uncomfortable mildly nauseous with no vomiting.  He denies any fevers or chills, cough, congestion, lightheadedness, dizziness.    Focused Physical exam:   Gen: Well nourished, well developed in no distress. Good historian and answers questions appropriately.    Resp: Breath sounds equal and clear bilaterally. No adventitious sounds. No increased effort of breathing.    CV: Heart S1, S2 regular rate and rhythm. No m/r/g. Distal pulses 3+ symmetric bilaterally. No lower extremity edema.    ABD/: Firm, distended.  No significant tenderness to palpation.    MSK: ROM of all extremities. No joint effusions, clubbing, cyanosis, or edema.    Neuro/Psych: A&Ox3. No focal motor or sensory deficits. Appropriate mood and behavior.    Plan/MDM:   Given patient's history of alcoholic cirrhosis, labs and imaging ordered for further  evaluation.  Please see subsequent provider note for further details and disposition

## 2024-04-22 ENCOUNTER — HOSPITAL ENCOUNTER (INPATIENT)
Dept: RADIOLOGY | Facility: HOSPITAL | Age: 54
Discharge: HOME | End: 2024-04-22
Payer: MEDICARE

## 2024-04-22 VITALS
DIASTOLIC BLOOD PRESSURE: 78 MMHG | HEART RATE: 78 BPM | OXYGEN SATURATION: 98 % | RESPIRATION RATE: 18 BRPM | SYSTOLIC BLOOD PRESSURE: 112 MMHG

## 2024-04-22 LAB
ALBUMIN SERPL-MCNC: 3.3 G/DL (ref 3.5–5)
ALP BLD-CCNC: 297 U/L (ref 35–125)
ALT SERPL-CCNC: 31 U/L (ref 5–40)
ANION GAP SERPL CALC-SCNC: 7 MMOL/L
AST SERPL-CCNC: 44 U/L (ref 5–40)
BILIRUB SERPL-MCNC: 0.8 MG/DL (ref 0.1–1.2)
BUN SERPL-MCNC: 12 MG/DL (ref 8–25)
CALCIUM SERPL-MCNC: 8.8 MG/DL (ref 8.5–10.4)
CHLORIDE SERPL-SCNC: 103 MMOL/L (ref 97–107)
CO2 SERPL-SCNC: 28 MMOL/L (ref 24–31)
CREAT SERPL-MCNC: 0.7 MG/DL (ref 0.4–1.6)
EGFRCR SERPLBLD CKD-EPI 2021: >90 ML/MIN/1.73M*2
GLUCOSE BLD MANUAL STRIP-MCNC: 117 MG/DL (ref 74–99)
GLUCOSE SERPL-MCNC: 94 MG/DL (ref 65–99)
MAGNESIUM SERPL-MCNC: 2.1 MG/DL (ref 1.6–3.1)
POTASSIUM SERPL-SCNC: 3.5 MMOL/L (ref 3.4–5.1)
PROT SERPL-MCNC: 6 G/DL (ref 5.9–7.9)
SODIUM SERPL-SCNC: 138 MMOL/L (ref 133–145)

## 2024-04-22 PROCEDURE — 83735 ASSAY OF MAGNESIUM: CPT | Performed by: INTERNAL MEDICINE

## 2024-04-22 PROCEDURE — 2720000007 HC OR 272 NO HCPCS

## 2024-04-22 PROCEDURE — P9047 ALBUMIN (HUMAN), 25%, 50ML: HCPCS | Mod: JZ | Performed by: INTERNAL MEDICINE

## 2024-04-22 PROCEDURE — 80053 COMPREHEN METABOLIC PANEL: CPT | Performed by: INTERNAL MEDICINE

## 2024-04-22 PROCEDURE — C1729 CATH, DRAINAGE: HCPCS

## 2024-04-22 PROCEDURE — 82947 ASSAY GLUCOSE BLOOD QUANT: CPT

## 2024-04-22 PROCEDURE — 2500000002 HC RX 250 W HCPCS SELF ADMINISTERED DRUGS (ALT 637 FOR MEDICARE OP, ALT 636 FOR OP/ED): Performed by: INTERNAL MEDICINE

## 2024-04-22 PROCEDURE — 2500000001 HC RX 250 WO HCPCS SELF ADMINISTERED DRUGS (ALT 637 FOR MEDICARE OP): Performed by: INTERNAL MEDICINE

## 2024-04-22 PROCEDURE — 36415 COLL VENOUS BLD VENIPUNCTURE: CPT | Performed by: INTERNAL MEDICINE

## 2024-04-22 PROCEDURE — 1850000001 HC LEAVE OF ABSENCE - HOSPITAL SERVICES

## 2024-04-22 PROCEDURE — 49083 ABD PARACENTESIS W/IMAGING: CPT | Performed by: STUDENT IN AN ORGANIZED HEALTH CARE EDUCATION/TRAINING PROGRAM

## 2024-04-22 PROCEDURE — 2500000006 HC RX 250 W HCPCS SELF ADMINISTERED DRUGS (ALT 637 FOR ALL PAYERS): Performed by: INTERNAL MEDICINE

## 2024-04-22 PROCEDURE — S4991 NICOTINE PATCH NONLEGEND: HCPCS | Performed by: INTERNAL MEDICINE

## 2024-04-22 PROCEDURE — G0378 HOSPITAL OBSERVATION PER HR: HCPCS

## 2024-04-22 PROCEDURE — 2500000004 HC RX 250 GENERAL PHARMACY W/ HCPCS (ALT 636 FOR OP/ED): Mod: JZ | Performed by: INTERNAL MEDICINE

## 2024-04-22 PROCEDURE — 49083 ABD PARACENTESIS W/IMAGING: CPT

## 2024-04-22 RX ORDER — ALBUMIN HUMAN 250 G/1000ML
50 SOLUTION INTRAVENOUS ONCE
Status: COMPLETED | OUTPATIENT
Start: 2024-04-22 | End: 2024-04-22

## 2024-04-22 RX ADMIN — BUPROPION HYDROCHLORIDE 150 MG: 150 TABLET, EXTENDED RELEASE ORAL at 06:13

## 2024-04-22 RX ADMIN — PANTOPRAZOLE SODIUM 40 MG: 40 TABLET, DELAYED RELEASE ORAL at 06:13

## 2024-04-22 RX ADMIN — Medication 5 MG: at 21:20

## 2024-04-22 RX ADMIN — COLCHICINE 0.6 MG: 0.6 TABLET, FILM COATED ORAL at 21:19

## 2024-04-22 RX ADMIN — ESCITALOPRAM OXALATE 10 MG: 10 TABLET ORAL at 09:30

## 2024-04-22 RX ADMIN — SPIRONOLACTONE 25 MG: 25 TABLET ORAL at 09:30

## 2024-04-22 RX ADMIN — ALBUMIN HUMAN 50 G: 0.25 SOLUTION INTRAVENOUS at 15:57

## 2024-04-22 RX ADMIN — SPIRONOLACTONE 25 MG: 25 TABLET ORAL at 21:20

## 2024-04-22 RX ADMIN — COLCHICINE 0.6 MG: 0.6 TABLET, FILM COATED ORAL at 09:30

## 2024-04-22 RX ADMIN — Medication 1 TABLET: at 06:13

## 2024-04-22 RX ADMIN — FOLIC ACID 1 MG: 1 TABLET ORAL at 09:30

## 2024-04-22 RX ADMIN — NICOTINE 1 PATCH: 21 PATCH, EXTENDED RELEASE TRANSDERMAL at 09:30

## 2024-04-22 RX ADMIN — METOPROLOL SUCCINATE 50 MG: 50 TABLET, FILM COATED, EXTENDED RELEASE ORAL at 09:30

## 2024-04-22 ASSESSMENT — ENCOUNTER SYMPTOMS
CONSTITUTIONAL NEGATIVE: 1
ALLERGIC/IMMUNOLOGIC NEGATIVE: 1
MUSCULOSKELETAL NEGATIVE: 1
PSYCHIATRIC NEGATIVE: 1
EYES NEGATIVE: 1
ENDOCRINE NEGATIVE: 1
HEMATOLOGIC/LYMPHATIC NEGATIVE: 1
CARDIOVASCULAR NEGATIVE: 1
ABDOMINAL DISTENTION: 1
NEUROLOGICAL NEGATIVE: 1
RESPIRATORY NEGATIVE: 1

## 2024-04-22 ASSESSMENT — COGNITIVE AND FUNCTIONAL STATUS - GENERAL
DAILY ACTIVITIY SCORE: 24
MOBILITY SCORE: 24
DAILY ACTIVITIY SCORE: 24
MOBILITY SCORE: 24

## 2024-04-22 ASSESSMENT — PAIN SCALES - GENERAL
PAINLEVEL_OUTOF10: 0 - NO PAIN

## 2024-04-22 ASSESSMENT — PAIN - FUNCTIONAL ASSESSMENT
PAIN_FUNCTIONAL_ASSESSMENT: 0-10

## 2024-04-22 ASSESSMENT — COLUMBIA-SUICIDE SEVERITY RATING SCALE - C-SSRS
6. HAVE YOU EVER DONE ANYTHING, STARTED TO DO ANYTHING, OR PREPARED TO DO ANYTHING TO END YOUR LIFE?: NO
2. HAVE YOU ACTUALLY HAD ANY THOUGHTS OF KILLING YOURSELF?: NO
1. IN THE PAST MONTH, HAVE YOU WISHED YOU WERE DEAD OR WISHED YOU COULD GO TO SLEEP AND NOT WAKE UP?: NO

## 2024-04-22 NOTE — CARE PLAN
The patient's goals for the shift include monitor i/o    The clinical goals for the shift include monitor I&O    Over the shift, the patient did not make progress toward the following goals. Barriers to progression include none. Recommendations to address these barriers include none.

## 2024-04-22 NOTE — PROGRESS NOTES
Aamir Garcia is a 54 y.o. male on day 0 of admission presenting with Alcoholic cirrhosis of liver with ascites (Multi).    Subjective   Seen and examined slept well urinating well waiting IR abdominal paracentesis GI consultation       Objective     Physical Exam  Vitals and nursing note reviewed.   Constitutional:       Appearance: Normal appearance.   HENT:      Head: Normocephalic and atraumatic.      Mouth/Throat:      Mouth: Mucous membranes are moist.   Eyes:      Extraocular Movements: Extraocular movements intact.      Pupils: Pupils are equal, round, and reactive to light.   Cardiovascular:      Rate and Rhythm: Normal rate and regular rhythm.      Pulses: Normal pulses.      Heart sounds: Normal heart sounds.   Pulmonary:      Effort: Pulmonary effort is normal.      Breath sounds: Normal breath sounds.   Abdominal:      General: There is distension.      Palpations: Abdomen is soft.      Comments: Ascites with fluid wave   Musculoskeletal:         General: Normal range of motion.      Cervical back: Normal range of motion.   Skin:     General: Skin is warm and dry.      Capillary Refill: Capillary refill takes less than 2 seconds.   Neurological:      General: No focal deficit present.      Mental Status: He is alert and oriented to person, place, and time. Mental status is at baseline.   Psychiatric:         Mood and Affect: Mood normal.         Last Recorded Vitals  Blood pressure 103/63, pulse 78, temperature 37.3 °C (99.1 °F), temperature source Tympanic, resp. rate (!) 2, height 1.829 m (6'), weight 81 kg (178 lb 9.2 oz), SpO2 96%.  Intake/Output last 3 Shifts:  I/O last 3 completed shifts:  In: 340 (4.2 mL/kg) [P.O.:240; I.V.:100 (1.2 mL/kg)]  Out: - (0 mL/kg)   Weight: 81 kg     Relevant Results              Results for orders placed or performed during the hospital encounter of 04/21/24 (from the past 24 hour(s))   CBC and Auto Differential   Result Value Ref Range    WBC 6.9 4.4 - 11.3 x10*3/uL     nRBC 0.0 0.0 - 0.0 /100 WBCs    RBC 4.49 (L) 4.50 - 5.90 x10*6/uL    Hemoglobin 14.7 13.5 - 17.5 g/dL    Hematocrit 43.9 41.0 - 52.0 %    MCV 98 80 - 100 fL    MCH 32.7 26.0 - 34.0 pg    MCHC 33.5 32.0 - 36.0 g/dL    RDW 12.7 11.5 - 14.5 %    Platelets 129 (L) 150 - 450 x10*3/uL    Neutrophils % 61.9 40.0 - 80.0 %    Immature Granulocytes %, Automated 0.1 0.0 - 0.9 %    Lymphocytes % 27.1 13.0 - 44.0 %    Monocytes % 9.2 2.0 - 10.0 %    Eosinophils % 1.0 0.0 - 6.0 %    Basophils % 0.7 0.0 - 2.0 %    Neutrophils Absolute 4.24 1.20 - 7.70 x10*3/uL    Immature Granulocytes Absolute, Automated 0.01 0.00 - 0.70 x10*3/uL    Lymphocytes Absolute 1.86 1.20 - 4.80 x10*3/uL    Monocytes Absolute 0.63 0.10 - 1.00 x10*3/uL    Eosinophils Absolute 0.07 0.00 - 0.70 x10*3/uL    Basophils Absolute 0.05 0.00 - 0.10 x10*3/uL   Comprehensive metabolic panel   Result Value Ref Range    Glucose 104 (H) 65 - 99 mg/dL    Sodium 135 133 - 145 mmol/L    Potassium 3.4 3.4 - 5.1 mmol/L    Chloride 98 97 - 107 mmol/L    Bicarbonate 29 24 - 31 mmol/L    Urea Nitrogen 12 8 - 25 mg/dL    Creatinine 0.80 0.40 - 1.60 mg/dL    eGFR >90 >60 mL/min/1.73m*2    Calcium 9.8 8.5 - 10.4 mg/dL    Albumin 3.8 3.5 - 5.0 g/dL    Alkaline Phosphatase 342 (H) 35 - 125 U/L    Total Protein 7.0 5.9 - 7.9 g/dL    AST 54 (H) 5 - 40 U/L    Bilirubin, Total 1.0 0.1 - 1.2 mg/dL    ALT 37 5 - 40 U/L    Anion Gap 8 <=19 mmol/L   Lipase   Result Value Ref Range    Lipase 95 (H) 16 - 63 U/L   Ammonia   Result Value Ref Range    Ammonia 25 12 - 45 umol/L   Magnesium   Result Value Ref Range    Magnesium 1.50 (L) 1.60 - 3.10 mg/dL   Protime-INR   Result Value Ref Range    Protime 13.3 (H) 9.3 - 12.7 seconds    INR 1.3 (H) 0.9 - 1.2   Magnesium   Result Value Ref Range    Magnesium 1.50 (L) 1.60 - 3.10 mg/dL   Comprehensive Metabolic Panel   Result Value Ref Range    Glucose 94 65 - 99 mg/dL    Sodium 138 133 - 145 mmol/L    Potassium 3.5 3.4 - 5.1 mmol/L    Chloride 103 97 -  107 mmol/L    Bicarbonate 28 24 - 31 mmol/L    Urea Nitrogen 12 8 - 25 mg/dL    Creatinine 0.70 0.40 - 1.60 mg/dL    eGFR >90 >60 mL/min/1.73m*2    Calcium 8.8 8.5 - 10.4 mg/dL    Albumin 3.3 (L) 3.5 - 5.0 g/dL    Alkaline Phosphatase 297 (H) 35 - 125 U/L    Total Protein 6.0 5.9 - 7.9 g/dL    AST 44 (H) 5 - 40 U/L    Bilirubin, Total 0.8 0.1 - 1.2 mg/dL    ALT 31 5 - 40 U/L    Anion Gap 7 <=19 mmol/L   Magnesium   Result Value Ref Range    Magnesium 2.10 1.60 - 3.10 mg/dL   POCT GLUCOSE   Result Value Ref Range    POCT Glucose 117 (H) 74 - 99 mg/dL     Scheduled medications  buPROPion XL, 150 mg, oral, Daily before breakfast  colchicine, 0.6 mg, oral, BID  escitalopram, 10 mg, oral, Daily  folic acid, 1 mg, oral, Daily  melatonin, 5 mg, oral, Nightly  metoprolol succinate XL, 50 mg, oral, Daily  multivitamin with minerals, 1 tablet, oral, Daily  nicotine, 1 patch, transdermal, Daily  pantoprazole, 40 mg, oral, Daily before breakfast  spironolactone, 25 mg, oral, BID      Continuous medications     PRN medications  PRN medications: polyethylene glycol                   Assessment/Plan   Principal Problem:    Alcoholic cirrhosis of liver with ascites (Multi)    Continue oral spironolactone awaiting IR abdominal paracentesis anticipating discharge following this procedure later today outpatient follow-up with gastroenterology and alcohol counseling       I spent 45   minutes in the professional and overall care of this patient.      Bear Brooks DO

## 2024-04-23 ENCOUNTER — PHARMACY VISIT (OUTPATIENT)
Dept: PHARMACY | Facility: CLINIC | Age: 54
End: 2024-04-23
Payer: COMMERCIAL

## 2024-04-23 VITALS
DIASTOLIC BLOOD PRESSURE: 59 MMHG | BODY MASS INDEX: 24.19 KG/M2 | OXYGEN SATURATION: 98 % | HEIGHT: 72 IN | RESPIRATION RATE: 18 BRPM | TEMPERATURE: 98.4 F | HEART RATE: 75 BPM | WEIGHT: 178.57 LBS | SYSTOLIC BLOOD PRESSURE: 99 MMHG

## 2024-04-23 LAB
ALBUMIN SERPL-MCNC: 3.5 G/DL (ref 3.5–5)
ALP BLD-CCNC: 287 U/L (ref 35–125)
ALT SERPL-CCNC: 30 U/L (ref 5–40)
ANION GAP SERPL CALC-SCNC: 8 MMOL/L
AST SERPL-CCNC: 41 U/L (ref 5–40)
BILIRUB SERPL-MCNC: 0.8 MG/DL (ref 0.1–1.2)
BUN SERPL-MCNC: 10 MG/DL (ref 8–25)
CALCIUM SERPL-MCNC: 8.9 MG/DL (ref 8.5–10.4)
CHLORIDE SERPL-SCNC: 103 MMOL/L (ref 97–107)
CO2 SERPL-SCNC: 27 MMOL/L (ref 24–31)
CREAT SERPL-MCNC: 0.6 MG/DL (ref 0.4–1.6)
EGFRCR SERPLBLD CKD-EPI 2021: >90 ML/MIN/1.73M*2
GLUCOSE SERPL-MCNC: 92 MG/DL (ref 65–99)
MAGNESIUM SERPL-MCNC: 2 MG/DL (ref 1.6–3.1)
POTASSIUM SERPL-SCNC: 3.6 MMOL/L (ref 3.4–5.1)
PROT SERPL-MCNC: 6 G/DL (ref 5.9–7.9)
SODIUM SERPL-SCNC: 138 MMOL/L (ref 133–145)

## 2024-04-23 PROCEDURE — P9047 ALBUMIN (HUMAN), 25%, 50ML: HCPCS | Mod: JZ | Performed by: INTERNAL MEDICINE

## 2024-04-23 PROCEDURE — 83735 ASSAY OF MAGNESIUM: CPT | Performed by: INTERNAL MEDICINE

## 2024-04-23 PROCEDURE — 2500000004 HC RX 250 GENERAL PHARMACY W/ HCPCS (ALT 636 FOR OP/ED): Mod: JZ | Performed by: INTERNAL MEDICINE

## 2024-04-23 PROCEDURE — 2500000002 HC RX 250 W HCPCS SELF ADMINISTERED DRUGS (ALT 637 FOR MEDICARE OP, ALT 636 FOR OP/ED): Performed by: INTERNAL MEDICINE

## 2024-04-23 PROCEDURE — 36415 COLL VENOUS BLD VENIPUNCTURE: CPT | Performed by: INTERNAL MEDICINE

## 2024-04-23 PROCEDURE — RXMED WILLOW AMBULATORY MEDICATION CHARGE

## 2024-04-23 PROCEDURE — 80053 COMPREHEN METABOLIC PANEL: CPT | Performed by: INTERNAL MEDICINE

## 2024-04-23 PROCEDURE — S4991 NICOTINE PATCH NONLEGEND: HCPCS | Performed by: INTERNAL MEDICINE

## 2024-04-23 PROCEDURE — G0378 HOSPITAL OBSERVATION PER HR: HCPCS

## 2024-04-23 PROCEDURE — 2500000001 HC RX 250 WO HCPCS SELF ADMINISTERED DRUGS (ALT 637 FOR MEDICARE OP): Performed by: INTERNAL MEDICINE

## 2024-04-23 RX ORDER — ALBUMIN HUMAN 250 G/1000ML
50 SOLUTION INTRAVENOUS ONCE
Status: COMPLETED | OUTPATIENT
Start: 2024-04-23 | End: 2024-04-23

## 2024-04-23 RX ORDER — IBUPROFEN 200 MG
1 TABLET ORAL DAILY
Qty: 14 PATCH | Refills: 0 | Status: SHIPPED | OUTPATIENT
Start: 2024-04-24 | End: 2024-06-04 | Stop reason: HOSPADM

## 2024-04-23 RX ORDER — SPIRONOLACTONE 25 MG/1
25 TABLET ORAL DAILY
Qty: 30 TABLET | Refills: 1 | Status: ON HOLD | OUTPATIENT
Start: 2024-04-23 | End: 2024-06-04

## 2024-04-23 RX ADMIN — COLCHICINE 0.6 MG: 0.6 TABLET, FILM COATED ORAL at 09:00

## 2024-04-23 RX ADMIN — FOLIC ACID 1 MG: 1 TABLET ORAL at 09:00

## 2024-04-23 RX ADMIN — ALBUMIN HUMAN 50 G: 0.25 SOLUTION INTRAVENOUS at 10:40

## 2024-04-23 RX ADMIN — BUPROPION HYDROCHLORIDE 150 MG: 150 TABLET, EXTENDED RELEASE ORAL at 06:34

## 2024-04-23 RX ADMIN — ESCITALOPRAM OXALATE 10 MG: 10 TABLET ORAL at 09:00

## 2024-04-23 RX ADMIN — NICOTINE 1 PATCH: 21 PATCH, EXTENDED RELEASE TRANSDERMAL at 09:00

## 2024-04-23 RX ADMIN — PANTOPRAZOLE SODIUM 40 MG: 40 TABLET, DELAYED RELEASE ORAL at 06:34

## 2024-04-23 RX ADMIN — Medication 1 TABLET: at 06:34

## 2024-04-23 SDOH — ECONOMIC STABILITY: HOUSING INSECURITY
IN THE LAST 12 MONTHS, WAS THERE A TIME WHEN YOU DID NOT HAVE A STEADY PLACE TO SLEEP OR SLEPT IN A SHELTER (INCLUDING NOW)?: NO

## 2024-04-23 SDOH — SOCIAL STABILITY: SOCIAL NETWORK: HOW OFTEN DO YOU ATTEND CHURCH OR RELIGIOUS SERVICES?: NEVER

## 2024-04-23 SDOH — SOCIAL STABILITY: SOCIAL INSECURITY: WITHIN THE LAST YEAR, HAVE YOU BEEN AFRAID OF YOUR PARTNER OR EX-PARTNER?: NO

## 2024-04-23 SDOH — ECONOMIC STABILITY: INCOME INSECURITY: IN THE LAST 12 MONTHS, WAS THERE A TIME WHEN YOU WERE NOT ABLE TO PAY THE MORTGAGE OR RENT ON TIME?: NO

## 2024-04-23 SDOH — ECONOMIC STABILITY: FOOD INSECURITY: WITHIN THE PAST 12 MONTHS, YOU WORRIED THAT YOUR FOOD WOULD RUN OUT BEFORE YOU GOT MONEY TO BUY MORE.: NEVER TRUE

## 2024-04-23 SDOH — SOCIAL STABILITY: SOCIAL NETWORK: IN A TYPICAL WEEK, HOW MANY TIMES DO YOU TALK ON THE PHONE WITH FAMILY, FRIENDS, OR NEIGHBORS?: THREE TIMES A WEEK

## 2024-04-23 SDOH — HEALTH STABILITY: MENTAL HEALTH
STRESS IS WHEN SOMEONE FEELS TENSE, NERVOUS, ANXIOUS, OR CAN'T SLEEP AT NIGHT BECAUSE THEIR MIND IS TROUBLED. HOW STRESSED ARE YOU?: TO SOME EXTENT

## 2024-04-23 SDOH — SOCIAL STABILITY: SOCIAL NETWORK: ARE YOU MARRIED, WIDOWED, DIVORCED, SEPARATED, NEVER MARRIED, OR LIVING WITH A PARTNER?: DIVORCED

## 2024-04-23 SDOH — ECONOMIC STABILITY: INCOME INSECURITY: IN THE PAST 12 MONTHS, HAS THE ELECTRIC, GAS, OIL, OR WATER COMPANY THREATENED TO SHUT OFF SERVICE IN YOUR HOME?: NO

## 2024-04-23 SDOH — HEALTH STABILITY: MENTAL HEALTH
HOW OFTEN DO YOU NEED TO HAVE SOMEONE HELP YOU WHEN YOU READ INSTRUCTIONS, PAMPHLETS, OR OTHER WRITTEN MATERIAL FROM YOUR DOCTOR OR PHARMACY?: NEVER

## 2024-04-23 SDOH — SOCIAL STABILITY: SOCIAL INSECURITY: WITHIN THE LAST YEAR, HAVE YOU BEEN HUMILIATED OR EMOTIONALLY ABUSED IN OTHER WAYS BY YOUR PARTNER OR EX-PARTNER?: NO

## 2024-04-23 SDOH — HEALTH STABILITY: MENTAL HEALTH: HOW OFTEN DO YOU HAVE A DRINK CONTAINING ALCOHOL?: 4 OR MORE TIMES A WEEK

## 2024-04-23 SDOH — SOCIAL STABILITY: SOCIAL INSECURITY
WITHIN THE LAST YEAR, HAVE YOU BEEN KICKED, HIT, SLAPPED, OR OTHERWISE PHYSICALLY HURT BY YOUR PARTNER OR EX-PARTNER?: NO

## 2024-04-23 SDOH — HEALTH STABILITY: MENTAL HEALTH: HOW OFTEN DO YOU HAVE 6 OR MORE DRINKS ON ONE OCCASION?: DAILY OR ALMOST DAILY

## 2024-04-23 SDOH — SOCIAL STABILITY: SOCIAL NETWORK: HOW OFTEN DO YOU ATTENT MEETINGS OF THE CLUB OR ORGANIZATION YOU BELONG TO?: NEVER

## 2024-04-23 SDOH — ECONOMIC STABILITY: TRANSPORTATION INSECURITY
IN THE PAST 12 MONTHS, HAS THE LACK OF TRANSPORTATION KEPT YOU FROM MEDICAL APPOINTMENTS OR FROM GETTING MEDICATIONS?: NO

## 2024-04-23 SDOH — SOCIAL STABILITY: SOCIAL INSECURITY
WITHIN THE LAST YEAR, HAVE TO BEEN RAPED OR FORCED TO HAVE ANY KIND OF SEXUAL ACTIVITY BY YOUR PARTNER OR EX-PARTNER?: NO

## 2024-04-23 SDOH — HEALTH STABILITY: PHYSICAL HEALTH: ON AVERAGE, HOW MANY MINUTES DO YOU ENGAGE IN EXERCISE AT THIS LEVEL?: 0 MIN

## 2024-04-23 SDOH — ECONOMIC STABILITY: FOOD INSECURITY: WITHIN THE PAST 12 MONTHS, THE FOOD YOU BOUGHT JUST DIDN'T LAST AND YOU DIDN'T HAVE MONEY TO GET MORE.: NEVER TRUE

## 2024-04-23 SDOH — ECONOMIC STABILITY: TRANSPORTATION INSECURITY
IN THE PAST 12 MONTHS, HAS LACK OF TRANSPORTATION KEPT YOU FROM MEETINGS, WORK, OR FROM GETTING THINGS NEEDED FOR DAILY LIVING?: NO

## 2024-04-23 SDOH — HEALTH STABILITY: PHYSICAL HEALTH: ON AVERAGE, HOW MANY DAYS PER WEEK DO YOU ENGAGE IN MODERATE TO STRENUOUS EXERCISE (LIKE A BRISK WALK)?: 0 DAYS

## 2024-04-23 SDOH — SOCIAL STABILITY: SOCIAL NETWORK: HOW OFTEN DO YOU GET TOGETHER WITH FRIENDS OR RELATIVES?: THREE TIMES A WEEK

## 2024-04-23 SDOH — HEALTH STABILITY: MENTAL HEALTH

## 2024-04-23 SDOH — ECONOMIC STABILITY: INCOME INSECURITY: HOW HARD IS IT FOR YOU TO PAY FOR THE VERY BASICS LIKE FOOD, HOUSING, MEDICAL CARE, AND HEATING?: NOT HARD AT ALL

## 2024-04-23 ASSESSMENT — COGNITIVE AND FUNCTIONAL STATUS - GENERAL
MOBILITY SCORE: 24
DAILY ACTIVITIY SCORE: 24

## 2024-04-23 ASSESSMENT — PAIN SCALES - GENERAL: PAINLEVEL_OUTOF10: 0 - NO PAIN

## 2024-04-23 ASSESSMENT — ACTIVITIES OF DAILY LIVING (ADL): LACK_OF_TRANSPORTATION: NO

## 2024-04-23 NOTE — PROGRESS NOTES
04/23/24 1256   Physical Activity   On average, how many days per week do you engage in moderate to strenuous exercise (like a brisk walk)? 0 days   On average, how many minutes do you engage in exercise at this level? 0 min   Financial Resource Strain   How hard is it for you to pay for the very basics like food, housing, medical care, and heating? Not hard   Housing Stability   In the last 12 months, was there a time when you were not able to pay the mortgage or rent on time? N   In the last 12 months, was there a time when you did not have a steady place to sleep or slept in a shelter (including now)? N   Transportation Needs   In the past 12 months, has lack of transportation kept you from medical appointments or from getting medications? no   In the past 12 months, has lack of transportation kept you from meetings, work, or from getting things needed for daily living? No   Food Insecurity   Within the past 12 months, you worried that your food would run out before you got the money to buy more. Never true   Within the past 12 months, the food you bought just didn't last and you didn't have money to get more. Never true   Stress   Do you feel stress - tense, restless, nervous, or anxious, or unable to sleep at night because your mind is troubled all the time - these days? To some exte   Social Connections   In a typical week, how many times do you talk on the phone with family, friends, or neighbors? Three   How often do you get together with friends or relatives? Three times   How often do you attend Mosque or Confucianism services? Never   Do you belong to any clubs or organizations such as Mosque groups, unions, fraternal or athletic groups, or school groups? No   How often do you attend meetings of the clubs or organizations you belong to? Never   Are you , , , , never , or living with a partner?    Intimate Partner Violence   Within the last year, have you been  afraid of your partner or ex-partner? No   Within the last year, have you been humiliated or emotionally abused in other ways by your partner or ex-partner? No   Within the last year, have you been kicked, hit, slapped, or otherwise physically hurt by your partner or ex-partner? No   Within the last year, have you been raped or forced to have any kind of sexual activity by your partner or ex-partner? No   Alcohol Use   Q1: How often do you have a drink containing alcohol? 4 or more ti   Q2: How many drinks containing alcohol do you have on a typical day when you are drinking?   (2 pints of Vodka a day)   Q3: How often do you have six or more drinks on one occasion? Daily   Utilities   In the past 12 months has the electric, gas, oil, or water company threatened to shut off services in your home? No   Health Literacy   How often do you need to have someone help you when you read instructions, pamphlets, or other written material from your doctor or pharmacy? Never

## 2024-04-23 NOTE — CARE PLAN
The patient's goals for the shift include monitor i/o    The clinical goals for the shift include follow fluid restriction    Over the shift, the patient did not make progress toward the following goals. Barriers to progression include none. Recommendations to address these barriers include none.

## 2024-04-23 NOTE — NURSING NOTE
Assumed care of patient.  Patient resting in bed, alert, ordering breakfast.  BSSR completed.  Bed low and call light within reach.

## 2024-04-23 NOTE — PROGRESS NOTES
04/23/24 1243   [facility] Disability Status   Are you deaf or do you have serious difficulty hearing? N   Are you blind or do you have serious difficulty seeing, even when wearing glasses? N   Because of a physical, mental, or emotional condition, do you have serious difficulty concentrating, remembering, or making decisions? (5 years old or older) N   Do you have serious difficulty walking or climbing stairs? N   Do you have serious difficulty dressing or bathing? N   Because of a physical, mental, or emotional condition, do you have serious difficulty doing errands alone such as visiting the doctor? N

## 2024-04-23 NOTE — CARE PLAN
The patient's goals for the shift include monitor i/o    The clinical goals for the shift include follow fluid restriction      Problem: Pain  Goal: My pain/discomfort is manageable  Outcome: Progressing  Flowsheets (Taken 4/22/2024 2142)  Resident's pain/discomfort is manageable:   Offer non-pharmacological pain management interventions   Identify and avoid pain triggers     Problem: Safety  Goal: Patient will be injury free during hospitalization  Outcome: Progressing  Goal: I will remain free of falls  Outcome: Progressing     Problem: Daily Care  Goal: Daily care needs are met  Outcome: Progressing

## 2024-04-23 NOTE — PROGRESS NOTES
04/23/24 1243   Discharge Planning   Living Arrangements Alone   Support Systems Family members   Assistance Needed independent   Type of Residence Private residence   Do you have animals or pets at home? Yes   Type of Animals or Pets dog   Who is requesting discharge planning? Patient   Home or Post Acute Services None   Patient expects to be discharged to: Home with no needs   Does the patient need discharge transport arranged? No   Financial Resource Strain   How hard is it for you to pay for the very basics like food, housing, medical care, and heating? Not hard   Housing Stability   In the last 12 months, was there a time when you were not able to pay the mortgage or rent on time? N   In the last 12 months, was there a time when you did not have a steady place to sleep or slept in a shelter (including now)? N   Transportation Needs   In the past 12 months, has lack of transportation kept you from medical appointments or from getting medications? no   In the past 12 months, has lack of transportation kept you from meetings, work, or from getting things needed for daily living? No   Patient Choice   Patient / Family choosing to utilize agency / facility established prior to hospitalization No     Aamir Garcia is a 54 y.o. male presenting with increasing ascites.  Patient is a 54-year-old  male known alcohol user with alcohol induced cirrhosis and multiple admissions for recurrent ascites the patient continues to drink alcohol consuming approximately 2 pints of vodka on a daily basis he stopped drinking approximately 5 days ago but has had multiple recurrences of near chronic continuous alcohol use.  He had recently been on Lasix and spironolactone but this was discontinued approximately a month ago due to renal insufficiency.  He has been drinking a great deal of water as well.  Placed on a 2 g sodium restricted 1600 mL fluid restricted diet.  Currently is in the process of a divorce, lives alone in a  single home.  Has a job/drives. Independent with care. Has been drinking alcohol did tell me that he has been not drinking alcohol for 10 days.  Has a supportive family. Does want to stop drinking and goes to support groups ([facility]).     PLAN: Discharge to home with no needs.

## 2024-04-23 NOTE — DISCHARGE SUMMARY
Discharge Diagnosis  Alcoholic cirrhosis of liver with ascites (Multi)    Issues Requiring Follow-Up  Outpatient follow-up gastroenterology and nephrology and primary care physician, repeat laboratories to be performed on Friday discontinuation of all alcohol use    Discharge Meds     Your medication list        START taking these medications        Instructions Last Dose Given Next Dose Due   nicotine 21 mg/24 hr patch  Commonly known as: Nicoderm CQ  Start taking on: April 24, 2024      Place 1 patch over 24 hours on the skin once daily.       spironolactone 25 mg tablet  Commonly known as: Aldactone      Take 1 tablet (25 mg) by mouth once daily.              CONTINUE taking these medications        Instructions Last Dose Given Next Dose Due   buPROPion  mg 24 hr tablet  Commonly known as: Wellbutrin XL      TAKE 1 TABLET BY MOUTH EVERY DAY IN THE MORNING       colchicine 0.6 mg tablet      Take 1 tablet (0.6 mg) by mouth 2 times a day.       EPINEPHrine 0.3 mg/0.3 mL injection syringe  Commonly known as: Epipen           escitalopram 10 mg tablet  Commonly known as: Lexapro      Take 1 tablet (10 mg) by mouth once daily. Do not start before April 1, 2024.       folic acid 1 mg tablet  Commonly known as: Folvite      Take 1 tablet (1 mg) by mouth once daily.       melatonin 5 mg tablet      Take 1 tablet (5 mg) by mouth once daily.       methylphenidate LA 10 mg 24 hr capsule  Commonly known as: Ritalin LA           metoprolol succinate XL 50 mg 24 hr tablet  Commonly known as: Toprol-XL      Take 1 tablet (50 mg) by mouth once daily. Do not crush or chew.       multivitamin with minerals tablet           omeprazole 20 mg DR capsule  Commonly known as: PriLOSEC      Take 1 capsule (20 mg) by mouth once daily. Do not crush or chew.       polyethylene glycol 17 gram packet  Commonly known as: Glycolax, Miralax                  STOP taking these medications      amLODIPine 5 mg tablet  Commonly known as:  Norvasc        methylPREDNISolone 4 mg tablets  Commonly known as: Medrol Dospak                  Where to Get Your Medications        These medications were sent to [facility]  [address], [place] [address]      Hours: 9 AM to 6 PM Mon-Fri, 9 AM to 1 PM Sat Phone: [phone]   nicotine 21 mg/24 hr patch  spironolactone 25 mg tablet         Test Results Pending At Discharge  Pending Labs       No current pending labs.            Hospital Course   54-year-old male presents with alcoholic cirrhosis with ascites he is returned to drinking.  He was initiated on low-dose spironolactone went for IR directed large-volume abdominal paracentesis with removal of nearly 5 L of fluid he received albumin x 2 post paracentesis.  He is medically stable for discharge laboratories are stable repeat laboratories to be performed approximately Friday of this week.  Total of 45 minutes time spent in discharge planning    Results for orders placed or performed during the hospital encounter of 04/21/24 (from the past 24 hour(s))   Comprehensive Metabolic Panel   Result Value Ref Range    Glucose 92 65 - 99 mg/dL    Sodium 138 133 - 145 mmol/L    Potassium 3.6 3.4 - 5.1 mmol/L    Chloride 103 97 - 107 mmol/L    Bicarbonate 27 24 - 31 mmol/L    Urea Nitrogen 10 8 - 25 mg/dL    Creatinine 0.60 0.40 - 1.60 mg/dL    eGFR >90 >60 mL/min/1.73m*2    Calcium 8.9 8.5 - 10.4 mg/dL    Albumin 3.5 3.5 - 5.0 g/dL    Alkaline Phosphatase 287 (H) 35 - 125 U/L    Total Protein 6.0 5.9 - 7.9 g/dL    AST 41 (H) 5 - 40 U/L    Bilirubin, Total 0.8 0.1 - 1.2 mg/dL    ALT 30 5 - 40 U/L    Anion Gap 8 <=19 mmol/L   Magnesium   Result Value Ref Range    Magnesium 2.00 1.60 - 3.10 mg/dL         Pertinent Physical Exam At Time of Discharge  Physical Exam  Vitals and nursing note reviewed.   Constitutional:       Appearance: Normal appearance.   HENT:      Head: Normocephalic and atraumatic.      Mouth/Throat:      Mouth: Mucous  membranes are moist.   Eyes:      Extraocular Movements: Extraocular movements intact.      Pupils: Pupils are equal, round, and reactive to light.   Cardiovascular:      Rate and Rhythm: Normal rate and regular rhythm.      Pulses: Normal pulses.      Heart sounds: Normal heart sounds.   Pulmonary:      Effort: Pulmonary effort is normal.      Breath sounds: Normal breath sounds.   Abdominal:      General: Abdomen is flat.      Palpations: Abdomen is soft.   Musculoskeletal:         General: Normal range of motion.      Cervical back: Normal range of motion and neck supple.   Skin:     General: Skin is warm and dry.      Capillary Refill: Capillary refill takes less than 2 seconds.   Neurological:      General: No focal deficit present.      Mental Status: He is alert and oriented to person, place, and time. Mental status is at baseline.   Psychiatric:         Mood and Affect: Mood normal.         Outpatient Follow-Up  Future Appointments   Date Time Provider Department Center   6/17/2024  9:00 AM Pascale Wood MD [id number]         Bear Brooks DO

## 2024-04-25 ENCOUNTER — PATIENT OUTREACH (OUTPATIENT)
Dept: PRIMARY CARE | Facility: CLINIC | Age: 54
End: 2024-04-25
Payer: MEDICARE

## 2024-04-25 NOTE — PROGRESS NOTES
Discharge Facility: [facility]  Discharge Diagnosis: Alcoholic cirrhosis of liver with ascites  Admission Date: 04/21/2024  Discharge Date:  04/23/2024    PCP Appointment Date: Tasked to office for scheduling  Specialist Appointment Date: None  Hospital Encounter and Summary: Linked    See discharge assessment below for further details      Engagement  Call Start Time: 1020 (4/25/2024 10:28 AM)    Medications  Medications reviewed with patient/caregiver?: Yes (4/25/2024 10:28 AM)  Is the patient having any side effects they believe may be caused by any medication additions or changes?: No (4/25/2024 10:28 AM)  Does the patient have all medications ordered at discharge?: Yes (4/25/2024 10:28 AM)  Prescription Comments: Scripts given at discharge for Nicoderm and Aldactone (4/25/2024 10:28 AM)  Is the patient taking all medications as directed (includes completed medication regime)?: Yes (4/25/2024 10:28 AM)  Medication Comments: Patient denies any issue obatinaing or affording medication (4/25/2024 10:28 AM)    Appointments  Does the patient have a primary care provider?: Yes (4/25/2024 10:28 AM)  Care Management Interventions: -- (Tasked to office for scheduling) (4/25/2024 10:28 AM)  Has the patient kept scheduled appointments due by today?: Yes (4/25/2024 10:28 AM)    Self Management  What is the home health agency?: N/A (4/25/2024 10:28 AM)  What Durable Medical Equipment (DME) was ordered?: N/A (4/25/2024 10:28 AM)    Patient Teaching  Does the patient have access to their discharge instructions?: Yes (4/25/2024 10:28 AM)  Care Management Interventions: Reviewed instructions with patient (4/25/2024 10:28 AM)  What is the patient's perception of their health status since discharge?: Improving (4/25/2024 10:28 AM)  Is the patient/caregiver able to teach back the hierarchy of who to call/visit for symptoms/problems? PCP, Specialist, Home Health nurse, Urgent Care, ED, 911: Yes (4/25/2024 10:28 AM)  Patient/Caregiver  Education Comments: CM spoke to patient via phone. He states that he is doing okay at home. He has all discharge medication. He admits to urinary frequency with a very small amount of dark urine. He denies any pain or foul odor associated with the urine. This concerns will be included in the tasked message to the office staff. He has no other concerns at this time and was thankful for this call. (4/25/2024 10:28 AM)

## 2024-04-26 ENCOUNTER — LAB (OUTPATIENT)
Dept: LAB | Facility: LAB | Age: 54
End: 2024-04-26
Payer: MEDICARE

## 2024-04-26 DIAGNOSIS — K74.60 UNSPECIFIED CIRRHOSIS OF LIVER (MULTI): ICD-10-CM

## 2024-04-26 DIAGNOSIS — N17.9 ACUTE KIDNEY FAILURE, UNSPECIFIED (CMS-HCC): Primary | ICD-10-CM

## 2024-04-26 LAB
ALBUMIN SERPL-MCNC: 4.4 G/DL (ref 3.5–5)
ALP BLD-CCNC: 268 U/L (ref 35–125)
ALT SERPL-CCNC: 33 U/L (ref 5–40)
ANION GAP SERPL CALC-SCNC: 13 MMOL/L
AST SERPL-CCNC: 39 U/L (ref 5–40)
BASOPHILS # BLD AUTO: 0.07 X10*3/UL (ref 0–0.1)
BASOPHILS NFR BLD AUTO: 1.2 %
BILIRUB SERPL-MCNC: 1.1 MG/DL (ref 0.1–1.2)
BUN SERPL-MCNC: 11 MG/DL (ref 8–25)
CALCIUM SERPL-MCNC: 10.3 MG/DL (ref 8.5–10.4)
CHLORIDE SERPL-SCNC: 97 MMOL/L (ref 97–107)
CO2 SERPL-SCNC: 28 MMOL/L (ref 24–31)
CREAT SERPL-MCNC: 0.8 MG/DL (ref 0.4–1.6)
EGFRCR SERPLBLD CKD-EPI 2021: >90 ML/MIN/1.73M*2
EOSINOPHIL # BLD AUTO: 0.1 X10*3/UL (ref 0–0.7)
EOSINOPHIL NFR BLD AUTO: 1.7 %
ERYTHROCYTE [DISTWIDTH] IN BLOOD BY AUTOMATED COUNT: 13.1 % (ref 11.5–14.5)
GLUCOSE SERPL-MCNC: 90 MG/DL (ref 65–99)
HCT VFR BLD AUTO: 43.9 % (ref 41–52)
HGB BLD-MCNC: 14.4 G/DL (ref 13.5–17.5)
IMM GRANULOCYTES # BLD AUTO: 0.02 X10*3/UL (ref 0–0.7)
IMM GRANULOCYTES NFR BLD AUTO: 0.3 % (ref 0–0.9)
INR PPP: 1.2 (ref 0.9–1.2)
LYMPHOCYTES # BLD AUTO: 1.84 X10*3/UL (ref 1.2–4.8)
LYMPHOCYTES NFR BLD AUTO: 31.2 %
MCH RBC QN AUTO: 32.6 PG (ref 26–34)
MCHC RBC AUTO-ENTMCNC: 32.8 G/DL (ref 32–36)
MCV RBC AUTO: 99 FL (ref 80–100)
MONOCYTES # BLD AUTO: 0.75 X10*3/UL (ref 0.1–1)
MONOCYTES NFR BLD AUTO: 12.7 %
NEUTROPHILS # BLD AUTO: 3.12 X10*3/UL (ref 1.2–7.7)
NEUTROPHILS NFR BLD AUTO: 52.9 %
NRBC BLD-RTO: 0 /100 WBCS (ref 0–0)
PLATELET # BLD AUTO: 206 X10*3/UL (ref 150–450)
POTASSIUM SERPL-SCNC: 4.3 MMOL/L (ref 3.4–5.1)
PROT SERPL-MCNC: 7 G/DL (ref 5.9–7.9)
PROTHROMBIN TIME: 12.8 SECONDS (ref 9.3–12.7)
RBC # BLD AUTO: 4.42 X10*6/UL (ref 4.5–5.9)
SODIUM SERPL-SCNC: 138 MMOL/L (ref 133–145)
WBC # BLD AUTO: 5.9 X10*3/UL (ref 4.4–11.3)

## 2024-04-26 PROCEDURE — 85025 COMPLETE CBC W/AUTO DIFF WBC: CPT

## 2024-04-26 PROCEDURE — 36415 COLL VENOUS BLD VENIPUNCTURE: CPT

## 2024-04-26 PROCEDURE — 85610 PROTHROMBIN TIME: CPT

## 2024-04-26 PROCEDURE — 80053 COMPREHEN METABOLIC PANEL: CPT

## 2024-04-27 ENCOUNTER — TELEPHONE (OUTPATIENT)
Dept: GASTROENTEROLOGY | Facility: HOSPITAL | Age: 54
End: 2024-04-27

## 2024-04-27 DIAGNOSIS — K70.30 ALCOHOLIC CIRRHOSIS OF LIVER WITHOUT ASCITES (MULTI): Primary | ICD-10-CM

## 2024-04-27 RX ORDER — FUROSEMIDE 20 MG/1
20 TABLET ORAL DAILY
Qty: 30 TABLET | Refills: 1 | Status: SHIPPED | OUTPATIENT
Start: 2024-04-27 | End: 2025-04-27

## 2024-04-29 ENCOUNTER — TELEPHONE (OUTPATIENT)
Dept: PRIMARY CARE | Facility: CLINIC | Age: 54
End: 2024-04-29
Payer: MEDICARE

## 2024-04-29 NOTE — TELEPHONE ENCOUNTER
LMOM to call office to schedule hospital followup.  Pt was at [facility] [place] 4/23/24, dx: generalized abdominal pain    This can be billed as TCM.

## 2024-04-30 DIAGNOSIS — N17.9 ACUTE KIDNEY FAILURE, UNSPECIFIED (CMS-HCC): Primary | ICD-10-CM

## 2024-05-03 ENCOUNTER — LAB (OUTPATIENT)
Dept: LAB | Facility: LAB | Age: 54
End: 2024-05-03
Payer: MEDICARE

## 2024-05-03 DIAGNOSIS — M10.072 ACUTE IDIOPATHIC GOUT OF LEFT FOOT: ICD-10-CM

## 2024-05-03 DIAGNOSIS — N17.9 ACUTE KIDNEY FAILURE, UNSPECIFIED (CMS-HCC): ICD-10-CM

## 2024-05-03 LAB
ALBUMIN SERPL-MCNC: 4.4 G/DL (ref 3.5–5)
ANION GAP SERPL CALC-SCNC: 16 MMOL/L
BUN SERPL-MCNC: 9 MG/DL (ref 8–25)
CALCIUM SERPL-MCNC: 10 MG/DL (ref 8.5–10.4)
CHLORIDE SERPL-SCNC: 98 MMOL/L (ref 97–107)
CO2 SERPL-SCNC: 25 MMOL/L (ref 24–31)
CREAT SERPL-MCNC: 0.8 MG/DL (ref 0.4–1.6)
EGFRCR SERPLBLD CKD-EPI 2021: >90 ML/MIN/1.73M*2
ERYTHROCYTE [DISTWIDTH] IN BLOOD BY AUTOMATED COUNT: 13.1 % (ref 11.5–14.5)
GLUCOSE SERPL-MCNC: 121 MG/DL (ref 65–99)
HCT VFR BLD AUTO: 46.2 % (ref 41–52)
HGB BLD-MCNC: 15.1 G/DL (ref 13.5–17.5)
MCH RBC QN AUTO: 32.2 PG (ref 26–34)
MCHC RBC AUTO-ENTMCNC: 32.7 G/DL (ref 32–36)
MCV RBC AUTO: 99 FL (ref 80–100)
NRBC BLD-RTO: 0 /100 WBCS (ref 0–0)
PHOSPHATE SERPL-MCNC: 4 MG/DL (ref 2.5–4.5)
PLATELET # BLD AUTO: 214 X10*3/UL (ref 150–450)
POTASSIUM SERPL-SCNC: 3.6 MMOL/L (ref 3.4–5.1)
PTH-INTACT SERPL-MCNC: 18.3 PG/ML (ref 18.5–88)
RBC # BLD AUTO: 4.69 X10*6/UL (ref 4.5–5.9)
SODIUM SERPL-SCNC: 139 MMOL/L (ref 133–145)
URATE SERPL-MCNC: 5.2 MG/DL (ref 3.6–7.7)
WBC # BLD AUTO: 8.3 X10*3/UL (ref 4.4–11.3)

## 2024-05-03 PROCEDURE — 80069 RENAL FUNCTION PANEL: CPT

## 2024-05-03 PROCEDURE — 83970 ASSAY OF PARATHORMONE: CPT

## 2024-05-03 PROCEDURE — 36415 COLL VENOUS BLD VENIPUNCTURE: CPT

## 2024-05-03 PROCEDURE — 85027 COMPLETE CBC AUTOMATED: CPT

## 2024-05-03 PROCEDURE — 84550 ASSAY OF BLOOD/URIC ACID: CPT

## 2024-05-07 ENCOUNTER — TELEPHONE (OUTPATIENT)
Dept: PRIMARY CARE | Facility: CLINIC | Age: 54
End: 2024-05-07
Payer: MEDICARE

## 2024-05-07 NOTE — TELEPHONE ENCOUNTER
Nina pt.  Pt states he cannot sleep more than 2 hours a night and is having night sweats x2 weeks.  Cannot take it anymore and asking what he can do.  Pt offered appt for further eval but requesting advice if possible.  Please advise.  Ph: [phone]

## 2024-05-09 ENCOUNTER — PATIENT OUTREACH (OUTPATIENT)
Dept: PRIMARY CARE | Facility: CLINIC | Age: 54
End: 2024-05-09
Payer: MEDICARE

## 2024-05-09 NOTE — PROGRESS NOTES
14 day call to patient after hospitalization. Patient did not see PCP within 14 days of discharge.   At time of outreach call the patient feels as if their condition has improved since last visit.    Patient has complaints of difficulty falling asleep and staying asleep. He has contacted PCP and was advised to schedule an appointment to discuss.

## 2024-05-13 ENCOUNTER — APPOINTMENT (OUTPATIENT)
Dept: PRIMARY CARE | Facility: CLINIC | Age: 54
End: 2024-05-13
Payer: MEDICARE

## 2024-05-31 ENCOUNTER — APPOINTMENT (OUTPATIENT)
Dept: RADIOLOGY | Facility: HOSPITAL | Age: 54
DRG: 433 | End: 2024-05-31
Payer: MEDICARE

## 2024-05-31 ENCOUNTER — HOSPITAL ENCOUNTER (INPATIENT)
Facility: HOSPITAL | Age: 54
LOS: 1 days | Discharge: HOME | DRG: 433 | End: 2024-06-04
Attending: INTERNAL MEDICINE | Admitting: INTERNAL MEDICINE
Payer: MEDICARE

## 2024-05-31 DIAGNOSIS — F41.9 ANXIETY: ICD-10-CM

## 2024-05-31 DIAGNOSIS — K70.31 ALCOHOLIC CIRRHOSIS OF LIVER WITH ASCITES (MULTI): ICD-10-CM

## 2024-05-31 DIAGNOSIS — F10.21 HISTORY OF ALCOHOL DEPENDENCE (MULTI): ICD-10-CM

## 2024-05-31 DIAGNOSIS — F90.1 ATTENTION DEFICIT HYPERACTIVITY DISORDER (ADHD), PREDOMINANTLY HYPERACTIVE TYPE: ICD-10-CM

## 2024-05-31 DIAGNOSIS — M10.49 OTHER SECONDARY ACUTE GOUT OF MULTIPLE SITES: ICD-10-CM

## 2024-05-31 DIAGNOSIS — K70.31 ASCITES DUE TO ALCOHOLIC CIRRHOSIS (MULTI): Primary | ICD-10-CM

## 2024-05-31 PROBLEM — R10.9 ABDOMINAL PAIN: Status: ACTIVE | Noted: 2024-05-31

## 2024-05-31 LAB
ABO GROUP (TYPE) IN BLOOD: NORMAL
ALBUMIN SERPL-MCNC: 4.1 G/DL (ref 3.5–5)
ALP BLD-CCNC: 452 U/L (ref 35–125)
ALT SERPL-CCNC: 42 U/L (ref 5–40)
AMMONIA PLAS-SCNC: 40 UMOL/L (ref 12–45)
AMPHETAMINES UR QL SCN>1000 NG/ML: NEGATIVE
ANION GAP SERPL CALC-SCNC: 12 MMOL/L
ANTIBODY SCREEN: NORMAL
APPEARANCE UR: CLEAR
AST SERPL-CCNC: 93 U/L (ref 5–40)
BARBITURATES UR QL SCN>300 NG/ML: NEGATIVE
BASOPHILS # BLD AUTO: 0.09 X10*3/UL (ref 0–0.1)
BASOPHILS NFR BLD AUTO: 1.1 %
BENZODIAZ UR QL SCN>300 NG/ML: NEGATIVE
BILIRUB SERPL-MCNC: 1.4 MG/DL (ref 0.1–1.2)
BILIRUB UR STRIP.AUTO-MCNC: NEGATIVE MG/DL
BUN SERPL-MCNC: 10 MG/DL (ref 8–25)
BZE UR QL SCN>300 NG/ML: NEGATIVE
CALCIUM SERPL-MCNC: 9.5 MG/DL (ref 8.5–10.4)
CANNABINOIDS UR QL SCN>50 NG/ML: POSITIVE
CHLORIDE SERPL-SCNC: 94 MMOL/L (ref 97–107)
CO2 SERPL-SCNC: 28 MMOL/L (ref 24–31)
COLOR UR: YELLOW
CREAT SERPL-MCNC: 0.6 MG/DL (ref 0.4–1.6)
EGFRCR SERPLBLD CKD-EPI 2021: >90 ML/MIN/1.73M*2
EOSINOPHIL # BLD AUTO: 0.05 X10*3/UL (ref 0–0.7)
EOSINOPHIL NFR BLD AUTO: 0.6 %
ERYTHROCYTE [DISTWIDTH] IN BLOOD BY AUTOMATED COUNT: 14.2 % (ref 11.5–14.5)
ETHANOL SERPL-MCNC: <0.01 G/DL
FENTANYL+NORFENTANYL UR QL SCN: NEGATIVE
GLUCOSE SERPL-MCNC: 144 MG/DL (ref 65–99)
GLUCOSE UR STRIP.AUTO-MCNC: NORMAL MG/DL
HCT VFR BLD AUTO: 37.6 % (ref 41–52)
HGB BLD-MCNC: 12.9 G/DL (ref 13.5–17.5)
IMM GRANULOCYTES # BLD AUTO: 0.03 X10*3/UL (ref 0–0.7)
IMM GRANULOCYTES NFR BLD AUTO: 0.4 % (ref 0–0.9)
INR PPP: 1.2 (ref 0.9–1.2)
INR PPP: 1.2 (ref 0.9–1.2)
KETONES UR STRIP.AUTO-MCNC: NEGATIVE MG/DL
LEUKOCYTE ESTERASE UR QL STRIP.AUTO: NEGATIVE
LIPASE SERPL-CCNC: 85 U/L (ref 16–63)
LYMPHOCYTES # BLD AUTO: 1.75 X10*3/UL (ref 1.2–4.8)
LYMPHOCYTES NFR BLD AUTO: 22.2 %
MAGNESIUM SERPL-MCNC: 1.5 MG/DL (ref 1.6–3.1)
MAGNESIUM SERPL-MCNC: 1.6 MG/DL (ref 1.6–3.1)
MCH RBC QN AUTO: 32.3 PG (ref 26–34)
MCHC RBC AUTO-ENTMCNC: 34.3 G/DL (ref 32–36)
MCV RBC AUTO: 94 FL (ref 80–100)
METHADONE UR QL SCN>300 NG/ML: NEGATIVE
MONOCYTES # BLD AUTO: 0.85 X10*3/UL (ref 0.1–1)
MONOCYTES NFR BLD AUTO: 10.8 %
NEUTROPHILS # BLD AUTO: 5.12 X10*3/UL (ref 1.2–7.7)
NEUTROPHILS NFR BLD AUTO: 64.9 %
NITRITE UR QL STRIP.AUTO: NEGATIVE
NRBC BLD-RTO: 0 /100 WBCS (ref 0–0)
OPIATES UR QL SCN>300 NG/ML: POSITIVE
OXYCODONE UR QL: NEGATIVE
PCP UR QL SCN>25 NG/ML: NEGATIVE
PH UR STRIP.AUTO: 8 [PH]
PLATELET # BLD AUTO: 167 X10*3/UL (ref 150–450)
POTASSIUM SERPL-SCNC: 3.2 MMOL/L (ref 3.4–5.1)
PROT SERPL-MCNC: 7.8 G/DL (ref 5.9–7.9)
PROT UR STRIP.AUTO-MCNC: ABNORMAL MG/DL
PROTHROMBIN TIME: 11.9 SECONDS (ref 9.3–12.7)
PROTHROMBIN TIME: 11.9 SECONDS (ref 9.3–12.7)
RBC # BLD AUTO: 4 X10*6/UL (ref 4.5–5.9)
RBC # UR STRIP.AUTO: NEGATIVE /UL
RBC #/AREA URNS AUTO: NORMAL /HPF
RH FACTOR (ANTIGEN D): NORMAL
SODIUM SERPL-SCNC: 134 MMOL/L (ref 133–145)
SP GR UR STRIP.AUTO: 1.04
UROBILINOGEN UR STRIP.AUTO-MCNC: ABNORMAL MG/DL
WBC # BLD AUTO: 7.9 X10*3/UL (ref 4.4–11.3)
WBC #/AREA URNS AUTO: NORMAL /HPF

## 2024-05-31 PROCEDURE — 2500000002 HC RX 250 W HCPCS SELF ADMINISTERED DRUGS (ALT 637 FOR MEDICARE OP, ALT 636 FOR OP/ED)

## 2024-05-31 PROCEDURE — 2500000004 HC RX 250 GENERAL PHARMACY W/ HCPCS (ALT 636 FOR OP/ED): Performed by: REGISTERED NURSE

## 2024-05-31 PROCEDURE — 74177 CT ABD & PELVIS W/CONTRAST: CPT | Performed by: RADIOLOGY

## 2024-05-31 PROCEDURE — 80365 DRUG SCREENING OXYCODONE: CPT | Mod: TRILAB,WESLAB | Performed by: INTERNAL MEDICINE

## 2024-05-31 PROCEDURE — 80307 DRUG TEST PRSMV CHEM ANLYZR: CPT | Performed by: INTERNAL MEDICINE

## 2024-05-31 PROCEDURE — 2500000004 HC RX 250 GENERAL PHARMACY W/ HCPCS (ALT 636 FOR OP/ED): Performed by: INTERNAL MEDICINE

## 2024-05-31 PROCEDURE — 2550000001 HC RX 255 CONTRASTS

## 2024-05-31 PROCEDURE — 96365 THER/PROPH/DIAG IV INF INIT: CPT

## 2024-05-31 PROCEDURE — 82077 ASSAY SPEC XCP UR&BREATH IA: CPT | Performed by: INTERNAL MEDICINE

## 2024-05-31 PROCEDURE — 2500000002 HC RX 250 W HCPCS SELF ADMINISTERED DRUGS (ALT 637 FOR MEDICARE OP, ALT 636 FOR OP/ED): Performed by: INTERNAL MEDICINE

## 2024-05-31 PROCEDURE — 85610 PROTHROMBIN TIME: CPT | Performed by: INTERNAL MEDICINE

## 2024-05-31 PROCEDURE — 80053 COMPREHEN METABOLIC PANEL: CPT

## 2024-05-31 PROCEDURE — P9047 ALBUMIN (HUMAN), 25%, 50ML: HCPCS | Performed by: INTERNAL MEDICINE

## 2024-05-31 PROCEDURE — 85025 COMPLETE CBC W/AUTO DIFF WBC: CPT

## 2024-05-31 PROCEDURE — 2500000004 HC RX 250 GENERAL PHARMACY W/ HCPCS (ALT 636 FOR OP/ED)

## 2024-05-31 PROCEDURE — 36415 COLL VENOUS BLD VENIPUNCTURE: CPT

## 2024-05-31 PROCEDURE — 87040 BLOOD CULTURE FOR BACTERIA: CPT | Mod: TRILAB

## 2024-05-31 PROCEDURE — 83735 ASSAY OF MAGNESIUM: CPT | Performed by: INTERNAL MEDICINE

## 2024-05-31 PROCEDURE — 2500000001 HC RX 250 WO HCPCS SELF ADMINISTERED DRUGS (ALT 637 FOR MEDICARE OP): Performed by: INTERNAL MEDICINE

## 2024-05-31 PROCEDURE — 96375 TX/PRO/DX INJ NEW DRUG ADDON: CPT

## 2024-05-31 PROCEDURE — 83735 ASSAY OF MAGNESIUM: CPT

## 2024-05-31 PROCEDURE — 86901 BLOOD TYPING SEROLOGIC RH(D): CPT | Performed by: INTERNAL MEDICINE

## 2024-05-31 PROCEDURE — 83690 ASSAY OF LIPASE: CPT

## 2024-05-31 PROCEDURE — 80349 CANNABINOIDS NATURAL: CPT | Performed by: INTERNAL MEDICINE

## 2024-05-31 PROCEDURE — 80361 OPIATES 1 OR MORE: CPT | Mod: TRILAB,WESLAB | Performed by: INTERNAL MEDICINE

## 2024-05-31 PROCEDURE — 99285 EMERGENCY DEPT VISIT HI MDM: CPT

## 2024-05-31 PROCEDURE — 94762 N-INVAS EAR/PLS OXIMTRY CONT: CPT

## 2024-05-31 PROCEDURE — G0378 HOSPITAL OBSERVATION PER HR: HCPCS

## 2024-05-31 PROCEDURE — 2500000001 HC RX 250 WO HCPCS SELF ADMINISTERED DRUGS (ALT 637 FOR MEDICARE OP)

## 2024-05-31 PROCEDURE — 82140 ASSAY OF AMMONIA: CPT

## 2024-05-31 PROCEDURE — 81001 URINALYSIS AUTO W/SCOPE: CPT | Performed by: INTERNAL MEDICINE

## 2024-05-31 PROCEDURE — 74177 CT ABD & PELVIS W/CONTRAST: CPT

## 2024-05-31 PROCEDURE — 85610 PROTHROMBIN TIME: CPT

## 2024-05-31 PROCEDURE — 87075 CULTR BACTERIA EXCEPT BLOOD: CPT | Mod: TRILAB

## 2024-05-31 RX ORDER — ONDANSETRON HYDROCHLORIDE 2 MG/ML
4 INJECTION, SOLUTION INTRAVENOUS EVERY 8 HOURS PRN
Status: DISCONTINUED | OUTPATIENT
Start: 2024-05-31 | End: 2024-06-04 | Stop reason: HOSPADM

## 2024-05-31 RX ORDER — PANTOPRAZOLE SODIUM 40 MG/1
40 TABLET, DELAYED RELEASE ORAL
Status: DISCONTINUED | OUTPATIENT
Start: 2024-06-01 | End: 2024-06-04 | Stop reason: HOSPADM

## 2024-05-31 RX ORDER — MULTIVIT-MIN/IRON FUM/FOLIC AC 7.5 MG-4
1 TABLET ORAL ONCE
Status: COMPLETED | OUTPATIENT
Start: 2024-05-31 | End: 2024-05-31

## 2024-05-31 RX ORDER — FOLIC ACID 1 MG/1
1 TABLET ORAL DAILY
Status: DISCONTINUED | OUTPATIENT
Start: 2024-05-31 | End: 2024-05-31

## 2024-05-31 RX ORDER — POTASSIUM CHLORIDE 20 MEQ/1
40 TABLET, EXTENDED RELEASE ORAL ONCE
Status: COMPLETED | OUTPATIENT
Start: 2024-05-31 | End: 2024-05-31

## 2024-05-31 RX ORDER — IBUPROFEN 200 MG
1 TABLET ORAL DAILY
Status: DISCONTINUED | OUTPATIENT
Start: 2024-05-31 | End: 2024-06-04 | Stop reason: HOSPADM

## 2024-05-31 RX ORDER — MULTIVIT-MIN/IRON FUM/FOLIC AC 7.5 MG-4
1 TABLET ORAL
Status: DISCONTINUED | OUTPATIENT
Start: 2024-05-31 | End: 2024-05-31

## 2024-05-31 RX ORDER — BUPROPION HYDROCHLORIDE 150 MG/1
150 TABLET ORAL
Status: DISCONTINUED | OUTPATIENT
Start: 2024-06-01 | End: 2024-06-04 | Stop reason: HOSPADM

## 2024-05-31 RX ORDER — LORAZEPAM 1 MG/1
2 TABLET ORAL EVERY 2 HOUR PRN
Status: DISCONTINUED | OUTPATIENT
Start: 2024-05-31 | End: 2024-06-04 | Stop reason: HOSPADM

## 2024-05-31 RX ORDER — FUROSEMIDE 20 MG/1
20 TABLET ORAL DAILY
Status: DISCONTINUED | OUTPATIENT
Start: 2024-05-31 | End: 2024-06-04 | Stop reason: HOSPADM

## 2024-05-31 RX ORDER — MORPHINE SULFATE 2 MG/ML
2 INJECTION, SOLUTION INTRAMUSCULAR; INTRAVENOUS ONCE
Status: COMPLETED | OUTPATIENT
Start: 2024-05-31 | End: 2024-05-31

## 2024-05-31 RX ORDER — AMLODIPINE BESYLATE 5 MG/1
5 TABLET ORAL DAILY
COMMUNITY
End: 2024-06-14 | Stop reason: SDUPTHER

## 2024-05-31 RX ORDER — FOLIC ACID 1 MG/1
1 TABLET ORAL ONCE
Status: COMPLETED | OUTPATIENT
Start: 2024-05-31 | End: 2024-05-31

## 2024-05-31 RX ORDER — ACETAMINOPHEN 325 MG/1
650 TABLET ORAL EVERY 4 HOURS PRN
Status: DISCONTINUED | OUTPATIENT
Start: 2024-05-31 | End: 2024-06-04 | Stop reason: HOSPADM

## 2024-05-31 RX ORDER — POLYETHYLENE GLYCOL 3350 17 G/17G
17 POWDER, FOR SOLUTION ORAL DAILY
Status: DISCONTINUED | OUTPATIENT
Start: 2024-05-31 | End: 2024-06-04 | Stop reason: HOSPADM

## 2024-05-31 RX ORDER — MULTIVIT-MIN/IRON FUM/FOLIC AC 7.5 MG-4
1 TABLET ORAL DAILY
Status: DISCONTINUED | OUTPATIENT
Start: 2024-05-31 | End: 2024-06-04 | Stop reason: HOSPADM

## 2024-05-31 RX ORDER — ALBUMIN HUMAN 250 G/1000ML
25 SOLUTION INTRAVENOUS ONCE
Status: COMPLETED | OUTPATIENT
Start: 2024-05-31 | End: 2024-05-31

## 2024-05-31 RX ORDER — ESCITALOPRAM OXALATE 10 MG/1
10 TABLET ORAL DAILY
Status: DISCONTINUED | OUTPATIENT
Start: 2024-05-31 | End: 2024-06-04 | Stop reason: HOSPADM

## 2024-05-31 RX ORDER — METHYLPHENIDATE HYDROCHLORIDE 5 MG/1
5 TABLET ORAL 2 TIMES DAILY
Status: DISCONTINUED | OUTPATIENT
Start: 2024-06-01 | End: 2024-06-04 | Stop reason: HOSPADM

## 2024-05-31 RX ORDER — ACETAMINOPHEN 500 MG
5 TABLET ORAL DAILY
Status: DISCONTINUED | OUTPATIENT
Start: 2024-05-31 | End: 2024-06-04 | Stop reason: HOSPADM

## 2024-05-31 RX ORDER — ACETAMINOPHEN 160 MG/5ML
650 SOLUTION ORAL EVERY 4 HOURS PRN
Status: DISCONTINUED | OUTPATIENT
Start: 2024-05-31 | End: 2024-06-04 | Stop reason: HOSPADM

## 2024-05-31 RX ORDER — LORAZEPAM 0.5 MG/1
0.5 TABLET ORAL EVERY 2 HOUR PRN
Status: DISCONTINUED | OUTPATIENT
Start: 2024-05-31 | End: 2024-06-04 | Stop reason: HOSPADM

## 2024-05-31 RX ORDER — METOPROLOL SUCCINATE 50 MG/1
50 TABLET, EXTENDED RELEASE ORAL DAILY
Status: DISCONTINUED | OUTPATIENT
Start: 2024-05-31 | End: 2024-06-04 | Stop reason: HOSPADM

## 2024-05-31 RX ORDER — ACETAMINOPHEN 650 MG/1
650 SUPPOSITORY RECTAL EVERY 4 HOURS PRN
Status: DISCONTINUED | OUTPATIENT
Start: 2024-05-31 | End: 2024-06-04 | Stop reason: HOSPADM

## 2024-05-31 RX ORDER — LANOLIN ALCOHOL/MO/W.PET/CERES
100 CREAM (GRAM) TOPICAL DAILY
Status: DISCONTINUED | OUTPATIENT
Start: 2024-06-03 | End: 2024-06-04 | Stop reason: HOSPADM

## 2024-05-31 RX ORDER — THIAMINE HYDROCHLORIDE 100 MG/ML
100 INJECTION, SOLUTION INTRAMUSCULAR; INTRAVENOUS DAILY
Status: DISPENSED | OUTPATIENT
Start: 2024-05-31 | End: 2024-06-03

## 2024-05-31 RX ORDER — PANTOPRAZOLE SODIUM 40 MG/10ML
40 INJECTION, POWDER, LYOPHILIZED, FOR SOLUTION INTRAVENOUS
Status: DISCONTINUED | OUTPATIENT
Start: 2024-06-01 | End: 2024-06-04 | Stop reason: HOSPADM

## 2024-05-31 RX ORDER — LANOLIN ALCOHOL/MO/W.PET/CERES
100 CREAM (GRAM) TOPICAL ONCE
Status: COMPLETED | OUTPATIENT
Start: 2024-05-31 | End: 2024-05-31

## 2024-05-31 RX ORDER — SPIRONOLACTONE 25 MG/1
25 TABLET ORAL DAILY
Status: DISCONTINUED | OUTPATIENT
Start: 2024-05-31 | End: 2024-06-04 | Stop reason: HOSPADM

## 2024-05-31 RX ORDER — LORAZEPAM 1 MG/1
1 TABLET ORAL EVERY 2 HOUR PRN
Status: DISCONTINUED | OUTPATIENT
Start: 2024-05-31 | End: 2024-06-04 | Stop reason: HOSPADM

## 2024-05-31 RX ORDER — FOLIC ACID 1 MG/1
1 TABLET ORAL DAILY
Status: DISCONTINUED | OUTPATIENT
Start: 2024-05-31 | End: 2024-06-04 | Stop reason: HOSPADM

## 2024-05-31 RX ADMIN — PIPERACILLIN SODIUM AND TAZOBACTAM SODIUM 3.38 G: 3; .375 INJECTION, SOLUTION INTRAVENOUS at 18:20

## 2024-05-31 RX ADMIN — POTASSIUM CHLORIDE 40 MEQ: 1500 TABLET, EXTENDED RELEASE ORAL at 15:38

## 2024-05-31 RX ADMIN — PIPERACILLIN SODIUM AND TAZOBACTAM SODIUM 3.38 G: 3; .375 INJECTION, SOLUTION INTRAVENOUS at 23:08

## 2024-05-31 RX ADMIN — FOLIC ACID 1 MG: 1 TABLET ORAL at 16:54

## 2024-05-31 RX ADMIN — ESCITALOPRAM OXALATE 10 MG: 10 TABLET ORAL at 21:16

## 2024-05-31 RX ADMIN — Medication 100 MG: at 16:54

## 2024-05-31 RX ADMIN — Medication 5 MG: at 21:16

## 2024-05-31 RX ADMIN — ALBUMIN HUMAN 25 G: 0.25 SOLUTION INTRAVENOUS at 21:15

## 2024-05-31 RX ADMIN — Medication 1 TABLET: at 16:53

## 2024-05-31 RX ADMIN — ACETAMINOPHEN 650 MG: 325 TABLET ORAL at 23:00

## 2024-05-31 RX ADMIN — ONDANSETRON 4 MG: 2 INJECTION INTRAMUSCULAR; INTRAVENOUS at 22:57

## 2024-05-31 RX ADMIN — SPIRONOLACTONE 25 MG: 25 TABLET ORAL at 21:16

## 2024-05-31 RX ADMIN — IOHEXOL 75 ML: 350 INJECTION, SOLUTION INTRAVENOUS at 14:17

## 2024-05-31 RX ADMIN — FUROSEMIDE 20 MG: 20 TABLET ORAL at 21:16

## 2024-05-31 RX ADMIN — MORPHINE SULFATE 2 MG: 2 INJECTION, SOLUTION INTRAMUSCULAR; INTRAVENOUS at 16:53

## 2024-05-31 SDOH — SOCIAL STABILITY: SOCIAL INSECURITY: ARE YOU OR HAVE YOU BEEN THREATENED OR ABUSED PHYSICALLY, EMOTIONALLY, OR SEXUALLY BY ANYONE?: NO

## 2024-05-31 SDOH — SOCIAL STABILITY: SOCIAL INSECURITY: HAS ANYONE EVER THREATENED TO HURT YOUR FAMILY OR YOUR PETS?: NO

## 2024-05-31 SDOH — SOCIAL STABILITY: SOCIAL INSECURITY: DOES ANYONE TRY TO KEEP YOU FROM HAVING/CONTACTING OTHER FRIENDS OR DOING THINGS OUTSIDE YOUR HOME?: NO

## 2024-05-31 SDOH — SOCIAL STABILITY: SOCIAL INSECURITY: HAVE YOU HAD THOUGHTS OF HARMING ANYONE ELSE?: NO

## 2024-05-31 SDOH — SOCIAL STABILITY: SOCIAL INSECURITY: ARE THERE ANY APPARENT SIGNS OF INJURIES/BEHAVIORS THAT COULD BE RELATED TO ABUSE/NEGLECT?: NO

## 2024-05-31 SDOH — SOCIAL STABILITY: SOCIAL INSECURITY: DO YOU FEEL UNSAFE GOING BACK TO THE PLACE WHERE YOU ARE LIVING?: NO

## 2024-05-31 SDOH — SOCIAL STABILITY: SOCIAL INSECURITY: HAVE YOU HAD ANY THOUGHTS OF HARMING ANYONE ELSE?: NO

## 2024-05-31 SDOH — SOCIAL STABILITY: SOCIAL INSECURITY: DO YOU FEEL ANYONE HAS EXPLOITED OR TAKEN ADVANTAGE OF YOU FINANCIALLY OR OF YOUR PERSONAL PROPERTY?: NO

## 2024-05-31 ASSESSMENT — PAIN - FUNCTIONAL ASSESSMENT: PAIN_FUNCTIONAL_ASSESSMENT: 0-10

## 2024-05-31 ASSESSMENT — COGNITIVE AND FUNCTIONAL STATUS - GENERAL
PATIENT BASELINE BEDBOUND: NO
DAILY ACTIVITIY SCORE: 24
MOBILITY SCORE: 24

## 2024-05-31 ASSESSMENT — LIFESTYLE VARIABLES
TREMOR: NO TREMOR
AUDIT-C TOTAL SCORE: 12
AGITATION: NORMAL ACTIVITY
HEADACHE, FULLNESS IN HEAD: NOT PRESENT
TOTAL SCORE: 2
ANXIETY: 2
TOTAL SCORE: 3
ORIENTATION AND CLOUDING OF SENSORIUM: ORIENTED AND CAN DO SERIAL ADDITIONS
AUDITORY DISTURBANCES: NOT PRESENT
NAUSEA AND VOMITING: MILD NAUSEA WITH NO VOMITING
TOTAL SCORE: 4
AGITATION: NORMAL ACTIVITY
AUDIT-C TOTAL SCORE: 12
ANXIETY: NO ANXIETY, AT EASE
AUDITORY DISTURBANCES: NOT PRESENT
PAROXYSMAL SWEATS: NO SWEAT VISIBLE
PULSE: 99
ORIENTATION AND CLOUDING OF SENSORIUM: ORIENTED AND CAN DO SERIAL ADDITIONS
PAROXYSMAL SWEATS: NO SWEAT VISIBLE
ANXIETY: MILDLY ANXIOUS
VISUAL DISTURBANCES: NOT PRESENT
VISUAL DISTURBANCES: NOT PRESENT
BLOOD PRESSURE: 125/70
TREMOR: NO TREMOR
HEADACHE, FULLNESS IN HEAD: NOT PRESENT
NAUSEA AND VOMITING: NO NAUSEA AND NO VOMITING
VISUAL DISTURBANCES: NOT PRESENT
ORIENTATION AND CLOUDING OF SENSORIUM: ORIENTED AND CAN DO SERIAL ADDITIONS
PAROXYSMAL SWEATS: BEADS OF SWEAT OBVIOUS ON FOREHEAD
TREMOR: NO TREMOR
AUDITORY DISTURBANCES: NOT PRESENT
NAUSEA AND VOMITING: NO NAUSEA AND NO VOMITING
HEADACHE, FULLNESS IN HEAD: NOT PRESENT
HOW MANY STANDARD DRINKS CONTAINING ALCOHOL DO YOU HAVE ON A TYPICAL DAY: 10 OR MORE
HOW OFTEN DO YOU HAVE A DRINK CONTAINING ALCOHOL: 4 OR MORE TIMES A WEEK
HOW OFTEN DO YOU HAVE 6 OR MORE DRINKS ON ONE OCCASION: DAILY OR ALMOST DAILY
SKIP TO QUESTIONS 9-10: 0
AGITATION: SOMEWHAT MORE THAN NORMAL ACTIVITY

## 2024-05-31 ASSESSMENT — ACTIVITIES OF DAILY LIVING (ADL)
FEEDING YOURSELF: INDEPENDENT
TOILETING: INDEPENDENT
JUDGMENT_ADEQUATE_SAFELY_COMPLETE_DAILY_ACTIVITIES: YES
LACK_OF_TRANSPORTATION: PATIENT UNABLE TO ANSWER
PATIENT'S MEMORY ADEQUATE TO SAFELY COMPLETE DAILY ACTIVITIES?: YES
ADEQUATE_TO_COMPLETE_ADL: YES
DRESSING YOURSELF: INDEPENDENT
WALKS IN HOME: INDEPENDENT
HEARING - RIGHT EAR: FUNCTIONAL
TOILETING: INDEPENDENT
HEARING - LEFT EAR: FUNCTIONAL
ADEQUATE_TO_COMPLETE_ADL: YES
WALKS IN HOME: INDEPENDENT
HEARING - LEFT EAR: FUNCTIONAL
JUDGMENT_ADEQUATE_SAFELY_COMPLETE_DAILY_ACTIVITIES: YES
FEEDING YOURSELF: INDEPENDENT
BATHING: INDEPENDENT
GROOMING: INDEPENDENT
PATIENT'S MEMORY ADEQUATE TO SAFELY COMPLETE DAILY ACTIVITIES?: YES
GROOMING: INDEPENDENT
DRESSING YOURSELF: INDEPENDENT
HEARING - RIGHT EAR: FUNCTIONAL
BATHING: INDEPENDENT

## 2024-05-31 ASSESSMENT — ENCOUNTER SYMPTOMS
HEMATOLOGIC/LYMPHATIC NEGATIVE: 1
ABDOMINAL PAIN: 1
CARDIOVASCULAR NEGATIVE: 1
PSYCHIATRIC NEGATIVE: 1
EYES NEGATIVE: 1
CONSTITUTIONAL NEGATIVE: 1
ENDOCRINE NEGATIVE: 1
ALLERGIC/IMMUNOLOGIC NEGATIVE: 1
NEUROLOGICAL NEGATIVE: 1
MUSCULOSKELETAL NEGATIVE: 1
RESPIRATORY NEGATIVE: 1

## 2024-05-31 ASSESSMENT — COLUMBIA-SUICIDE SEVERITY RATING SCALE - C-SSRS
1. IN THE PAST MONTH, HAVE YOU WISHED YOU WERE DEAD OR WISHED YOU COULD GO TO SLEEP AND NOT WAKE UP?: NO
6. HAVE YOU EVER DONE ANYTHING, STARTED TO DO ANYTHING, OR PREPARED TO DO ANYTHING TO END YOUR LIFE?: NO
2. HAVE YOU ACTUALLY HAD ANY THOUGHTS OF KILLING YOURSELF?: NO

## 2024-05-31 ASSESSMENT — PAIN SCALES - GENERAL: PAINLEVEL_OUTOF10: 0 - NO PAIN

## 2024-05-31 NOTE — ED PROVIDER NOTES
HPI   Chief Complaint   Patient presents with    Bloated     Pt complains of abd distension, frequent soft stool and decreased urine output starting today.  Has been taking lasix at home.        HPI  Patient is a 54-year-old male with history of hypertension, alcohol dependence and cirrhosis presenting for evaluation of abdominal pain and distention that started yesterday morning.  Patient states that he was in the hospital for this 1 month ago and did get sober and quit drinking but this past weekend he went camping and started drinking again and he has been drinking excessively ever since.  States he is unsure how much he drinks per day.  He states he does take Lasix for the fluid but it has not been helping.  He was taking spironolactone but states that they took him off of that.  He also states that he has not been urinating as much.  States that he does urinate a bunch after he takes the Lasix and then will not go for the rest of the day.  He otherwise denies fever or chills.  Denies chest pain or shortness of breath.  Denies headache.  Denies anxiety, tremor, seizure.                  No data recorded                     Patient History   Past Medical History:   Diagnosis Date    Alcohol dependence (Multi)     Alcoholic cirrhosis of liver with ascites (Multi)     GERD (gastroesophageal reflux disease)     Hyperlipidemia     Hypertension      Past Surgical History:   Procedure Laterality Date    PARACENTESIS      2x in 2024    SINUS SURGERY  2013    Sinus Surgery     Family History   Problem Relation Name Age of Onset    Hypertension Mother           age 64    Heart attack Father           at age 62    Other (Glioblastoma) Father      Diabetes Father      Cancer Father       Social History     Tobacco Use    Smoking status: Every Day     Current packs/day: 0.50     Types: Cigarettes     Passive exposure: Current    Smokeless tobacco: Never   Vaping Use    Vaping status: Some Days     Substances: Nicotine    Devices: Disposable, Pre-filled or refillable cartridge   Substance Use Topics    Alcohol use: Yes     Comment: daily liquor    Drug use: Never       Physical Exam   ED Triage Vitals [05/31/24 1341]   Temperature Heart Rate Respirations BP   37 °C (98.6 °F) (!) 110 18 140/90      Pulse Ox Temp src Heart Rate Source Patient Position   99 % -- -- --      BP Location FiO2 (%)     -- --       Physical Exam  Vitals and nursing note reviewed.   Constitutional:       General: He is not in acute distress.     Appearance: He is well-developed.      Comments: Awake and alert appearing slightly uncomfortable but otherwise in no acute distress in hospital bed   HENT:      Head: Normocephalic and atraumatic.   Eyes:      Conjunctiva/sclera: Conjunctivae normal.   Cardiovascular:      Rate and Rhythm: Regular rhythm. Tachycardia present.      Heart sounds: No murmur heard.  Pulmonary:      Effort: Pulmonary effort is normal. No respiratory distress.      Breath sounds: Normal breath sounds.   Abdominal:      Comments: Abdomen is distended with diffuse tenderness to palpation   Musculoskeletal:         General: No swelling.      Cervical back: Neck supple.   Skin:     General: Skin is warm and dry.      Capillary Refill: Capillary refill takes less than 2 seconds.   Neurological:      Mental Status: He is alert.      Comments: Alert and oriented to person place and time.  No focal deficits.  Providing history without difficultly.   Psychiatric:         Mood and Affect: Mood normal.         ED Course & [facility]   ED Course as of 06/03/24 1037   Fri May 31, 2024   8537 I spoke with gastroenterologist Dr. Dutta.  His recommendations were admission to the hospital with diagnostic paracentesis and obtaining blood cultures and empirically treating with Zosyn.  He also recommends ultrasound of the liver with Doppler to rule out portal venous thrombosis. [JJ]      ED Course User Index  [JJ] Lakeisha Jo PA-C          Diagnoses as of 06/03/24 1037   Ascites due to alcoholic cirrhosis (Multi)   History of alcohol dependence (Multi)       Medical Decision Making  Parts of this chart have been completed using voice recognition software. Please excuse any errors of transcription.  My thought process and reason for plan has been formulated from the time that I saw the patient until the time of disposition and is not specific to one specific moment during their visit and furthermore my MDM encompasses this entire chart and not only this text box.      HPI: Detailed above.    Exam: A medically appropriate exam performed, outlined above, given the known history and presentation.    History obtained from: Patient    EKG: Interpreted by attending physician and reviewed by me    Social Determinants of Health considered during this visit: lives independently    Medications given during visit:  Medications   LORazepam (Ativan) tablet 0.5 mg (has no administration in time range)     Or   LORazepam (Ativan) tablet 1 mg (has no administration in time range)     Or   LORazepam (Ativan) tablet 2 mg (has no administration in time range)   buPROPion XL (Wellbutrin XL) 24 hr tablet 150 mg (150 mg oral Given 6/3/24 0607)   escitalopram (Lexapro) tablet 10 mg (10 mg oral Given 6/3/24 0943)   furosemide (Lasix) tablet 20 mg (20 mg oral Given 6/3/24 0940)   melatonin tablet 5 mg (5 mg oral Given 6/2/24 2059)   methylphenidate (Ritalin) tablet 5 mg (5 mg oral Given 6/3/24 0940)   metoprolol succinate XL (Toprol-XL) 24 hr tablet 50 mg (50 mg oral Given 6/3/24 0941)   nicotine (Nicoderm CQ) 21 mg/24 hr patch 1 patch (1 patch transdermal Medication Applied 6/3/24 0940)   polyethylene glycol (Glycolax, Miralax) packet 17 g (17 g oral Not Given 6/3/24 0940)   pantoprazole (ProtoNix) EC tablet 40 mg (40 mg oral Given 6/3/24 0607)     Or   pantoprazole (ProtoNix) injection 40 mg ( intravenous See Alternative 6/3/24 0607)   acetaminophen (Tylenol) tablet 650 mg  (650 mg oral Given 6/2/24 0625)     Or   acetaminophen (Tylenol) oral liquid 650 mg ( oral See Alternative 6/2/24 0625)     Or   acetaminophen (Tylenol) suppository 650 mg ( rectal See Alternative 6/2/24 0625)   spironolactone (Aldactone) tablet 25 mg (25 mg oral Given 6/3/24 0900)   folic acid (Folvite) tablet 1 mg (1 mg oral Given 6/3/24 0940)   multivitamin with minerals 1 tablet (1 tablet oral Given 6/3/24 0940)   thiamine (Vitamin B1) injection 100 mg (100 mg intravenous Given 6/2/24 0808)   thiamine (Vitamin B-1) tablet 100 mg (100 mg oral Given 6/3/24 0940)   piperacillin-tazobactam-dextrose (Zosyn) IV 3.375 g (0 g intravenous Stopped 6/3/24 0556)   ondansetron (Zofran) injection 4 mg (4 mg intravenous Given 6/2/24 0808)   simethicone (Mylicon) chewable tablet 80 mg (80 mg oral Given 6/1/24 1803)   methylPREDNISolone (Medrol) tablet 24 mg (24 mg oral Given 6/2/24 1156)     Followed by   methylPREDNISolone (Medrol) tablet 20 mg (20 mg oral Given 6/3/24 0940)     Followed by   methylPREDNISolone (Medrol) tablet 16 mg (has no administration in time range)     Followed by   methylPREDNISolone (Medrol) tablet 12 mg (has no administration in time range)     Followed by   methylPREDNISolone (Medrol) tablet 8 mg (has no administration in time range)     Followed by   methylPREDNISolone (Medrol) tablet 4 mg (has no administration in time range)   iohexol (OMNIPaque) 350 mg iodine/mL solution 75 mL (75 mL intravenous Given 5/31/24 1417)   potassium chloride CR (Klor-Con M20) ER tablet 40 mEq (40 mEq oral Given 5/31/24 1538)   morphine injection 2 mg (2 mg intravenous Given 5/31/24 1653)   folic acid (Folvite) tablet 1 mg (1 mg oral Given 5/31/24 1654)   multivitamin with minerals 1 tablet (1 tablet oral Given 5/31/24 1653)   thiamine (Vitamin B-1) tablet 100 mg (100 mg oral Given 5/31/24 1654)   piperacillin-tazobactam-dextrose (Zosyn) IV 3.375 g (0 g intravenous Stopped 6/2/24 0123)   albumin human 25 % solution 25 g  (0 g intravenous Stopped 5/31/24 0715)   potassium chloride CR (Klor-Con M20) ER tablet 20 mEq (20 mEq oral Given 6/1/24 1248)        Diagnostic/tests  Labs Reviewed   CBC WITH AUTO DIFFERENTIAL - Abnormal       Result Value    WBC 7.9      nRBC 0.0      RBC 4.00 (*)     Hemoglobin 12.9 (*)     Hematocrit 37.6 (*)     MCV 94      MCH 32.3      MCHC 34.3      RDW 14.2      Platelets 167      Neutrophils % 64.9      Immature Granulocytes %, Automated 0.4      Lymphocytes % 22.2      Monocytes % 10.8      Eosinophils % 0.6      Basophils % 1.1      Neutrophils Absolute 5.12      Immature Granulocytes Absolute, Automated 0.03      Lymphocytes Absolute 1.75      Monocytes Absolute 0.85      Eosinophils Absolute 0.05      Basophils Absolute 0.09     COMPREHENSIVE METABOLIC PANEL - Abnormal    Glucose 144 (*)     Sodium 134      Potassium 3.2 (*)     Chloride 94 (*)     Bicarbonate 28      Urea Nitrogen 10      Creatinine 0.60      eGFR >90      Calcium 9.5      Albumin 4.1      Alkaline Phosphatase 452 (*)     Total Protein 7.8      AST 93 (*)     Bilirubin, Total 1.4 (*)     ALT 42 (*)     Anion Gap 12     LIPASE - Abnormal    Lipase 85 (*)    MAGNESIUM - Abnormal    Magnesium 1.50 (*)    URINALYSIS WITH REFLEX MICROSCOPIC - Abnormal    Color, Urine Yellow      Appearance, Urine Clear      Specific Gravity, Urine 1.043 (*)     pH, Urine 8.0      Protein, Urine 20 (TRACE)      Glucose, Urine Normal      Blood, Urine NEGATIVE      Ketones, Urine NEGATIVE      Bilirubin, Urine NEGATIVE      Urobilinogen, Urine 3 (1+) (*)     Nitrite, Urine NEGATIVE      Leukocyte Esterase, Urine NEGATIVE     DRUG SCREEN, URINE WITH REFLEX TO CONFIRMATION - Abnormal    Amphetamine Screen, Urine Negative      Barbiturate Screen, Urine Negative      Benzodiazepines Screen, Urine Negative      Cannabinoid Screen, Urine Positive (*)     Cocaine Metabolite Screen, Urine Negative      Fentanyl Screen, Urine Negative      Methadone Screen, Urine  Negative      Opiate Screen, Urine Positive (*)     Oxycodone Screen, Urine Negative      PCP Screen, Urine Negative      Narrative:     These toxicological screening tests provide unconfirmed qualitative measurements to aid in treatment and diagnosis in cases of drug use or overdose. This test is used only for medical purposes. A positive result does not indicate or measure intoxication. For specific test performance or pathologist consultation, please contact the Laboratory.    The following threshold concentrations are used for these analyses.Values at or above the threshold concentration are reported as positive. Values below the threshold are reported as negative.    Drug /Screening Threshold                                                                                                 THC/CANNABINOIDS................50 ng/ml  METHADONE......................300 ng/ml  COCAINE METABOLITES............300 ng/ml  BENZODIAZEPINE.................300 ng/ml  PCP.............................25 ng/ml  OPIATE.........................300 ng/ml  AMPHETAMINE/ECSTASY...........1000 ng/ml  BARBITURATE....................200 ng/ml  OXYCODONE......................100 ng/ml  FENTANYL.........................5 ng/ml       CBC - Abnormal    WBC 6.9      nRBC 0.0      RBC 3.84 (*)     Hemoglobin 12.5 (*)     Hematocrit 36.4 (*)     MCV 95      MCH 32.6      MCHC 34.3      RDW 14.2      Platelets 123 (*)    COMPREHENSIVE METABOLIC PANEL - Abnormal    Glucose 97      Sodium 138      Potassium 3.2 (*)     Chloride 99      Bicarbonate 29      Urea Nitrogen 8      Creatinine 0.60      eGFR >90      Calcium 9.0      Albumin 3.8      Alkaline Phosphatase 320 (*)     Total Protein 6.7      AST 58 (*)     Bilirubin, Total 2.8 (*)     ALT 28      Anion Gap 10     PROTIME-INR - Abnormal    Protime 12.9 (*)     INR 1.3 (*)     Narrative:     INR Therapeutic Range: 2.0-3.5   CBC - Abnormal    WBC 7.3      nRBC 0.0      RBC 3.89 (*)      Hemoglobin 12.6 (*)     Hematocrit 36.9 (*)     MCV 95      MCH 32.4      MCHC 34.1      RDW 14.0      Platelets 126 (*)    COMPREHENSIVE METABOLIC PANEL - Abnormal    Glucose 109 (*)     Sodium 131 (*)     Potassium 3.4      Chloride 95 (*)     Bicarbonate 25      Urea Nitrogen 8      Creatinine 0.70      eGFR >90      Calcium 9.6      Albumin 3.8      Alkaline Phosphatase 311 (*)     Total Protein 6.8      AST 44 (*)     Bilirubin, Total 1.9 (*)     ALT 23      Anion Gap 11     URIC ACID - Abnormal    Uric Acid 2.9 (*)    CBC WITH AUTO DIFFERENTIAL - Abnormal    WBC 10.0      nRBC 0.0      RBC 3.63 (*)     Hemoglobin 11.8 (*)     Hematocrit 34.0 (*)     MCV 94      MCH 32.5      MCHC 34.7      RDW 13.6      Platelets 132 (*)     Neutrophils % 73.6      Immature Granulocytes %, Automated 0.4      Lymphocytes % 16.4      Monocytes % 9.3      Eosinophils % 0.1      Basophils % 0.2      Neutrophils Absolute 7.33      Immature Granulocytes Absolute, Automated 0.04      Lymphocytes Absolute 1.64      Monocytes Absolute 0.93      Eosinophils Absolute 0.01      Basophils Absolute 0.02     BLOOD CULTURE - Normal    Blood Culture No growth at 2 days     BLOOD CULTURE - Normal    Blood Culture No growth at 2 days     MAGNESIUM - Normal    Magnesium 1.60     PROTIME-INR - Normal    Protime 11.9      INR 1.2      Narrative:     INR Therapeutic Range: 2.0-3.5   AMMONIA - Normal    Ammonia 40     PROTIME-INR - Normal    Protime 11.9      INR 1.2      Narrative:     INR Therapeutic Range: 2.0-3.5   ALCOHOL - Normal    Alcohol <0.010     MAGNESIUM - Normal    Magnesium 1.70     MICROSCOPIC ONLY, URINE - Normal    WBC, Urine NONE      RBC, Urine 1-2     MAGNESIUM - Normal    Magnesium 1.60     STERILE FLUID CULTURE/SMEAR   TYPE AND SCREEN    ABO TYPE O      Rh TYPE POS      ANTIBODY SCREEN NEG     ALBUMIN, FLUID    Narrative:     The following orders were created for panel order Albumin, Fluid.  Procedure                                Abnormality         Status                     ---------                               -----------         ------                     Albumin, Fluid[[id number]]                                                                Please view results for these tests on the individual orders.   BODY FLUID CELL COUNT WITH DIFFERENTIAL    Narrative:     The following orders were created for panel order Body Fluid Cell Count With Differential.  Procedure                               Abnormality         Status                     ---------                               -----------         ------                     Body Fluid Cell Count[[id number]]                                                       Body Fluid Differential[[id number]]                                                       Please view results for these tests on the individual orders.   PROTEIN, TOTAL FLUID    Narrative:     The following orders were created for panel order Protein, Total Fluid.  Procedure                               Abnormality         Status                     ---------                               -----------         ------                     Protein, Total Fluid[[id number]]                                                          Please view results for these tests on the individual orders.   ALBUMIN, FLUID   BODY FLUID CELL COUNT   PROTEIN, TOTAL FLUID   THC (MARIJUANA), URINE, CONFIRMATION   OPIATE CONFIRMATION, URINE   OOB INTERNAL TRACKING   COMPREHENSIVE METABOLIC PANEL   BODY FLUID CELL DIFFERENTIAL      US liver with doppler   Final Result   Cirrhotic liver with ascites. Normal blood flow within the   vasculature of the liver. Mild gallbladder wall thickening is most   likely due to chronic liver disease.             Signed by: Isaac Talbot 6/1/2024 9:55 AM   Dictation workstation:   [id number]      CT abdomen pelvis w IV contrast   Final Result   1. Moderate to large volume ascites not significantly changed from   04/21/2024         2. Findings raising suspicion for underlying cirrhosis without   significant nodular contour. However, there is secondary signs of   portal hypertension including recanalization of the portal vein,   enlarged main portal vein, and splenomegaly        3. Portions of the mid to distal colon are contracted in particular   of the transverse and descending colon limiting characterization with   equivocal component of mild wall thickening in the transverse colon   likely related to contraction. No pericolonic fat stranding or   significant thickening to suggest hepatic colopathy. However,   continued follow-up recommended        4. Diffuse sigmoid diverticulosis with generalized long segment wall   thickening of the proximal to mid colon with sequela of chronic   diverticulitis             MACRO:   None        Signed by: Isidra Matt 5/31/2024 2:50 PM   Dictation workstation:   [id number]      US guided abdominal paracentesis    (Results Pending)        Considerations/further MDM:  Patient is a 54-year-old male with history of alcohol dependence and alcoholic cirrhosis presenting to ER for evaluation of abdominal distention and pain.  During the ER visit the patient is awake and alert resting in hospital bed slightly uncomfortable but otherwise in no acute distress.  He has no evidence of encephalopathy and is he is providing full history and communicating well on exam.  Vital signs are significant for mild tachycardia upon arrival.  Abdominal exam with distention and diffuse tenderness to palpation.  No peritonitic signs.  No evidence of peripheral edema on exam.  Pain control provided while in the ER.  Laboratory workup without leukocytosis but with mild anemia and with elevated liver enzymes likely due to known alcoholic cirrhosis.  Laboratory studies also with mild hypokalemia which was supplemented orally in the ER.  Patient was placed on CIWA protocol for precautions of withdrawal.  No evidence of  alcohol withdrawal while in the ER.  CT abdomen pelvis was performed with evidence of moderate to large volume ascites.  Given no leukocytosis and patient afebrile without peritonitic signs I have low suspicion for SBP.  Ammonia level within normal limits.  I did speak with GI on-call Dr. Phillips as discussed in ED course who did recommend empirically covering with Zosyn and admitting to hospital for paracentesis.  Discussed this plan of care with the patient who is agreeable at this time.  Case discussed with hospitalist service who did admit the patient for management of his alcoholic cirrhosis and ascites.  Patient remained well-appearing and able during ER visit.  I did discuss this case with the attending ER physician Dr. Pastrana.  He did agree with my plan of care at this time.  Procedure  Procedures     Lakeisha Jo PA-C  06/03/24 1045

## 2024-05-31 NOTE — H&P
History Of Present Illness  Aamir Garcia is a 54 y.o. male presenting with abdominal pain and swelling.  This patient with recently diagnosed alcoholic liver cirrhosis and ascites underwent initial evaluation and paracentesis approximately a month ago.  At that point he was able to stop drinking.  Unfortunately a week ago he started again.  He drinks vodka.  He is vague about how much he does a day.  He comes to the emergency room today because he noticed enlargement of his abdominal girth with some vague abdominal pain.  ER did a CT of the abdomen which showed moderate to large volume ascites.  Case was discussed between ER and GI consulted who insisted on coverage for SBP and ordered a Doppler ultrasound.  On these grounds I am asked to admit the patient.  The patient denies any fever chills he denies any nausea vomiting diarrhea he denies any shortness of breath or chest pain.  The abdominal pain that he describes is mostly around the lower edges of his rib cage.  He is able to move without any significant pain or discomfort  There is no peripheral edema no urinary complaints  Past Medical History  He has a past medical history of Alcohol dependence (Multi), Alcoholic cirrhosis of liver with ascites (Multi), GERD (gastroesophageal reflux disease), Hyperlipidemia, and Hypertension.  As above  Surgical History  He has a past surgical history that includes Sinus surgery (2013) and Paracentesis.  As above  Social History  He reports that he has been smoking cigarettes. He has been exposed to tobacco smoke. He has never used smokeless tobacco. He reports current alcohol use. He reports that he does not use drugs.  As above  Family History  Family History   Problem Relation Name Age of Onset    Hypertension Mother           age 64    Heart attack Father           at age 62    Other (Glioblastoma) Father      Diabetes Father      Cancer Father          Allergies  Clams    ROS  Review of Systems    Constitutional: Negative.    HENT: Negative.     Eyes: Negative.    Respiratory: Negative.     Cardiovascular: Negative.    Gastrointestinal:  Positive for abdominal pain.   Endocrine: Negative.    Genitourinary: Negative.    Musculoskeletal: Negative.    Skin: Negative.    Allergic/Immunologic: Negative.    Neurological: Negative.    Hematological: Negative.    Psychiatric/Behavioral: Negative.     All other systems reviewed and are negative.       Last Recorded Vitals  /90   Pulse (!) 110   Temp 37 °C (98.6 °F)   Resp 18   Wt 76.5 kg (168 lb 10.4 oz)   SpO2 99%     Physical Exam  Assessed patient emergency room bed #3.  He is alert oriented x 3 cooperative  male no distress moving freely on the ER couch  Normocephalic atraumatic EOMI PERRLA sclera nonicteric  Neck supple no JVD  Chest clear bilaterally  Heart regular S1-S2 distant  Abdomen is mildly protuberant it is soft nontender for the most part there is no rebound no guarding positive bowel sounds this is a very much nonsurgical exam  Extremities there is no peripheral edema good pedal pulses  Neurologic examination gross motor sensor nonfocal no evidence for asterixis  Psych no psychosis or delirium    Relevant Results  Lab work reviewed imaging reviewed    ASSESSMENT/PLAN  Assessment/Plan   Principal Problem:    Abdominal pain    May 31:  1.  Abdominal pain which is most likely due to increasing ascites.  Clinically I have very low suspicion for SBP but will follow guidance from GI Dr. Pabon with whom I spoke directly in who advised to cover the antibiotics if no ascitic fluid analysis available at this time.  The patient was already given a dose of Zosyn so continue with that.  The patient is uncomfortable with the prospect of blind paracentesis and will order that to be done under ultrasound guidance by IR.  I personally see no urgency to this.  GI to see patient tomorrow for additional orders.  I do not feel the patient needs  narcotics for his pain which is really very minimal  2.  Alcoholic cirrhosis with portal hypertension and ascites we will continue his regular medications including low-dose diuretics although there is no peripheral edema  3.  Recurrent alcohol abuse he is very apologetic about his relapse and he is committed to try again and stop drinking will implement CIWA monitoring and will continue to support him  4.  Hypertension  5.  GI DVT prophylaxis  6.  Smoker encouraged him to quit smoking too         Vasquez Denise MD

## 2024-05-31 NOTE — ED TRIAGE NOTES
TRIAGE NOTE   I saw the patient as the Clinician in Triage and performed a brief history and physical exam, established acuity, and ordered appropriate tests to develop basic plan of care. Patient will be seen by an [facility], resident and/or physician who will independently evaluate the patient. Please see subsequent provider notes for further details and disposition.     Brief HPI: In brief, Aamir Garcia is a 54 y.o. male that presents for abdominal distention and bloating.  Patient has a history of alcoholic liver cirrhosis.  Patient states over the past week he has noticed increased abdominal bloating and gas pain.  Nothing makes it better or worse.  He states he was discharged from the hospital at the end of April about 1 month ago where he had a paracentesis and had 5 L drained from his abdomen.  He states since then he has been feeling okay and has continued to take his medications as prescribed including amlodipine, furosemide, omeprazole, and folic acid.  He denies chest pain, shortness of breath, fever, chills, nausea, vomiting, constipation, diarrhea.    Focused Physical exam:   GENERAL APPEARANCE: Awake and alert.    VITAL SIGNS: As per the nurses' triage record.    HEENT: Normocephalic, atraumatic.     CHEST: Clear to auscultation bilaterally.     HEART: Regular rate and rhythm.     ABDOMEN: Distention with positive fluid wave however no rebound or guarding    MUSCULOSKELETAL: Moving all extremities     NEUROLOGICAL: Awake, alert and oriented x 3.     DERM: Warm and dry.  No rashes.      Plan/MDM:   Patient is a 54-year-old male presenting to the emergency department for evaluation of abdominal pain and bloating.  Patient has a history of alcoholic liver cirrhosis and was previously admitted approximately 1 month ago for GI follow-up and paracentesis.  CBC, CMP, lipase, mag, PT/INR, and ammonia levels ordered as well as CT of the abdomen and pelvis.    Please see subsequent provider note for further details  and disposition

## 2024-06-01 ENCOUNTER — APPOINTMENT (OUTPATIENT)
Dept: RADIOLOGY | Facility: HOSPITAL | Age: 54
DRG: 433 | End: 2024-06-01
Payer: MEDICARE

## 2024-06-01 LAB
ALBUMIN SERPL-MCNC: 3.8 G/DL (ref 3.5–5)
ALP BLD-CCNC: 320 U/L (ref 35–125)
ALT SERPL-CCNC: 28 U/L (ref 5–40)
ANION GAP SERPL CALC-SCNC: 10 MMOL/L
AST SERPL-CCNC: 58 U/L (ref 5–40)
BILIRUB SERPL-MCNC: 2.8 MG/DL (ref 0.1–1.2)
BUN SERPL-MCNC: 8 MG/DL (ref 8–25)
CALCIUM SERPL-MCNC: 9 MG/DL (ref 8.5–10.4)
CHLORIDE SERPL-SCNC: 99 MMOL/L (ref 97–107)
CO2 SERPL-SCNC: 29 MMOL/L (ref 24–31)
CREAT SERPL-MCNC: 0.6 MG/DL (ref 0.4–1.6)
EGFRCR SERPLBLD CKD-EPI 2021: >90 ML/MIN/1.73M*2
ERYTHROCYTE [DISTWIDTH] IN BLOOD BY AUTOMATED COUNT: 14.2 % (ref 11.5–14.5)
GLUCOSE SERPL-MCNC: 97 MG/DL (ref 65–99)
HCT VFR BLD AUTO: 36.4 % (ref 41–52)
HGB BLD-MCNC: 12.5 G/DL (ref 13.5–17.5)
INR PPP: 1.3 (ref 0.9–1.2)
MAGNESIUM SERPL-MCNC: 1.7 MG/DL (ref 1.6–3.1)
MCH RBC QN AUTO: 32.6 PG (ref 26–34)
MCHC RBC AUTO-ENTMCNC: 34.3 G/DL (ref 32–36)
MCV RBC AUTO: 95 FL (ref 80–100)
NRBC BLD-RTO: 0 /100 WBCS (ref 0–0)
PLATELET # BLD AUTO: 123 X10*3/UL (ref 150–450)
POTASSIUM SERPL-SCNC: 3.2 MMOL/L (ref 3.4–5.1)
PROT SERPL-MCNC: 6.7 G/DL (ref 5.9–7.9)
PROTHROMBIN TIME: 12.9 SECONDS (ref 9.3–12.7)
RBC # BLD AUTO: 3.84 X10*6/UL (ref 4.5–5.9)
SODIUM SERPL-SCNC: 138 MMOL/L (ref 133–145)
WBC # BLD AUTO: 6.9 X10*3/UL (ref 4.4–11.3)

## 2024-06-01 PROCEDURE — 80053 COMPREHEN METABOLIC PANEL: CPT | Performed by: INTERNAL MEDICINE

## 2024-06-01 PROCEDURE — 85027 COMPLETE CBC AUTOMATED: CPT | Performed by: INTERNAL MEDICINE

## 2024-06-01 PROCEDURE — 83735 ASSAY OF MAGNESIUM: CPT | Performed by: INTERNAL MEDICINE

## 2024-06-01 PROCEDURE — 93975 VASCULAR STUDY: CPT | Performed by: RADIOLOGY

## 2024-06-01 PROCEDURE — 85610 PROTHROMBIN TIME: CPT | Performed by: INTERNAL MEDICINE

## 2024-06-01 PROCEDURE — S4991 NICOTINE PATCH NONLEGEND: HCPCS | Performed by: INTERNAL MEDICINE

## 2024-06-01 PROCEDURE — 2500000002 HC RX 250 W HCPCS SELF ADMINISTERED DRUGS (ALT 637 FOR MEDICARE OP, ALT 636 FOR OP/ED): Performed by: INTERNAL MEDICINE

## 2024-06-01 PROCEDURE — 2500000001 HC RX 250 WO HCPCS SELF ADMINISTERED DRUGS (ALT 637 FOR MEDICARE OP): Performed by: INTERNAL MEDICINE

## 2024-06-01 PROCEDURE — 36415 COLL VENOUS BLD VENIPUNCTURE: CPT | Performed by: INTERNAL MEDICINE

## 2024-06-01 PROCEDURE — 2500000004 HC RX 250 GENERAL PHARMACY W/ HCPCS (ALT 636 FOR OP/ED): Performed by: INTERNAL MEDICINE

## 2024-06-01 PROCEDURE — 76705 ECHO EXAM OF ABDOMEN: CPT | Performed by: RADIOLOGY

## 2024-06-01 PROCEDURE — 93975 VASCULAR STUDY: CPT

## 2024-06-01 PROCEDURE — G0378 HOSPITAL OBSERVATION PER HR: HCPCS

## 2024-06-01 PROCEDURE — 82435 ASSAY OF BLOOD CHLORIDE: CPT | Performed by: INTERNAL MEDICINE

## 2024-06-01 RX ORDER — POTASSIUM CHLORIDE 20 MEQ/1
20 TABLET, EXTENDED RELEASE ORAL ONCE
Status: COMPLETED | OUTPATIENT
Start: 2024-06-01 | End: 2024-06-01

## 2024-06-01 RX ORDER — SIMETHICONE 80 MG
80 TABLET,CHEWABLE ORAL 4 TIMES DAILY PRN
Status: DISCONTINUED | OUTPATIENT
Start: 2024-06-01 | End: 2024-06-04 | Stop reason: HOSPADM

## 2024-06-01 RX ADMIN — ACETAMINOPHEN 650 MG: 325 TABLET ORAL at 22:00

## 2024-06-01 RX ADMIN — SIMETHICONE 80 MG: 80 TABLET, CHEWABLE ORAL at 18:03

## 2024-06-01 RX ADMIN — PIPERACILLIN SODIUM AND TAZOBACTAM SODIUM 3.38 G: 3; .375 INJECTION, SOLUTION INTRAVENOUS at 12:48

## 2024-06-01 RX ADMIN — PIPERACILLIN SODIUM AND TAZOBACTAM SODIUM 3.38 G: 3; .375 INJECTION, SOLUTION INTRAVENOUS at 05:40

## 2024-06-01 RX ADMIN — NICOTINE 1 PATCH: 21 PATCH, EXTENDED RELEASE TRANSDERMAL at 08:30

## 2024-06-01 RX ADMIN — PIPERACILLIN SODIUM AND TAZOBACTAM SODIUM 3.38 G: 3; .375 INJECTION, SOLUTION INTRAVENOUS at 17:43

## 2024-06-01 RX ADMIN — Medication 5 MG: at 20:38

## 2024-06-01 RX ADMIN — POTASSIUM CHLORIDE 20 MEQ: 1500 TABLET, EXTENDED RELEASE ORAL at 12:48

## 2024-06-01 ASSESSMENT — COGNITIVE AND FUNCTIONAL STATUS - GENERAL
DAILY ACTIVITIY SCORE: 24
MOBILITY SCORE: 24

## 2024-06-01 ASSESSMENT — LIFESTYLE VARIABLES
HEADACHE, FULLNESS IN HEAD: NOT PRESENT
VISUAL DISTURBANCES: NOT PRESENT
HEADACHE, FULLNESS IN HEAD: MILD
TOTAL SCORE: 3
ORIENTATION AND CLOUDING OF SENSORIUM: ORIENTED AND CAN DO SERIAL ADDITIONS
PULSE: 98
ORIENTATION AND CLOUDING OF SENSORIUM: ORIENTED AND CAN DO SERIAL ADDITIONS
PAROXYSMAL SWEATS: BARELY PERCEPTIBLE SWEATING, PALMS MOIST
TOTAL SCORE: 2
TOTAL SCORE: 2
ANXIETY: NO ANXIETY, AT EASE
AGITATION: NORMAL ACTIVITY
AGITATION: NORMAL ACTIVITY
ORIENTATION AND CLOUDING OF SENSORIUM: ORIENTED AND CAN DO SERIAL ADDITIONS
VISUAL DISTURBANCES: NOT PRESENT
HEADACHE, FULLNESS IN HEAD: MILD
TOTAL SCORE: 0
AGITATION: NORMAL ACTIVITY
ORIENTATION AND CLOUDING OF SENSORIUM: ORIENTED AND CAN DO SERIAL ADDITIONS
AGITATION: NORMAL ACTIVITY
AUDITORY DISTURBANCES: NOT PRESENT
TREMOR: NO TREMOR
TREMOR: NO TREMOR
HEADACHE, FULLNESS IN HEAD: MILD
PAROXYSMAL SWEATS: NO SWEAT VISIBLE
ANXIETY: NO ANXIETY, AT EASE
PAROXYSMAL SWEATS: NO SWEAT VISIBLE
AUDITORY DISTURBANCES: NOT PRESENT
NAUSEA AND VOMITING: NO NAUSEA AND NO VOMITING
TREMOR: NO TREMOR
HEADACHE, FULLNESS IN HEAD: MILD
AGITATION: NORMAL ACTIVITY
VISUAL DISTURBANCES: NOT PRESENT
VISUAL DISTURBANCES: NOT PRESENT
PAROXYSMAL SWEATS: NO SWEAT VISIBLE
VISUAL DISTURBANCES: NOT PRESENT
ORIENTATION AND CLOUDING OF SENSORIUM: ORIENTED AND CAN DO SERIAL ADDITIONS
NAUSEA AND VOMITING: NO NAUSEA AND NO VOMITING
AUDITORY DISTURBANCES: NOT PRESENT
TOTAL SCORE: 0
NAUSEA AND VOMITING: NO NAUSEA AND NO VOMITING
AUDITORY DISTURBANCES: NOT PRESENT
AUDITORY DISTURBANCES: NOT PRESENT
NAUSEA AND VOMITING: NO NAUSEA AND NO VOMITING
ANXIETY: NO ANXIETY, AT EASE
TREMOR: NO TREMOR
AUDITORY DISTURBANCES: NOT PRESENT
VISUAL DISTURBANCES: NOT PRESENT
PAROXYSMAL SWEATS: NO SWEAT VISIBLE
TOTAL SCORE: 2
ANXIETY: NO ANXIETY, AT EASE
TREMOR: NO TREMOR
AGITATION: NORMAL ACTIVITY
ANXIETY: NO ANXIETY, AT EASE
PAROXYSMAL SWEATS: NO SWEAT VISIBLE
ANXIETY: NO ANXIETY, AT EASE
HEADACHE, FULLNESS IN HEAD: NOT PRESENT
ORIENTATION AND CLOUDING OF SENSORIUM: ORIENTED AND CAN DO SERIAL ADDITIONS
TREMOR: NO TREMOR

## 2024-06-01 ASSESSMENT — PAIN - FUNCTIONAL ASSESSMENT
PAIN_FUNCTIONAL_ASSESSMENT: 0-10

## 2024-06-01 ASSESSMENT — PAIN DESCRIPTION - LOCATION: LOCATION: ABDOMEN

## 2024-06-01 ASSESSMENT — PAIN SCALES - GENERAL
PAINLEVEL_OUTOF10: 0 - NO PAIN
PAINLEVEL_OUTOF10: 3
PAINLEVEL_OUTOF10: 0 - NO PAIN

## 2024-06-01 NOTE — PROGRESS NOTES
Patient currently off the floor for test     Hx known alcohol related cirrhosis (MELD 13)    -WBC is normal. Moderate large ascites on imaging. Pending paracentesis r/o SBP    -Pending RUQ US     Formal consultation to follow

## 2024-06-01 NOTE — CARE PLAN
The patient's goals for the shift include      The clinical goals for the shift include monitor vitals, ultrasound of liver, meet with GI doctors     left upper arm

## 2024-06-01 NOTE — CARE PLAN
The patient's goals for the shift include  rest     The clinical goals for the shift include monitor vitals and labs    Problem: Pain  Goal: My pain/discomfort is manageable  Outcome: Progressing     Problem: Safety  Goal: Patient will be injury free during hospitalization  Outcome: Progressing  Goal: I will remain free of falls  Outcome: Progressing     Problem: Daily Care  Goal: Daily care needs are met  Outcome: Progressing     Problem: Psychosocial Needs  Goal: Demonstrates ability to cope with hospitalization/illness  Outcome: Progressing  Goal: Collaborate with me, my family, and caregiver to identify my specific goals  Outcome: Progressing  Flowsheets (Taken 5/31/2024 2038)  Cultural Requests During Hospitalization: N/A  Spiritual Requests During Hospitalization: N/A     Problem: Discharge Barriers  Goal: My discharge needs are met  Outcome: Progressing     Problem: Fall/Injury  Goal: Not fall by end of shift  Outcome: Progressing  Goal: Be free from injury by end of the shift  Outcome: Progressing  Goal: Verbalize understanding of personal risk factors for fall in the hospital  Outcome: Progressing  Goal: Verbalize understanding of risk factor reduction measures to prevent injury from fall in the home  Outcome: Progressing  Goal: Use assistive devices by end of the shift  Outcome: Progressing  Goal: Pace activities to prevent fatigue by end of the shift  Outcome: Progressing     Problem: Fall/Injury  Goal: Not fall by end of shift  Outcome: Progressing

## 2024-06-01 NOTE — PROGRESS NOTES
Aamir Garcia is a 54 y.o. male on day 0 of admission presenting with Abdominal pain.      Subjective   He feels his abdomen has decreased in size he is asking to eat.  There is not hardly any pain       Objective     Last Recorded Vitals  BP (!) 146/98 (BP Location: Left arm, Patient Position: Sitting)   Pulse 98   Temp 36.5 °C (97.7 °F) (Temporal)   Resp 20   Wt 76.5 kg (168 lb 10.4 oz)   SpO2 98%   Intake/Output last 3 Shifts:    Intake/Output Summary (Last 24 hours) at 6/1/2024 1041  Last data filed at 6/1/2024 0947  Gross per 24 hour   Intake 0 ml   Output --   Net 0 ml       Physical Exam  Alert oriented x 3 cooperative no distress  Chest clear  Heart regular  Abdomen soft no particular tender mildly distended no particular fluid wave no abdominal wall edema  Extremities no edema good pulses  Neurologic exam nonfocal  Relevant Results  Reviewed  Assessment/Plan     Principal Problem:    Abdominal pain      June 1    1.  Abdominal pain which is most likely due to increasing ascites.  Clinically I have very low suspicion for SBP but will follow guidance from GI Dr. Pabon with whom I spoke directly in who advised to cover the antibiotics if no ascitic fluid analysis available at this time.  The patient was already given a dose of Zosyn so continue with that.  The patient is uncomfortable with the prospect of blind paracentesis and will order that to be done under ultrasound guidance by IR.  I personally see no urgency to this.  GI to see patient tomorrow for additional orders.  I do not feel the patient needs narcotics for his pain which is really very minimal  2.  Alcoholic cirrhosis with portal hypertension and ascites we will continue his regular medications including low-dose diuretics although there is no peripheral edema  3.  Recurrent alcohol abuse he is very apologetic about his relapse and he is committed to try again and stop drinking will implement CIWA monitoring and will continue to support  him  4.  Hypertension  5.  GI DVT prophylaxis  6.  Smoker encouraged him to quit smoking too     His abdominal exam is completely benign.  Awaiting GI input.  While he may have underlying ascites he is not willing to go for bedside paracentesis.  Will advance diet.  Continue empiric antibiotics for now until further advised by GI.  No significant alcohol withdrawal continue to monitor.  Replace potassium        Vasquez Denise MD

## 2024-06-01 NOTE — CARE PLAN
The patient's goals for the shift include  monitor vitals, ultrasound of liver, meet with GI docs  Problem: Pain  Goal: My pain/discomfort is manageable  Outcome: Progressing     Problem: Safety  Goal: Patient will be injury free during hospitalization  Outcome: Progressing  Goal: I will remain free of falls  Outcome: Progressing     Problem: Daily Care  Goal: Daily care needs are met  Outcome: Progressing     Problem: Psychosocial Needs  Goal: Demonstrates ability to cope with hospitalization/illness  Outcome: Progressing  Goal: Collaborate with me, my family, and caregiver to identify my specific goals  Outcome: Progressing     Problem: Fall/Injury  Goal: Not fall by end of shift  Outcome: Progressing  Goal: Be free from injury by end of the shift  Outcome: Progressing  Goal: Verbalize understanding of personal risk factors for fall in the hospital  Outcome: Progressing  Goal: Verbalize understanding of risk factor reduction measures to prevent injury from fall in the home  Outcome: Progressing  Goal: Use assistive devices by end of the shift  Outcome: Progressing  Goal: Pace activities to prevent fatigue by end of the shift  Outcome: Progressing       The clinical goals for the shift include monitor vitals and labs

## 2024-06-02 LAB
ALBUMIN SERPL-MCNC: 3.8 G/DL (ref 3.5–5)
ALP BLD-CCNC: 311 U/L (ref 35–125)
ALT SERPL-CCNC: 23 U/L (ref 5–40)
ANION GAP SERPL CALC-SCNC: 11 MMOL/L
AST SERPL-CCNC: 44 U/L (ref 5–40)
BILIRUB SERPL-MCNC: 1.9 MG/DL (ref 0.1–1.2)
BUN SERPL-MCNC: 8 MG/DL (ref 8–25)
CALCIUM SERPL-MCNC: 9.6 MG/DL (ref 8.5–10.4)
CHLORIDE SERPL-SCNC: 95 MMOL/L (ref 97–107)
CO2 SERPL-SCNC: 25 MMOL/L (ref 24–31)
CREAT SERPL-MCNC: 0.7 MG/DL (ref 0.4–1.6)
EGFRCR SERPLBLD CKD-EPI 2021: >90 ML/MIN/1.73M*2
ERYTHROCYTE [DISTWIDTH] IN BLOOD BY AUTOMATED COUNT: 14 % (ref 11.5–14.5)
GLUCOSE SERPL-MCNC: 109 MG/DL (ref 65–99)
HCT VFR BLD AUTO: 36.9 % (ref 41–52)
HGB BLD-MCNC: 12.6 G/DL (ref 13.5–17.5)
MAGNESIUM SERPL-MCNC: 1.6 MG/DL (ref 1.6–3.1)
MCH RBC QN AUTO: 32.4 PG (ref 26–34)
MCHC RBC AUTO-ENTMCNC: 34.1 G/DL (ref 32–36)
MCV RBC AUTO: 95 FL (ref 80–100)
NRBC BLD-RTO: 0 /100 WBCS (ref 0–0)
PLATELET # BLD AUTO: 126 X10*3/UL (ref 150–450)
POTASSIUM SERPL-SCNC: 3.4 MMOL/L (ref 3.4–5.1)
PROT SERPL-MCNC: 6.8 G/DL (ref 5.9–7.9)
RBC # BLD AUTO: 3.89 X10*6/UL (ref 4.5–5.9)
SODIUM SERPL-SCNC: 131 MMOL/L (ref 133–145)
URATE SERPL-MCNC: 2.9 MG/DL (ref 3.6–7.7)
WBC # BLD AUTO: 7.3 X10*3/UL (ref 4.4–11.3)

## 2024-06-02 PROCEDURE — 84550 ASSAY OF BLOOD/URIC ACID: CPT | Performed by: INTERNAL MEDICINE

## 2024-06-02 PROCEDURE — 2500000004 HC RX 250 GENERAL PHARMACY W/ HCPCS (ALT 636 FOR OP/ED)

## 2024-06-02 PROCEDURE — S4991 NICOTINE PATCH NONLEGEND: HCPCS | Performed by: INTERNAL MEDICINE

## 2024-06-02 PROCEDURE — 84075 ASSAY ALKALINE PHOSPHATASE: CPT | Performed by: INTERNAL MEDICINE

## 2024-06-02 PROCEDURE — G0378 HOSPITAL OBSERVATION PER HR: HCPCS

## 2024-06-02 PROCEDURE — 2500000002 HC RX 250 W HCPCS SELF ADMINISTERED DRUGS (ALT 637 FOR MEDICARE OP, ALT 636 FOR OP/ED): Performed by: INTERNAL MEDICINE

## 2024-06-02 PROCEDURE — 2500000004 HC RX 250 GENERAL PHARMACY W/ HCPCS (ALT 636 FOR OP/ED): Performed by: INTERNAL MEDICINE

## 2024-06-02 PROCEDURE — 80053 COMPREHEN METABOLIC PANEL: CPT | Performed by: INTERNAL MEDICINE

## 2024-06-02 PROCEDURE — 36415 COLL VENOUS BLD VENIPUNCTURE: CPT | Performed by: INTERNAL MEDICINE

## 2024-06-02 PROCEDURE — 83735 ASSAY OF MAGNESIUM: CPT | Performed by: INTERNAL MEDICINE

## 2024-06-02 PROCEDURE — 2500000001 HC RX 250 WO HCPCS SELF ADMINISTERED DRUGS (ALT 637 FOR MEDICARE OP): Performed by: INTERNAL MEDICINE

## 2024-06-02 PROCEDURE — 2500000004 HC RX 250 GENERAL PHARMACY W/ HCPCS (ALT 636 FOR OP/ED): Performed by: REGISTERED NURSE

## 2024-06-02 PROCEDURE — 85027 COMPLETE CBC AUTOMATED: CPT | Performed by: INTERNAL MEDICINE

## 2024-06-02 RX ORDER — METHYLPREDNISOLONE 4 MG/1
20 TABLET ORAL ONCE
Status: COMPLETED | OUTPATIENT
Start: 2024-06-03 | End: 2024-06-03

## 2024-06-02 RX ORDER — METHYLPREDNISOLONE 4 MG/1
16 TABLET ORAL ONCE
Status: COMPLETED | OUTPATIENT
Start: 2024-06-04 | End: 2024-06-04

## 2024-06-02 RX ORDER — METHYLPREDNISOLONE 4 MG/1
12 TABLET ORAL ONCE
Status: DISCONTINUED | OUTPATIENT
Start: 2024-06-05 | End: 2024-06-04 | Stop reason: HOSPADM

## 2024-06-02 RX ORDER — METHYLPREDNISOLONE 4 MG/1
8 TABLET ORAL ONCE
Status: DISCONTINUED | OUTPATIENT
Start: 2024-06-06 | End: 2024-06-04 | Stop reason: HOSPADM

## 2024-06-02 RX ORDER — METHYLPREDNISOLONE 4 MG/1
4 TABLET ORAL ONCE
Status: DISCONTINUED | OUTPATIENT
Start: 2024-06-07 | End: 2024-06-04 | Stop reason: HOSPADM

## 2024-06-02 RX ORDER — METHYLPREDNISOLONE 4 MG/1
24 TABLET ORAL ONCE
Status: COMPLETED | OUTPATIENT
Start: 2024-06-02 | End: 2024-06-02

## 2024-06-02 RX ADMIN — METHYLPHENIDATE HYDROCHLORIDE 5 MG: 5 TABLET ORAL at 08:09

## 2024-06-02 RX ADMIN — SPIRONOLACTONE 25 MG: 25 TABLET ORAL at 08:09

## 2024-06-02 RX ADMIN — FOLIC ACID 1 MG: 1 TABLET ORAL at 08:09

## 2024-06-02 RX ADMIN — Medication 1 TABLET: at 08:09

## 2024-06-02 RX ADMIN — ESCITALOPRAM OXALATE 10 MG: 10 TABLET ORAL at 08:09

## 2024-06-02 RX ADMIN — Medication 5 MG: at 20:59

## 2024-06-02 RX ADMIN — METOPROLOL SUCCINATE 50 MG: 50 TABLET, EXTENDED RELEASE ORAL at 08:09

## 2024-06-02 RX ADMIN — PIPERACILLIN SODIUM AND TAZOBACTAM SODIUM 3.38 G: 3; .375 INJECTION, SOLUTION INTRAVENOUS at 06:28

## 2024-06-02 RX ADMIN — PANTOPRAZOLE SODIUM 40 MG: 40 TABLET, DELAYED RELEASE ORAL at 06:26

## 2024-06-02 RX ADMIN — FUROSEMIDE 20 MG: 20 TABLET ORAL at 08:09

## 2024-06-02 RX ADMIN — THIAMINE HYDROCHLORIDE 100 MG: 100 INJECTION, SOLUTION INTRAMUSCULAR; INTRAVENOUS at 08:08

## 2024-06-02 RX ADMIN — BUPROPION HYDROCHLORIDE 150 MG: 150 TABLET, EXTENDED RELEASE ORAL at 06:25

## 2024-06-02 RX ADMIN — PIPERACILLIN SODIUM AND TAZOBACTAM SODIUM 3.38 G: 3; .375 INJECTION, SOLUTION INTRAVENOUS at 23:47

## 2024-06-02 RX ADMIN — NICOTINE 1 PATCH: 21 PATCH, EXTENDED RELEASE TRANSDERMAL at 08:08

## 2024-06-02 RX ADMIN — METHYLPREDNISOLONE 24 MG: 4 TABLET ORAL at 11:56

## 2024-06-02 RX ADMIN — PIPERACILLIN SODIUM AND TAZOBACTAM SODIUM 3.38 G: 3; .375 INJECTION, SOLUTION INTRAVENOUS at 17:03

## 2024-06-02 RX ADMIN — PIPERACILLIN SODIUM AND TAZOBACTAM SODIUM 3.38 G: 3; .375 INJECTION, SOLUTION INTRAVENOUS at 00:53

## 2024-06-02 RX ADMIN — ACETAMINOPHEN 650 MG: 325 TABLET ORAL at 06:25

## 2024-06-02 RX ADMIN — ONDANSETRON 4 MG: 2 INJECTION INTRAMUSCULAR; INTRAVENOUS at 08:08

## 2024-06-02 RX ADMIN — PIPERACILLIN SODIUM AND TAZOBACTAM SODIUM 3.38 G: 3; .375 INJECTION, SOLUTION INTRAVENOUS at 11:56

## 2024-06-02 ASSESSMENT — PAIN SCALES - GENERAL
PAINLEVEL_OUTOF10: 4
PAINLEVEL_OUTOF10: 8
PAINLEVEL_OUTOF10: 4
PAINLEVEL_OUTOF10: 0 - NO PAIN

## 2024-06-02 ASSESSMENT — LIFESTYLE VARIABLES
TOTAL SCORE: 1
NAUSEA AND VOMITING: NO NAUSEA AND NO VOMITING
PAROXYSMAL SWEATS: NO SWEAT VISIBLE
NAUSEA AND VOMITING: NO NAUSEA AND NO VOMITING
HEADACHE, FULLNESS IN HEAD: NOT PRESENT
TREMOR: NO TREMOR
HEADACHE, FULLNESS IN HEAD: NOT PRESENT
AUDITORY DISTURBANCES: NOT PRESENT
ORIENTATION AND CLOUDING OF SENSORIUM: ORIENTED AND CAN DO SERIAL ADDITIONS
ANXIETY: NO ANXIETY, AT EASE
PAROXYSMAL SWEATS: NO SWEAT VISIBLE
TOTAL SCORE: 0
VISUAL DISTURBANCES: NOT PRESENT
PAROXYSMAL SWEATS: NO SWEAT VISIBLE
VISUAL DISTURBANCES: NOT PRESENT
TOTAL SCORE: 0
AGITATION: NORMAL ACTIVITY
ANXIETY: NO ANXIETY, AT EASE
PAROXYSMAL SWEATS: NO SWEAT VISIBLE
TREMOR: NO TREMOR
AUDITORY DISTURBANCES: NOT PRESENT
TOTAL SCORE: 0
TREMOR: NO TREMOR
NAUSEA AND VOMITING: MILD NAUSEA WITH NO VOMITING
HEADACHE, FULLNESS IN HEAD: NOT PRESENT
TREMOR: NO TREMOR
HEADACHE, FULLNESS IN HEAD: NOT PRESENT
AGITATION: NORMAL ACTIVITY
ORIENTATION AND CLOUDING OF SENSORIUM: ORIENTED AND CAN DO SERIAL ADDITIONS
HEADACHE, FULLNESS IN HEAD: NOT PRESENT
HEADACHE, FULLNESS IN HEAD: NOT PRESENT
VISUAL DISTURBANCES: NOT PRESENT
NAUSEA AND VOMITING: NO NAUSEA AND NO VOMITING
PAROXYSMAL SWEATS: NO SWEAT VISIBLE
AUDITORY DISTURBANCES: NOT PRESENT
ORIENTATION AND CLOUDING OF SENSORIUM: ORIENTED AND CAN DO SERIAL ADDITIONS
TREMOR: NO TREMOR
AUDITORY DISTURBANCES: NOT PRESENT
VISUAL DISTURBANCES: NOT PRESENT
ORIENTATION AND CLOUDING OF SENSORIUM: ORIENTED AND CAN DO SERIAL ADDITIONS
TREMOR: NO TREMOR
AGITATION: NORMAL ACTIVITY
ORIENTATION AND CLOUDING OF SENSORIUM: ORIENTED AND CAN DO SERIAL ADDITIONS
AGITATION: NORMAL ACTIVITY
TOTAL SCORE: 0
ANXIETY: NO ANXIETY, AT EASE
AUDITORY DISTURBANCES: NOT PRESENT
AUDITORY DISTURBANCES: NOT PRESENT
TOTAL SCORE: 0
ANXIETY: NO ANXIETY, AT EASE
PAROXYSMAL SWEATS: NO SWEAT VISIBLE
ORIENTATION AND CLOUDING OF SENSORIUM: ORIENTED AND CAN DO SERIAL ADDITIONS
VISUAL DISTURBANCES: NOT PRESENT
VISUAL DISTURBANCES: NOT PRESENT
NAUSEA AND VOMITING: NO NAUSEA AND NO VOMITING
ANXIETY: NO ANXIETY, AT EASE
ANXIETY: NO ANXIETY, AT EASE
AGITATION: NORMAL ACTIVITY
AGITATION: NORMAL ACTIVITY
NAUSEA AND VOMITING: NO NAUSEA AND NO VOMITING

## 2024-06-02 ASSESSMENT — COGNITIVE AND FUNCTIONAL STATUS - GENERAL
MOBILITY SCORE: 24
DAILY ACTIVITIY SCORE: 24
MOBILITY SCORE: 24
DAILY ACTIVITIY SCORE: 24

## 2024-06-02 ASSESSMENT — PAIN - FUNCTIONAL ASSESSMENT
PAIN_FUNCTIONAL_ASSESSMENT: 0-10

## 2024-06-02 ASSESSMENT — ENCOUNTER SYMPTOMS
VOMITING: 0
DIARRHEA: 0
ABDOMINAL DISTENTION: 1
ABDOMINAL PAIN: 0
CHILLS: 0
FATIGUE: 0
NAUSEA: 0
FEVER: 0

## 2024-06-02 ASSESSMENT — PAIN DESCRIPTION - ORIENTATION: ORIENTATION: LEFT;RIGHT

## 2024-06-02 ASSESSMENT — PAIN DESCRIPTION - LOCATION: LOCATION: FOOT

## 2024-06-02 NOTE — CONSULTS
Consults    Reason For Consult  Abdominal Pain     History Of Present Illness  Aamir Garcia is a 54 y.o. male presenting with abdominal pain and distention. Patient has hx alcohol induced cirrhosis. He was doing really well following last discharge but recently started drinking again. CT a/p per ED showed moderately large ascites. He is on Lasix at home. He mentions that someone stopped his Aldactone. Cr normal. K 2.3. WBC is normal. Denies fevers, chills. He had duplex US yesterday, no PVT or masses      Past Medical History  He has a past medical history of Alcohol dependence (Multi), Alcoholic cirrhosis of liver with ascites (Multi), GERD (gastroesophageal reflux disease), Hyperlipidemia, and Hypertension.    Surgical History  He has a past surgical history that includes Sinus surgery (2013) and Paracentesis.     Social History  He reports that he has been smoking cigarettes. He has been exposed to tobacco smoke. He has never used smokeless tobacco. He reports current alcohol use. He reports that he does not use drugs.    Family History  Family History   Problem Relation Name Age of Onset    Hypertension Mother           age 64    Heart attack Father           at age 62    Other (Glioblastoma) Father      Diabetes Father      Cancer Father          Allergies  Clams    Review of Systems   Constitutional:  Negative for chills, fatigue and fever.   Gastrointestinal:  Positive for abdominal distention. Negative for abdominal pain, diarrhea, nausea and vomiting.        Physical Exam  Vitals reviewed.   Constitutional:       General: He is awake.      Appearance: Normal appearance.   HENT:      Head: Normocephalic and atraumatic.      Mouth/Throat:      Mouth: Mucous membranes are moist.   Cardiovascular:      Rate and Rhythm: Normal rate and regular rhythm.   Pulmonary:      Effort: Pulmonary effort is normal.      Breath sounds: Normal breath sounds.   Abdominal:      General: There is distension.       Palpations: Abdomen is soft.      Tenderness: There is no abdominal tenderness. There is no guarding.   Musculoskeletal:      Cervical back: Normal range of motion and neck supple.   Skin:     General: Skin is warm and dry.   Neurological:      General: No focal deficit present.      Mental Status: He is alert and oriented to person, place, and time. Mental status is at baseline.   Psychiatric:         Attention and Perception: Attention and perception normal.         Mood and Affect: Mood normal.         Behavior: Behavior normal.          Last Recorded Vitals  Blood pressure 144/71, pulse 90, temperature 37.1 °C (98.8 °F), temperature source Temporal, resp. rate 17, height 1.829 m (6'), weight 76.5 kg (168 lb 10.4 oz), SpO2 98%.    Relevant Results  No results found for this or any previous visit (from the past 24 hour(s)).  US liver with doppler    Result Date: 6/1/2024  Interpreted By:  Isaac Talbot, STUDY: US LIVER WITH DOPPLER   INDICATION: Signs/Symptoms:Alcoholic cirrhosis   COMPARISON: CT scan from 05/31/2024   ACCESSION NUMBER(S): [id number]   ORDERING CLINICIAN: JOSE M BARROSO   TECHNIQUE: Real-time ultrasound of the entire abdomen was performed. Grayscale and Doppler ultrasound of the aorta and IVC were also obtained, with spectral analysis and color flow.   FINDINGS: The liver is borderline prominent, with slightly lobulated contour and increased echogenicity of the parenchyma due to known cirrhosis. There is associated ascites.   Doppler ultrasound of the hepatic vasculature was also performed. There is recanalized umbilical vein. There is normal hepatopetal flow within the portal vein. The hepatic artery and the hepatic veins are patent.   The gallbladder is appropriately distended, without gallstones. There is mild prominence of the gallbladder wall, most likely related to chronic liver disease. There is no intrahepatic biliary duct dilatation; the common bile duct is mildly prominent, measuring 9  mm. The pancreas is mostly obscured by gas. The right kidney appears normal.         Cirrhotic liver with ascites. Normal blood flow within the vasculature of the liver. Mild gallbladder wall thickening is most likely due to chronic liver disease.     Signed by: Isaac Talbot 6/1/2024 9:55 AM Dictation workstation:   [id number]    CT abdomen pelvis w IV contrast    Result Date: 5/31/2024  Interpreted By:  Isidra Matt, STUDY: CT ABDOMEN PELVIS W IV CONTRAST; ;  5/31/2024 2:30 pm   INDICATION: Signs/Symptoms:abdominal pain; distension.   COMPARISON: 04/21/2024   ACCESSION NUMBER(S): [id number]   ORDERING CLINICIAN: SHANIQUE GAR   TECHNIQUE: Serial axial CT images obtained of the abdomen and pelvis following intravenous administration of 75 mL of Omnipaque 350. Images reformatted in the coronal and sagittal projection.   All CT examinations are performed with 1 or more of the following dose reduction techniques: Automated exposure control, adjustment of mA and/or kv according to patient's size, or use of iterative reconstruction techniques.   FINDINGS: Included lung bases are unremarkable. Distal esophagus is unremarkable.   Liver demonstrates no significant nodular contour within limits of the CT and given patient's history. Mild heterogeneous enhancement is demonstrated. A few scattered subcentimeter hypodensities are demonstrated for example, in the right lobe of liver posteriorly measuring 4 mm as seen on prior imaging too small to characterize suggesting hepatic cysts. There is recanalization of the umbilical vein which can be seen in setting of portal hypertension correlate with patient's corresponding history and concern for underlying component of cirrhosis. Main portal vein measures 17 mm which is enlarged.   Spleen measures 14.9 cm in the craniocaudal dimension and is enlarged.   Adrenal glands are unremarkable   Pancreas is unremarkable   Gallbladder is distended.   Right kidney is unremarkable    Left kidney is unremarkable   Retroperitoneum demonstrates scattered portacaval lymph nodes some of which are prominent measuring up to 8 mm in the short axis with findings as seen on prior imaging. There is moderate to large amount of ascites.   Loops of large bowel demonstrate portions of the mid to distal colon to be contracted with equivocal component of mild wall thickening within the transverse and descending colon likely related to contraction. There is diffuse sigmoid diverticulosis with wall thickening in the sigmoid colon with findings as seen on prior imaging with sequela of chronic diverticulitis. No superimposed acute diverticulitis demonstrated. Small bowel loops are nondilated. Generalized stranding of the mesenteric fat and greater omentum with findings as seen on prior imaging. Stomach is unremarkable   CT pelvis:   Unopacified bladder is unremarkable. There is no pelvic lymphadenopathy.   Visualized osseous structures demonstrate mild facet arthropathy L4/5 and L5/S1. Multilevel degenerative discogenic changes are demonstrated. Multilevel disc bulge with moderate loss disc space height L4/5. This contributes to mild narrowing thecal sac.               1. Moderate to large volume ascites not significantly changed from 04/21/2024   2. Findings raising suspicion for underlying cirrhosis without significant nodular contour. However, there is secondary signs of portal hypertension including recanalization of the portal vein, enlarged main portal vein, and splenomegaly   3. Portions of the mid to distal colon are contracted in particular of the transverse and descending colon limiting characterization with equivocal component of mild wall thickening in the transverse colon likely related to contraction. No pericolonic fat stranding or significant thickening to suggest hepatic colopathy. However, continued follow-up recommended   4. Diffuse sigmoid diverticulosis with generalized long segment wall  thickening of the proximal to mid colon with sequela of chronic diverticulitis     MACRO: None   Signed by: Isidra Matt 5/31/2024 2:50 PM Dictation workstation:   [id number]        Assessment/Plan     Cirrhosis, Ascites (MELD 13 decomp, alcohol etiology)   Pattern suggestive alcoholic hepatitis     HCC- AFP up to date, normal on 3/29/24  HE: Monitor mental status, Start/continue lactulose, add rifaximin if needed  EV: Plan for outpatient EGD with Dr Sullivan   Ascites: CT showed moderately large amount of ascites. IR consulted for para.    -Paracentesis with fluid analysis and Albumin  -Neg hep panel on 3/29/24  -2-3 gm low NA diet   -Lasix 20mg and Aldactone 25mg. He has normal Cr. K is 3.2. We can likely increase to 40mg and 50mg prior to discharge     ETOH Abuse    -Monitor ALICE SCOTT spent 30 minutes in the professional and overall care of this patient.

## 2024-06-02 NOTE — CARE PLAN
The patient's goals for the shift include      The clinical goals for the shift include monitor vitals, pain management, safety

## 2024-06-02 NOTE — PROGRESS NOTES
Aamir Garcia is a 54 y.o. male on day 0 of admission presenting with Abdominal pain.      Subjective   Is no abdominal pain abdominal size is the same.  GI notes reviewed  Patient complaining bilateral first toe swelling and pain stating that he has a gout flareup.  He tells me that he has been diagnosed with gout many years ago by his previous doctor       Objective     Last Recorded Vitals  /71 (BP Location: Right arm, Patient Position: Lying)   Pulse 97   Temp 37 °C (98.6 °F) (Temporal)   Resp 18   Wt 76.5 kg (168 lb 10.4 oz)   SpO2 96%   Intake/Output last 3 Shifts:    Intake/Output Summary (Last 24 hours) at 6/2/2024 1148  Last data filed at 6/2/2024 0658  Gross per 24 hour   Intake 700 ml   Output --   Net 700 ml       Physical Exam  Alert oriented x 3 cooperative walking around the room  Normocephalic atraumatic EOMI PERRLA  Neck supple  Chest clear  Heart regular  Abdomen is soft mildly distended nontender benign  Extremities there is no peripheral edema good pulses there is bilateral swelling of the first metacarpal phalangeal joints with some tenderness there is no redness though  Neurologic exam focal no asterixis  Relevant Results  Reviewed  Assessment/Plan     Principal Problem:    Abdominal pain      June 2    1.  Abdominal pain which is most likely due to increasing ascites.  Clinically I have very low suspicion for SBP but will follow guidance from GI Dr. Pabon with whom I spoke directly in who advised to cover the antibiotics if no ascitic fluid analysis available at this time.  The patient was already given a dose of Zosyn so continue with that.  The patient is uncomfortable with the prospect of blind paracentesis and will order that to be done under ultrasound guidance by IR.  I personally see no urgency to this.  GI to see patient tomorrow for additional orders.  I do not feel the patient needs narcotics for his pain which is really very minimal  2.  Alcoholic cirrhosis with portal  hypertension and ascites we will continue his regular medications including low-dose diuretics although there is no peripheral edema  3.  Recurrent alcohol abuse he is very apologetic about his relapse and he is committed to try again and stop drinking will implement CIWA monitoring and will continue to support him  4.  Hypertension  5.  GI DVT prophylaxis  6.  Smoker encouraged him to quit smoking too        His abdominal exam is completely benign.  Awaiting GI input.  While he may have underlying ascites he is not willing to go for bedside paracentesis.  Will advance diet.  Continue empiric antibiotics for now until further advised by GI.  No significant alcohol withdrawal continue to monitor.  Replace potassium    I feel he most likely has gout due to bladder involvement nontoxic appearance.  Discussed with GI NP who shares my opinion he most likely does not have SBP and agrees to steroids which I feel would be safer than naproxen.  Continue with plan for paracentesis tomorrow.  He is still on empiric antibiotics for possible SBP per recommendation of Dr. Phillips and will defer to GI on what to do with the antibiotics at this time                Vasquez Denise MD

## 2024-06-02 NOTE — CARE PLAN
Problem: Pain  Goal: My pain/discomfort is manageable  Outcome: Progressing     Problem: Safety  Goal: Patient will be injury free during hospitalization  Outcome: Progressing  Goal: I will remain free of falls  Outcome: Progressing     Problem: Daily Care  Goal: Daily care needs are met  Outcome: Progressing     Problem: Psychosocial Needs  Goal: Demonstrates ability to cope with hospitalization/illness  Outcome: Progressing  Goal: Collaborate with me, my family, and caregiver to identify my specific goals  Outcome: Progressing   The patient's goals for the shift include  comfort, monitor vitals     The clinical goals for the shift include monitor vitals

## 2024-06-02 NOTE — NURSING NOTE
Agree with previous assessment, patient resting in bed with no needs. Call light and personal belongings within reach. Patient aware of plan for paracentesis tomorrow. Patient denies any pain.

## 2024-06-03 ENCOUNTER — APPOINTMENT (OUTPATIENT)
Dept: CARDIOLOGY | Facility: HOSPITAL | Age: 54
DRG: 433 | End: 2024-06-03
Payer: MEDICARE

## 2024-06-03 LAB
ALBUMIN SERPL-MCNC: 3.7 G/DL (ref 3.5–5)
ALP BLD-CCNC: 254 U/L (ref 35–125)
ALT SERPL-CCNC: 23 U/L (ref 5–40)
ANION GAP SERPL CALC-SCNC: 13 MMOL/L
AST SERPL-CCNC: 39 U/L (ref 5–40)
BASOPHILS # BLD AUTO: 0.02 X10*3/UL (ref 0–0.1)
BASOPHILS NFR BLD AUTO: 0.2 %
BILIRUB SERPL-MCNC: 1.2 MG/DL (ref 0.1–1.2)
BUN SERPL-MCNC: 12 MG/DL (ref 8–25)
CALCIUM SERPL-MCNC: 9.3 MG/DL (ref 8.5–10.4)
CHLORIDE SERPL-SCNC: 98 MMOL/L (ref 97–107)
CO2 SERPL-SCNC: 23 MMOL/L (ref 24–31)
CREAT SERPL-MCNC: 0.6 MG/DL (ref 0.4–1.6)
EGFRCR SERPLBLD CKD-EPI 2021: >90 ML/MIN/1.73M*2
EOSINOPHIL # BLD AUTO: 0.01 X10*3/UL (ref 0–0.7)
EOSINOPHIL NFR BLD AUTO: 0.1 %
ERYTHROCYTE [DISTWIDTH] IN BLOOD BY AUTOMATED COUNT: 13.6 % (ref 11.5–14.5)
GLUCOSE SERPL-MCNC: 159 MG/DL (ref 65–99)
HCT VFR BLD AUTO: 34 % (ref 41–52)
HGB BLD-MCNC: 11.8 G/DL (ref 13.5–17.5)
IMM GRANULOCYTES # BLD AUTO: 0.04 X10*3/UL (ref 0–0.7)
IMM GRANULOCYTES NFR BLD AUTO: 0.4 % (ref 0–0.9)
LYMPHOCYTES # BLD AUTO: 1.64 X10*3/UL (ref 1.2–4.8)
LYMPHOCYTES NFR BLD AUTO: 16.4 %
MCH RBC QN AUTO: 32.5 PG (ref 26–34)
MCHC RBC AUTO-ENTMCNC: 34.7 G/DL (ref 32–36)
MCV RBC AUTO: 94 FL (ref 80–100)
MONOCYTES # BLD AUTO: 0.93 X10*3/UL (ref 0.1–1)
MONOCYTES NFR BLD AUTO: 9.3 %
NEUTROPHILS # BLD AUTO: 7.33 X10*3/UL (ref 1.2–7.7)
NEUTROPHILS NFR BLD AUTO: 73.6 %
NRBC BLD-RTO: 0 /100 WBCS (ref 0–0)
PLATELET # BLD AUTO: 132 X10*3/UL (ref 150–450)
POTASSIUM SERPL-SCNC: 3.8 MMOL/L (ref 3.4–5.1)
PROT SERPL-MCNC: 6.8 G/DL (ref 5.9–7.9)
RBC # BLD AUTO: 3.63 X10*6/UL (ref 4.5–5.9)
SODIUM SERPL-SCNC: 134 MMOL/L (ref 133–145)
WBC # BLD AUTO: 10 X10*3/UL (ref 4.4–11.3)

## 2024-06-03 PROCEDURE — 85025 COMPLETE CBC W/AUTO DIFF WBC: CPT | Performed by: INTERNAL MEDICINE

## 2024-06-03 PROCEDURE — G0378 HOSPITAL OBSERVATION PER HR: HCPCS

## 2024-06-03 PROCEDURE — 84075 ASSAY ALKALINE PHOSPHATASE: CPT | Performed by: INTERNAL MEDICINE

## 2024-06-03 PROCEDURE — 2500000001 HC RX 250 WO HCPCS SELF ADMINISTERED DRUGS (ALT 637 FOR MEDICARE OP): Performed by: INTERNAL MEDICINE

## 2024-06-03 PROCEDURE — 80053 COMPREHEN METABOLIC PANEL: CPT | Performed by: INTERNAL MEDICINE

## 2024-06-03 PROCEDURE — 36415 COLL VENOUS BLD VENIPUNCTURE: CPT | Performed by: INTERNAL MEDICINE

## 2024-06-03 PROCEDURE — 2500000002 HC RX 250 W HCPCS SELF ADMINISTERED DRUGS (ALT 637 FOR MEDICARE OP, ALT 636 FOR OP/ED): Performed by: INTERNAL MEDICINE

## 2024-06-03 PROCEDURE — 93005 ELECTROCARDIOGRAM TRACING: CPT

## 2024-06-03 PROCEDURE — S4991 NICOTINE PATCH NONLEGEND: HCPCS | Performed by: INTERNAL MEDICINE

## 2024-06-03 PROCEDURE — 2500000004 HC RX 250 GENERAL PHARMACY W/ HCPCS (ALT 636 FOR OP/ED): Performed by: INTERNAL MEDICINE

## 2024-06-03 RX ADMIN — Medication 1 TABLET: at 09:40

## 2024-06-03 RX ADMIN — PIPERACILLIN SODIUM AND TAZOBACTAM SODIUM 3.38 G: 3; .375 INJECTION, SOLUTION INTRAVENOUS at 17:52

## 2024-06-03 RX ADMIN — FUROSEMIDE 20 MG: 20 TABLET ORAL at 09:40

## 2024-06-03 RX ADMIN — METHYLPHENIDATE HYDROCHLORIDE 5 MG: 5 TABLET ORAL at 15:36

## 2024-06-03 RX ADMIN — FOLIC ACID 1 MG: 1 TABLET ORAL at 09:40

## 2024-06-03 RX ADMIN — METHYLPHENIDATE HYDROCHLORIDE 5 MG: 5 TABLET ORAL at 09:40

## 2024-06-03 RX ADMIN — ESCITALOPRAM OXALATE 10 MG: 10 TABLET ORAL at 09:43

## 2024-06-03 RX ADMIN — PIPERACILLIN SODIUM AND TAZOBACTAM SODIUM 3.38 G: 3; .375 INJECTION, SOLUTION INTRAVENOUS at 23:06

## 2024-06-03 RX ADMIN — METOPROLOL SUCCINATE 50 MG: 50 TABLET, EXTENDED RELEASE ORAL at 09:41

## 2024-06-03 RX ADMIN — PIPERACILLIN SODIUM AND TAZOBACTAM SODIUM 3.38 G: 3; .375 INJECTION, SOLUTION INTRAVENOUS at 13:01

## 2024-06-03 RX ADMIN — PANTOPRAZOLE SODIUM 40 MG: 40 TABLET, DELAYED RELEASE ORAL at 06:07

## 2024-06-03 RX ADMIN — Medication 100 MG: at 09:40

## 2024-06-03 RX ADMIN — BUPROPION HYDROCHLORIDE 150 MG: 150 TABLET, EXTENDED RELEASE ORAL at 06:07

## 2024-06-03 RX ADMIN — METHYLPREDNISOLONE 20 MG: 4 TABLET ORAL at 09:40

## 2024-06-03 RX ADMIN — PIPERACILLIN SODIUM AND TAZOBACTAM SODIUM 3.38 G: 3; .375 INJECTION, SOLUTION INTRAVENOUS at 05:26

## 2024-06-03 RX ADMIN — Medication 5 MG: at 20:13

## 2024-06-03 RX ADMIN — SPIRONOLACTONE 25 MG: 25 TABLET ORAL at 09:00

## 2024-06-03 RX ADMIN — NICOTINE 1 PATCH: 21 PATCH, EXTENDED RELEASE TRANSDERMAL at 09:40

## 2024-06-03 ASSESSMENT — COGNITIVE AND FUNCTIONAL STATUS - GENERAL
DAILY ACTIVITIY SCORE: 24
MOBILITY SCORE: 24
MOBILITY SCORE: 24
DAILY ACTIVITIY SCORE: 24

## 2024-06-03 ASSESSMENT — PAIN SCALES - GENERAL
PAINLEVEL_OUTOF10: 0 - NO PAIN

## 2024-06-03 ASSESSMENT — PAIN - FUNCTIONAL ASSESSMENT: PAIN_FUNCTIONAL_ASSESSMENT: 0-10

## 2024-06-03 NOTE — PROGRESS NOTES
06/03/24 1450   Discharge Planning   Living Arrangements Alone   Support Systems Children   Assistance Needed independent   Type of Residence Private residence   Number of Stairs to Enter Residence 0   Number of Stairs Within Residence 0   Do you have animals or pets at home? Yes   Type of Animals or Pets 1 dog   Who is requesting discharge planning? Provider   Home or Post Acute Services None   Patient expects to be discharged to: Home   Does the patient need discharge transport arranged? No     Home no needs when medically cleared

## 2024-06-03 NOTE — CARE PLAN
The patient's goals for the shift include  rest and nausea control    The clinical goals for the shift include pain control, decrease nausea, and promote ret      06/03/24 at 7:45 PM - Saskia Carranza RN

## 2024-06-03 NOTE — PROGRESS NOTES
Aamir Garcia is a 54 y.o. male on day 0 of admission presenting with Abdominal pain.      Subjective   Patient doing well today, just complains of abdominal distention.       Objective     Last Recorded Vitals  /66 (BP Location: Left arm, Patient Position: Lying)   Pulse 73   Temp 36.6 °C (97.9 °F) (Temporal)   Resp 16   Wt 76.5 kg (168 lb 10.4 oz)   SpO2 96%   Intake/Output last 3 Shifts:    Intake/Output Summary (Last 24 hours) at 6/3/2024 1839  Last data filed at 6/3/2024 1822  Gross per 24 hour   Intake 2200 ml   Output --   Net 2200 ml       Admission Weight  Weight: 76.5 kg (168 lb 10.4 oz) (05/31/24 1341)    Daily Weight  05/31/24 : 76.5 kg (168 lb 10.4 oz)    Image Results  US liver with doppler  Narrative: Interpreted By:  Isaac Talbot,   STUDY:  US LIVER WITH DOPPLER      INDICATION:  Signs/Symptoms:Alcoholic cirrhosis      COMPARISON:  CT scan from 05/31/2024      ACCESSION NUMBER(S):  [id number]      ORDERING CLINICIAN:  JOSE M BARROSO      TECHNIQUE:  Real-time ultrasound of the entire abdomen was performed. Grayscale  and Doppler ultrasound of the aorta and IVC were also obtained, with  spectral analysis and color flow.      FINDINGS:  The liver is borderline prominent, with slightly lobulated contour  and increased echogenicity of the parenchyma due to known cirrhosis.  There is associated ascites.      Doppler ultrasound of the hepatic vasculature was also performed.  There is recanalized umbilical vein. There is normal hepatopetal flow  within the portal vein. The hepatic artery and the hepatic veins are  patent.      The gallbladder is appropriately distended, without gallstones. There  is mild prominence of the gallbladder wall, most likely related to  chronic liver disease. There is no intrahepatic biliary duct  dilatation; the common bile duct is mildly prominent, measuring 9 mm.  The pancreas is mostly obscured by gas. The right kidney appears  normal.          Impression: Cirrhotic  liver with ascites. Normal blood flow within the  vasculature of the liver. Mild gallbladder wall thickening is most  likely due to chronic liver disease.          Signed by: Isaac Talbot 6/1/2024 9:55 AM  Dictation workstation:   [id number]      Physical Exam  Generally no acute distress  HEENT PERRL EOMI  Cardiovascular S1-S2 regular rate rhythm  Lungs clear to auscultation bilaterally  Abdomen distended bowel sounds present  Extremities no clubbing cyanosis edema    Relevant Results  Scheduled medications  buPROPion XL, 150 mg, oral, Daily before breakfast  escitalopram, 10 mg, oral, Daily  folic acid, 1 mg, oral, Daily  furosemide, 20 mg, oral, Daily  melatonin, 5 mg, oral, Daily  methylphenidate, 5 mg, oral, BID  [START ON 6/4/2024] methylPREDNISolone, 16 mg, oral, Once   Followed by  [START ON 6/5/2024] methylPREDNISolone, 12 mg, oral, Once   Followed by  [START ON 6/6/2024] methylPREDNISolone, 8 mg, oral, Once   Followed by  [START ON 6/7/2024] methylPREDNISolone, 4 mg, oral, Once  metoprolol succinate XL, 50 mg, oral, Daily  multivitamin with minerals, 1 tablet, oral, Daily  nicotine, 1 patch, transdermal, Daily  pantoprazole, 40 mg, oral, Daily before breakfast   Or  pantoprazole, 40 mg, intravenous, Daily before breakfast  piperacillin-tazobactam, 3.375 g, intravenous, q6h  polyethylene glycol, 17 g, oral, Daily  spironolactone, 25 mg, oral, Daily  thiamine, 100 mg, oral, Daily      Continuous medications     PRN medications  PRN medications: acetaminophen **OR** acetaminophen **OR** acetaminophen, LORazepam **OR** LORazepam **OR** LORazepam, ondansetron, simethicone  Results for orders placed or performed during the hospital encounter of 05/31/24 (from the past 24 hour(s))   CBC and Auto Differential   Result Value Ref Range    WBC 10.0 4.4 - 11.3 x10*3/uL    nRBC 0.0 0.0 - 0.0 /100 WBCs    RBC 3.63 (L) 4.50 - 5.90 x10*6/uL    Hemoglobin 11.8 (L) 13.5 - 17.5 g/dL    Hematocrit 34.0 (L) 41.0 - 52.0 %     MCV 94 80 - 100 fL    MCH 32.5 26.0 - 34.0 pg    MCHC 34.7 32.0 - 36.0 g/dL    RDW 13.6 11.5 - 14.5 %    Platelets 132 (L) 150 - 450 x10*3/uL    Neutrophils % 73.6 40.0 - 80.0 %    Immature Granulocytes %, Automated 0.4 0.0 - 0.9 %    Lymphocytes % 16.4 13.0 - 44.0 %    Monocytes % 9.3 2.0 - 10.0 %    Eosinophils % 0.1 0.0 - 6.0 %    Basophils % 0.2 0.0 - 2.0 %    Neutrophils Absolute 7.33 1.20 - 7.70 x10*3/uL    Immature Granulocytes Absolute, Automated 0.04 0.00 - 0.70 x10*3/uL    Lymphocytes Absolute 1.64 1.20 - 4.80 x10*3/uL    Monocytes Absolute 0.93 0.10 - 1.00 x10*3/uL    Eosinophils Absolute 0.01 0.00 - 0.70 x10*3/uL    Basophils Absolute 0.02 0.00 - 0.10 x10*3/uL   Comprehensive Metabolic Panel   Result Value Ref Range    Glucose 159 (H) 65 - 99 mg/dL    Sodium 134 133 - 145 mmol/L    Potassium 3.8 3.4 - 5.1 mmol/L    Chloride 98 97 - 107 mmol/L    Bicarbonate 23 (L) 24 - 31 mmol/L    Urea Nitrogen 12 8 - 25 mg/dL    Creatinine 0.60 0.40 - 1.60 mg/dL    eGFR >90 >60 mL/min/1.73m*2    Calcium 9.3 8.5 - 10.4 mg/dL    Albumin 3.7 3.5 - 5.0 g/dL    Alkaline Phosphatase 254 (H) 35 - 125 U/L    Total Protein 6.8 5.9 - 7.9 g/dL    AST 39 5 - 40 U/L    Bilirubin, Total 1.2 0.1 - 1.2 mg/dL    ALT 23 5 - 40 U/L    Anion Gap 13 <=19 mmol/L     Impression: 54-year-old gentleman with history of alcohol abuse and alcohol cirrhosis presents with abdominal distention and pain.    Plan:    1.  Abdominal distention  -Concern for SBP, empirically treat with antibiotics  -Pending paracentesis today    2.  Alcoholic cirrhosis  -Appreciate GI input, continue with low-sodium diet, diuretics and hepatic encephalopathy prophylaxis    Further medical management pending hospital course    Assessment/Plan                  Principal Problem:    Abdominal pain                  Miguel Boucher MD

## 2024-06-03 NOTE — CARE PLAN
The patient's goals for the shift include      The clinical goals for the shift include pain management, iv antibiotics      Problem: Pain  Goal: My pain/discomfort is manageable  Outcome: Progressing     Problem: Safety  Goal: Patient will be injury free during hospitalization  Outcome: Progressing  Goal: I will remain free of falls  Outcome: Progressing

## 2024-06-04 ENCOUNTER — PHARMACY VISIT (OUTPATIENT)
Dept: PHARMACY | Facility: CLINIC | Age: 54
End: 2024-06-04
Payer: COMMERCIAL

## 2024-06-04 ENCOUNTER — HOSPITAL ENCOUNTER (INPATIENT)
Dept: RADIOLOGY | Facility: HOSPITAL | Age: 54
Discharge: HOME | DRG: 433 | End: 2024-06-04
Payer: MEDICARE

## 2024-06-04 VITALS
TEMPERATURE: 97.5 F | SYSTOLIC BLOOD PRESSURE: 119 MMHG | OXYGEN SATURATION: 100 % | HEART RATE: 75 BPM | RESPIRATION RATE: 16 BRPM | DIASTOLIC BLOOD PRESSURE: 78 MMHG

## 2024-06-04 VITALS
RESPIRATION RATE: 18 BRPM | BODY MASS INDEX: 22.84 KG/M2 | WEIGHT: 168.65 LBS | OXYGEN SATURATION: 99 % | DIASTOLIC BLOOD PRESSURE: 70 MMHG | HEART RATE: 74 BPM | HEIGHT: 72 IN | TEMPERATURE: 97.9 F | SYSTOLIC BLOOD PRESSURE: 111 MMHG

## 2024-06-04 DIAGNOSIS — R10.9 ABDOMINAL PAIN: Primary | ICD-10-CM

## 2024-06-04 PROBLEM — K70.31 ASCITES DUE TO ALCOHOLIC CIRRHOSIS (MULTI): Status: ACTIVE | Noted: 2024-06-04

## 2024-06-04 LAB
ALBUMIN FLD-MCNC: 2.5 G/DL
ALBUMIN SERPL-MCNC: 3.8 G/DL (ref 3.5–5)
ALP BLD-CCNC: 271 U/L (ref 35–125)
ALT SERPL-CCNC: 23 U/L (ref 5–40)
ANION GAP SERPL CALC-SCNC: 7 MMOL/L
AST SERPL-CCNC: 34 U/L (ref 5–40)
BASOPHILS # BLD AUTO: 0.02 X10*3/UL (ref 0–0.1)
BASOPHILS NFR BLD AUTO: 0.2 %
BASOPHILS NFR FLD MANUAL: 0 %
BILIRUB SERPL-MCNC: 0.7 MG/DL (ref 0.1–1.2)
BLASTS NFR FLD MANUAL: 0 %
BUN SERPL-MCNC: 17 MG/DL (ref 8–25)
CALCIUM SERPL-MCNC: 9.5 MG/DL (ref 8.5–10.4)
CHLORIDE SERPL-SCNC: 102 MMOL/L (ref 97–107)
CLARITY FLD: ABNORMAL
CO2 SERPL-SCNC: 29 MMOL/L (ref 24–31)
COLOR FLD: ABNORMAL
CREAT SERPL-MCNC: 0.7 MG/DL (ref 0.4–1.6)
EGFRCR SERPLBLD CKD-EPI 2021: >90 ML/MIN/1.73M*2
EOSINOPHIL # BLD AUTO: 0.03 X10*3/UL (ref 0–0.7)
EOSINOPHIL NFR BLD AUTO: 0.3 %
EOSINOPHIL NFR FLD MANUAL: 0 %
ERYTHROCYTE [DISTWIDTH] IN BLOOD BY AUTOMATED COUNT: 14.4 % (ref 11.5–14.5)
GLUCOSE SERPL-MCNC: 177 MG/DL (ref 65–99)
HCT VFR BLD AUTO: 35.2 % (ref 41–52)
HGB BLD-MCNC: 11.8 G/DL (ref 13.5–17.5)
IMM GRANULOCYTES # BLD AUTO: 0.06 X10*3/UL (ref 0–0.7)
IMM GRANULOCYTES NFR BLD AUTO: 0.6 % (ref 0–0.9)
IMMATURE GRANULOCYTES IN FLUID: 0 %
LYMPHOCYTES # BLD AUTO: 2.06 X10*3/UL (ref 1.2–4.8)
LYMPHOCYTES NFR BLD AUTO: 21.4 %
LYMPHOCYTES NFR FLD MANUAL: 28 %
MCH RBC QN AUTO: 32.2 PG (ref 26–34)
MCHC RBC AUTO-ENTMCNC: 33.5 G/DL (ref 32–36)
MCV RBC AUTO: 96 FL (ref 80–100)
MONOCYTES # BLD AUTO: 0.94 X10*3/UL (ref 0.1–1)
MONOCYTES NFR BLD AUTO: 9.8 %
MONOS+MACROS NFR FLD MANUAL: 36 %
NEUTROPHILS # BLD AUTO: 6.51 X10*3/UL (ref 1.2–7.7)
NEUTROPHILS NFR BLD AUTO: 67.7 %
NEUTROPHILS NFR FLD MANUAL: 36 %
NRBC BLD-RTO: 0 /100 WBCS (ref 0–0)
OTHER CELLS NFR FLD MANUAL: 0 %
PLASMA CELLS NFR FLD MANUAL: 0 %
PLATELET # BLD AUTO: 138 X10*3/UL (ref 150–450)
POTASSIUM SERPL-SCNC: 3.9 MMOL/L (ref 3.4–5.1)
PROT FLD-MCNC: 4.1 G/DL
PROT SERPL-MCNC: 7.1 G/DL (ref 5.9–7.9)
RBC # BLD AUTO: 3.66 X10*6/UL (ref 4.5–5.9)
RBC # FLD AUTO: 2000 /UL
SODIUM SERPL-SCNC: 138 MMOL/L (ref 133–145)
TOTAL CELLS COUNTED FLD: 100
WBC # BLD AUTO: 9.6 X10*3/UL (ref 4.4–11.3)
WBC # FLD AUTO: 334 /UL

## 2024-06-04 PROCEDURE — 80053 COMPREHEN METABOLIC PANEL: CPT | Performed by: INTERNAL MEDICINE

## 2024-06-04 PROCEDURE — 87070 CULTURE OTHR SPECIMN AEROBIC: CPT | Mod: WESLAB | Performed by: INTERNAL MEDICINE

## 2024-06-04 PROCEDURE — 2500000004 HC RX 250 GENERAL PHARMACY W/ HCPCS (ALT 636 FOR OP/ED): Performed by: INTERNAL MEDICINE

## 2024-06-04 PROCEDURE — 84157 ASSAY OF PROTEIN OTHER: CPT | Mod: WESLAB | Performed by: INTERNAL MEDICINE

## 2024-06-04 PROCEDURE — 7100000010 HC PHASE TWO TIME - EACH INCREMENTAL 1 MINUTE

## 2024-06-04 PROCEDURE — 49083 ABD PARACENTESIS W/IMAGING: CPT | Performed by: RADIOLOGY

## 2024-06-04 PROCEDURE — 7100000009 HC PHASE TWO TIME - INITIAL BASE CHARGE

## 2024-06-04 PROCEDURE — 84075 ASSAY ALKALINE PHOSPHATASE: CPT | Performed by: INTERNAL MEDICINE

## 2024-06-04 PROCEDURE — 82042 OTHER SOURCE ALBUMIN QUAN EA: CPT | Mod: WESLAB | Performed by: INTERNAL MEDICINE

## 2024-06-04 PROCEDURE — C1729 CATH, DRAINAGE: HCPCS

## 2024-06-04 PROCEDURE — 2500000002 HC RX 250 W HCPCS SELF ADMINISTERED DRUGS (ALT 637 FOR MEDICARE OP, ALT 636 FOR OP/ED): Performed by: INTERNAL MEDICINE

## 2024-06-04 PROCEDURE — 85025 COMPLETE CBC W/AUTO DIFF WBC: CPT | Performed by: INTERNAL MEDICINE

## 2024-06-04 PROCEDURE — 2500000001 HC RX 250 WO HCPCS SELF ADMINISTERED DRUGS (ALT 637 FOR MEDICARE OP): Performed by: INTERNAL MEDICINE

## 2024-06-04 PROCEDURE — 0W9G3ZZ DRAINAGE OF PERITONEAL CAVITY, PERCUTANEOUS APPROACH: ICD-10-PCS | Performed by: INTERNAL MEDICINE

## 2024-06-04 PROCEDURE — RXMED WILLOW AMBULATORY MEDICATION CHARGE

## 2024-06-04 PROCEDURE — 2500000005 HC RX 250 GENERAL PHARMACY W/O HCPCS: Performed by: RADIOLOGY

## 2024-06-04 PROCEDURE — G0378 HOSPITAL OBSERVATION PER HR: HCPCS

## 2024-06-04 PROCEDURE — 87075 CULTR BACTERIA EXCEPT BLOOD: CPT | Mod: WESLAB | Performed by: INTERNAL MEDICINE

## 2024-06-04 PROCEDURE — 36415 COLL VENOUS BLD VENIPUNCTURE: CPT | Performed by: INTERNAL MEDICINE

## 2024-06-04 PROCEDURE — 2720000007 HC OR 272 NO HCPCS

## 2024-06-04 PROCEDURE — 49083 ABD PARACENTESIS W/IMAGING: CPT

## 2024-06-04 PROCEDURE — 89051 BODY FLUID CELL COUNT: CPT | Performed by: INTERNAL MEDICINE

## 2024-06-04 PROCEDURE — 1100000001 HC PRIVATE ROOM DAILY

## 2024-06-04 PROCEDURE — S4991 NICOTINE PATCH NONLEGEND: HCPCS | Performed by: INTERNAL MEDICINE

## 2024-06-04 RX ORDER — SPIRONOLACTONE 25 MG/1
25 TABLET ORAL DAILY
Qty: 30 TABLET | Refills: 0 | Status: SHIPPED | OUTPATIENT
Start: 2024-06-04 | End: 2024-06-14 | Stop reason: ALTCHOICE

## 2024-06-04 RX ORDER — METHYLPREDNISOLONE 4 MG/1
TABLET ORAL
Qty: 10 TABLET | Refills: 0 | Status: SHIPPED | OUTPATIENT
Start: 2024-06-04 | End: 2024-06-14 | Stop reason: ALTCHOICE

## 2024-06-04 RX ORDER — FOLIC ACID 1 MG/1
1 TABLET ORAL DAILY
Qty: 30 TABLET | Refills: 0 | Status: SHIPPED | OUTPATIENT
Start: 2024-06-04 | End: 2024-06-14 | Stop reason: SDUPTHER

## 2024-06-04 RX ORDER — BUPROPION HYDROCHLORIDE 150 MG/1
150 TABLET ORAL
Qty: 30 TABLET | Refills: 0 | Status: SHIPPED | OUTPATIENT
Start: 2024-06-04 | End: 2024-07-04

## 2024-06-04 RX ORDER — LIDOCAINE HYDROCHLORIDE AND EPINEPHRINE 5; 5 MG/ML; UG/ML
INJECTION, SOLUTION INFILTRATION; PERINEURAL
Status: COMPLETED | OUTPATIENT
Start: 2024-06-04 | End: 2024-06-04

## 2024-06-04 RX ORDER — METHYLPHENIDATE HYDROCHLORIDE 10 MG/1
10 CAPSULE, EXTENDED RELEASE ORAL EVERY MORNING
Qty: 14 CAPSULE | Refills: 0 | Status: SHIPPED | OUTPATIENT
Start: 2024-06-04 | End: 2024-06-18

## 2024-06-04 RX ORDER — ESCITALOPRAM OXALATE 10 MG/1
10 TABLET ORAL DAILY
Qty: 30 TABLET | Refills: 0 | Status: SHIPPED | OUTPATIENT
Start: 2024-06-04 | End: 2024-07-04

## 2024-06-04 RX ORDER — ACETAMINOPHEN 500 MG
5 TABLET ORAL DAILY
Qty: 90 TABLET | Refills: 0 | Status: SHIPPED | OUTPATIENT
Start: 2024-06-04 | End: 2024-09-02

## 2024-06-04 RX ADMIN — ESCITALOPRAM OXALATE 10 MG: 10 TABLET ORAL at 09:26

## 2024-06-04 RX ADMIN — FUROSEMIDE 20 MG: 20 TABLET ORAL at 09:26

## 2024-06-04 RX ADMIN — NICOTINE 1 PATCH: 21 PATCH, EXTENDED RELEASE TRANSDERMAL at 09:22

## 2024-06-04 RX ADMIN — METHYLPHENIDATE HYDROCHLORIDE 5 MG: 5 TABLET ORAL at 09:26

## 2024-06-04 RX ADMIN — PIPERACILLIN SODIUM AND TAZOBACTAM SODIUM 3.38 G: 3; .375 INJECTION, SOLUTION INTRAVENOUS at 05:29

## 2024-06-04 RX ADMIN — FOLIC ACID 1 MG: 1 TABLET ORAL at 09:23

## 2024-06-04 RX ADMIN — METHYLPREDNISOLONE 16 MG: 4 TABLET ORAL at 09:23

## 2024-06-04 RX ADMIN — Medication 1 TABLET: at 09:25

## 2024-06-04 RX ADMIN — Medication 100 MG: at 09:25

## 2024-06-04 RX ADMIN — BUPROPION HYDROCHLORIDE 150 MG: 150 TABLET, EXTENDED RELEASE ORAL at 05:29

## 2024-06-04 RX ADMIN — LIDOCAINE HYDROCHLORIDE AND EPINEPHRINE 10 ML: 5; 5 INJECTION, SOLUTION INFILTRATION; PERINEURAL at 11:31

## 2024-06-04 RX ADMIN — PANTOPRAZOLE SODIUM 40 MG: 40 TABLET, DELAYED RELEASE ORAL at 05:29

## 2024-06-04 RX ADMIN — METHYLPHENIDATE HYDROCHLORIDE 5 MG: 5 TABLET ORAL at 12:00

## 2024-06-04 RX ADMIN — SPIRONOLACTONE 25 MG: 25 TABLET ORAL at 09:26

## 2024-06-04 ASSESSMENT — COLUMBIA-SUICIDE SEVERITY RATING SCALE - C-SSRS
1. IN THE PAST MONTH, HAVE YOU WISHED YOU WERE DEAD OR WISHED YOU COULD GO TO SLEEP AND NOT WAKE UP?: NO
2. HAVE YOU ACTUALLY HAD ANY THOUGHTS OF KILLING YOURSELF?: NO
6. HAVE YOU EVER DONE ANYTHING, STARTED TO DO ANYTHING, OR PREPARED TO DO ANYTHING TO END YOUR LIFE?: NO

## 2024-06-04 ASSESSMENT — PAIN - FUNCTIONAL ASSESSMENT
PAIN_FUNCTIONAL_ASSESSMENT: 0-10
PAIN_FUNCTIONAL_ASSESSMENT: 0-10

## 2024-06-04 ASSESSMENT — COGNITIVE AND FUNCTIONAL STATUS - GENERAL
MOBILITY SCORE: 24
DAILY ACTIVITIY SCORE: 24

## 2024-06-04 ASSESSMENT — PAIN SCALES - GENERAL
PAINLEVEL_OUTOF10: 0 - NO PAIN

## 2024-06-04 NOTE — NURSING NOTE
Patient is being transported over to [facility]. Attempted to call IR, no one was available at this time.

## 2024-06-04 NOTE — NURSING NOTE
Patient arrived back to room from IR procedure at [facility], [place]. They took 4.6 L of fluids off.

## 2024-06-04 NOTE — PROGRESS NOTES
Aamir Garcia is a 54 y.o. male on day 0 of admission presenting with Abdominal pain.    Subjective   Denies current abdominal pain. Tolerating diet        Objective     Physical Exam  Vitals reviewed.   Constitutional:       General: He is awake.      Appearance: Normal appearance.   HENT:      Head: Normocephalic and atraumatic.      Mouth/Throat:      Mouth: Mucous membranes are moist.   Cardiovascular:      Rate and Rhythm: Normal rate and regular rhythm.   Pulmonary:      Effort: Pulmonary effort is normal.      Breath sounds: Normal breath sounds.   Abdominal:      General: There is no distension.      Palpations: Abdomen is soft.      Tenderness: There is no abdominal tenderness. There is no guarding.   Musculoskeletal:      Cervical back: Normal range of motion and neck supple.   Skin:     General: Skin is warm and dry.   Neurological:      General: No focal deficit present.      Mental Status: He is alert and oriented to person, place, and time. Mental status is at baseline.   Psychiatric:         Attention and Perception: Attention and perception normal.         Mood and Affect: Mood normal.         Behavior: Behavior normal.         Last Recorded Vitals  Blood pressure 103/65, pulse 66, temperature 36.6 °C (97.9 °F), temperature source Temporal, resp. rate 16, height 1.829 m (6'), weight 76.5 kg (168 lb 10.4 oz), SpO2 98%.  Intake/Output last 3 Shifts:  I/O last 3 completed shifts:  In: 3900 (51 mL/kg) [P.O.:3600; IV Piggyback:300]  Out: - (0 mL/kg)   Weight: 76.5 kg     Relevant Results              Results for orders placed or performed during the hospital encounter of 05/31/24 (from the past 24 hour(s))   CBC and Auto Differential   Result Value Ref Range    WBC 9.6 4.4 - 11.3 x10*3/uL    nRBC 0.0 0.0 - 0.0 /100 WBCs    RBC 3.66 (L) 4.50 - 5.90 x10*6/uL    Hemoglobin 11.8 (L) 13.5 - 17.5 g/dL    Hematocrit 35.2 (L) 41.0 - 52.0 %    MCV 96 80 - 100 fL    MCH 32.2 26.0 - 34.0 pg    MCHC 33.5 32.0 - 36.0  g/dL    RDW 14.4 11.5 - 14.5 %    Platelets 138 (L) 150 - 450 x10*3/uL    Neutrophils % 67.7 40.0 - 80.0 %    Immature Granulocytes %, Automated 0.6 0.0 - 0.9 %    Lymphocytes % 21.4 13.0 - 44.0 %    Monocytes % 9.8 2.0 - 10.0 %    Eosinophils % 0.3 0.0 - 6.0 %    Basophils % 0.2 0.0 - 2.0 %    Neutrophils Absolute 6.51 1.20 - 7.70 x10*3/uL    Immature Granulocytes Absolute, Automated 0.06 0.00 - 0.70 x10*3/uL    Lymphocytes Absolute 2.06 1.20 - 4.80 x10*3/uL    Monocytes Absolute 0.94 0.10 - 1.00 x10*3/uL    Eosinophils Absolute 0.03 0.00 - 0.70 x10*3/uL    Basophils Absolute 0.02 0.00 - 0.10 x10*3/uL   Comprehensive Metabolic Panel   Result Value Ref Range    Glucose 177 (H) 65 - 99 mg/dL    Sodium 138 133 - 145 mmol/L    Potassium 3.9 3.4 - 5.1 mmol/L    Chloride 102 97 - 107 mmol/L    Bicarbonate 29 24 - 31 mmol/L    Urea Nitrogen 17 8 - 25 mg/dL    Creatinine 0.70 0.40 - 1.60 mg/dL    eGFR >90 >60 mL/min/1.73m*2    Calcium 9.5 8.5 - 10.4 mg/dL    Albumin 3.8 3.5 - 5.0 g/dL    Alkaline Phosphatase 271 (H) 35 - 125 U/L    Total Protein 7.1 5.9 - 7.9 g/dL    AST 34 5 - 40 U/L    Bilirubin, Total 0.7 0.1 - 1.2 mg/dL    ALT 23 5 - 40 U/L    Anion Gap 7 <=19 mmol/L     US liver with doppler    Result Date: 6/1/2024  Interpreted By:  Isaac Talbot, STUDY: US LIVER WITH DOPPLER   INDICATION: Signs/Symptoms:Alcoholic cirrhosis   COMPARISON: CT scan from 05/31/2024   ACCESSION NUMBER(S): [id number]   ORDERING CLINICIAN: JOSE M BARROSO   TECHNIQUE: Real-time ultrasound of the entire abdomen was performed. Grayscale and Doppler ultrasound of the aorta and IVC were also obtained, with spectral analysis and color flow.   FINDINGS: The liver is borderline prominent, with slightly lobulated contour and increased echogenicity of the parenchyma due to known cirrhosis. There is associated ascites.   Doppler ultrasound of the hepatic vasculature was also performed. There is recanalized umbilical vein. There is normal hepatopetal  flow within the portal vein. The hepatic artery and the hepatic veins are patent.   The gallbladder is appropriately distended, without gallstones. There is mild prominence of the gallbladder wall, most likely related to chronic liver disease. There is no intrahepatic biliary duct dilatation; the common bile duct is mildly prominent, measuring 9 mm. The pancreas is mostly obscured by gas. The right kidney appears normal.         Cirrhotic liver with ascites. Normal blood flow within the vasculature of the liver. Mild gallbladder wall thickening is most likely due to chronic liver disease.     Signed by: Isaac Talbot 6/1/2024 9:55 AM Dictation workstation:   [id number]    CT abdomen pelvis w IV contrast    Result Date: 5/31/2024  Interpreted By:  Isidra Matt, STUDY: CT ABDOMEN PELVIS W IV CONTRAST; ;  5/31/2024 2:30 pm   INDICATION: Signs/Symptoms:abdominal pain; distension.   COMPARISON: 04/21/2024   ACCESSION NUMBER(S): [id number]   ORDERING CLINICIAN: SHANIQUE GAR   TECHNIQUE: Serial axial CT images obtained of the abdomen and pelvis following intravenous administration of 75 mL of Omnipaque 350. Images reformatted in the coronal and sagittal projection.   All CT examinations are performed with 1 or more of the following dose reduction techniques: Automated exposure control, adjustment of mA and/or kv according to patient's size, or use of iterative reconstruction techniques.   FINDINGS: Included lung bases are unremarkable. Distal esophagus is unremarkable.   Liver demonstrates no significant nodular contour within limits of the CT and given patient's history. Mild heterogeneous enhancement is demonstrated. A few scattered subcentimeter hypodensities are demonstrated for example, in the right lobe of liver posteriorly measuring 4 mm as seen on prior imaging too small to characterize suggesting hepatic cysts. There is recanalization of the umbilical vein which can be seen in setting of portal  hypertension correlate with patient's corresponding history and concern for underlying component of cirrhosis. Main portal vein measures 17 mm which is enlarged.   Spleen measures 14.9 cm in the craniocaudal dimension and is enlarged.   Adrenal glands are unremarkable   Pancreas is unremarkable   Gallbladder is distended.   Right kidney is unremarkable   Left kidney is unremarkable   Retroperitoneum demonstrates scattered portacaval lymph nodes some of which are prominent measuring up to 8 mm in the short axis with findings as seen on prior imaging. There is moderate to large amount of ascites.   Loops of large bowel demonstrate portions of the mid to distal colon to be contracted with equivocal component of mild wall thickening within the transverse and descending colon likely related to contraction. There is diffuse sigmoid diverticulosis with wall thickening in the sigmoid colon with findings as seen on prior imaging with sequela of chronic diverticulitis. No superimposed acute diverticulitis demonstrated. Small bowel loops are nondilated. Generalized stranding of the mesenteric fat and greater omentum with findings as seen on prior imaging. Stomach is unremarkable   CT pelvis:   Unopacified bladder is unremarkable. There is no pelvic lymphadenopathy.   Visualized osseous structures demonstrate mild facet arthropathy L4/5 and L5/S1. Multilevel degenerative discogenic changes are demonstrated. Multilevel disc bulge with moderate loss disc space height L4/5. This contributes to mild narrowing thecal sac.               1. Moderate to large volume ascites not significantly changed from 04/21/2024   2. Findings raising suspicion for underlying cirrhosis without significant nodular contour. However, there is secondary signs of portal hypertension including recanalization of the portal vein, enlarged main portal vein, and splenomegaly   3. Portions of the mid to distal colon are contracted in particular of the  transverse and descending colon limiting characterization with equivocal component of mild wall thickening in the transverse colon likely related to contraction. No pericolonic fat stranding or significant thickening to suggest hepatic colopathy. However, continued follow-up recommended   4. Diffuse sigmoid diverticulosis with generalized long segment wall thickening of the proximal to mid colon with sequela of chronic diverticulitis     MACRO: None   Signed by: Isidra Matt 5/31/2024 2:50 PM Dictation workstation:   [id number]                  Assessment/Plan   Principal Problem:    Abdominal pain  Active Problems:    Ascites due to alcoholic cirrhosis (Multi)    Cirrhosis, Ascites (MELD 13 decomp, alcohol etiology)   Pattern suggestive alcoholic hepatitis      HCC- AFP up to date, normal on 3/29/24  HE: Monitor mental status, Start/continue lactulose, add rifaximin if needed  EV: Plan for outpatient EGD with Dr Sullivan   Ascites: CT showed moderately large amount of ascites. IR consulted for para.               -Paracentesis with fluid analysis and Albumin  -Neg hep panel on 3/29/24  -2-3 gm low NA diet   -Lasix 20mg and Aldactone 25mg. He has normal Cr. K is 3.2. We can increase to 40mg and 50mg prior to discharge      ETOH Abuse               -Monitor CIWA     6/4   Awaiting paracentesis. Abdominal pain resolved. Recommend Lasix 40mg daily and Aldactone 50mg at discharge. Low Na diet. Avoid alcohol. No barriers to DC from GI standpoint following paracentesis        I spent 20 minutes in the professional and overall care of this patient.      Anna Cunningham, APRN-CNP

## 2024-06-04 NOTE — DISCHARGE SUMMARY
Discharge Diagnosis  Abdominal pain    Issues Requiring Follow-Up  Gastroenterology follow-up    Discharge Meds     Your medication list        START taking these medications        Instructions Last Dose Given Next Dose Due   methylPREDNISolone 4 mg tablet  Commonly known as: Medrol  Start taking on: June 4, 2024      Take 4 tablets (16 mg) by mouth once daily for 1 day, THEN 3 tablets (12 mg) once daily for 1 day, THEN 2 tablets (8 mg) once daily for 1 day, THEN 1 tablet (4 mg) once daily for 1 day.              CHANGE how you take these medications        Instructions Last Dose Given Next Dose Due   methylphenidate LA 10 mg 24 hr capsule  Commonly known as: Ritalin LA  What changed: See the new instructions.      Take 1 capsule (10 mg) by mouth once daily in the morning for 14 days. Do not crush or chew.              CONTINUE taking these medications        Instructions Last Dose Given Next Dose Due   amLODIPine 5 mg tablet  Commonly known as: Norvasc           buPROPion  mg 24 hr tablet  Commonly known as: Wellbutrin XL      Take 1 tablet (150 mg) by mouth once daily in the morning. Take before meals.       EPINEPHrine 0.3 mg/0.3 mL injection syringe  Commonly known as: Epipen           escitalopram 10 mg tablet  Commonly known as: Lexapro      Take 1 tablet (10 mg) by mouth once daily.       folic acid 1 mg tablet  Commonly known as: Folvite      Take 1 tablet (1 mg) by mouth once daily.       furosemide 20 mg tablet  Commonly known as: Lasix      Take 1 tablet (20 mg) by mouth once daily.       melatonin 5 mg tablet      Take 1 tablet (5 mg) by mouth once daily.       metoprolol succinate XL 50 mg 24 hr tablet  Commonly known as: Toprol-XL      Take 1 tablet (50 mg) by mouth once daily. Do not crush or chew.       omeprazole 20 mg DR capsule  Commonly known as: PriLOSEC      Take 1 capsule (20 mg) by mouth once daily. Do not crush or chew.       spironolactone 25 mg tablet  Commonly known as: Aldactone       Take 1 tablet (25 mg) by mouth once daily.              STOP taking these medications      multivitamin with minerals tablet        nicotine 21 mg/24 hr patch  Commonly known as: Nicoderm CQ        polyethylene glycol 17 gram packet  Commonly known as: Glycolax, Miralax                  Where to Get Your Medications        These medications were sent to [facility]  [address], [address], [place] [address]      Hours: 9 AM to 6 PM Mon-Fri, 9 AM to 1 PM Sat Phone: [phone]   buPROPion  mg 24 hr tablet  escitalopram 10 mg tablet  folic acid 1 mg tablet  melatonin 5 mg tablet  methylphenidate LA 10 mg 24 hr capsule  methylPREDNISolone 4 mg tablet  spironolactone 25 mg tablet         Test Results Pending At Discharge  Pending Labs       Order Current Status    Opiate Confirmation, Urine In process    THC (Marijuana), Urine, Confirmation In process    Blood Culture Preliminary result    Blood Culture Preliminary result            Hospital Course   Patient admitted to the hospital for increased abdominal pain and distention.  Patient with a history of alcoholic cirrhosis.  Concern was for possible SBP, patient started antibiotics.  Patient had paracentesis completed with 4.6 L of fluid taken off, low suspicion for SBP and antibiotics were stopped.  Patient was continued to drink alcohol up until admission.  He had been sober for about 30+ days of recent but began drinking again.  We had at length discussions about his liver disease and trajectory.  Patient has a plan for rehab and support.  Patient was evaluated by gastroenterology his primary GI doctor is Dr. Sullivan.  Discussed with the patient discharge medications, he stated he did not want to go up on the spironolactone or Lasix because he did not like the way it made him feel and will follow-up with Dr. Sullivan to discuss this in a couple weeks.  Patient will follow-up with Dr. Sullivan for continued care.  Otherwise  patient be discharged home in stable and improved condition.    Pertinent Physical Exam At Time of Discharge  Physical Exam  Generally no acute distress  HEENT PERRL EOMI  Cardiovascular S1-S2 regular rate rhythm no murmurs rubs gallops  Lungs clear  Abdomen bowel sounds present slightly distended nontender  Extremities no clubbing cyanosis edema  Outpatient Follow-Up  Future Appointments   Date Time Provider Department Center   6/17/2024  9:00 AM Pascale Wood MD [id number] [facility]     Time spent discharge 45 minutes    Miguel Boucher MD

## 2024-06-04 NOTE — PROGRESS NOTES
06/04/24 1438   Discharge Planning   Patient expects to be discharged to: Home   Does the patient need discharge transport arranged? No     Going to [facility] for paracentesis today.  Home no needs upon discharge.

## 2024-06-04 NOTE — CARE PLAN
The patient's goals for the shift include  paracentesis     The clinical goals for the shift include pain control, have a paracentesis done [facility], and monitor VS       Problem: Pain  Goal: My pain/discomfort is manageable  Outcome: Progressing     Problem: Safety  Goal: Patient will be injury free during hospitalization  Outcome: Progressing  Goal: I will remain free of falls  Outcome: Progressing     Problem: Daily Care  Goal: Daily care needs are met  Outcome: Progressing     Problem: Psychosocial Needs  Goal: Demonstrates ability to cope with hospitalization/illness  Outcome: Progressing  Goal: Collaborate with me, my family, and caregiver to identify my specific goals  Outcome: Progressing     Problem: Discharge Barriers  Goal: My discharge needs are met  Outcome: Progressing     Problem: Fall/Injury  Goal: Not fall by end of shift  Outcome: Progressing  Goal: Be free from injury by end of the shift  Outcome: Progressing  Goal: Verbalize understanding of personal risk factors for fall in the hospital  Outcome: Progressing  Goal: Verbalize understanding of risk factor reduction measures to prevent injury from fall in the home  Outcome: Progressing  Goal: Use assistive devices by end of the shift  Outcome: Progressing  Goal: Pace activities to prevent fatigue by end of the shift  Outcome: Progressing

## 2024-06-05 ENCOUNTER — PATIENT OUTREACH (OUTPATIENT)
Dept: PRIMARY CARE | Facility: CLINIC | Age: 54
End: 2024-06-05
Payer: MEDICARE

## 2024-06-05 ENCOUNTER — DOCUMENTATION (OUTPATIENT)
Dept: PRIMARY CARE | Facility: CLINIC | Age: 54
End: 2024-06-05
Payer: MEDICARE

## 2024-06-05 LAB
BACTERIA BLD CULT: NORMAL
BACTERIA BLD CULT: NORMAL

## 2024-06-05 NOTE — PROGRESS NOTES
Discharge Facility: [facility]     Discharge Diagnosis:    Abdominal pain     Admission Date: 5/31/2024   Discharge Date:  6/4/2024     PCP Appointment Date: 6/14/2024     Specialist Appointment Date:     Patient calling ALISSON Sullivan  to schedule F/U     Hospital Encounter and Summary: Linked     See discharge assessment below for further details     Engagement  Call Start Time: 0936 (6/5/2024  9:36 AM)    Medications  Medications reviewed with patient/caregiver?: Yes (6/5/2024  9:36 AM)  Is the patient having any side effects they believe may be caused by any medication additions or changes?: No (6/5/2024  9:36 AM)  Does the patient have all medications ordered at discharge?: Yes (6/5/2024  9:36 AM)  Care Management Interventions: No intervention needed (6/5/2024  9:36 AM)  Prescription Comments: meds ro bed prior to DC , Patient states  following DC instructions , buPROPion  mg 24 hr tablet  escitalopram 10 mg tablet  folic acid 1 mg tablet  melatonin 5 mg tablet  methylphenidate LA 10 mg 24 hr capsule  methylPREDNISolone 4 mg tablet  spironolactone 25 mg tablet (6/5/2024  9:36 AM)  Care Management Interventions: Provided patient education (6/5/2024  9:36 AM)  Medication Comments: Patient aware to , CHANGE how you take these medications ,  methylphenidate LA 10 mg 24 hr capsule  Commonly known as: Ritalin LA  What changed: See the new instructions.        Take 1 capsule (10 mg) by mouth once daily in the morning for 14 days. Do not crush or chew....STOP taking these medications      multivitamin with minerals tablet        nicotine 21 mg/24 hr patch  Commonly known as: Nicoderm CQ        polyethylene glycol 17 gram packet  Commonly known as: Glycolax, Miralax (6/5/2024  9:36 AM)    Appointments  Does the patient have a primary care provider?: Yes (6/5/2024  9:36 AM)  Care Management Interventions: Verified appointment date/time/provider (6/14) (6/5/2024  9:36 AM)  Has the patient kept scheduled appointments  due by today?: Yes (6/5/2024  9:36 AM)  Care Management Interventions: Advised to schedule with specialist (GI pt calling today) (6/5/2024  9:36 AM)    Self Management  What is the home health agency?: NA (6/5/2024  9:36 AM)  What Durable Medical Equipment (DME) was ordered?: NA (6/5/2024  9:36 AM)    Patient Teaching  Does the patient have access to their discharge instructions?: Yes (6/5/2024  9:36 AM)  Care Management Interventions: Reviewed instructions with patient (6/5/2024  9:36 AM)  What is the patient's perception of their health status since discharge?: Improving (6/5/2024  9:36 AM)  Is the patient/caregiver able to teach back the hierarchy of who to call/visit for symptoms/problems? PCP, Specialist, Home Health nurse, Urgent Care, ED, 911: Yes (6/5/2024  9:36 AM)  Patient/Caregiver Education Comments: Patient aware to self monitor call providers for any questions concerns or change  in condition, Patient encourgaed to monitor weight daily /measure abd girth/ discuss with GI/PCP (6/5/2024  9:36 AM)

## 2024-06-06 LAB
6MAM UR CFM-MCNC: <25 NG/ML
CARBOXYTHC UR-MCNC: 221 NG/ML
CODEINE UR CFM-MCNC: <50 NG/ML
HYDROCODONE CTO UR CFM-MCNC: <25 NG/ML
HYDROMORPHONE UR CFM-MCNC: <25 NG/ML
MORPHINE UR CFM-MCNC: 1898 NG/ML
NORHYDROCODONE UR CFM-MCNC: <25 NG/ML
NOROXYCODONE UR CFM-MCNC: <25 NG/ML
OXYCODONE UR CFM-MCNC: <25 NG/ML
OXYMORPHONE UR CFM-MCNC: <25 NG/ML

## 2024-06-08 LAB
BACTERIA FLD CULT: NORMAL
GRAM STN SPEC: NORMAL
GRAM STN SPEC: NORMAL

## 2024-06-11 ENCOUNTER — TELEPHONE (OUTPATIENT)
Dept: INPATIENT UNIT | Facility: HOSPITAL | Age: 54
End: 2024-06-11

## 2024-06-11 RX ORDER — LOSARTAN POTASSIUM 100 MG/1
100 TABLET ORAL
COMMUNITY
Start: 2024-01-05 | End: 2024-06-14 | Stop reason: SDUPTHER

## 2024-06-11 RX ORDER — SPIRONOLACTONE 50 MG/1
50 TABLET, FILM COATED ORAL DAILY
COMMUNITY
Start: 2024-04-09

## 2024-06-12 ENCOUNTER — PATIENT OUTREACH (OUTPATIENT)
Dept: PRIMARY CARE | Facility: CLINIC | Age: 54
End: 2024-06-12
Payer: MEDICARE

## 2024-06-12 NOTE — PROGRESS NOTES
Unable to reach patient for a F/U call , will see PCP 6/14 question GI F/U     LVM with call back number for patient to call if needed   If no voicemail available call attempts x 2 were made to contact the patient to assist with any questions or concerns patient may have.     L/M Patient encouraged to call providers for any questions concerns or change in condition

## 2024-06-14 ENCOUNTER — OFFICE VISIT (OUTPATIENT)
Dept: PRIMARY CARE | Facility: CLINIC | Age: 54
End: 2024-06-14
Payer: MEDICARE

## 2024-06-14 VITALS
BODY MASS INDEX: 22.48 KG/M2 | OXYGEN SATURATION: 99 % | HEIGHT: 72 IN | HEART RATE: 74 BPM | DIASTOLIC BLOOD PRESSURE: 66 MMHG | TEMPERATURE: 97.5 F | WEIGHT: 166 LBS | SYSTOLIC BLOOD PRESSURE: 105 MMHG

## 2024-06-14 DIAGNOSIS — R61 NIGHT SWEATS: ICD-10-CM

## 2024-06-14 DIAGNOSIS — K21.9 GASTROESOPHAGEAL REFLUX DISEASE WITHOUT ESOPHAGITIS: ICD-10-CM

## 2024-06-14 DIAGNOSIS — K70.31 ALCOHOLIC CIRRHOSIS OF LIVER WITH ASCITES (MULTI): Primary | ICD-10-CM

## 2024-06-14 DIAGNOSIS — I10 HYPERTENSION, UNSPECIFIED TYPE: ICD-10-CM

## 2024-06-14 DIAGNOSIS — F41.9 ANXIETY: ICD-10-CM

## 2024-06-14 PROCEDURE — 99214 OFFICE O/P EST MOD 30 MIN: CPT | Performed by: INTERNAL MEDICINE

## 2024-06-14 PROCEDURE — 3078F DIAST BP <80 MM HG: CPT | Performed by: INTERNAL MEDICINE

## 2024-06-14 PROCEDURE — 3008F BODY MASS INDEX DOCD: CPT | Performed by: INTERNAL MEDICINE

## 2024-06-14 PROCEDURE — 3074F SYST BP LT 130 MM HG: CPT | Performed by: INTERNAL MEDICINE

## 2024-06-14 RX ORDER — AMLODIPINE BESYLATE 5 MG/1
5 TABLET ORAL DAILY
Qty: 100 TABLET | Refills: 1 | Status: SHIPPED | OUTPATIENT
Start: 2024-06-14

## 2024-06-14 RX ORDER — FOLIC ACID 1 MG/1
1 TABLET ORAL DAILY
Qty: 30 TABLET | Refills: 0 | Status: SHIPPED | OUTPATIENT
Start: 2024-06-14 | End: 2024-07-14

## 2024-06-14 RX ORDER — OMEPRAZOLE 20 MG/1
20 CAPSULE, DELAYED RELEASE ORAL DAILY
Qty: 30 CAPSULE | Refills: 11 | Status: SHIPPED | OUTPATIENT
Start: 2024-06-14 | End: 2025-06-14

## 2024-06-14 RX ORDER — LOSARTAN POTASSIUM 100 MG/1
100 TABLET ORAL DAILY
Qty: 100 TABLET | Refills: 1 | Status: SHIPPED | OUTPATIENT
Start: 2024-06-14

## 2024-06-14 RX ORDER — METOPROLOL SUCCINATE 50 MG/1
50 TABLET, EXTENDED RELEASE ORAL DAILY
Qty: 90 TABLET | Refills: 1 | Status: SHIPPED | OUTPATIENT
Start: 2024-06-14

## 2024-06-14 ASSESSMENT — PAIN SCALES - GENERAL: PAINLEVEL: 0-NO PAIN

## 2024-06-14 NOTE — PROGRESS NOTES
[facility]: [facility]  PROGRESS NOTE      Aamir Garcia is a 54 y.o. male that is presenting today for Follow-up (TCM fu).    Assessment/Plan   Diagnoses and all orders for this visit:      Reviewed ED records  Alcoholic cirrhosis of liver with ascites (Multi)      Patient counseled - not planning to quit drinking   -     folic acid (Folvite) 1 mg tablet; Take 1 tablet (1 mg) by mouth once daily.  Hypertension, unspecified type       Under control with current treatment   Continue the same   Rx E-scripted 100 days x 1  -     metoprolol succinate XL (Toprol-XL) 50 mg 24 hr tablet; Take 1 tablet (50 mg) by mouth once daily. Do not crush or chew.  -     amLODIPine (Norvasc) 5 mg tablet; Take 1 tablet (5 mg) by mouth once daily.  -     losartan (Cozaar) 100 mg tablet; Take 1 tablet (100 mg) by mouth once daily.      Advised to hold Losartan when he has N/V   Anxiety      Re-assured and discussed how alcohol consumption will affect his anxiety in a negative way.        Under Psych care  Gastroesophageal reflux disease without esophagitis      Not well controlled with current treatment   Continue the same   Rx E-scripted 100 days x 1  -     omeprazole (PriLOSEC) 20 mg DR capsule; Take 1 capsule (20 mg) by mouth once daily. Do not crush or chew.  Night sweats      Normal CxR   -     Tsh With Reflex To Free T4 If Abnormal; Future  Other orders  -     Follow Up In Primary Care; Future  Subjective     - Aamir Garcia 54 y.o. male is here today for FUV after ED visit on 7/14/24 for Dehydration due to N/V due to alcohol intoxication was hydrated and D/[facility] home in stable condition. Today he's feeling well, unfortunately still drinking alcohol and has no intention or plans to quit.          - Patient denies any symptoms or concerns at this time.       - patient denies any adverse reactions to or concerns with his/her meds.       - Problem list and medication reconciliation done today.  - V.S. Stable. No changes  at this time.  - Encouraged continued diet and exercise modification.    Review of Systems   All pertinent POSITIVES as noted per HPI.  All other systems have been reviewed and are NEGATIVE and /or Noncontributory to this patient current visit or complaint.    Objective   Vitals:    06/14/24 1125   BP: 105/66   Pulse: 74   Temp: 36.4 °C (97.5 °F)   SpO2: 99%      Body mass index is 22.51 kg/m².  Physical Exam  Vitals and nursing note reviewed.   Constitutional:       Appearance: Normal appearance.   HENT:      Head: Normocephalic and atraumatic.   Neck:      Vascular: No carotid bruit.   Cardiovascular:      Rate and Rhythm: Normal rate and regular rhythm.      Pulses: Normal pulses.      Heart sounds: Normal heart sounds.   Pulmonary:      Effort: Pulmonary effort is normal.      Breath sounds: Normal breath sounds.   Abdominal:      General: Abdomen is flat. Bowel sounds are normal.      Palpations: Abdomen is soft.   Musculoskeletal:         General: No swelling. Normal range of motion.      Cervical back: Neck supple.   Lymphadenopathy:      Cervical: No cervical adenopathy.   Skin:     General: Skin is warm and dry.   Neurological:      Mental Status: He is alert.   Psychiatric:         Mood and Affect: Mood normal.     Diagnostic Results   Lab Results   Component Value Date    GLUCOSE 177 (H) 06/04/2024    CALCIUM 9.5 06/04/2024     06/04/2024    K 3.9 06/04/2024    CO2 29 06/04/2024     06/04/2024    BUN 17 06/04/2024    CREATININE 0.70 06/04/2024     Lab Results   Component Value Date    ALT 23 06/04/2024    AST 34 06/04/2024    ALKPHOS 271 (H) 06/04/2024    BILITOT 0.7 06/04/2024     Lab Results   Component Value Date    WBC 9.6 06/04/2024    HGB 11.8 (L) 06/04/2024    HCT 35.2 (L) 06/04/2024    MCV 96 06/04/2024     (L) 06/04/2024     Lab Results   Component Value Date    CHOL 209 (H) 01/24/2020     Lab Results   Component Value Date    HDL 30 (L) 01/24/2020     Lab Results   Component  "Value Date    LDLCALC 144 (H) 2020     Lab Results   Component Value Date    TRIG 173 (H) 2020     No components found for: \"CHOLHDL\"  Lab Results   Component Value Date    HGBA1C 5.5 2023     Other labs not included in the list above were reviewed either before or during this encounter.    History    Past Medical History:   Diagnosis Date    Alcohol dependence (Multi)     Alcoholic cirrhosis of liver with ascites (Multi)     GERD (gastroesophageal reflux disease)     Hyperlipidemia     Hypertension      Past Surgical History:   Procedure Laterality Date    PARACENTESIS      2x in 2024    SINUS SURGERY  2013    Sinus Surgery     Family History   Problem Relation Name Age of Onset    Hypertension Mother           age 64    Heart attack Father           at age 62    Other (Glioblastoma) Father      Diabetes Father      Cancer Father       Social History     Socioeconomic History    Marital status:      Spouse name: Not on file    Number of children: Not on file    Years of education: Not on file    Highest education level: Not on file   Occupational History    Not on file   Tobacco Use    Smoking status: Every Day     Current packs/day: 0.50     Types: Cigarettes     Passive exposure: Current    Smokeless tobacco: Never   Vaping Use    Vaping status: Some Days    Substances: Nicotine    Devices: Disposable, Pre-filled or refillable cartridge   Substance and Sexual Activity    Alcohol use: Not Currently     Comment: daily liquor    Drug use: Not Currently     Types: Marijuana    Sexual activity: Not on file   Other Topics Concern    Not on file   Social History Narrative    Not on file     Social Determinants of Health     Financial Resource Strain: Patient Unable To Answer (2024)    Overall Financial Resource Strain (CARDIA)     Difficulty of Paying Living Expenses: Patient unable to answer   Food Insecurity: No Food Insecurity (2024)    Hunger Vital Sign     " Worried About Running Out of Food in the Last Year: Never true     Ran Out of Food in the Last Year: Never true   Transportation Needs: Patient Unable To Answer (5/31/2024)    PRAPARE - Transportation     Lack of Transportation (Medical): Patient unable to answer     Lack of Transportation (Non-Medical): Patient unable to answer   Physical Activity: Inactive (4/23/2024)    Exercise Vital Sign     Days of Exercise per Week: 0 days     Minutes of Exercise per Session: 0 min   Stress: Stress Concern Present (4/23/2024)    [employer] - Occupational Stress Questionnaire     Feeling of Stress : To some extent   Social Connections: Socially Isolated (4/23/2024)    Social Connection and Isolation Panel [NHANES]     Frequency of Communication with Friends and Family: Three times a week     Frequency of Social Gatherings with Friends and Family: Three times a week     Attends Yarsani Services: Never     Active Member of Clubs or Organizations: No     Attends Club or Organization Meetings: Never     Marital Status:    Intimate Partner Violence: Not At Risk (4/23/2024)    Humiliation, Afraid, Rape, and Kick questionnaire     Fear of Current or Ex-Partner: No     Emotionally Abused: No     Physically Abused: No     Sexually Abused: No   Housing Stability: Patient Unable To Answer (5/31/2024)    Housing Stability Vital Sign     Unable to Pay for Housing in the Last Year: Patient unable to answer     Number of Places Lived in the Last Year: 1     Unstable Housing in the Last Year: Patient unable to answer   Recent Concern: Housing Stability - High Risk (3/28/2024)    Housing Stability Vital Sign     Unable to Pay for Housing in the Last Year: No     Number of Places Lived in the Last Year: 3     Unstable Housing in the Last Year: No     Allergies   Allergen Reactions    Clams Shortness of breath     Current Outpatient Medications on File Prior to Visit   Medication Sig Dispense Refill     amLODIPine (Norvasc) 5 mg tablet Take 1 tablet (5 mg) by mouth once daily.      buPROPion XL (Wellbutrin XL) 150 mg 24 hr tablet Take 1 tablet (150 mg) by mouth once daily in the morning. Take before meals. 30 tablet 0    EPINEPHrine 0.3 mg/0.3 mL injection syringe INJECT INTO THE MUSCLE ONCE AS NEEDED FOR ALLERGIC REACTIONS      escitalopram (Lexapro) 10 mg tablet Take 1 tablet (10 mg) by mouth once daily. 30 tablet 0    folic acid (Folvite) 1 mg tablet Take 1 tablet (1 mg) by mouth once daily. 30 tablet 0    furosemide (Lasix) 20 mg tablet Take 1 tablet (20 mg) by mouth once daily. 30 tablet 1    melatonin 5 mg tablet Take 1 tablet (5 mg) by mouth once daily. 90 tablet 0    methylphenidate LA (Ritalin LA) 10 mg 24 hr capsule Take 1 capsule (10 mg) by mouth once daily in the morning for 14 days. Do not crush or chew. 14 capsule 0    metoprolol succinate XL (Toprol-XL) 50 mg 24 hr tablet Take 1 tablet (50 mg) by mouth once daily. Do not crush or chew. 90 tablet 1    omeprazole (PriLOSEC) 20 mg DR capsule Take 1 capsule (20 mg) by mouth once daily. Do not crush or chew. 30 capsule 11    spironolactone (Aldactone) 50 mg tablet Take 1 tablet (50 mg) by mouth once daily. Pt taking every other day, due to increase of urination      losartan (Cozaar) 100 mg tablet Take 1 tablet (100 mg) by mouth.      [] methylPREDNISolone (Medrol) 4 mg tablet Take 4 tablets (16 mg) by mouth once daily for 1 day, THEN 3 tablets (12 mg) once daily for 1 day, THEN 2 tablets (8 mg) once daily for 1 day, THEN 1 tablet (4 mg) once daily for 1 day. 10 tablet 0    spironolactone (Aldactone) 25 mg tablet Take 1 tablet (25 mg) by mouth once daily. 30 tablet 0     No current facility-administered medications on file prior to visit.       There is no immunization history on file for this patient.  Patient's medical history was reviewed and updated either before or during this encounter.       Pascale Wood MD

## 2024-06-17 ENCOUNTER — APPOINTMENT (OUTPATIENT)
Dept: PRIMARY CARE | Facility: CLINIC | Age: 54
End: 2024-06-17
Payer: MEDICARE

## 2024-06-17 ENCOUNTER — LAB (OUTPATIENT)
Dept: LAB | Facility: LAB | Age: 54
End: 2024-06-17
Payer: MEDICARE

## 2024-06-17 DIAGNOSIS — K70.30 ALCOHOLIC CIRRHOSIS OF LIVER WITHOUT ASCITES (MULTI): ICD-10-CM

## 2024-06-17 DIAGNOSIS — R61 NIGHT SWEATS: ICD-10-CM

## 2024-06-17 LAB
ALBUMIN SERPL-MCNC: 4.4 G/DL (ref 3.5–5)
ALP BLD-CCNC: 285 U/L (ref 35–125)
ALT SERPL-CCNC: 29 U/L (ref 5–40)
ANION GAP SERPL CALC-SCNC: 13 MMOL/L
AST SERPL-CCNC: 35 U/L (ref 5–40)
BILIRUB SERPL-MCNC: 1.3 MG/DL (ref 0.1–1.2)
BUN SERPL-MCNC: 11 MG/DL (ref 8–25)
CALCIUM SERPL-MCNC: 10.4 MG/DL (ref 8.5–10.4)
CHLORIDE SERPL-SCNC: 97 MMOL/L (ref 97–107)
CO2 SERPL-SCNC: 26 MMOL/L (ref 24–31)
CREAT SERPL-MCNC: 0.8 MG/DL (ref 0.4–1.6)
EGFRCR SERPLBLD CKD-EPI 2021: >90 ML/MIN/1.73M*2
GLUCOSE SERPL-MCNC: 110 MG/DL (ref 65–99)
MAGNESIUM SERPL-MCNC: 1.8 MG/DL (ref 1.6–3.1)
POTASSIUM SERPL-SCNC: 3.9 MMOL/L (ref 3.4–5.1)
PROT SERPL-MCNC: 7.7 G/DL (ref 5.9–7.9)
SODIUM SERPL-SCNC: 136 MMOL/L (ref 133–145)
TSH SERPL DL<=0.05 MIU/L-ACNC: 1.47 MIU/L (ref 0.27–4.2)

## 2024-06-17 PROCEDURE — 80053 COMPREHEN METABOLIC PANEL: CPT

## 2024-06-17 PROCEDURE — 84443 ASSAY THYROID STIM HORMONE: CPT

## 2024-06-17 PROCEDURE — 83735 ASSAY OF MAGNESIUM: CPT

## 2024-06-17 PROCEDURE — 36415 COLL VENOUS BLD VENIPUNCTURE: CPT

## 2024-06-19 ENCOUNTER — PATIENT OUTREACH (OUTPATIENT)
Dept: PRIMARY CARE | Facility: CLINIC | Age: 54
End: 2024-06-19
Payer: MEDICARE

## 2024-06-19 NOTE — PROGRESS NOTES
Unable to reach patient for call back after patient's follow up appointment with PCP on 6/14/2024     [facility] with call back number for patient to call if needed   If no voicemail available call attempts x 2 were made to contact the patient to assist with any questions or concerns patient may have.     L/M encouraged patient to call providers for any questions concerns or change in condition

## 2024-06-27 ENCOUNTER — LAB (OUTPATIENT)
Dept: LAB | Facility: LAB | Age: 54
End: 2024-06-27
Payer: MEDICARE

## 2024-06-27 ENCOUNTER — PATIENT MESSAGE (OUTPATIENT)
Dept: PRIMARY CARE | Facility: CLINIC | Age: 54
End: 2024-06-27

## 2024-06-27 DIAGNOSIS — K70.31 ALCOHOLIC CIRRHOSIS OF LIVER WITH ASCITES (MULTI): ICD-10-CM

## 2024-06-27 DIAGNOSIS — F41.9 ANXIETY: ICD-10-CM

## 2024-06-27 DIAGNOSIS — K74.60 UNSPECIFIED CIRRHOSIS OF LIVER (MULTI): Primary | ICD-10-CM

## 2024-06-27 LAB
ANION GAP SERPL CALC-SCNC: 14 MMOL/L
BUN SERPL-MCNC: 9 MG/DL (ref 8–25)
CALCIUM SERPL-MCNC: 10.6 MG/DL (ref 8.5–10.4)
CHLORIDE SERPL-SCNC: 96 MMOL/L (ref 97–107)
CO2 SERPL-SCNC: 31 MMOL/L (ref 24–31)
CREAT SERPL-MCNC: 0.7 MG/DL (ref 0.4–1.6)
EGFRCR SERPLBLD CKD-EPI 2021: >90 ML/MIN/1.73M*2
GLUCOSE SERPL-MCNC: 146 MG/DL (ref 65–99)
POTASSIUM SERPL-SCNC: 4.7 MMOL/L (ref 3.4–5.1)
SODIUM SERPL-SCNC: 141 MMOL/L (ref 133–145)

## 2024-06-27 PROCEDURE — 80048 BASIC METABOLIC PNL TOTAL CA: CPT

## 2024-06-27 PROCEDURE — 36415 COLL VENOUS BLD VENIPUNCTURE: CPT

## 2024-06-28 ENCOUNTER — PATIENT OUTREACH (OUTPATIENT)
Dept: PRIMARY CARE | Facility: CLINIC | Age: 54
End: 2024-06-28
Payer: MEDICARE

## 2024-06-28 RX ORDER — BUPROPION HYDROCHLORIDE 150 MG/1
150 TABLET ORAL
Qty: 90 TABLET | Refills: 0 | Status: SHIPPED | OUTPATIENT
Start: 2024-06-28

## 2024-06-28 RX ORDER — ESCITALOPRAM OXALATE 10 MG/1
10 TABLET ORAL DAILY
Qty: 90 TABLET | Refills: 0 | Status: SHIPPED | OUTPATIENT
Start: 2024-06-28

## 2024-06-28 NOTE — PROGRESS NOTES
Call regarding appt. with PCP on 6/14/2024 after hospitalization.    At time of outreach call the patient feels as if their condition has improved since last visit.    Reviewed the PCP appointment with the pt and addressed any questions or concerns.     Encouraged patient to call providers for any questions concerns or change in condition

## 2024-07-03 DIAGNOSIS — M10.9 GOUTY ARTHRITIS: Primary | ICD-10-CM

## 2024-07-03 RX ORDER — COLCHICINE 0.6 MG/1
0.6 TABLET ORAL 2 TIMES DAILY
Qty: 60 TABLET | Refills: 0 | Status: SHIPPED | OUTPATIENT
Start: 2024-07-03 | End: 2024-08-02

## 2024-07-14 ENCOUNTER — APPOINTMENT (OUTPATIENT)
Dept: RADIOLOGY | Facility: HOSPITAL | Age: 54
End: 2024-07-14
Payer: MEDICARE

## 2024-07-14 ENCOUNTER — HOSPITAL ENCOUNTER (EMERGENCY)
Facility: HOSPITAL | Age: 54
Discharge: HOME | End: 2024-07-14
Attending: STUDENT IN AN ORGANIZED HEALTH CARE EDUCATION/TRAINING PROGRAM
Payer: MEDICARE

## 2024-07-14 VITALS
DIASTOLIC BLOOD PRESSURE: 85 MMHG | BODY MASS INDEX: 20.9 KG/M2 | OXYGEN SATURATION: 97 % | RESPIRATION RATE: 18 BRPM | SYSTOLIC BLOOD PRESSURE: 123 MMHG | WEIGHT: 154.32 LBS | HEIGHT: 72 IN | HEART RATE: 90 BPM | TEMPERATURE: 97.9 F

## 2024-07-14 DIAGNOSIS — R11.2 NAUSEA AND VOMITING, UNSPECIFIED VOMITING TYPE: Primary | ICD-10-CM

## 2024-07-14 DIAGNOSIS — F10.10 ALCOHOL ABUSE: ICD-10-CM

## 2024-07-14 LAB
ABO GROUP (TYPE) IN BLOOD: NORMAL
ALBUMIN SERPL-MCNC: 4.8 G/DL (ref 3.5–5)
ALP BLD-CCNC: 296 U/L (ref 35–125)
ALT SERPL-CCNC: 43 U/L (ref 5–40)
ANION GAP SERPL CALC-SCNC: 17 MMOL/L
ANTIBODY SCREEN: NORMAL
AST SERPL-CCNC: 96 U/L (ref 5–40)
BASOPHILS # BLD AUTO: 0.08 X10*3/UL (ref 0–0.1)
BASOPHILS NFR BLD AUTO: 0.8 %
BILIRUB SERPL-MCNC: 1.2 MG/DL (ref 0.1–1.2)
BUN SERPL-MCNC: 19 MG/DL (ref 8–25)
CALCIUM SERPL-MCNC: 10.1 MG/DL (ref 8.5–10.4)
CHLORIDE SERPL-SCNC: 89 MMOL/L (ref 97–107)
CO2 SERPL-SCNC: 28 MMOL/L (ref 24–31)
CREAT SERPL-MCNC: 0.8 MG/DL (ref 0.4–1.6)
EGFRCR SERPLBLD CKD-EPI 2021: >90 ML/MIN/1.73M*2
EOSINOPHIL # BLD AUTO: 0.06 X10*3/UL (ref 0–0.7)
EOSINOPHIL NFR BLD AUTO: 0.6 %
ERYTHROCYTE [DISTWIDTH] IN BLOOD BY AUTOMATED COUNT: 13.8 % (ref 11.5–14.5)
ETHANOL SERPL-MCNC: 0.3 G/DL
GLUCOSE SERPL-MCNC: 133 MG/DL (ref 65–99)
HCT VFR BLD AUTO: 39.9 % (ref 41–52)
HGB BLD-MCNC: 14.1 G/DL (ref 13.5–17.5)
IMM GRANULOCYTES # BLD AUTO: 0.03 X10*3/UL (ref 0–0.7)
IMM GRANULOCYTES NFR BLD AUTO: 0.3 % (ref 0–0.9)
INR PPP: 1.2 (ref 0.9–1.2)
LIPASE SERPL-CCNC: 92 U/L (ref 16–63)
LYMPHOCYTES # BLD AUTO: 2.89 X10*3/UL (ref 1.2–4.8)
LYMPHOCYTES NFR BLD AUTO: 28.5 %
MAGNESIUM SERPL-MCNC: 1.6 MG/DL (ref 1.6–3.1)
MCH RBC QN AUTO: 32.1 PG (ref 26–34)
MCHC RBC AUTO-ENTMCNC: 35.3 G/DL (ref 32–36)
MCV RBC AUTO: 91 FL (ref 80–100)
MONOCYTES # BLD AUTO: 1.09 X10*3/UL (ref 0.1–1)
MONOCYTES NFR BLD AUTO: 10.8 %
NEUTROPHILS # BLD AUTO: 5.98 X10*3/UL (ref 1.2–7.7)
NEUTROPHILS NFR BLD AUTO: 59 %
NRBC BLD-RTO: 0 /100 WBCS (ref 0–0)
PLATELET # BLD AUTO: 157 X10*3/UL (ref 150–450)
POTASSIUM SERPL-SCNC: 3.3 MMOL/L (ref 3.4–5.1)
PROT SERPL-MCNC: 8.6 G/DL (ref 5.9–7.9)
PROTHROMBIN TIME: 11.9 SECONDS (ref 9.3–12.7)
RBC # BLD AUTO: 4.39 X10*6/UL (ref 4.5–5.9)
RH FACTOR (ANTIGEN D): NORMAL
SODIUM SERPL-SCNC: 134 MMOL/L (ref 133–145)
WBC # BLD AUTO: 10.1 X10*3/UL (ref 4.4–11.3)

## 2024-07-14 PROCEDURE — 71046 X-RAY EXAM CHEST 2 VIEWS: CPT | Performed by: STUDENT IN AN ORGANIZED HEALTH CARE EDUCATION/TRAINING PROGRAM

## 2024-07-14 PROCEDURE — 96374 THER/PROPH/DIAG INJ IV PUSH: CPT

## 2024-07-14 PROCEDURE — 96375 TX/PRO/DX INJ NEW DRUG ADDON: CPT

## 2024-07-14 PROCEDURE — 83690 ASSAY OF LIPASE: CPT | Performed by: PHYSICIAN ASSISTANT

## 2024-07-14 PROCEDURE — C9113 INJ PANTOPRAZOLE SODIUM, VIA: HCPCS | Performed by: PHYSICIAN ASSISTANT

## 2024-07-14 PROCEDURE — 82077 ASSAY SPEC XCP UR&BREATH IA: CPT | Performed by: PHYSICIAN ASSISTANT

## 2024-07-14 PROCEDURE — 85610 PROTHROMBIN TIME: CPT | Performed by: PHYSICIAN ASSISTANT

## 2024-07-14 PROCEDURE — 85025 COMPLETE CBC W/AUTO DIFF WBC: CPT | Performed by: PHYSICIAN ASSISTANT

## 2024-07-14 PROCEDURE — 71046 X-RAY EXAM CHEST 2 VIEWS: CPT

## 2024-07-14 PROCEDURE — 2500000004 HC RX 250 GENERAL PHARMACY W/ HCPCS (ALT 636 FOR OP/ED): Performed by: PHYSICIAN ASSISTANT

## 2024-07-14 PROCEDURE — 86900 BLOOD TYPING SEROLOGIC ABO: CPT | Performed by: PHYSICIAN ASSISTANT

## 2024-07-14 PROCEDURE — 99284 EMERGENCY DEPT VISIT MOD MDM: CPT | Mod: 25

## 2024-07-14 PROCEDURE — 84075 ASSAY ALKALINE PHOSPHATASE: CPT | Performed by: PHYSICIAN ASSISTANT

## 2024-07-14 PROCEDURE — 83735 ASSAY OF MAGNESIUM: CPT | Performed by: PHYSICIAN ASSISTANT

## 2024-07-14 PROCEDURE — 36415 COLL VENOUS BLD VENIPUNCTURE: CPT | Performed by: PHYSICIAN ASSISTANT

## 2024-07-14 RX ORDER — PANTOPRAZOLE SODIUM 40 MG/10ML
80 INJECTION, POWDER, LYOPHILIZED, FOR SOLUTION INTRAVENOUS ONCE
Status: COMPLETED | OUTPATIENT
Start: 2024-07-14 | End: 2024-07-14

## 2024-07-14 RX ORDER — ONDANSETRON HYDROCHLORIDE 2 MG/ML
4 INJECTION, SOLUTION INTRAVENOUS ONCE
Status: COMPLETED | OUTPATIENT
Start: 2024-07-14 | End: 2024-07-14

## 2024-07-14 RX ORDER — ESOMEPRAZOLE MAGNESIUM 40 MG/1
40 CAPSULE, DELAYED RELEASE ORAL
Qty: 30 CAPSULE | Refills: 0 | Status: SHIPPED | OUTPATIENT
Start: 2024-07-14 | End: 2024-08-13

## 2024-07-14 ASSESSMENT — PAIN - FUNCTIONAL ASSESSMENT: PAIN_FUNCTIONAL_ASSESSMENT: 0-10

## 2024-07-14 ASSESSMENT — PAIN SCALES - GENERAL
PAINLEVEL_OUTOF10: 0 - NO PAIN

## 2024-07-14 NOTE — ED TRIAGE NOTES
Cirrhosis and liver disease related to alcoholism, patient admits to still using etoh. RLQ pain with nausea and vomiting and diarrhea. Patient admits to dark black stool and coffee ground emesis. Denies any falls or trauma. Patient has history of ascites and has had this drained 4 times this year. No abd distention noted. Patient is losing weight without trying. Patient does use THC. Patient admits to still drinking about 1 pint per day of 40 proof vodka with a few beers. Patient is aox4 on arrival. Patient feels safe at home, daughter at bedside with patient.

## 2024-07-14 NOTE — ED PROVIDER NOTES
ED Supervising Attending Note & Attestation   I was the Supervising Physician. I have seen face to face and examined the patient; reviewed history and physical, performed a substantive portion of the encounter, and discussed the medical decision making with the Medical Student, Resident, Fellow, PA and/or NP.     I personally saw the patient and made/approve the management plan and take responsibility for the patient management:     54-year-old male with past medical history of alcohol abuse who presents emergency room with nausea and vomiting.  Patient has been having nausea and vomiting for the last 2 days but today notes no vomiting.  Patient notes last drink was today.  He otherwise does note some black stools but no significant diarrhea, constipation, shortness of breath, abdominal pain, chest pain or headache.    ED Triage Vitals [07/14/24 1323]   Temperature Heart Rate Respirations BP   36.6 °C (97.9 °F) 96 18 118/80      Pulse Ox Temp Source Heart Rate Source Patient Position   95 % Oral Monitor Sitting      BP Location FiO2 (%)     Right arm --       Vital signs reviewed: Afebrile, nontachycardic, normotensive, 95% on room air    Exam     Constitutional: No acute distress. Resting comfortably.   Head: Normocephalic, atraumatic.   Eyes: Pupils equal bilaterally, EOM grossly intact, conjunctiva normal.  Mouth/Throat: Oropharynx is clear, moist mucus membranes.   Neck: Supple. No lymphadenopathy.  Cardiovascular: Regular rate and regular rhythm. Extremities are well-perfused.   Pulmonary/Chest: No respiratory distress, breathing comfortably on room air.    Abdominal: Soft, non-tender, non-distended. No rebound or guarding.   Rectal: Brown stool appreciated, normal tone  Musculoskeletal: No lower extremity edema.       Skin: Warm, dry, and intact.   Neurological: Patient is oriented to person, place, time, and situation. Face symmetric, hearing intact to voice, speech normal. Moves all extremities.      Differential includes but is not limited to:  Upper GI bleed versus lower GI bleed versus pancreatitis versus alcoholic gastritis    Labs: ordered.  Labs Reviewed   CBC WITH AUTO DIFFERENTIAL - Abnormal       Result Value    WBC 10.1      nRBC 0.0      RBC 4.39 (*)     Hemoglobin 14.1      Hematocrit 39.9 (*)     MCV 91      MCH 32.1      MCHC 35.3      RDW 13.8      Platelets 157      Neutrophils % 59.0      Immature Granulocytes %, Automated 0.3      Lymphocytes % 28.5      Monocytes % 10.8      Eosinophils % 0.6      Basophils % 0.8      Neutrophils Absolute 5.98      Immature Granulocytes Absolute, Automated 0.03      Lymphocytes Absolute 2.89      Monocytes Absolute 1.09 (*)     Eosinophils Absolute 0.06      Basophils Absolute 0.08     COMPREHENSIVE METABOLIC PANEL - Abnormal    Glucose 133 (*)     Sodium 134      Potassium 3.3 (*)     Chloride 89 (*)     Bicarbonate 28      Urea Nitrogen 19      Creatinine 0.80      eGFR >90      Calcium 10.1      Albumin 4.8      Alkaline Phosphatase 296 (*)     Total Protein 8.6 (*)     AST 96 (*)     Bilirubin, Total 1.2      ALT 43 (*)     Anion Gap 17     LIPASE - Abnormal    Lipase 92 (*)    ALCOHOL - Abnormal    Alcohol 0.296 (*)    PROTIME-INR - Normal    Protime 11.9      INR 1.2      Narrative:     INR Therapeutic Range: 2.0-3.5   MAGNESIUM - Normal    Magnesium 1.60     TYPE AND SCREEN    ABO TYPE O      Rh TYPE POS      ANTIBODY SCREEN NEG         Radiology: ordered and independent interpretation performed.  Xray(s) chest x-rays interpreted by me shows no large pneumothorax at this time.    ECG/medicine tests: ordered and independent interpretation performed.  Diagnoses as of 07/14/24 1525   Nausea and vomiting, unspecified vomiting type   Alcohol abuse     Lab work as interpreted by me shows no significant leukocytosis or anemia on CBC.  Hemoglobin is 14 which is improved from 1 month prior.  Comprehensive metabolic panel otherwise shows mild hypokalemia  hypochloremia likely in the setting of nausea and vomiting, no significant DIMITRI, and mild AST and ALT elevations consistent with 2:1 ratio and a alcoholic.  Lipase otherwise mildly elevated and alcohol is significantly elevated to 0.296.    Patient does not appear to be clinically intoxicated at this time.  Given fluids, Zofran, and Protonix with improvement of his symptoms.    Chest x-ray per radiology with no acute findings.  Patient otherwise with no emesis here in the emergency room and otherwise tolerating oral.    Patient feels comfortable to follow-up outpatient with gastroenterology, will give prescription for PPI at this time.  Patient also does have follow-up outpatient to arrange detox, patient is competent and comfortable at this time and feels as though he has a great support group at home.    Return precautions regarding worsening nausea and vomiting, bright red blood per rectum or per oral expressed and will return as appropriate.    PLAN AND FOLLOW-UP: Patient counseled on all findings, diagnosis and treatment plan. Patient's questions and concerns addressed. Patient stable, discharged with instructions to follow up with PMD, and to return to ED at any time for worsening symptoms or any other concerns. Patient demonstrates understanding of the findings and the importance of appropriate follow up care.    ED Medications managed:    Medications   ondansetron (Zofran) injection 4 mg (4 mg intravenous Given 7/14/24 1345)   pantoprazole (ProtoNix) injection 80 mg (80 mg intravenous Given 7/14/24 1345)   sodium chloride 0.9 % bolus 1,000 mL (0 mL intravenous Stopped 7/14/24 1418)       PATIENT REFERRED TO:  Manasa Swenson MD  [address]  [facility] [address]  [facility] [id number] [phone]            DISCHARGE MEDICATIONS:  New Prescriptions    ESOMEPRAZOLE (NEXIUM) 40 MG DR CAPSULE    Take 1 capsule (40 mg) by mouth once daily in the morning. Take before meals. Do not open capsule.       Phil Dasilva MD  3:25  PM    Attending Emergency Physician  [facility]             Phil Dasilva MD  07/14/24 [id number]

## 2024-08-29 ENCOUNTER — PATIENT OUTREACH (OUTPATIENT)
Dept: PRIMARY CARE | Facility: CLINIC | Age: 54
End: 2024-08-29
Payer: MEDICARE

## 2024-08-29 NOTE — PROGRESS NOTES
Call regarding F/U     At time of outreach call the patient feels as if their condition has improved since last visit.    Encouraged patient to call providers for any questions concerns or change in condition

## 2024-09-08 ENCOUNTER — HOSPITAL ENCOUNTER (INPATIENT)
Facility: HOSPITAL | Age: 54
LOS: 3 days | Discharge: HOME | DRG: 433 | End: 2024-09-11
Attending: EMERGENCY MEDICINE | Admitting: HOSPITALIST
Payer: MEDICARE

## 2024-09-08 ENCOUNTER — APPOINTMENT (OUTPATIENT)
Dept: RADIOLOGY | Facility: HOSPITAL | Age: 54
DRG: 433 | End: 2024-09-08
Payer: MEDICARE

## 2024-09-08 DIAGNOSIS — R10.84 GENERALIZED ABDOMINAL PAIN: ICD-10-CM

## 2024-09-08 DIAGNOSIS — K70.31 ALCOHOLIC CIRRHOSIS OF LIVER WITH ASCITES (MULTI): ICD-10-CM

## 2024-09-08 DIAGNOSIS — I10 HYPERTENSION, UNSPECIFIED TYPE: ICD-10-CM

## 2024-09-08 DIAGNOSIS — K70.30 ALCOHOLIC CIRRHOSIS OF LIVER WITHOUT ASCITES (MULTI): ICD-10-CM

## 2024-09-08 DIAGNOSIS — K70.31 ASCITES DUE TO ALCOHOLIC CIRRHOSIS (MULTI): Primary | ICD-10-CM

## 2024-09-08 LAB
ALBUMIN SERPL-MCNC: 3.9 G/DL (ref 3.5–5)
ALP BLD-CCNC: 227 U/L (ref 35–125)
ALT SERPL-CCNC: 26 U/L (ref 5–40)
ANION GAP SERPL CALC-SCNC: 10 MMOL/L
APPEARANCE UR: CLEAR
AST SERPL-CCNC: 45 U/L (ref 5–40)
BASOPHILS # BLD AUTO: 0.06 X10*3/UL (ref 0–0.1)
BASOPHILS NFR BLD AUTO: 0.7 %
BILIRUB SERPL-MCNC: 1.2 MG/DL (ref 0.1–1.2)
BILIRUB UR STRIP.AUTO-MCNC: NEGATIVE MG/DL
BUN SERPL-MCNC: 8 MG/DL (ref 8–25)
CALCIUM SERPL-MCNC: 9.7 MG/DL (ref 8.5–10.4)
CHLORIDE SERPL-SCNC: 99 MMOL/L (ref 97–107)
CO2 SERPL-SCNC: 26 MMOL/L (ref 24–31)
COLOR UR: ABNORMAL
CREAT SERPL-MCNC: 0.8 MG/DL (ref 0.4–1.6)
EGFRCR SERPLBLD CKD-EPI 2021: >90 ML/MIN/1.73M*2
EOSINOPHIL # BLD AUTO: 0.09 X10*3/UL (ref 0–0.7)
EOSINOPHIL NFR BLD AUTO: 1.1 %
ERYTHROCYTE [DISTWIDTH] IN BLOOD BY AUTOMATED COUNT: 15.1 % (ref 11.5–14.5)
GLUCOSE SERPL-MCNC: 140 MG/DL (ref 65–99)
GLUCOSE UR STRIP.AUTO-MCNC: NORMAL MG/DL
HCT VFR BLD AUTO: 40.7 % (ref 41–52)
HGB BLD-MCNC: 13.4 G/DL (ref 13.5–17.5)
IMM GRANULOCYTES # BLD AUTO: 0.04 X10*3/UL (ref 0–0.7)
IMM GRANULOCYTES NFR BLD AUTO: 0.5 % (ref 0–0.9)
KETONES UR STRIP.AUTO-MCNC: NEGATIVE MG/DL
LEUKOCYTE ESTERASE UR QL STRIP.AUTO: NEGATIVE
LIPASE SERPL-CCNC: 44 U/L (ref 16–63)
LYMPHOCYTES # BLD AUTO: 1.85 X10*3/UL (ref 1.2–4.8)
LYMPHOCYTES NFR BLD AUTO: 22.9 %
MCH RBC QN AUTO: 31.5 PG (ref 26–34)
MCHC RBC AUTO-ENTMCNC: 32.9 G/DL (ref 32–36)
MCV RBC AUTO: 96 FL (ref 80–100)
MONOCYTES # BLD AUTO: 0.8 X10*3/UL (ref 0.1–1)
MONOCYTES NFR BLD AUTO: 9.9 %
NEUTROPHILS # BLD AUTO: 5.23 X10*3/UL (ref 1.2–7.7)
NEUTROPHILS NFR BLD AUTO: 64.9 %
NITRITE UR QL STRIP.AUTO: NEGATIVE
NRBC BLD-RTO: 0 /100 WBCS (ref 0–0)
PH UR STRIP.AUTO: 6 [PH]
PLATELET # BLD AUTO: 172 X10*3/UL (ref 150–450)
POTASSIUM SERPL-SCNC: 3.8 MMOL/L (ref 3.4–5.1)
PROT SERPL-MCNC: 7.6 G/DL (ref 5.9–7.9)
PROT UR STRIP.AUTO-MCNC: NEGATIVE MG/DL
RBC # BLD AUTO: 4.26 X10*6/UL (ref 4.5–5.9)
RBC # UR STRIP.AUTO: NEGATIVE /UL
SODIUM SERPL-SCNC: 135 MMOL/L (ref 133–145)
SP GR UR STRIP.AUTO: 1.01
UROBILINOGEN UR STRIP.AUTO-MCNC: ABNORMAL MG/DL
WBC # BLD AUTO: 8.1 X10*3/UL (ref 4.4–11.3)

## 2024-09-08 PROCEDURE — 2500000004 HC RX 250 GENERAL PHARMACY W/ HCPCS (ALT 636 FOR OP/ED): Performed by: NURSE PRACTITIONER

## 2024-09-08 PROCEDURE — 80053 COMPREHEN METABOLIC PANEL: CPT | Performed by: EMERGENCY MEDICINE

## 2024-09-08 PROCEDURE — 96374 THER/PROPH/DIAG INJ IV PUSH: CPT

## 2024-09-08 PROCEDURE — 99285 EMERGENCY DEPT VISIT HI MDM: CPT | Mod: 25

## 2024-09-08 PROCEDURE — 1100000001 HC PRIVATE ROOM DAILY

## 2024-09-08 PROCEDURE — 85025 COMPLETE CBC W/AUTO DIFF WBC: CPT | Performed by: EMERGENCY MEDICINE

## 2024-09-08 PROCEDURE — 36415 COLL VENOUS BLD VENIPUNCTURE: CPT | Performed by: EMERGENCY MEDICINE

## 2024-09-08 PROCEDURE — 93010 ELECTROCARDIOGRAM REPORT: CPT | Performed by: INTERNAL MEDICINE

## 2024-09-08 PROCEDURE — 76705 ECHO EXAM OF ABDOMEN: CPT

## 2024-09-08 PROCEDURE — 2500000001 HC RX 250 WO HCPCS SELF ADMINISTERED DRUGS (ALT 637 FOR MEDICARE OP): Performed by: REGISTERED NURSE

## 2024-09-08 PROCEDURE — 2500000004 HC RX 250 GENERAL PHARMACY W/ HCPCS (ALT 636 FOR OP/ED): Performed by: REGISTERED NURSE

## 2024-09-08 PROCEDURE — 83690 ASSAY OF LIPASE: CPT | Performed by: EMERGENCY MEDICINE

## 2024-09-08 PROCEDURE — 76705 ECHO EXAM OF ABDOMEN: CPT | Performed by: RADIOLOGY

## 2024-09-08 PROCEDURE — 81003 URINALYSIS AUTO W/O SCOPE: CPT | Performed by: EMERGENCY MEDICINE

## 2024-09-08 PROCEDURE — 2500000004 HC RX 250 GENERAL PHARMACY W/ HCPCS (ALT 636 FOR OP/ED): Performed by: EMERGENCY MEDICINE

## 2024-09-08 RX ORDER — KETOROLAC TROMETHAMINE 30 MG/ML
30 INJECTION, SOLUTION INTRAMUSCULAR; INTRAVENOUS EVERY 6 HOURS PRN
Status: DISCONTINUED | OUTPATIENT
Start: 2024-09-08 | End: 2024-09-10

## 2024-09-08 RX ORDER — BUPROPION HYDROCHLORIDE 150 MG/1
150 TABLET ORAL
Status: DISCONTINUED | OUTPATIENT
Start: 2024-09-09 | End: 2024-09-11 | Stop reason: HOSPADM

## 2024-09-08 RX ORDER — TALC
6 POWDER (GRAM) TOPICAL NIGHTLY PRN
Status: DISCONTINUED | OUTPATIENT
Start: 2024-09-08 | End: 2024-09-11 | Stop reason: HOSPADM

## 2024-09-08 RX ORDER — ONDANSETRON 4 MG/1
4 TABLET, ORALLY DISINTEGRATING ORAL EVERY 8 HOURS PRN
Status: DISCONTINUED | OUTPATIENT
Start: 2024-09-08 | End: 2024-09-11 | Stop reason: HOSPADM

## 2024-09-08 RX ORDER — CEFTRIAXONE 2 G/50ML
2 INJECTION, SOLUTION INTRAVENOUS EVERY 24 HOURS
Status: DISCONTINUED | OUTPATIENT
Start: 2024-09-08 | End: 2024-09-10

## 2024-09-08 RX ORDER — FENTANYL CITRATE 50 UG/ML
50 INJECTION, SOLUTION INTRAMUSCULAR; INTRAVENOUS ONCE
Status: COMPLETED | OUTPATIENT
Start: 2024-09-08 | End: 2024-09-08

## 2024-09-08 RX ORDER — ESCITALOPRAM OXALATE 10 MG/1
10 TABLET ORAL DAILY
Status: DISCONTINUED | OUTPATIENT
Start: 2024-09-09 | End: 2024-09-11 | Stop reason: HOSPADM

## 2024-09-08 RX ORDER — PANTOPRAZOLE SODIUM 40 MG/1
40 TABLET, DELAYED RELEASE ORAL
Status: DISCONTINUED | OUTPATIENT
Start: 2024-09-09 | End: 2024-09-11 | Stop reason: HOSPADM

## 2024-09-08 RX ORDER — SPIRONOLACTONE 50 MG/1
50 TABLET, FILM COATED ORAL DAILY
Status: DISCONTINUED | OUTPATIENT
Start: 2024-09-09 | End: 2024-09-10

## 2024-09-08 RX ORDER — FUROSEMIDE 20 MG/1
20 TABLET ORAL DAILY
Status: DISCONTINUED | OUTPATIENT
Start: 2024-09-09 | End: 2024-09-10

## 2024-09-08 RX ORDER — AMLODIPINE BESYLATE 5 MG/1
5 TABLET ORAL DAILY
Status: DISCONTINUED | OUTPATIENT
Start: 2024-09-09 | End: 2024-09-11 | Stop reason: HOSPADM

## 2024-09-08 RX ORDER — ONDANSETRON HYDROCHLORIDE 2 MG/ML
4 INJECTION, SOLUTION INTRAVENOUS EVERY 8 HOURS PRN
Status: DISCONTINUED | OUTPATIENT
Start: 2024-09-08 | End: 2024-09-11 | Stop reason: HOSPADM

## 2024-09-08 RX ORDER — METOPROLOL SUCCINATE 50 MG/1
50 TABLET, EXTENDED RELEASE ORAL DAILY
Status: DISCONTINUED | OUTPATIENT
Start: 2024-09-09 | End: 2024-09-11 | Stop reason: HOSPADM

## 2024-09-08 RX ORDER — POLYETHYLENE GLYCOL 3350 17 G/17G
17 POWDER, FOR SOLUTION ORAL DAILY
Status: DISCONTINUED | OUTPATIENT
Start: 2024-09-09 | End: 2024-09-11 | Stop reason: HOSPADM

## 2024-09-08 SDOH — SOCIAL STABILITY: SOCIAL INSECURITY: WITHIN THE LAST YEAR, HAVE YOU BEEN AFRAID OF YOUR PARTNER OR EX-PARTNER?: NO

## 2024-09-08 SDOH — ECONOMIC STABILITY: FOOD INSECURITY: WITHIN THE PAST 12 MONTHS, YOU WORRIED THAT YOUR FOOD WOULD RUN OUT BEFORE YOU GOT MONEY TO BUY MORE.: NEVER TRUE

## 2024-09-08 SDOH — SOCIAL STABILITY: SOCIAL INSECURITY: HAVE YOU HAD THOUGHTS OF HARMING ANYONE ELSE?: NO

## 2024-09-08 SDOH — ECONOMIC STABILITY: INCOME INSECURITY: IN THE PAST 12 MONTHS, HAS THE ELECTRIC, GAS, OIL, OR WATER COMPANY THREATENED TO SHUT OFF SERVICE IN YOUR HOME?: NO

## 2024-09-08 SDOH — HEALTH STABILITY: MENTAL HEALTH
STRESS IS WHEN SOMEONE FEELS TENSE, NERVOUS, ANXIOUS, OR CAN'T SLEEP AT NIGHT BECAUSE THEIR MIND IS TROUBLED. HOW STRESSED ARE YOU?: PATIENT DECLINED

## 2024-09-08 SDOH — SOCIAL STABILITY: SOCIAL NETWORK: HOW OFTEN DO YOU GET TOGETHER WITH FRIENDS OR RELATIVES?: PATIENT DECLINED

## 2024-09-08 SDOH — SOCIAL STABILITY: SOCIAL INSECURITY: DO YOU FEEL UNSAFE GOING BACK TO THE PLACE WHERE YOU ARE LIVING?: NO

## 2024-09-08 SDOH — SOCIAL STABILITY: SOCIAL INSECURITY: ARE YOU OR HAVE YOU BEEN THREATENED OR ABUSED PHYSICALLY, EMOTIONALLY, OR SEXUALLY BY ANYONE?: NO

## 2024-09-08 SDOH — SOCIAL STABILITY: SOCIAL NETWORK: HOW OFTEN DO YOU ATTEND CHURCH OR RELIGIOUS SERVICES?: PATIENT DECLINED

## 2024-09-08 SDOH — SOCIAL STABILITY: SOCIAL INSECURITY: WITHIN THE LAST YEAR, HAVE YOU BEEN HUMILIATED OR EMOTIONALLY ABUSED IN OTHER WAYS BY YOUR PARTNER OR EX-PARTNER?: NO

## 2024-09-08 SDOH — SOCIAL STABILITY: SOCIAL NETWORK
DO YOU BELONG TO ANY CLUBS OR ORGANIZATIONS SUCH AS CHURCH GROUPS UNIONS, FRATERNAL OR ATHLETIC GROUPS, OR SCHOOL GROUPS?: PATIENT DECLINED

## 2024-09-08 SDOH — ECONOMIC STABILITY: FOOD INSECURITY: WITHIN THE PAST 12 MONTHS, THE FOOD YOU BOUGHT JUST DIDN'T LAST AND YOU DIDN'T HAVE MONEY TO GET MORE.: NEVER TRUE

## 2024-09-08 SDOH — SOCIAL STABILITY: SOCIAL INSECURITY: HAVE YOU HAD ANY THOUGHTS OF HARMING ANYONE ELSE?: NO

## 2024-09-08 SDOH — SOCIAL STABILITY: SOCIAL INSECURITY: HAS ANYONE EVER THREATENED TO HURT YOUR FAMILY OR YOUR PETS?: NO

## 2024-09-08 SDOH — SOCIAL STABILITY: SOCIAL INSECURITY: ABUSE: ADULT

## 2024-09-08 SDOH — HEALTH STABILITY: PHYSICAL HEALTH: ON AVERAGE, HOW MANY MINUTES DO YOU ENGAGE IN EXERCISE AT THIS LEVEL?: 0 MIN

## 2024-09-08 SDOH — SOCIAL STABILITY: SOCIAL NETWORK: ARE YOU MARRIED, WIDOWED, DIVORCED, SEPARATED, NEVER MARRIED, OR LIVING WITH A PARTNER?: PATIENT DECLINED

## 2024-09-08 SDOH — SOCIAL STABILITY: SOCIAL INSECURITY: WERE YOU ABLE TO COMPLETE ALL THE BEHAVIORAL HEALTH SCREENINGS?: YES

## 2024-09-08 SDOH — SOCIAL STABILITY: SOCIAL NETWORK: HOW OFTEN DO YOU ATTENT MEETINGS OF THE CLUB OR ORGANIZATION YOU BELONG TO?: PATIENT DECLINED

## 2024-09-08 SDOH — SOCIAL STABILITY: SOCIAL INSECURITY: DO YOU FEEL ANYONE HAS EXPLOITED OR TAKEN ADVANTAGE OF YOU FINANCIALLY OR OF YOUR PERSONAL PROPERTY?: NO

## 2024-09-08 SDOH — HEALTH STABILITY: PHYSICAL HEALTH: ON AVERAGE, HOW MANY DAYS PER WEEK DO YOU ENGAGE IN MODERATE TO STRENUOUS EXERCISE (LIKE A BRISK WALK)?: 0 DAYS

## 2024-09-08 SDOH — SOCIAL STABILITY: SOCIAL NETWORK: IN A TYPICAL WEEK, HOW MANY TIMES DO YOU TALK ON THE PHONE WITH FAMILY, FRIENDS, OR NEIGHBORS?: PATIENT DECLINED

## 2024-09-08 SDOH — SOCIAL STABILITY: SOCIAL INSECURITY: DOES ANYONE TRY TO KEEP YOU FROM HAVING/CONTACTING OTHER FRIENDS OR DOING THINGS OUTSIDE YOUR HOME?: NO

## 2024-09-08 SDOH — SOCIAL STABILITY: SOCIAL INSECURITY: ARE THERE ANY APPARENT SIGNS OF INJURIES/BEHAVIORS THAT COULD BE RELATED TO ABUSE/NEGLECT?: NO

## 2024-09-08 ASSESSMENT — COGNITIVE AND FUNCTIONAL STATUS - GENERAL
MOBILITY SCORE: 24
DAILY ACTIVITIY SCORE: 24
PATIENT BASELINE BEDBOUND: NO
MOBILITY SCORE: 24
DAILY ACTIVITIY SCORE: 24

## 2024-09-08 ASSESSMENT — ACTIVITIES OF DAILY LIVING (ADL)
TOILETING: INDEPENDENT
JUDGMENT_ADEQUATE_SAFELY_COMPLETE_DAILY_ACTIVITIES: YES
HEARING - RIGHT EAR: FUNCTIONAL
LACK_OF_TRANSPORTATION: PATIENT DECLINED
PATIENT'S MEMORY ADEQUATE TO SAFELY COMPLETE DAILY ACTIVITIES?: YES
ADEQUATE_TO_COMPLETE_ADL: YES
WALKS IN HOME: INDEPENDENT
HEARING - LEFT EAR: FUNCTIONAL
GROOMING: INDEPENDENT
DRESSING YOURSELF: INDEPENDENT
FEEDING YOURSELF: INDEPENDENT
BATHING: INDEPENDENT

## 2024-09-08 ASSESSMENT — LIFESTYLE VARIABLES
AUDIT-C TOTAL SCORE: 1
HOW OFTEN DO YOU HAVE 6 OR MORE DRINKS ON ONE OCCASION: NEVER
HOW OFTEN DO YOU HAVE A DRINK CONTAINING ALCOHOL: MONTHLY OR LESS
PRESCIPTION_ABUSE_PAST_12_MONTHS: NO
AUDIT-C TOTAL SCORE: 1
SUBSTANCE_ABUSE_PAST_12_MONTHS: NO
SKIP TO QUESTIONS 9-10: 1
HOW MANY STANDARD DRINKS CONTAINING ALCOHOL DO YOU HAVE ON A TYPICAL DAY: 1 OR 2

## 2024-09-08 ASSESSMENT — PAIN SCALES - GENERAL
PAINLEVEL_OUTOF10: 6
PAINLEVEL_OUTOF10: 6
PAINLEVEL_OUTOF10: 2

## 2024-09-08 ASSESSMENT — ENCOUNTER SYMPTOMS
ABDOMINAL PAIN: 1
ABDOMINAL DISTENTION: 1
CONSTIPATION: 1

## 2024-09-08 ASSESSMENT — PAIN - FUNCTIONAL ASSESSMENT
PAIN_FUNCTIONAL_ASSESSMENT: 0-10

## 2024-09-08 ASSESSMENT — PAIN DESCRIPTION - LOCATION: LOCATION: ABDOMEN

## 2024-09-08 ASSESSMENT — PAIN DESCRIPTION - ONSET: ONSET: AWAKENED FROM SLEEP

## 2024-09-08 ASSESSMENT — COLUMBIA-SUICIDE SEVERITY RATING SCALE - C-SSRS
2. HAVE YOU ACTUALLY HAD ANY THOUGHTS OF KILLING YOURSELF?: NO
6. HAVE YOU EVER DONE ANYTHING, STARTED TO DO ANYTHING, OR PREPARED TO DO ANYTHING TO END YOUR LIFE?: NO
6. HAVE YOU EVER DONE ANYTHING, STARTED TO DO ANYTHING, OR PREPARED TO DO ANYTHING TO END YOUR LIFE?: NO
2. HAVE YOU ACTUALLY HAD ANY THOUGHTS OF KILLING YOURSELF?: NO
1. IN THE PAST MONTH, HAVE YOU WISHED YOU WERE DEAD OR WISHED YOU COULD GO TO SLEEP AND NOT WAKE UP?: NO
1. IN THE PAST MONTH, HAVE YOU WISHED YOU WERE DEAD OR WISHED YOU COULD GO TO SLEEP AND NOT WAKE UP?: NO

## 2024-09-08 ASSESSMENT — PAIN DESCRIPTION - FREQUENCY: FREQUENCY: CONSTANT/CONTINUOUS

## 2024-09-08 ASSESSMENT — PAIN DESCRIPTION - PROGRESSION: CLINICAL_PROGRESSION: NOT CHANGED

## 2024-09-08 ASSESSMENT — PAIN DESCRIPTION - DESCRIPTORS: DESCRIPTORS: ACHING

## 2024-09-08 ASSESSMENT — PAIN DESCRIPTION - ORIENTATION: ORIENTATION: UPPER

## 2024-09-08 ASSESSMENT — PAIN DESCRIPTION - PAIN TYPE: TYPE: ACUTE PAIN

## 2024-09-08 NOTE — ASSESSMENT & PLAN NOTE
-Consult IR for paracentesis  -Fluid specimen analysis and culture ordered  -Will consult GI as patient is complaining about abdominal pain  -Will cover with ceftriaxone until SBP is ruled out  -N.p.o. after midnight  -Follow labs  -Resume home medications    Hypertension  -On home medication and monitor    GERD  -Continue with PPI    Depression  -Sinew home medication    DVT risk  -SCD's

## 2024-09-08 NOTE — ED PROVIDER NOTES
EMERGENCY DEPARTMENT ENCOUNTER      Pt Name: Aamir Garcia  MRN: [id number]  Birthdate 1970  Date of evaluation: 9/8/2024  ED Provider: Tejal Pina DO     CHIEF COMPLAINT       Chief Complaint   Patient presents with    Abdominal Pain     Abd pain, hx of ascites. Has cirosis       HISTORY OF PRESENT ILLNESS    Aamir Garcia is a 54 y.o. who presents to the emergency department via private vehicle with complaint of abdominal pain.  He has a history of end-stage liver disease with cirrhosis.  He has ascites with last paracentesis approximately 2 months ago where they removed 6 L.  Over the past 3 to 4 days he has been having increasing abdominal distention.  This is caused pain significantly on the superior and lateral aspects of his abdomen.  He has continued taking his medication with no improvement.  He recently lost coverage of his doctors through his insurance and is worried that he will not be able to wait until he follows up with them to feel better.  He denies any fever or chills.  No difficulty breathing.  No other acute complaints.    REVIEW OF SYSTEMS     Focused ROS performed and negative other than as listed in HPI    PAST MEDICAL HISTORY     Past Medical History:   Diagnosis Date    Alcohol dependence (Multi)     Alcoholic cirrhosis of liver with ascites (Multi)     GERD (gastroesophageal reflux disease)     Hyperlipidemia     Hypertension        SURGICAL HISTORY       Past Surgical History:   Procedure Laterality Date    PARACENTESIS      2x in January 2024    SINUS SURGERY  04/29/2013    Sinus Surgery       CURRENT MEDICATIONS       Previous Medications    AMLODIPINE (NORVASC) 5 MG TABLET    Take 1 tablet (5 mg) by mouth once daily.    BUPROPION XL (WELLBUTRIN XL) 150 MG 24 HR TABLET    Take 1 tablet (150 mg) by mouth once daily in the morning. Take before meals.    EPINEPHRINE 0.3 MG/0.3 ML INJECTION SYRINGE    INJECT INTO THE MUSCLE ONCE AS NEEDED FOR ALLERGIC REACTIONS    ESCITALOPRAM (LEXAPRO) 10  MG TABLET    Take 1 tablet (10 mg) by mouth once daily.    ESOMEPRAZOLE (NEXIUM) 40 MG DR CAPSULE    Take 1 capsule (40 mg) by mouth once daily in the morning. Take before meals. Do not open capsule.    FUROSEMIDE (LASIX) 20 MG TABLET    Take 1 tablet (20 mg) by mouth once daily.    LOSARTAN (COZAAR) 100 MG TABLET    Take 1 tablet (100 mg) by mouth once daily.    METHYLPHENIDATE LA (RITALIN LA) 10 MG 24 HR CAPSULE    Take 1 capsule (10 mg) by mouth once daily in the morning for 14 days. Do not crush or chew.    METOPROLOL SUCCINATE XL (TOPROL-XL) 50 MG 24 HR TABLET    Take 1 tablet (50 mg) by mouth once daily. Do not crush or chew.    OMEPRAZOLE (PRILOSEC) 20 MG DR CAPSULE    Take 1 capsule (20 mg) by mouth once daily. Do not crush or chew.    SPIRONOLACTONE (ALDACTONE) 50 MG TABLET    Take 1 tablet (50 mg) by mouth once daily. Pt taking every other day, due to increase of urination       ALLERGIES     Clams    FAMILY HISTORY       Family History   Problem Relation Name Age of Onset    Hypertension Mother           age 64    Heart attack Father           at age 62    Other (Glioblastoma) Father      Diabetes Father      Cancer Father          SOCIAL HISTORY       Social History     Socioeconomic History    Marital status:    Tobacco Use    Smoking status: Every Day     Current packs/day: 0.50     Types: Cigarettes     Passive exposure: Current    Smokeless tobacco: Never   Vaping Use    Vaping status: Some Days    Substances: Nicotine    Devices: Disposable, Pre-filled or refillable cartridge   Substance and Sexual Activity    Alcohol use: Not Currently     Comment: daily liquor    Drug use: Not Currently     Types: Marijuana     Social Determinants of Health     Financial Resource Strain: Patient Unable To Answer (2024)    Overall Financial Resource Strain (CARDIA)     Difficulty of Paying Living Expenses: Patient unable to answer   Food Insecurity: No Food Insecurity (2024)    Received from  [facility]    Hunger Screening     Within the past 12 months we worried whether our food would run out before we got money to buy more.: Never True     Within the past 12 months the food we bought just didn't last and we didn't have money to get more.: Never True   Transportation Needs: Patient Unable To Answer (5/31/2024)    PRAPARE - Transportation     Lack of Transportation (Medical): Patient unable to answer     Lack of Transportation (Non-Medical): Patient unable to answer   Physical Activity: Inactive (4/23/2024)    Exercise Vital Sign     Days of Exercise per Week: 0 days     Minutes of Exercise per Session: 0 min   Stress: Stress Concern Present (4/23/2024)    [employer] [place] [employer] - Occupational Stress Questionnaire     Feeling of Stress : To some extent   Social Connections: Socially Isolated (4/23/2024)    Social Connection and Isolation Panel [NHANES]     Frequency of Communication with Friends and Family: Three times a week     Frequency of Social Gatherings with Friends and Family: Three times a week     Attends [employer] Services: Never     Active Member of Clubs or Organizations: No     Attends Club or Organization Meetings: Never     Marital Status:    Intimate Partner Violence: Not At Risk (4/23/2024)    Humiliation, Afraid, Rape, and Kick questionnaire     Fear of Current or Ex-Partner: No     Emotionally Abused: No     Physically Abused: No     Sexually Abused: No   Housing Stability: Patient Unable To Answer (5/31/2024)    Housing Stability Vital Sign     Unable to Pay for Housing in the Last Year: Patient unable to answer     Number of Places Lived in the Last Year: 1     Unstable Housing in the Last Year: Patient unable to answer   Recent Concern: Housing Stability - High Risk (3/28/2024)    Housing Stability Vital Sign     Unable to Pay for Housing in the Last Year: No     Number of Places Lived in the Last Year: 3     Unstable Housing in the Last Year:  No       PHYSICAL EXAM       ED Triage Vitals [09/08/24 1349]   Temperature Heart Rate Respirations BP   36.6 °C (97.9 °F) 84 20 133/85      Pulse Ox Temp Source Heart Rate Source Patient Position   96 % Oral Monitor Sitting      BP Location FiO2 (%)     Right arm --        General: Appears well, no acute distress, alert  Head: Head atraumatic; normocephalic  Eyes: normal inspection; no icterus  ENT: mucosa moist without lesion  Neck: Normal inspection, no meningeal signs  Resp: Normal breath sounds, no wheeze or crackles; No respiratory distress  Chest Wall: no tenderness or deformity  Heart: Heart rate and rhythm regular; No Murmurs  Abdomen: Soft, positive distention with fluid wave, there is mild tenderness to the superior and lateral aspects but no overt tenderness diffusely  MSK: Normal appearance; Moves all extremities; No Pedal edema  Neuro: Alert; no focal deficits, moves all extremities  Psych: Mood and Affect normal  Skin: Color appropriate; warm; Dry    DIAGNOSTIC RESULTS   Lab and radiology results are independently interpreted unless noted below.  RADIOLOGY (Per Emergency Physician):     Interpretation per the Radiologist below, if available at the time of this note:  US abdomen complete    (Results Pending)       LABS:  Abnormal Labs Reviewed   CBC WITH AUTO DIFFERENTIAL - Abnormal; Notable for the following components:       Result Value    RBC 4.26 (*)     Hemoglobin 13.4 (*)     Hematocrit 40.7 (*)     RDW 15.1 (*)     All other components within normal limits   COMPREHENSIVE METABOLIC PANEL - Abnormal; Notable for the following components:    Glucose 140 (*)     Alkaline Phosphatase 227 (*)     AST 45 (*)     All other components within normal limits       All other labs were within normal range or not returned as of this dictation.    EKG:  Personally interpreted by Tejal Pina DO  1404: Normal sinus rhythm ventricular 82 normal axis and intervals no acute ischemic changes    EMERGENCY  DEPARTMENT COURSE/MDM   Patient presents with abdominal pain likely secondary to abdominal expansion from the ascites.  Workup is initiated.  He is discussed potentially having a paracentesis in the emergency department however prefer to be admitted and have it performed by IR tomorrow.    ED Course as of 09/08/24 1656   Sun Sep 08, 2024   1546 Labwork returned demonstrating no acute abnormalities. Alk Phos and ast improved compared to prior. Call placed to the hospitalist for admission. On reevaluation pt remains resting comfortably in no acute distress.  [EF]      ED Course User Index  [EF] Tejal Pina DO         Diagnoses as of 09/08/24 1656   Ascites due to alcoholic cirrhosis (Multi)       Meds Administered:  Medications   fentaNYL PF (Sublimaze) injection 50 mcg (50 mcg intravenous Given 9/8/24 1545)       PROCEDURES   Unless otherwise noted below, none  Procedures      FINAL IMPRESSION      1. Ascites due to alcoholic cirrhosis (Multi)          DISPOSITION    Admit 09/08/2024 04:15:59 PM        (Comment: Please note this report has been produced using speech recognition software and may contain errors related to that system including errors in grammar, punctuation, and spelling, as well as words and phrases that may be inappropriate.  If there are any questions or concerns please feel free to contact the dictating provider for clarification.)    Tejal Pina DO (electronically signed)  Emergency Medicine Physician    History Limited by: None  Independent history obtained from: None  External records reviewed: Outpatient Labs and/or studies prior liver enzymes  Diagnostics interpreted by me: EKG - see my independent interpretation elsewhere in the chart  Discussions with other clinicians: Hospitalist/Admitting Team    Chronic conditions impacting care:  ESLD  Social determinants of health affecting care: Alcoholism  Diagnostic tests considered but not performed: n/a  ED Medications managed:  Medications    fentaNYL PF (Sublimaze) injection 50 mcg (50 mcg intravenous Given 9/8/24 4367)       Prescription drugs considered: None             Tejal Pina DO  09/08/24 4799

## 2024-09-08 NOTE — H&P
History Of Present Illness  Aamir Garcia is a 54 y.o. male presenting with abdominal pain and distention.  Is a 54-year-old male patient with past medical history of alcoholic cirrhosis, hypertension, depression, and portal hypertension; who presented at [facility] with progressively worsening abdominal distention and abdominal pain.  Patient does have a history of ascites and paracentesis every 2 to 3 months.  Patient reports that his insurance company contacted him and told him that his GI or his PCP will not be covered under his insurance anymore.  Previously paracentesis was arranged by Dr. Sullivan who he is not able to make OP appointment with due to insurance problems.  Patient is alert oriented x 3, he does admit to intermittent alcohol consumption, he did have alcoholic beverage yesterday.  Denies any fevers or chills, denies any nausea or vomiting, but he does report upper abdominal pain and constipation. Patient denies  any CP or SOB  ED course: CT a/p -moderate volume ascites, secondary signs of portal hypertension along with heterogenous appearing liver, no bowel obstruction, air-fluid levels in nondilated small bowel compatible with an ileus.  Right upper quadrant ultrasound is pending    Patient will be admitted for ascites, alcoholic cirrhosis, and to rule out SBP.  Past Medical History  Past Medical History:   Diagnosis Date    Alcohol dependence (Multi)     Alcoholic cirrhosis of liver with ascites (Multi)     GERD (gastroesophageal reflux disease)     Hyperlipidemia     Hypertension        Surgical History  Past Surgical History:   Procedure Laterality Date    PARACENTESIS      2x in January 2024    SINUS SURGERY  04/29/2013    Sinus Surgery        Social History  He reports that he has been smoking cigarettes. He has been exposed to tobacco smoke. He has never used smokeless tobacco. He reports current alcohol use. He reports current drug use. Drug: Marijuana.    Family  History  Family History   Problem Relation Name Age of Onset    Hypertension Mother           age 64    Heart attack Father           at age 62    Other (Glioblastoma) Father      Diabetes Father      Cancer Father          Allergies  Clams    Review of Systems   Gastrointestinal:  Positive for abdominal distention, abdominal pain and constipation.   All other systems reviewed and are negative.       Physical Exam  Vitals reviewed.   Constitutional:       Appearance: He is ill-appearing.   HENT:      Head: Normocephalic.      Nose: Nose normal.      Mouth/Throat:      Mouth: Mucous membranes are moist.   Eyes:      Extraocular Movements: Extraocular movements intact.   Cardiovascular:      Rate and Rhythm: Regular rhythm.   Pulmonary:      Effort: Pulmonary effort is normal.   Abdominal:      General: There is distension.      Tenderness: There is abdominal tenderness.   Musculoskeletal:         General: Normal range of motion.      Cervical back: Neck supple.   Skin:     General: Skin is warm.   Neurological:      Mental Status: He is alert and oriented to person, place, and time.          Last Recorded Vitals  Blood pressure 133/85, pulse 84, temperature 36.6 °C (97.9 °F), temperature source Oral, resp. rate 20, height 1.829 m (6'), weight 83 kg (182 lb 15.7 oz), SpO2 96%.    Relevant Results      Results for orders placed or performed during the hospital encounter of 24 (from the past 24 hour(s))   CBC with Differential   Result Value Ref Range    WBC 8.1 4.4 - 11.3 x10*3/uL    nRBC 0.0 0.0 - 0.0 /100 WBCs    RBC 4.26 (L) 4.50 - 5.90 x10*6/uL    Hemoglobin 13.4 (L) 13.5 - 17.5 g/dL    Hematocrit 40.7 (L) 41.0 - 52.0 %    MCV 96 80 - 100 fL    MCH 31.5 26.0 - 34.0 pg    MCHC 32.9 32.0 - 36.0 g/dL    RDW 15.1 (H) 11.5 - 14.5 %    Platelets 172 150 - 450 x10*3/uL    Neutrophils % 64.9 40.0 - 80.0 %    Immature Granulocytes %, Automated 0.5 0.0 - 0.9 %    Lymphocytes % 22.9 13.0 - 44.0 %    Monocytes %  9.9 2.0 - 10.0 %    Eosinophils % 1.1 0.0 - 6.0 %    Basophils % 0.7 0.0 - 2.0 %    Neutrophils Absolute 5.23 1.20 - 7.70 x10*3/uL    Immature Granulocytes Absolute, Automated 0.04 0.00 - 0.70 x10*3/uL    Lymphocytes Absolute 1.85 1.20 - 4.80 x10*3/uL    Monocytes Absolute 0.80 0.10 - 1.00 x10*3/uL    Eosinophils Absolute 0.09 0.00 - 0.70 x10*3/uL    Basophils Absolute 0.06 0.00 - 0.10 x10*3/uL   Comprehensive Metabolic Panel   Result Value Ref Range    Glucose 140 (H) 65 - 99 mg/dL    Sodium 135 133 - 145 mmol/L    Potassium 3.8 3.4 - 5.1 mmol/L    Chloride 99 97 - 107 mmol/L    Bicarbonate 26 24 - 31 mmol/L    Urea Nitrogen 8 8 - 25 mg/dL    Creatinine 0.80 0.40 - 1.60 mg/dL    eGFR >90 >60 mL/min/1.73m*2    Calcium 9.7 8.5 - 10.4 mg/dL    Albumin 3.9 3.5 - 5.0 g/dL    Alkaline Phosphatase 227 (H) 35 - 125 U/L    Total Protein 7.6 5.9 - 7.9 g/dL    AST 45 (H) 5 - 40 U/L    Bilirubin, Total 1.2 0.1 - 1.2 mg/dL    ALT 26 5 - 40 U/L    Anion Gap 10 <=19 mmol/L   Lipase   Result Value Ref Range    Lipase 44 16 - 63 U/L   Urinalysis with Reflex Culture and Microscopic   Result Value Ref Range    Color, Urine Light-Yellow Light-Yellow, Yellow, Dark-Yellow    Appearance, Urine Clear Clear    Specific Gravity, Urine 1.011 1.005 - 1.035    pH, Urine 6.0 5.0, 5.5, 6.0, 6.5, 7.0, 7.5, 8.0    Protein, Urine NEGATIVE NEGATIVE, 10 (TRACE), 20 (TRACE) mg/dL    Glucose, Urine Normal Normal mg/dL    Blood, Urine NEGATIVE NEGATIVE    Ketones, Urine NEGATIVE NEGATIVE mg/dL    Bilirubin, Urine NEGATIVE NEGATIVE    Urobilinogen, Urine 2 (1+) (A) Normal mg/dL    Nitrite, Urine NEGATIVE NEGATIVE    Leukocyte Esterase, Urine NEGATIVE NEGATIVE      US right upper quadrant    Result Date: 9/8/2024  Interpreted By:  Diane Salcedo, STUDY: US RIGHT UPPER QUADRANT;  9/8/2024 5:00 pm   INDICATION: Signs/Symptoms:ascites, known liver disease.   COMPARISON: CT 05/31/2024, ultrasound 06/01/2024   ACCESSION NUMBER(S): [id number]   ORDERING  CLINICIAN: CODY PERKINS   TECHNIQUE: Grayscale and color Doppler sonographic imaging of the right upper quadrant.   FINDINGS: LIVER: Enlarged, 20 cm in craniocaudad length, stable. Nodular contour of the liver and coarse echogenicity suggesting cirrhosis. No focal abnormalities.   BILE DUCTS: No intrahepatic or extrahepatic bile duct dilatation. Extrahepatic bile duct = 7 mm.   GALLBLADDER: No stones, pericholecystic fluid, wall thickening, or localized tenderness.   PANCREAS: Obscured by bowel gas.   RIGHT KIDNEY: Normal size, no hydronephrosis.   PERITONEUM: Moderate volume ascites.       Similar appearance of hepatomegaly and hepatic cirrhosis.   Moderate ascites.       MACRO: None   Signed by: Diane Salcedo 9/8/2024 5:33 PM Dictation workstation:   [id number]    CT abdomen pelvis w IV contrast    Result Date: 9/7/2024  CT ABDOMEN AND PELVIS W CONT HISTORY: Abdominal pain, acute, nonlocalized.  Bloating.  Paracentesis scheduled for 9/9/2024. COMPARISON STUDY: None. TECHNIQUE: CT scan of the abdomen and pelvis performed with IV no oral contrast. 100 mL of Omnipaque 300 was injected intravenously without complication. Coronal and sagittal reformats generated and reviewed. All CT scans at this facility use dose modulation, iterative reconstruction, and/or weight based dosing when appropriate to reduce radiation dose to as low as reasonably achievable. FINDINGS: LOWER THORAX: Mild dependent bibasilar atelectasis.  At least mild degree of coronary artery calcification, not well characterized due to cardiac motion artifact.  Otherwise the visualized lower thorax is unremarkable. HEPATOBILIARY: Heterogenous liver without significant nodular hepatic contour. No focal aggressive appearing hepatic lesions.  Few scattered tiny low-attenuation lesions are likely cysts, however too small to accurately characterize.  Recannulization of the umbilical vein along with enlarged main portal vein (1.7 cm), can be seen in setting of  portal hypertension. No biliary ductal dilatation. Gallbladder is present. SPLEEN: Splenomegaly measuring up to 15.9 cm in the craniocaudal dimension. PANCREAS: No focal masses or ductal dilatation. ADRENALS: No large adrenal nodules. KIDNEYS/URETERS: Symmetric renal nephrograms No aggressive appearing mass lesion.  No obstructive renal calculus. No collecting system dilatation. Bladder: Within normal limits. PELVIC ORGANS: Dystrophic calcifications in the prostate. GI TRACT: No acute bowel obstruction.  Air-fluid levels throughout nondilated small bowel loops compatible with a mild ileus.  Sigmoid diverticulosis without CT evidence of diverticulitis. Retroperitoneal fat stranding superiorly which is not centered around the pancreas as well as fat stranding of the omentum likely related to liver disease. LYMPH NODES: Scattered prominent aortic and portacaval lymph nodes are not enlarged by size criteria, likely reactive. VESSELS: Patent portal and splenic veins. No abdominal aortic aneurysm.  Retroaortic left renal vein. BONES AND SOFT TISSUES: No suspicious osseous lesion. Scattered degenerative change without significant vertebral body height loss.  Chronic right L5 spondylolysis without spondylolisthesis at L5-S1.  Serpiginous sclerosis of the bilateral femoral heads consistent with avascular necrosis, without subchondral collapse. PERITONEUM/RETROPERITONEUM: No free air.  Moderate volume ascites. ABDOMINAL WALL: Unremarkable. _______________________________ IMPRESSION: *  Moderate volume ascites. *  Secondary signs of portal hypertension along with a heterogenous appearing liver.  Findings raising suspicion for underlying cirrhosis. *  No bowel obstruction.  Air-fluid levels in nondilated small bowel compatible with an ileus. *  Chronic changes detailed above including bilateral avascular necrosis of the hips.      Approved by Resident Phil Caceres MD  on 9/7/2024 7:26 AM Tal SCOTT MD have personally  reviewed the image(s) and agree with and/or edited the report Workstation:[id number] Finalized by Tal Mckeon MD on 9/7/2024 8:15 AM      Assessment/Plan   Assessment & Plan  Ascites due to alcoholic cirrhosis (Multi)  -Consult IR for paracentesis  -Fluid specimen analysis and culture ordered  -Will consult GI as patient is complaining about abdominal pain  -Will cover with ceftriaxone until SBP is ruled out  -N.p.o. after midnight  -Follow labs  -Resume home medications    Hypertension  -On home medication and monitor    GERD  -Continue with PPI    Depression  -Sinew home medication    DVT risk  -SCD's    Patient's insurance has changed coverage, will need new PCP and GI.       I spent 75 minutes in the professional and overall care of this patient.  Case discussed with collaborating physician Dr. Cervantes.    Frances Moffett, APRN-CNP

## 2024-09-09 ENCOUNTER — HOSPITAL ENCOUNTER (OUTPATIENT)
Dept: RADIOLOGY | Facility: HOSPITAL | Age: 54
Discharge: HOME | End: 2024-09-09
Payer: MEDICARE

## 2024-09-09 ENCOUNTER — APPOINTMENT (OUTPATIENT)
Dept: CARDIOLOGY | Facility: HOSPITAL | Age: 54
DRG: 433 | End: 2024-09-09
Payer: MEDICARE

## 2024-09-09 VITALS
RESPIRATION RATE: 20 BRPM | TEMPERATURE: 99 F | HEART RATE: 74 BPM | OXYGEN SATURATION: 98 % | DIASTOLIC BLOOD PRESSURE: 77 MMHG | SYSTOLIC BLOOD PRESSURE: 109 MMHG

## 2024-09-09 LAB
ALBUMIN SERPL-MCNC: 3.4 G/DL (ref 3.5–5)
ALP BLD-CCNC: 205 U/L (ref 35–125)
ALT SERPL-CCNC: 20 U/L (ref 5–40)
ANION GAP SERPL CALC-SCNC: 14 MMOL/L
APTT PPP: 32.4 SECONDS (ref 22–32.5)
AST SERPL-CCNC: 36 U/L (ref 5–40)
BASOPHILS # BLD AUTO: 0.08 X10*3/UL (ref 0–0.1)
BASOPHILS NFR BLD AUTO: 1.1 %
BASOPHILS NFR FLD MANUAL: 0 %
BILIRUB SERPL-MCNC: 0.5 MG/DL (ref 0.1–1.2)
BLASTS NFR FLD MANUAL: 0 %
BUN SERPL-MCNC: 12 MG/DL (ref 8–25)
CALCIUM SERPL-MCNC: 8.7 MG/DL (ref 8.5–10.4)
CHLORIDE SERPL-SCNC: 101 MMOL/L (ref 97–107)
CLARITY FLD: CLEAR
CO2 SERPL-SCNC: 23 MMOL/L (ref 24–31)
COLOR FLD: NORMAL
CREAT SERPL-MCNC: 0.7 MG/DL (ref 0.4–1.6)
EGFRCR SERPLBLD CKD-EPI 2021: >90 ML/MIN/1.73M*2
EOSINOPHIL # BLD AUTO: 0.16 X10*3/UL (ref 0–0.7)
EOSINOPHIL NFR BLD AUTO: 2.3 %
EOSINOPHIL NFR FLD MANUAL: 0 %
ERYTHROCYTE [DISTWIDTH] IN BLOOD BY AUTOMATED COUNT: 15.2 % (ref 11.5–14.5)
GLUCOSE SERPL-MCNC: 141 MG/DL (ref 65–99)
HCT VFR BLD AUTO: 36 % (ref 41–52)
HGB BLD-MCNC: 11.9 G/DL (ref 13.5–17.5)
HOLD SPECIMEN: NORMAL
IMM GRANULOCYTES # BLD AUTO: 0.04 X10*3/UL (ref 0–0.7)
IMM GRANULOCYTES NFR BLD AUTO: 0.6 % (ref 0–0.9)
IMMATURE GRANULOCYTES IN FLUID: 0 %
INR PPP: 1.2 (ref 0.9–1.2)
LYMPHOCYTES # BLD AUTO: 1.61 X10*3/UL (ref 1.2–4.8)
LYMPHOCYTES NFR BLD AUTO: 23.1 %
LYMPHOCYTES NFR FLD MANUAL: 61 %
MCH RBC QN AUTO: 31.4 PG (ref 26–34)
MCHC RBC AUTO-ENTMCNC: 33.1 G/DL (ref 32–36)
MCV RBC AUTO: 95 FL (ref 80–100)
MONOCYTES # BLD AUTO: 0.89 X10*3/UL (ref 0.1–1)
MONOCYTES NFR BLD AUTO: 12.8 %
MONOS+MACROS NFR FLD MANUAL: 33 %
NEUTROPHILS # BLD AUTO: 4.18 X10*3/UL (ref 1.2–7.7)
NEUTROPHILS NFR BLD AUTO: 60.1 %
NEUTROPHILS NFR FLD MANUAL: 6 %
NRBC BLD-RTO: 0 /100 WBCS (ref 0–0)
OTHER CELLS NFR FLD MANUAL: 0 %
PLASMA CELLS NFR FLD MANUAL: 0 %
PLATELET # BLD AUTO: 143 X10*3/UL (ref 150–450)
POTASSIUM SERPL-SCNC: 3.6 MMOL/L (ref 3.4–5.1)
PROT FLD-MCNC: 4.3 G/DL
PROT SERPL-MCNC: 6.7 G/DL (ref 5.9–7.9)
PROTHROMBIN TIME: 12.4 SECONDS (ref 9.3–12.7)
RBC # BLD AUTO: 3.79 X10*6/UL (ref 4.5–5.9)
RBC # FLD AUTO: 1000 /UL
SODIUM SERPL-SCNC: 138 MMOL/L (ref 133–145)
TOTAL CELLS COUNTED FLD: 100
WBC # BLD AUTO: 7 X10*3/UL (ref 4.4–11.3)
WBC # FLD AUTO: 306 /UL

## 2024-09-09 PROCEDURE — 0W9G3ZZ DRAINAGE OF PERITONEAL CAVITY, PERCUTANEOUS APPROACH: ICD-10-PCS | Performed by: RADIOLOGY

## 2024-09-09 PROCEDURE — P9047 ALBUMIN (HUMAN), 25%, 50ML: HCPCS | Mod: JZ | Performed by: NURSE PRACTITIONER

## 2024-09-09 PROCEDURE — 2500000004 HC RX 250 GENERAL PHARMACY W/ HCPCS (ALT 636 FOR OP/ED): Mod: JZ | Performed by: NURSE PRACTITIONER

## 2024-09-09 PROCEDURE — 49083 ABD PARACENTESIS W/IMAGING: CPT

## 2024-09-09 PROCEDURE — 93005 ELECTROCARDIOGRAM TRACING: CPT

## 2024-09-09 PROCEDURE — 2720000007 HC OR 272 NO HCPCS

## 2024-09-09 PROCEDURE — 84157 ASSAY OF PROTEIN OTHER: CPT | Mod: WESLAB | Performed by: NURSE PRACTITIONER

## 2024-09-09 PROCEDURE — 87205 SMEAR GRAM STAIN: CPT | Mod: WESLAB | Performed by: NURSE PRACTITIONER

## 2024-09-09 PROCEDURE — 99232 SBSQ HOSP IP/OBS MODERATE 35: CPT | Performed by: INTERNAL MEDICINE

## 2024-09-09 PROCEDURE — 2500000004 HC RX 250 GENERAL PHARMACY W/ HCPCS (ALT 636 FOR OP/ED): Performed by: INTERNAL MEDICINE

## 2024-09-09 PROCEDURE — 2500000004 HC RX 250 GENERAL PHARMACY W/ HCPCS (ALT 636 FOR OP/ED): Performed by: REGISTERED NURSE

## 2024-09-09 PROCEDURE — 85610 PROTHROMBIN TIME: CPT | Performed by: NURSE PRACTITIONER

## 2024-09-09 PROCEDURE — 7100000010 HC PHASE TWO TIME - EACH INCREMENTAL 1 MINUTE

## 2024-09-09 PROCEDURE — 2500000001 HC RX 250 WO HCPCS SELF ADMINISTERED DRUGS (ALT 637 FOR MEDICARE OP): Performed by: INTERNAL MEDICINE

## 2024-09-09 PROCEDURE — 7100000009 HC PHASE TWO TIME - INITIAL BASE CHARGE

## 2024-09-09 PROCEDURE — C1729 CATH, DRAINAGE: HCPCS

## 2024-09-09 PROCEDURE — 1100000001 HC PRIVATE ROOM DAILY

## 2024-09-09 PROCEDURE — 85025 COMPLETE CBC W/AUTO DIFF WBC: CPT | Performed by: NURSE PRACTITIONER

## 2024-09-09 PROCEDURE — 36415 COLL VENOUS BLD VENIPUNCTURE: CPT | Performed by: NURSE PRACTITIONER

## 2024-09-09 PROCEDURE — 2500000001 HC RX 250 WO HCPCS SELF ADMINISTERED DRUGS (ALT 637 FOR MEDICARE OP): Performed by: NURSE PRACTITIONER

## 2024-09-09 PROCEDURE — 80053 COMPREHEN METABOLIC PANEL: CPT | Performed by: NURSE PRACTITIONER

## 2024-09-09 PROCEDURE — 89051 BODY FLUID CELL COUNT: CPT | Performed by: NURSE PRACTITIONER

## 2024-09-09 RX ORDER — ALBUMIN HUMAN 250 G/1000ML
25 SOLUTION INTRAVENOUS ONCE
Status: DISCONTINUED | OUTPATIENT
Start: 2024-09-09 | End: 2024-09-11 | Stop reason: HOSPADM

## 2024-09-09 RX ORDER — ALBUMIN HUMAN 250 G/1000ML
50 SOLUTION INTRAVENOUS ONCE
Status: COMPLETED | OUTPATIENT
Start: 2024-09-09 | End: 2024-09-09

## 2024-09-09 SDOH — SOCIAL STABILITY: SOCIAL INSECURITY: WITHIN THE LAST YEAR, HAVE YOU BEEN HUMILIATED OR EMOTIONALLY ABUSED IN OTHER WAYS BY YOUR PARTNER OR EX-PARTNER?: NO

## 2024-09-09 SDOH — ECONOMIC STABILITY: INCOME INSECURITY: IN THE LAST 12 MONTHS, WAS THERE A TIME WHEN YOU WERE NOT ABLE TO PAY THE MORTGAGE OR RENT ON TIME?: NO

## 2024-09-09 SDOH — ECONOMIC STABILITY: FOOD INSECURITY: WITHIN THE PAST 12 MONTHS, YOU WORRIED THAT YOUR FOOD WOULD RUN OUT BEFORE YOU GOT MONEY TO BUY MORE.: NEVER TRUE

## 2024-09-09 SDOH — SOCIAL STABILITY: SOCIAL INSECURITY: WITHIN THE LAST YEAR, HAVE YOU BEEN AFRAID OF YOUR PARTNER OR EX-PARTNER?: NO

## 2024-09-09 SDOH — ECONOMIC STABILITY: HOUSING INSECURITY: IN THE PAST 12 MONTHS, HOW MANY TIMES HAVE YOU MOVED WHERE YOU WERE LIVING?: 0

## 2024-09-09 SDOH — ECONOMIC STABILITY: INCOME INSECURITY: IN THE PAST 12 MONTHS, HAS THE ELECTRIC, GAS, OIL, OR WATER COMPANY THREATENED TO SHUT OFF SERVICE IN YOUR HOME?: NO

## 2024-09-09 SDOH — ECONOMIC STABILITY: FOOD INSECURITY: WITHIN THE PAST 12 MONTHS, THE FOOD YOU BOUGHT JUST DIDN'T LAST AND YOU DIDN'T HAVE MONEY TO GET MORE.: NEVER TRUE

## 2024-09-09 SDOH — ECONOMIC STABILITY: HOUSING INSECURITY: AT ANY TIME IN THE PAST 12 MONTHS, WERE YOU HOMELESS OR LIVING IN A SHELTER (INCLUDING NOW)?: NO

## 2024-09-09 SDOH — ECONOMIC STABILITY: INCOME INSECURITY: HOW HARD IS IT FOR YOU TO PAY FOR THE VERY BASICS LIKE FOOD, HOUSING, MEDICAL CARE, AND HEATING?: NOT HARD AT ALL

## 2024-09-09 ASSESSMENT — ENCOUNTER SYMPTOMS
WEAKNESS: 0
DIARRHEA: 0
NAUSEA: 0
FEVER: 0
BLOOD IN STOOL: 0
VOMITING: 0
ABDOMINAL DISTENTION: 1
FATIGUE: 0
CHILLS: 0
ABDOMINAL PAIN: 1
CONSTIPATION: 0
RECTAL PAIN: 0

## 2024-09-09 ASSESSMENT — COGNITIVE AND FUNCTIONAL STATUS - GENERAL
DAILY ACTIVITIY SCORE: 24
DAILY ACTIVITIY SCORE: 24
MOBILITY SCORE: 24
MOBILITY SCORE: 24

## 2024-09-09 ASSESSMENT — PAIN SCALES - GENERAL
PAINLEVEL_OUTOF10: 0 - NO PAIN
PAINLEVEL_OUTOF10: 0 - NO PAIN
PAINLEVEL_OUTOF10: 2
PAINLEVEL_OUTOF10: 0 - NO PAIN
PAINLEVEL_OUTOF10: 5 - MODERATE PAIN
PAINLEVEL_OUTOF10: 2
PAINLEVEL_OUTOF10: 0 - NO PAIN
PAINLEVEL_OUTOF10: 0 - NO PAIN
PAINLEVEL_OUTOF10: 6
PAINLEVEL_OUTOF10: 0 - NO PAIN

## 2024-09-09 ASSESSMENT — PAIN - FUNCTIONAL ASSESSMENT
PAIN_FUNCTIONAL_ASSESSMENT: UNABLE TO SELF-REPORT
PAIN_FUNCTIONAL_ASSESSMENT: 0-10

## 2024-09-09 ASSESSMENT — COLUMBIA-SUICIDE SEVERITY RATING SCALE - C-SSRS
2. HAVE YOU ACTUALLY HAD ANY THOUGHTS OF KILLING YOURSELF?: NO
6. HAVE YOU EVER DONE ANYTHING, STARTED TO DO ANYTHING, OR PREPARED TO DO ANYTHING TO END YOUR LIFE?: NO
1. IN THE PAST MONTH, HAVE YOU WISHED YOU WERE DEAD OR WISHED YOU COULD GO TO SLEEP AND NOT WAKE UP?: NO

## 2024-09-09 ASSESSMENT — PAIN DESCRIPTION - LOCATION: LOCATION: ABDOMEN

## 2024-09-09 NOTE — PROGRESS NOTES
Aamir Garcia is a 54 y.o. male on day 1 of admission presenting with Ascites due to alcoholic cirrhosis (Multi).      Subjective   No events over night,   Reports abdominal distention and upper abdominal pain,     Still drinks alcohol,   No hematemesis or melena, No h/o BRBPR,          Objective     Last Recorded Vitals  /78 (BP Location: Right arm, Patient Position: Lying)   Pulse 72   Temp 37.3 °C (99.1 °F) (Temporal)   Resp 16   Wt 83 kg (182 lb 15.7 oz)   SpO2 98%   Intake/Output last 3 Shifts:    Intake/Output Summary (Last 24 hours) at 9/9/2024 1118  Last data filed at 9/9/2024 1000  Gross per 24 hour   Intake 350 ml   Output 100 ml   Net 250 ml       Admission Weight  Weight: 83 kg (182 lb 15.7 oz) (09/08/24 1349)    Daily Weight  09/08/24 : 83 kg (182 lb 15.7 oz)    Image Results  US right upper quadrant  Narrative: Interpreted By:  Diane Salcedo,   STUDY:  US RIGHT UPPER QUADRANT;  9/8/2024 5:00 pm      INDICATION:  Signs/Symptoms:ascites, known liver disease.      COMPARISON:  CT 05/31/2024, ultrasound 06/01/2024      ACCESSION NUMBER(S):  [id number]      ORDERING CLINICIAN:  CODY PERKINS      TECHNIQUE:  Grayscale and color Doppler sonographic imaging of the right upper  quadrant.      FINDINGS:  LIVER: Enlarged, 20 cm in craniocaudad length, stable. Nodular  contour of the liver and coarse echogenicity suggesting cirrhosis. No  focal abnormalities.      BILE DUCTS: No intrahepatic or extrahepatic bile duct dilatation.  Extrahepatic bile duct = 7 mm.      GALLBLADDER: No stones, pericholecystic fluid, wall thickening, or  localized tenderness.      PANCREAS: Obscured by bowel gas.      RIGHT KIDNEY: Normal size, no hydronephrosis.      PERITONEUM: Moderate volume ascites.      Impression: Similar appearance of hepatomegaly and hepatic cirrhosis.      Moderate ascites.              MACRO:  None      Signed by: Diane Salcedo 9/8/2024 5:33 PM  Dictation workstation:   [id number]      Physical  Exam  Constitutional:       Comments: Comfortable at rest, oriented x 3    HENT:      Head: Normocephalic.      Nose: Nose normal.      Mouth/Throat:      Mouth: Mucous membranes are moist.   Eyes:      Extraocular Movements: Extraocular movements intact.      Pupils: Pupils are equal, round, and reactive to light.   Cardiovascular:      Rate and Rhythm: Normal rate and regular rhythm.      Pulses: Normal pulses.      Heart sounds: Normal heart sounds. No murmur heard.  Pulmonary:      Effort: Pulmonary effort is normal. No respiratory distress.      Breath sounds: No wheezing.   Abdominal:      General: There is distension.      Palpations: Abdomen is soft.      Tenderness: There is abdominal tenderness. There is no guarding.      Comments: Mild epigastric tenderness noted    Musculoskeletal:         General: Normal range of motion.      Cervical back: Normal range of motion.   Skin:     General: Skin is warm.   Neurological:      General: No focal deficit present.      Mental Status: He is alert and oriented to person, place, and time.      Comments: No asterixis,    Psychiatric:         Mood and Affect: Mood normal.         Relevant Results  Scheduled medications  albumin human, 25 g, intravenous, Once  amLODIPine, 5 mg, oral, Daily  buPROPion XL, 150 mg, oral, Daily before breakfast  cefTRIAXone, 2 g, intravenous, q24h  escitalopram, 10 mg, oral, Daily  furosemide, 20 mg, oral, Daily  metoprolol succinate XL, 50 mg, oral, Daily  pantoprazole, 40 mg, oral, Daily before breakfast  polyethylene glycol, 17 g, oral, Daily  spironolactone, 50 mg, oral, Daily      Continuous medications     PRN medications  PRN medications: ketorolac, melatonin, ondansetron ODT **OR** ondansetron    Results for orders placed or performed during the hospital encounter of 09/08/24 (from the past 24 hour(s))   CBC with Differential   Result Value Ref Range    WBC 8.1 4.4 - 11.3 x10*3/uL    nRBC 0.0 0.0 - 0.0 /100 WBCs    RBC 4.26 (L) 4.50  - 5.90 x10*6/uL    Hemoglobin 13.4 (L) 13.5 - 17.5 g/dL    Hematocrit 40.7 (L) 41.0 - 52.0 %    MCV 96 80 - 100 fL    MCH 31.5 26.0 - 34.0 pg    MCHC 32.9 32.0 - 36.0 g/dL    RDW 15.1 (H) 11.5 - 14.5 %    Platelets 172 150 - 450 x10*3/uL    Neutrophils % 64.9 40.0 - 80.0 %    Immature Granulocytes %, Automated 0.5 0.0 - 0.9 %    Lymphocytes % 22.9 13.0 - 44.0 %    Monocytes % 9.9 2.0 - 10.0 %    Eosinophils % 1.1 0.0 - 6.0 %    Basophils % 0.7 0.0 - 2.0 %    Neutrophils Absolute 5.23 1.20 - 7.70 x10*3/uL    Immature Granulocytes Absolute, Automated 0.04 0.00 - 0.70 x10*3/uL    Lymphocytes Absolute 1.85 1.20 - 4.80 x10*3/uL    Monocytes Absolute 0.80 0.10 - 1.00 x10*3/uL    Eosinophils Absolute 0.09 0.00 - 0.70 x10*3/uL    Basophils Absolute 0.06 0.00 - 0.10 x10*3/uL   Comprehensive Metabolic Panel   Result Value Ref Range    Glucose 140 (H) 65 - 99 mg/dL    Sodium 135 133 - 145 mmol/L    Potassium 3.8 3.4 - 5.1 mmol/L    Chloride 99 97 - 107 mmol/L    Bicarbonate 26 24 - 31 mmol/L    Urea Nitrogen 8 8 - 25 mg/dL    Creatinine 0.80 0.40 - 1.60 mg/dL    eGFR >90 >60 mL/min/1.73m*2    Calcium 9.7 8.5 - 10.4 mg/dL    Albumin 3.9 3.5 - 5.0 g/dL    Alkaline Phosphatase 227 (H) 35 - 125 U/L    Total Protein 7.6 5.9 - 7.9 g/dL    AST 45 (H) 5 - 40 U/L    Bilirubin, Total 1.2 0.1 - 1.2 mg/dL    ALT 26 5 - 40 U/L    Anion Gap 10 <=19 mmol/L   Lipase   Result Value Ref Range    Lipase 44 16 - 63 U/L   Urinalysis with Reflex Culture and Microscopic   Result Value Ref Range    Color, Urine Light-Yellow Light-Yellow, Yellow, Dark-Yellow    Appearance, Urine Clear Clear    Specific Gravity, Urine 1.011 1.005 - 1.035    pH, Urine 6.0 5.0, 5.5, 6.0, 6.5, 7.0, 7.5, 8.0    Protein, Urine NEGATIVE NEGATIVE, 10 (TRACE), 20 (TRACE) mg/dL    Glucose, Urine Normal Normal mg/dL    Blood, Urine NEGATIVE NEGATIVE    Ketones, Urine NEGATIVE NEGATIVE mg/dL    Bilirubin, Urine NEGATIVE NEGATIVE    Urobilinogen, Urine 2 (1+) (A) Normal mg/dL     Nitrite, Urine NEGATIVE NEGATIVE    Leukocyte Esterase, Urine NEGATIVE NEGATIVE   CBC and Auto Differential   Result Value Ref Range    WBC 7.0 4.4 - 11.3 x10*3/uL    nRBC 0.0 0.0 - 0.0 /100 WBCs    RBC 3.79 (L) 4.50 - 5.90 x10*6/uL    Hemoglobin 11.9 (L) 13.5 - 17.5 g/dL    Hematocrit 36.0 (L) 41.0 - 52.0 %    MCV 95 80 - 100 fL    MCH 31.4 26.0 - 34.0 pg    MCHC 33.1 32.0 - 36.0 g/dL    RDW 15.2 (H) 11.5 - 14.5 %    Platelets 143 (L) 150 - 450 x10*3/uL    Neutrophils % 60.1 40.0 - 80.0 %    Immature Granulocytes %, Automated 0.6 0.0 - 0.9 %    Lymphocytes % 23.1 13.0 - 44.0 %    Monocytes % 12.8 2.0 - 10.0 %    Eosinophils % 2.3 0.0 - 6.0 %    Basophils % 1.1 0.0 - 2.0 %    Neutrophils Absolute 4.18 1.20 - 7.70 x10*3/uL    Immature Granulocytes Absolute, Automated 0.04 0.00 - 0.70 x10*3/uL    Lymphocytes Absolute 1.61 1.20 - 4.80 x10*3/uL    Monocytes Absolute 0.89 0.10 - 1.00 x10*3/uL    Eosinophils Absolute 0.16 0.00 - 0.70 x10*3/uL    Basophils Absolute 0.08 0.00 - 0.10 x10*3/uL   Comprehensive Metabolic Panel   Result Value Ref Range    Glucose 141 (H) 65 - 99 mg/dL    Sodium 138 133 - 145 mmol/L    Potassium 3.6 3.4 - 5.1 mmol/L    Chloride 101 97 - 107 mmol/L    Bicarbonate 23 (L) 24 - 31 mmol/L    Urea Nitrogen 12 8 - 25 mg/dL    Creatinine 0.70 0.40 - 1.60 mg/dL    eGFR >90 >60 mL/min/1.73m*2    Calcium 8.7 8.5 - 10.4 mg/dL    Albumin 3.4 (L) 3.5 - 5.0 g/dL    Alkaline Phosphatase 205 (H) 35 - 125 U/L    Total Protein 6.7 5.9 - 7.9 g/dL    AST 36 5 - 40 U/L    Bilirubin, Total 0.5 0.1 - 1.2 mg/dL    ALT 20 5 - 40 U/L    Anion Gap 14 <=19 mmol/L   Coagulation Screen   Result Value Ref Range    Protime 12.4 9.3 - 12.7 seconds    INR 1.2 0.9 - 1.2    aPTT 32.4 22.0 - 32.5 seconds       Assessment/Plan     Assessment & Plan  Ascites due to alcoholic cirrhosis (Multi)    54-year-old male patient with PMH of decompensated alcoholic cirrhosis, portal hypertension, Ascites on recurrent paracentesis, and depression   who is presented at [facility] with progressively worsening abdominal distention and abdominal pain.      Decompensated liver cirrhosis with ascites:  With portal hypertension and ascites  No hepatic encephalopathy,   -Consult IR for paracentesis  -Fluid specimen analysis and culture ordered  Advised alcohol cessation,   Continue diuretics,   Needs follow up with hepatology for consideration for transplant work up,     Abdominal pain:  DD includes alcoholic gastritis, fluid pressure vs SBP,   GI is consulted,   on ceftriaxone until SBP is ruled out    Hypertension  -On home medication and monitor    GERD  -Continue with PPI    Depression  -continue home medication    DVT risk  -SCD's    Dispo: Pending paracentesis, needs hepatology follow up.       Branden Petersen MD

## 2024-09-09 NOTE — CARE PLAN
The patient's goals for the shift include pain management and undergo paracentesis.    The clinical goals for the shift include pain management, comfort, maintain safety, complete paracentesis, and monitor labs/VS.    Problem: Pain - Adult  Goal: Verbalizes/displays adequate comfort level or baseline comfort level  Outcome: Progressing     Problem: Safety - Adult  Goal: Free from fall injury  Outcome: Progressing     Problem: Chronic Conditions and Co-morbidities  Goal: Patient's chronic conditions and co-morbidity symptoms are monitored and maintained or improved  Outcome: Progressing     Problem: Pain  Goal: Takes deep breaths with improved pain control throughout the shift  Outcome: Progressing  Goal: Turns in bed with improved pain control throughout the shift  Outcome: Progressing  Goal: Walks with improved pain control throughout the shift  Outcome: Progressing  Goal: Performs ADL's with improved pain control throughout shift  Outcome: Progressing  Goal: Participates in PT with improved pain control throughout the shift  Outcome: Progressing  Goal: Free from opioid side effects throughout the shift  Outcome: Progressing  Goal: Free from acute confusion related to pain meds throughout the shift  Outcome: Progressing     Problem: Deep Vein Thrombosis  Goal: I will remain free from complications of deep vein thrombosis and maintain current level of mobility  Outcome: Progressing     Problem: Discharge Planning  Goal: Discharge to home or other facility with appropriate resources  Outcome: Progressing

## 2024-09-09 NOTE — PROGRESS NOTES
09/09/24 1200   Discharge Planning   Living Arrangements Alone   Support Systems Children   Assistance Needed Patient reports he is independent of all ADLs and IADLs.   Type of Residence Private residence   Number of Stairs to Enter Residence 0   Number of Stairs Within Residence 0   Do you have animals or pets at home? No   Type of Animals or Pets dog   Who is requesting discharge planning? Provider   Home or Post Acute Services None   Expected Discharge Disposition Home   Does the patient need discharge transport arranged? No   Financial Resource Strain   How hard is it for you to pay for the very basics like food, housing, medical care, and heating? Not hard   Housing Stability   In the last 12 months, was there a time when you were not able to pay the mortgage or rent on time? N   In the past 12 months, how many times have you moved where you were living? 0   At any time in the past 12 months, were you homeless or living in a shelter (including now)? N   Transportation Needs   In the past 12 months, has lack of transportation kept you from medical appointments or from getting medications? no   In the past 12 months, has lack of transportation kept you from meetings, work, or from getting things needed for daily living? No     MSW met with patient. He reports he is independent of all ADLs and IADLs. He denies any home going needs or concerns. Patient plans to return home upon discharge.

## 2024-09-09 NOTE — ASSESSMENT & PLAN NOTE
54-year-old male patient with PMH of decompensated alcoholic cirrhosis, portal hypertension, Ascites on recurrent paracentesis, and depression  who is presented at [facility] with progressively worsening abdominal distention and abdominal pain.      Decompensated liver cirrhosis with ascites:  With portal hypertension and ascites  No hepatic encephalopathy,   -Consult IR for paracentesis  -Fluid specimen analysis and culture ordered  Advised alcohol cessation,   Continue diuretics,   Needs follow up with hepatology for consideration for transplant work up,     Abdominal pain:  DD includes alcoholic gastritis, fluid pressure vs SBP,   GI is consulted,   on ceftriaxone until SBP is ruled out    Hypertension  -On home medication and monitor    GERD  -Continue with PPI    Depression  -continue home medication    DVT risk  -SCD's

## 2024-09-09 NOTE — NURSING NOTE
Assumed care of patient. Patient awake A&O x4, resting in bed. Requesting pain meds for abdominal pain. Call light within reach. Will continue plan of care.

## 2024-09-09 NOTE — CONSULTS
Consults    Reason For Consult  Ascites    History Of Present Illness  Aamir Garcia is a 54 y.o. male presenting with abdominal pain. Patient reports increasing abdominal pain and distention over the past few weeks. He previously was having OP paracentesis per Dr Sullivan; however, his insurance was no longer covering. Denies fevers, chills. Takes Lasix and Aldactone at home. Cirrhosis attributed to alcohol etiology. He is still drinking alcohol      Past Medical History  He has a past medical history of Alcohol dependence (Multi), Alcoholic cirrhosis of liver with ascites (Multi), GERD (gastroesophageal reflux disease), Hyperlipidemia, and Hypertension.    Surgical History  He has a past surgical history that includes Sinus surgery (2013) and Paracentesis.     Social History  He reports that he has been smoking cigarettes. He has been exposed to tobacco smoke. He has never used smokeless tobacco. He reports current alcohol use. He reports current drug use. Drug: Marijuana.    Family History  Family History   Problem Relation Name Age of Onset    Hypertension Mother           age 64    Heart attack Father           at age 62    Other (Glioblastoma) Father      Diabetes Father      Cancer Father          Allergies  Clams    Review of Systems   Constitutional:  Negative for chills, fatigue and fever.   Gastrointestinal:  Positive for abdominal distention and abdominal pain. Negative for blood in stool, constipation, diarrhea, nausea, rectal pain and vomiting.   Neurological:  Negative for weakness.        Physical Exam  Vitals reviewed.   Constitutional:       General: He is awake.      Appearance: Normal appearance.   HENT:      Head: Normocephalic and atraumatic.      Mouth/Throat:      Mouth: Mucous membranes are moist.   Cardiovascular:      Rate and Rhythm: Normal rate and regular rhythm.   Pulmonary:      Effort: Pulmonary effort is normal.      Breath sounds: Normal breath sounds.   Abdominal:       General: There is distension.      Palpations: Abdomen is soft.      Tenderness: There is abdominal tenderness. There is no guarding.   Musculoskeletal:      Cervical back: Normal range of motion and neck supple.   Skin:     General: Skin is warm and dry.   Neurological:      General: No focal deficit present.      Mental Status: He is alert and oriented to person, place, and time. Mental status is at baseline.   Psychiatric:         Attention and Perception: Attention and perception normal.         Mood and Affect: Mood normal.         Behavior: Behavior normal.          Last Recorded Vitals  Blood pressure 113/78, pulse 72, temperature 37.3 °C (99.1 °F), temperature source Temporal, resp. rate 16, height 1.829 m (6'), weight 83 kg (182 lb 15.7 oz), SpO2 98%.    Relevant Results  Results for orders placed or performed during the hospital encounter of 09/08/24 (from the past 24 hour(s))   CBC with Differential   Result Value Ref Range    WBC 8.1 4.4 - 11.3 x10*3/uL    nRBC 0.0 0.0 - 0.0 /100 WBCs    RBC 4.26 (L) 4.50 - 5.90 x10*6/uL    Hemoglobin 13.4 (L) 13.5 - 17.5 g/dL    Hematocrit 40.7 (L) 41.0 - 52.0 %    MCV 96 80 - 100 fL    MCH 31.5 26.0 - 34.0 pg    MCHC 32.9 32.0 - 36.0 g/dL    RDW 15.1 (H) 11.5 - 14.5 %    Platelets 172 150 - 450 x10*3/uL    Neutrophils % 64.9 40.0 - 80.0 %    Immature Granulocytes %, Automated 0.5 0.0 - 0.9 %    Lymphocytes % 22.9 13.0 - 44.0 %    Monocytes % 9.9 2.0 - 10.0 %    Eosinophils % 1.1 0.0 - 6.0 %    Basophils % 0.7 0.0 - 2.0 %    Neutrophils Absolute 5.23 1.20 - 7.70 x10*3/uL    Immature Granulocytes Absolute, Automated 0.04 0.00 - 0.70 x10*3/uL    Lymphocytes Absolute 1.85 1.20 - 4.80 x10*3/uL    Monocytes Absolute 0.80 0.10 - 1.00 x10*3/uL    Eosinophils Absolute 0.09 0.00 - 0.70 x10*3/uL    Basophils Absolute 0.06 0.00 - 0.10 x10*3/uL   Comprehensive Metabolic Panel   Result Value Ref Range    Glucose 140 (H) 65 - 99 mg/dL    Sodium 135 133 - 145 mmol/L    Potassium 3.8  3.4 - 5.1 mmol/L    Chloride 99 97 - 107 mmol/L    Bicarbonate 26 24 - 31 mmol/L    Urea Nitrogen 8 8 - 25 mg/dL    Creatinine 0.80 0.40 - 1.60 mg/dL    eGFR >90 >60 mL/min/1.73m*2    Calcium 9.7 8.5 - 10.4 mg/dL    Albumin 3.9 3.5 - 5.0 g/dL    Alkaline Phosphatase 227 (H) 35 - 125 U/L    Total Protein 7.6 5.9 - 7.9 g/dL    AST 45 (H) 5 - 40 U/L    Bilirubin, Total 1.2 0.1 - 1.2 mg/dL    ALT 26 5 - 40 U/L    Anion Gap 10 <=19 mmol/L   Lipase   Result Value Ref Range    Lipase 44 16 - 63 U/L   Urinalysis with Reflex Culture and Microscopic   Result Value Ref Range    Color, Urine Light-Yellow Light-Yellow, Yellow, Dark-Yellow    Appearance, Urine Clear Clear    Specific Gravity, Urine 1.011 1.005 - 1.035    pH, Urine 6.0 5.0, 5.5, 6.0, 6.5, 7.0, 7.5, 8.0    Protein, Urine NEGATIVE NEGATIVE, 10 (TRACE), 20 (TRACE) mg/dL    Glucose, Urine Normal Normal mg/dL    Blood, Urine NEGATIVE NEGATIVE    Ketones, Urine NEGATIVE NEGATIVE mg/dL    Bilirubin, Urine NEGATIVE NEGATIVE    Urobilinogen, Urine 2 (1+) (A) Normal mg/dL    Nitrite, Urine NEGATIVE NEGATIVE    Leukocyte Esterase, Urine NEGATIVE NEGATIVE   CBC and Auto Differential   Result Value Ref Range    WBC 7.0 4.4 - 11.3 x10*3/uL    nRBC 0.0 0.0 - 0.0 /100 WBCs    RBC 3.79 (L) 4.50 - 5.90 x10*6/uL    Hemoglobin 11.9 (L) 13.5 - 17.5 g/dL    Hematocrit 36.0 (L) 41.0 - 52.0 %    MCV 95 80 - 100 fL    MCH 31.4 26.0 - 34.0 pg    MCHC 33.1 32.0 - 36.0 g/dL    RDW 15.2 (H) 11.5 - 14.5 %    Platelets 143 (L) 150 - 450 x10*3/uL    Neutrophils % 60.1 40.0 - 80.0 %    Immature Granulocytes %, Automated 0.6 0.0 - 0.9 %    Lymphocytes % 23.1 13.0 - 44.0 %    Monocytes % 12.8 2.0 - 10.0 %    Eosinophils % 2.3 0.0 - 6.0 %    Basophils % 1.1 0.0 - 2.0 %    Neutrophils Absolute 4.18 1.20 - 7.70 x10*3/uL    Immature Granulocytes Absolute, Automated 0.04 0.00 - 0.70 x10*3/uL    Lymphocytes Absolute 1.61 1.20 - 4.80 x10*3/uL    Monocytes Absolute 0.89 0.10 - 1.00 x10*3/uL    Eosinophils  Absolute 0.16 0.00 - 0.70 x10*3/uL    Basophils Absolute 0.08 0.00 - 0.10 x10*3/uL   Comprehensive Metabolic Panel   Result Value Ref Range    Glucose 141 (H) 65 - 99 mg/dL    Sodium 138 133 - 145 mmol/L    Potassium 3.6 3.4 - 5.1 mmol/L    Chloride 101 97 - 107 mmol/L    Bicarbonate 23 (L) 24 - 31 mmol/L    Urea Nitrogen 12 8 - 25 mg/dL    Creatinine 0.70 0.40 - 1.60 mg/dL    eGFR >90 >60 mL/min/1.73m*2    Calcium 8.7 8.5 - 10.4 mg/dL    Albumin 3.4 (L) 3.5 - 5.0 g/dL    Alkaline Phosphatase 205 (H) 35 - 125 U/L    Total Protein 6.7 5.9 - 7.9 g/dL    AST 36 5 - 40 U/L    Bilirubin, Total 0.5 0.1 - 1.2 mg/dL    ALT 20 5 - 40 U/L    Anion Gap 14 <=19 mmol/L   Coagulation Screen   Result Value Ref Range    Protime 12.4 9.3 - 12.7 seconds    INR 1.2 0.9 - 1.2    aPTT 32.4 22.0 - 32.5 seconds     US right upper quadrant    Result Date: 9/8/2024  Interpreted By:  Diane Salcedo, STUDY: US RIGHT UPPER QUADRANT;  9/8/2024 5:00 pm   INDICATION: Signs/Symptoms:ascites, known liver disease.   COMPARISON: CT 05/31/2024, ultrasound 06/01/2024   ACCESSION NUMBER(S): [id number]   ORDERING CLINICIAN: CODY PERKINS   TECHNIQUE: Grayscale and color Doppler sonographic imaging of the right upper quadrant.   FINDINGS: LIVER: Enlarged, 20 cm in craniocaudad length, stable. Nodular contour of the liver and coarse echogenicity suggesting cirrhosis. No focal abnormalities.   BILE DUCTS: No intrahepatic or extrahepatic bile duct dilatation. Extrahepatic bile duct = 7 mm.   GALLBLADDER: No stones, pericholecystic fluid, wall thickening, or localized tenderness.   PANCREAS: Obscured by bowel gas.   RIGHT KIDNEY: Normal size, no hydronephrosis.   PERITONEUM: Moderate volume ascites.       Similar appearance of hepatomegaly and hepatic cirrhosis.   Moderate ascites.       MACRO: None   Signed by: Diane Salcedo 9/8/2024 5:33 PM Dictation workstation:   [id number]    CT abdomen pelvis w IV contrast    Result Date: 9/7/2024  CT ABDOMEN AND  PELVIS W CONT HISTORY: Abdominal pain, acute, nonlocalized.  Bloating.  Paracentesis scheduled for 9/9/2024. COMPARISON STUDY: None. TECHNIQUE: CT scan of the abdomen and pelvis performed with IV no oral contrast. 100 mL of Omnipaque 300 was injected intravenously without complication. Coronal and sagittal reformats generated and reviewed. All CT scans at this facility use dose modulation, iterative reconstruction, and/or weight based dosing when appropriate to reduce radiation dose to as low as reasonably achievable. FINDINGS: LOWER THORAX: Mild dependent bibasilar atelectasis.  At least mild degree of coronary artery calcification, not well characterized due to cardiac motion artifact.  Otherwise the visualized lower thorax is unremarkable. HEPATOBILIARY: Heterogenous liver without significant nodular hepatic contour. No focal aggressive appearing hepatic lesions.  Few scattered tiny low-attenuation lesions are likely cysts, however too small to accurately characterize.  Recannulization of the umbilical vein along with enlarged main portal vein (1.7 cm), can be seen in setting of portal hypertension. No biliary ductal dilatation. Gallbladder is present. SPLEEN: Splenomegaly measuring up to 15.9 cm in the craniocaudal dimension. PANCREAS: No focal masses or ductal dilatation. ADRENALS: No large adrenal nodules. KIDNEYS/URETERS: Symmetric renal nephrograms No aggressive appearing mass lesion.  No obstructive renal calculus. No collecting system dilatation. Bladder: Within normal limits. PELVIC ORGANS: Dystrophic calcifications in the prostate. GI TRACT: No acute bowel obstruction.  Air-fluid levels throughout nondilated small bowel loops compatible with a mild ileus.  Sigmoid diverticulosis without CT evidence of diverticulitis. Retroperitoneal fat stranding superiorly which is not centered around the pancreas as well as fat stranding of the omentum likely related to liver disease. LYMPH NODES: Scattered prominent  aortic and portacaval lymph nodes are not enlarged by size criteria, likely reactive. VESSELS: Patent portal and splenic veins. No abdominal aortic aneurysm.  Retroaortic left renal vein. BONES AND SOFT TISSUES: No suspicious osseous lesion. Scattered degenerative change without significant vertebral body height loss.  Chronic right L5 spondylolysis without spondylolisthesis at L5-S1.  Serpiginous sclerosis of the bilateral femoral heads consistent with avascular necrosis, without subchondral collapse. PERITONEUM/RETROPERITONEUM: No free air.  Moderate volume ascites. ABDOMINAL WALL: Unremarkable. _______________________________ IMPRESSION: *  Moderate volume ascites. *  Secondary signs of portal hypertension along with a heterogenous appearing liver.  Findings raising suspicion for underlying cirrhosis. *  No bowel obstruction.  Air-fluid levels in nondilated small bowel compatible with an ileus. *  Chronic changes detailed above including bilateral avascular necrosis of the hips.      Approved by Resident Phil Caceres MD  on 9/7/2024 7:26 AM I, Tal Mckeon MD have personally reviewed the image(s) and agree with and/or edited the report Workstation:[id number] Finalized by Tal Mckeon MD on 9/7/2024 8:15 AM        Assessment/Plan     Cirrhosis, Ascites (Low MELD decomp, ETOH)      HCC- AFP normal on 3/29/24. Will repeat   HE: Monitor mental status, no need for lactulose   EV: Plan for outpatient EGD with Dr Sullivan   Ascites: CT showed moderately large amount of ascites. IR consulted for para.               -Paracentesis with fluid analysis and Albumin  -Neg hep panel on 3/29/24  -2-3 gm low NA diet   -Lasix 40mg and Aldactone 50mg. He has normal Cr. K. 40mg and 50mg. Can consider increase to 100mg Aldactone daily following paracentesis     I spent 30 minutes in the professional and overall care of this patient.

## 2024-09-10 LAB
AFP SERPL-MCNC: <4 NG/ML (ref 0–9)
ALBUMIN SERPL-MCNC: 3.6 G/DL (ref 3.5–5)
ALP BLD-CCNC: 175 U/L (ref 35–125)
ALT SERPL-CCNC: 17 U/L (ref 5–40)
ANION GAP SERPL CALC-SCNC: 9 MMOL/L
AST SERPL-CCNC: 29 U/L (ref 5–40)
BASOPHILS # BLD AUTO: 0.08 X10*3/UL (ref 0–0.1)
BASOPHILS NFR BLD AUTO: 1 %
BILIRUB SERPL-MCNC: 0.6 MG/DL (ref 0.1–1.2)
BUN SERPL-MCNC: 11 MG/DL (ref 8–25)
CALCIUM SERPL-MCNC: 8.6 MG/DL (ref 8.5–10.4)
CHLORIDE SERPL-SCNC: 101 MMOL/L (ref 97–107)
CO2 SERPL-SCNC: 26 MMOL/L (ref 24–31)
CREAT SERPL-MCNC: 0.8 MG/DL (ref 0.4–1.6)
EGFRCR SERPLBLD CKD-EPI 2021: >90 ML/MIN/1.73M*2
EOSINOPHIL # BLD AUTO: 0.18 X10*3/UL (ref 0–0.7)
EOSINOPHIL NFR BLD AUTO: 2.3 %
ERYTHROCYTE [DISTWIDTH] IN BLOOD BY AUTOMATED COUNT: 14.5 % (ref 11.5–14.5)
GLUCOSE SERPL-MCNC: 112 MG/DL (ref 65–99)
HCT VFR BLD AUTO: 39.3 % (ref 41–52)
HGB BLD-MCNC: 13.1 G/DL (ref 13.5–17.5)
IMM GRANULOCYTES # BLD AUTO: 0.04 X10*3/UL (ref 0–0.7)
IMM GRANULOCYTES NFR BLD AUTO: 0.5 % (ref 0–0.9)
LYMPHOCYTES # BLD AUTO: 1.62 X10*3/UL (ref 1.2–4.8)
LYMPHOCYTES NFR BLD AUTO: 20.7 %
MCH RBC QN AUTO: 31.4 PG (ref 26–34)
MCHC RBC AUTO-ENTMCNC: 33.3 G/DL (ref 32–36)
MCV RBC AUTO: 94 FL (ref 80–100)
MONOCYTES # BLD AUTO: 0.92 X10*3/UL (ref 0.1–1)
MONOCYTES NFR BLD AUTO: 11.8 %
NEUTROPHILS # BLD AUTO: 4.97 X10*3/UL (ref 1.2–7.7)
NEUTROPHILS NFR BLD AUTO: 63.7 %
NRBC BLD-RTO: 0 /100 WBCS (ref 0–0)
PLATELET # BLD AUTO: 149 X10*3/UL (ref 150–450)
POTASSIUM SERPL-SCNC: 3.7 MMOL/L (ref 3.4–5.1)
PROT SERPL-MCNC: 6 G/DL (ref 5.9–7.9)
RBC # BLD AUTO: 4.17 X10*6/UL (ref 4.5–5.9)
SODIUM SERPL-SCNC: 136 MMOL/L (ref 133–145)
WBC # BLD AUTO: 7.8 X10*3/UL (ref 4.4–11.3)

## 2024-09-10 PROCEDURE — 2500000004 HC RX 250 GENERAL PHARMACY W/ HCPCS (ALT 636 FOR OP/ED): Performed by: INTERNAL MEDICINE

## 2024-09-10 PROCEDURE — 2500000001 HC RX 250 WO HCPCS SELF ADMINISTERED DRUGS (ALT 637 FOR MEDICARE OP)

## 2024-09-10 PROCEDURE — 2500000001 HC RX 250 WO HCPCS SELF ADMINISTERED DRUGS (ALT 637 FOR MEDICARE OP): Performed by: INTERNAL MEDICINE

## 2024-09-10 PROCEDURE — 84075 ASSAY ALKALINE PHOSPHATASE: CPT | Performed by: INTERNAL MEDICINE

## 2024-09-10 PROCEDURE — 36415 COLL VENOUS BLD VENIPUNCTURE: CPT | Performed by: INTERNAL MEDICINE

## 2024-09-10 PROCEDURE — 1100000001 HC PRIVATE ROOM DAILY

## 2024-09-10 PROCEDURE — 85025 COMPLETE CBC W/AUTO DIFF WBC: CPT | Performed by: INTERNAL MEDICINE

## 2024-09-10 PROCEDURE — 82105 ALPHA-FETOPROTEIN SERUM: CPT | Mod: TRILAB,WESLAB | Performed by: INTERNAL MEDICINE

## 2024-09-10 RX ORDER — SPIRONOLACTONE 50 MG/1
50 TABLET, FILM COATED ORAL 2 TIMES DAILY
Status: DISCONTINUED | OUTPATIENT
Start: 2024-09-10 | End: 2024-09-11 | Stop reason: HOSPADM

## 2024-09-10 RX ORDER — FUROSEMIDE 40 MG/1
40 TABLET ORAL DAILY
Status: DISCONTINUED | OUTPATIENT
Start: 2024-09-11 | End: 2024-09-11 | Stop reason: HOSPADM

## 2024-09-10 RX ORDER — LANOLIN ALCOHOL/MO/W.PET/CERES
100 CREAM (GRAM) TOPICAL DAILY
Status: DISCONTINUED | OUTPATIENT
Start: 2024-09-10 | End: 2024-09-11 | Stop reason: HOSPADM

## 2024-09-10 ASSESSMENT — COGNITIVE AND FUNCTIONAL STATUS - GENERAL
MOBILITY SCORE: 24
DAILY ACTIVITIY SCORE: 24
DAILY ACTIVITIY SCORE: 24
MOBILITY SCORE: 24

## 2024-09-10 ASSESSMENT — PAIN - FUNCTIONAL ASSESSMENT
PAIN_FUNCTIONAL_ASSESSMENT: 0-10
PAIN_FUNCTIONAL_ASSESSMENT: UNABLE TO SELF-REPORT

## 2024-09-10 ASSESSMENT — PAIN SCALES - GENERAL
PAINLEVEL_OUTOF10: 3
PAINLEVEL_OUTOF10: 6
PAINLEVEL_OUTOF10: 0 - NO PAIN
PAINLEVEL_OUTOF10: 4
PAINLEVEL_OUTOF10: 3

## 2024-09-10 ASSESSMENT — PAIN DESCRIPTION - LOCATION
LOCATION: ABDOMEN
LOCATION: ABDOMEN

## 2024-09-10 NOTE — CARE PLAN
Problem: Pain - Adult  Goal: Verbalizes/displays adequate comfort level or baseline comfort level  Outcome: Progressing     Problem: Safety - Adult  Goal: Free from fall injury  Outcome: Progressing     Problem: Discharge Planning  Goal: Discharge to home or other facility with appropriate resources  Outcome: Progressing     Problem: Chronic Conditions and Co-morbidities  Goal: Patient's chronic conditions and co-morbidity symptoms are monitored and maintained or improved  Outcome: Progressing     Problem: Pain  Goal: Takes deep breaths with improved pain control throughout the shift  Outcome: Progressing  Goal: Turns in bed with improved pain control throughout the shift  Outcome: Progressing  Goal: Walks with improved pain control throughout the shift  Outcome: Progressing  Goal: Performs ADL's with improved pain control throughout shift  Outcome: Progressing  Goal: Participates in PT with improved pain control throughout the shift  Outcome: Progressing  Goal: Free from opioid side effects throughout the shift  Outcome: Progressing  Goal: Free from acute confusion related to pain meds throughout the shift  Outcome: Progressing     Problem: Deep Vein Thrombosis  Goal: I will remain free from complications of deep vein thrombosis and maintain current level of mobility  Outcome: Progressing   The patient's goals for the shift include      The clinical goals for the shift include pain control, monitor vs and labs, comfort and rest    Over the shift, the patient did not make progress toward the following goals. Barriers to progression include pain. Recommendations to address these barriers include medicate.

## 2024-09-10 NOTE — NURSING NOTE
Patient ambulated in the tobias, now eating lunch. Pt to let me know about discharge this afternoon.

## 2024-09-10 NOTE — PROGRESS NOTES
Aamir Garcia is a 54 y.o. male on day 2 of admission presenting with Ascites due to alcoholic cirrhosis (Multi).    Subjective   Overall abdominal pain has improved status post paracentesis       Objective     Physical Exam  HENT:      Head: Normocephalic.      Nose: Nose normal.   Eyes:      Pupils: Pupils are equal, round, and reactive to light.   Cardiovascular:      Rate and Rhythm: Normal rate.   Pulmonary:      Effort: Pulmonary effort is normal.   Abdominal:      Palpations: Abdomen is soft.   Musculoskeletal:         General: Normal range of motion.      Cervical back: Normal range of motion.   Skin:     General: Skin is warm.   Neurological:      General: No focal deficit present.      Mental Status: He is alert.         Last Recorded Vitals  Blood pressure 118/78, pulse 72, temperature 36.5 °C (97.7 °F), temperature source Temporal, resp. rate 17, height 1.829 m (6'), weight 83 kg (182 lb 15.7 oz), SpO2 99%.  Intake/Output last 3 Shifts:  I/O last 3 completed shifts:  In: 400 (4.8 mL/kg) [P.O.:300; IV Piggyback:100]  Out: 100 (1.2 mL/kg) [Urine:100 (0 mL/kg/hr)]  Weight: 83 kg     Relevant Results  US right upper quadrant    Result Date: 9/8/2024  Interpreted By:  Diane Salcedo, STUDY: US RIGHT UPPER QUADRANT;  9/8/2024 5:00 pm   INDICATION: Signs/Symptoms:ascites, known liver disease.   COMPARISON: CT 05/31/2024, ultrasound 06/01/2024   ACCESSION NUMBER(S): [id number]   ORDERING CLINICIAN: CODY PERKINS   TECHNIQUE: Grayscale and color Doppler sonographic imaging of the right upper quadrant.   FINDINGS: LIVER: Enlarged, 20 cm in craniocaudad length, stable. Nodular contour of the liver and coarse echogenicity suggesting cirrhosis. No focal abnormalities.   BILE DUCTS: No intrahepatic or extrahepatic bile duct dilatation. Extrahepatic bile duct = 7 mm.   GALLBLADDER: No stones, pericholecystic fluid, wall thickening, or localized tenderness.   PANCREAS: Obscured by bowel gas.   RIGHT KIDNEY: Normal size, no  hydronephrosis.   PERITONEUM: Moderate volume ascites.       Similar appearance of hepatomegaly and hepatic cirrhosis.   Moderate ascites.       MACRO: None   Signed by: Diane Salcedo 9/8/2024 5:33 PM Dictation workstation:   [id number]      Scheduled medications  albumin human, 25 g, intravenous, Once  amLODIPine, 5 mg, oral, Daily  buPROPion XL, 150 mg, oral, Daily before breakfast  escitalopram, 10 mg, oral, Daily  furosemide, 20 mg, oral, Daily  metoprolol succinate XL, 50 mg, oral, Daily  pantoprazole, 40 mg, oral, Daily before breakfast  polyethylene glycol, 17 g, oral, Daily  spironolactone, 50 mg, oral, Daily      Continuous medications     PRN medications  PRN medications: ketorolac, melatonin, ondansetron ODT **OR** ondansetron  Results for orders placed or performed during the hospital encounter of 09/08/24 (from the past 24 hour(s))   Body Fluid Differential   Result Value Ref Range    Neutrophils %, Manual, Fluid 6 <25 % %    Lymphocytes %, Manual, Fluid 61 <75 % %    Mono/Macrophages %, Manual, Fluid 33 <70 % %    Eosinophils %, Manual, Fluid 0 0 % %    Basophils %, Manual, Fluid 0 0 % %    Immature Granulocytes %, Manual, Fluid 0 0 % %    Blasts %, Manual, Fluid 0 0 % %    Unclassified Cells %, Manual, Fluid 0 0 % %    Plasma Cells %, Manual, Fluid 0 0 % %    Total Cells Counted, Fluid 100    Protein, Total Fluid   Result Value Ref Range    Protein, Total Fluid 4.3 Not established g/dL   Body Fluid Cell Count   Result Value Ref Range    Color, Fluid Straw Colorless, Straw, Yellow    Clarity, Fluid Clear Clear    WBC, Fluid 306 See Comment /uL    RBC, Fluid 1,000 0  /uL /uL   Lavender Top   Result Value Ref Range    Extra Tube Hold for add-ons.    Sterile Cup   Result Value Ref Range    Extra Tube Hold for add-ons.    Sterile Cup   Result Value Ref Range    Extra Tube Hold for add-ons.    Sterile Fluid Culture/Smear    Specimen: Ascites Fluid   Result Value Ref Range    Gram Stain (3+) Moderate  Polymorphonuclear leukocytes     Gram Stain No organisms seen    Lavender Top   Result Value Ref Range    Extra Tube Hold for add-ons.    Sterile Cup   Result Value Ref Range    Extra Tube Hold for add-ons.    Sterile Cup   Result Value Ref Range    Extra Tube Hold for add-ons.    CBC and Auto Differential   Result Value Ref Range    WBC 7.8 4.4 - 11.3 x10*3/uL    nRBC 0.0 0.0 - 0.0 /100 WBCs    RBC 4.17 (L) 4.50 - 5.90 x10*6/uL    Hemoglobin 13.1 (L) 13.5 - 17.5 g/dL    Hematocrit 39.3 (L) 41.0 - 52.0 %    MCV 94 80 - 100 fL    MCH 31.4 26.0 - 34.0 pg    MCHC 33.3 32.0 - 36.0 g/dL    RDW 14.5 11.5 - 14.5 %    Platelets 149 (L) 150 - 450 x10*3/uL    Neutrophils % 63.7 40.0 - 80.0 %    Immature Granulocytes %, Automated 0.5 0.0 - 0.9 %    Lymphocytes % 20.7 13.0 - 44.0 %    Monocytes % 11.8 2.0 - 10.0 %    Eosinophils % 2.3 0.0 - 6.0 %    Basophils % 1.0 0.0 - 2.0 %    Neutrophils Absolute 4.97 1.20 - 7.70 x10*3/uL    Immature Granulocytes Absolute, Automated 0.04 0.00 - 0.70 x10*3/uL    Lymphocytes Absolute 1.62 1.20 - 4.80 x10*3/uL    Monocytes Absolute 0.92 0.10 - 1.00 x10*3/uL    Eosinophils Absolute 0.18 0.00 - 0.70 x10*3/uL    Basophils Absolute 0.08 0.00 - 0.10 x10*3/uL   Comprehensive Metabolic Panel   Result Value Ref Range    Glucose 112 (H) 65 - 99 mg/dL    Sodium 136 133 - 145 mmol/L    Potassium 3.7 3.4 - 5.1 mmol/L    Chloride 101 97 - 107 mmol/L    Bicarbonate 26 24 - 31 mmol/L    Urea Nitrogen 11 8 - 25 mg/dL    Creatinine 0.80 0.40 - 1.60 mg/dL    eGFR >90 >60 mL/min/1.73m*2    Calcium 8.6 8.5 - 10.4 mg/dL    Albumin 3.6 3.5 - 5.0 g/dL    Alkaline Phosphatase 175 (H) 35 - 125 U/L    Total Protein 6.0 5.9 - 7.9 g/dL    AST 29 5 - 40 U/L    Bilirubin, Total 0.6 0.1 - 1.2 mg/dL    ALT 17 5 - 40 U/L    Anion Gap 9 <=19 mmol/L                            Assessment/Plan   Assessment & Plan  Ascites due to alcoholic cirrhosis (Multi)    Cirrhosis, Ascites (Low MELD decomp, ETOH)      HCC- AFP normal on  3/29/24. Will repeat   HE: Monitor mental status, no need for lactulose   EV: Plan for outpatient EGD with Dr Sullivan   Ascites: CT showed moderately large amount of ascites. IR consulted for para.               -S/P Paracentesis yesterday 8.8 liters of clear yellow fluid drained. Fluid analysis was Neg SBP  -Neg hep panel on 3/29/24  -2-3 gm low NA diet   -Lasix 40mg and Aldactone 50mg. He has normal Cr. K. 40mg and 50mg. Can consider increase to 100mg Aldactone daily following paracentesis     Patient is currently tolerating diet, overall symptoms have improved. Will plan for an outpatient follow-up.  Will increase his diuretics at discharge . Okay to PAULINE from GI standpoint    Nano Joya, LILO-CNP

## 2024-09-10 NOTE — PROGRESS NOTES
Aamir Garcia is a 54 y.o. male on day 2 of admission presenting with Ascites due to alcoholic cirrhosis (Multi).      Subjective   Chart reviewed  Patient admitted with mostly upper abdominal pain in the setting of large abdominal ascites.  Pain immediately improved although not completely resolved with paracentesis.  Feels much better today although admits to being tired  No fever no chills no nausea no vomiting no confusion        Objective     Last Recorded Vitals  /78 (BP Location: Right arm, Patient Position: Lying)   Pulse 72   Temp 36.5 °C (97.7 °F) (Temporal)   Resp 17   Wt 83 kg (182 lb 15.7 oz)   SpO2 99%   Intake/Output last 3 Shifts:    Intake/Output Summary (Last 24 hours) at 9/10/2024 1213  Last data filed at 9/10/2024 0900  Gross per 24 hour   Intake 250 ml   Output --   Net 250 ml       Physical Exam  Alert oriented x 3 cooperative no distress  Normocephalic/atraumatic EOMI PERRLA  Chest clear  Heart regular  Abdomen is soft nontender except minimal in the upper abdomen positive bowel sounds no rebound no guarding exam is very benign  Extremities no edema  Neurologic exam nonfocal  Relevant Results  Ascitic fluid analysis reviewed labs reviewed  Assessment/Plan     Principal Problem:    Ascites due to alcoholic cirrhosis (Multi)      September 10    Alcoholic cirrhosis with ascites status post paracentesis, fluid analysis is benign no evidence of SBP will stop antibiotic  Abdominal pain which she feels is due to large volume ascites and mechanical in nature much improved  Alcohol abuse strongly advised against continuing alcohol use he shows understanding of the problem  History of hypertension  History of GERD    I feel that he can be discharged if he tolerates that and ambulation, discussed with nurse             Vasquez Denise MD

## 2024-09-10 NOTE — CARE PLAN
The patient's goals for the shift include  pain control    The clinical goals for the shift include pain control, comfort and rest      Problem: Pain - Adult  Goal: Verbalizes/displays adequate comfort level or baseline comfort level  Outcome: Progressing     Problem: Safety - Adult  Goal: Free from fall injury  Outcome: Progressing     Problem: Discharge Planning  Goal: Discharge to home or other facility with appropriate resources  Outcome: Progressing     Problem: Chronic Conditions and Co-morbidities  Goal: Patient's chronic conditions and co-morbidity symptoms are monitored and maintained or improved  Outcome: Progressing     Problem: Pain  Goal: Takes deep breaths with improved pain control throughout the shift  Outcome: Progressing  Goal: Turns in bed with improved pain control throughout the shift  Outcome: Progressing  Goal: Walks with improved pain control throughout the shift  Outcome: Progressing  Goal: Performs ADL's with improved pain control throughout shift  Outcome: Progressing  Goal: Participates in PT with improved pain control throughout the shift  Outcome: Progressing  Goal: Free from opioid side effects throughout the shift  Outcome: Progressing  Goal: Free from acute confusion related to pain meds throughout the shift  Outcome: Progressing     Problem: Deep Vein Thrombosis  Goal: I will remain free from complications of deep vein thrombosis and maintain current level of mobility  Outcome: Progressing

## 2024-09-11 ENCOUNTER — PHARMACY VISIT (OUTPATIENT)
Dept: PHARMACY | Facility: CLINIC | Age: 54
End: 2024-09-11

## 2024-09-11 VITALS
HEIGHT: 72 IN | HEART RATE: 62 BPM | WEIGHT: 182.98 LBS | OXYGEN SATURATION: 100 % | SYSTOLIC BLOOD PRESSURE: 109 MMHG | RESPIRATION RATE: 17 BRPM | DIASTOLIC BLOOD PRESSURE: 70 MMHG | TEMPERATURE: 97.9 F | BODY MASS INDEX: 24.78 KG/M2

## 2024-09-11 PROBLEM — F10.929 ALCOHOLIC INTOXICATION (CMS-HCC): Status: ACTIVE | Noted: 2024-09-11

## 2024-09-11 LAB
ALBUMIN SERPL-MCNC: 3.3 G/DL (ref 3.5–5)
ALP BLD-CCNC: 171 U/L (ref 35–125)
ALT SERPL-CCNC: 18 U/L (ref 5–40)
ANION GAP SERPL CALC-SCNC: 9 MMOL/L
AST SERPL-CCNC: 29 U/L (ref 5–40)
BASOPHILS # BLD AUTO: 0.1 X10*3/UL (ref 0–0.1)
BASOPHILS NFR BLD AUTO: 1.4 %
BILIRUB SERPL-MCNC: 0.5 MG/DL (ref 0.1–1.2)
BUN SERPL-MCNC: 15 MG/DL (ref 8–25)
CALCIUM SERPL-MCNC: 9.2 MG/DL (ref 8.5–10.4)
CHLORIDE SERPL-SCNC: 103 MMOL/L (ref 97–107)
CO2 SERPL-SCNC: 24 MMOL/L (ref 24–31)
CREAT SERPL-MCNC: 0.8 MG/DL (ref 0.4–1.6)
EGFRCR SERPLBLD CKD-EPI 2021: >90 ML/MIN/1.73M*2
EOSINOPHIL # BLD AUTO: 0.19 X10*3/UL (ref 0–0.7)
EOSINOPHIL NFR BLD AUTO: 2.6 %
ERYTHROCYTE [DISTWIDTH] IN BLOOD BY AUTOMATED COUNT: 14.6 % (ref 11.5–14.5)
GLUCOSE SERPL-MCNC: 104 MG/DL (ref 65–99)
HCT VFR BLD AUTO: 39.1 % (ref 41–52)
HGB BLD-MCNC: 12.8 G/DL (ref 13.5–17.5)
IMM GRANULOCYTES # BLD AUTO: 0.05 X10*3/UL (ref 0–0.7)
IMM GRANULOCYTES NFR BLD AUTO: 0.7 % (ref 0–0.9)
LYMPHOCYTES # BLD AUTO: 1.82 X10*3/UL (ref 1.2–4.8)
LYMPHOCYTES NFR BLD AUTO: 24.7 %
MCH RBC QN AUTO: 31.1 PG (ref 26–34)
MCHC RBC AUTO-ENTMCNC: 32.7 G/DL (ref 32–36)
MCV RBC AUTO: 95 FL (ref 80–100)
MONOCYTES # BLD AUTO: 0.9 X10*3/UL (ref 0.1–1)
MONOCYTES NFR BLD AUTO: 12.2 %
NEUTROPHILS # BLD AUTO: 4.3 X10*3/UL (ref 1.2–7.7)
NEUTROPHILS NFR BLD AUTO: 58.4 %
NRBC BLD-RTO: 0 /100 WBCS (ref 0–0)
PLATELET # BLD AUTO: 174 X10*3/UL (ref 150–450)
POTASSIUM SERPL-SCNC: 4.3 MMOL/L (ref 3.4–5.1)
PROT SERPL-MCNC: 6.4 G/DL (ref 5.9–7.9)
RBC # BLD AUTO: 4.12 X10*6/UL (ref 4.5–5.9)
SODIUM SERPL-SCNC: 136 MMOL/L (ref 133–145)
WBC # BLD AUTO: 7.4 X10*3/UL (ref 4.4–11.3)

## 2024-09-11 PROCEDURE — 80053 COMPREHEN METABOLIC PANEL: CPT | Performed by: INTERNAL MEDICINE

## 2024-09-11 PROCEDURE — 2500000001 HC RX 250 WO HCPCS SELF ADMINISTERED DRUGS (ALT 637 FOR MEDICARE OP): Performed by: INTERNAL MEDICINE

## 2024-09-11 PROCEDURE — 36415 COLL VENOUS BLD VENIPUNCTURE: CPT | Performed by: INTERNAL MEDICINE

## 2024-09-11 PROCEDURE — 2500000001 HC RX 250 WO HCPCS SELF ADMINISTERED DRUGS (ALT 637 FOR MEDICARE OP)

## 2024-09-11 PROCEDURE — 85025 COMPLETE CBC W/AUTO DIFF WBC: CPT | Performed by: INTERNAL MEDICINE

## 2024-09-11 RX ORDER — BUSPIRONE HYDROCHLORIDE 10 MG/1
TABLET ORAL
COMMUNITY
Start: 2024-09-04

## 2024-09-11 RX ORDER — FUROSEMIDE 40 MG/1
40 TABLET ORAL DAILY
Qty: 30 TABLET | Refills: 0 | Status: SHIPPED | OUTPATIENT
Start: 2024-09-12 | End: 2024-10-12

## 2024-09-11 RX ORDER — ACETAMINOPHEN 500 MG
5 TABLET ORAL DAILY
Qty: 90 TABLET | Refills: 0 | Status: SHIPPED | OUTPATIENT
Start: 2024-09-11 | End: 2024-10-11

## 2024-09-11 RX ORDER — LANOLIN ALCOHOL/MO/W.PET/CERES
100 CREAM (GRAM) TOPICAL DAILY
Qty: 30 TABLET | Refills: 0 | Status: SHIPPED | OUTPATIENT
Start: 2024-09-12 | End: 2024-10-12

## 2024-09-11 RX ORDER — METOPROLOL SUCCINATE 50 MG/1
50 TABLET, EXTENDED RELEASE ORAL DAILY
Qty: 30 TABLET | Refills: 0 | Status: SHIPPED | OUTPATIENT
Start: 2024-09-11 | End: 2024-10-11

## 2024-09-11 RX ORDER — SPIRONOLACTONE 50 MG/1
50 TABLET, FILM COATED ORAL DAILY
Qty: 30 TABLET | Refills: 0 | Status: SHIPPED | OUTPATIENT
Start: 2024-09-11 | End: 2024-10-11

## 2024-09-11 ASSESSMENT — COGNITIVE AND FUNCTIONAL STATUS - GENERAL: DAILY ACTIVITIY SCORE: 24

## 2024-09-11 ASSESSMENT — PAIN SCALES - GENERAL: PAINLEVEL_OUTOF10: 0 - NO PAIN

## 2024-09-11 NOTE — CARE PLAN
The patient's goals for the shift include  rest    The clinical goals for the shift include comfort, monitor vitals and labs        Problem: Pain - Adult  Goal: Verbalizes/displays adequate comfort level or baseline comfort level  Outcome: Progressing     Problem: Safety - Adult  Goal: Free from fall injury  Outcome: Progressing     Problem: Discharge Planning  Goal: Discharge to home or other facility with appropriate resources  Outcome: Progressing     Problem: Chronic Conditions and Co-morbidities  Goal: Patient's chronic conditions and co-morbidity symptoms are monitored and maintained or improved  Outcome: Progressing     Problem: Pain  Goal: Takes deep breaths with improved pain control throughout the shift  Outcome: Progressing  Goal: Turns in bed with improved pain control throughout the shift  Outcome: Progressing  Goal: Walks with improved pain control throughout the shift  Outcome: Progressing  Goal: Performs ADL's with improved pain control throughout shift  Outcome: Progressing  Goal: Participates in PT with improved pain control throughout the shift  Outcome: Progressing  Goal: Free from opioid side effects throughout the shift  Outcome: Progressing  Goal: Free from acute confusion related to pain meds throughout the shift  Outcome: Progressing     Problem: Deep Vein Thrombosis  Goal: I will remain free from complications of deep vein thrombosis and maintain current level of mobility  Outcome: Progressing

## 2024-09-11 NOTE — DISCHARGE INSTRUCTIONS
Stop any alcohol  Low-salt diet is very important  Doses of diuretics have been increased you will need follow-up lab work next week  Very important to follow with gastroenterologist and primary care physician within 1 to 2 weeks

## 2024-09-11 NOTE — CARE PLAN
Problem: Pain - Adult  Goal: Verbalizes/displays adequate comfort level or baseline comfort level  Outcome: Progressing     Problem: Safety - Adult  Goal: Free from fall injury  Outcome: Progressing     Problem: Discharge Planning  Goal: Discharge to home or other facility with appropriate resources  Outcome: Progressing     Problem: Chronic Conditions and Co-morbidities  Goal: Patient's chronic conditions and co-morbidity symptoms are monitored and maintained or improved  Outcome: Progressing     Problem: Pain  Goal: Takes deep breaths with improved pain control throughout the shift  Outcome: Progressing  Goal: Turns in bed with improved pain control throughout the shift  Outcome: Progressing  Goal: Walks with improved pain control throughout the shift  Outcome: Progressing  Goal: Performs ADL's with improved pain control throughout shift  Outcome: Progressing  Goal: Participates in PT with improved pain control throughout the shift  Outcome: Progressing  Goal: Free from opioid side effects throughout the shift  Outcome: Progressing  Goal: Free from acute confusion related to pain meds throughout the shift  Outcome: Progressing     Problem: Deep Vein Thrombosis  Goal: I will remain free from complications of deep vein thrombosis and maintain current level of mobility  Outcome: Progressing

## 2024-09-11 NOTE — DISCHARGE SUMMARY
Discharge Diagnosis  Patient Active Problem List   Diagnosis    Alcohol abuse, in remission    Angioedema    Anxiety    Bilateral inguinal hernia    Difficulty breathing    Disorder of intervertebral disc of lumbar spine    Facial pain    Headache    Gastroesophageal reflux disease    Gout    Hyperglycemia    Hypertension    Knee joint effusion    Major depressive disorder, single episode, unspecified    Nausea and vomiting    Nicotine dependence    Onychomycosis    Pure hypercholesterolemia    Rectal spasm    Tenesmus    Displacement of lumbar intervertebral disc without myelopathy    Lumbar radiculopathy    Diverticulosis of intestine, part unspecified, without perforation or abscess without bleeding    Malnutrition of moderate degree (Multi)    Anal spasm    Alcoholic cirrhosis of liver without ascites (Multi)    Acute renal failure (ARF) (CMS-HCC)    Alcoholic cirrhosis of liver with ascites (Multi)    Abdominal pain    Ascites due to alcoholic cirrhosis (Multi)         Issues Requiring Follow-Up  Follow-up labs follow-up with physicians diet    Discharge Meds     Your medication list        START taking these medications        Instructions Last Dose Given Next Dose Due   thiamine 100 mg tablet  Commonly known as: Vitamin B-1  Start taking on: September 12, 2024      Take 1 tablet (100 mg) by mouth once daily.              CHANGE how you take these medications        Instructions Last Dose Given Next Dose Due   furosemide 40 mg tablet  Commonly known as: Lasix  Start taking on: September 12, 2024  What changed:   medication strength  how much to take      Take 1 tablet (40 mg) by mouth once daily.              CONTINUE taking these medications        Instructions Last Dose Given Next Dose Due   amLODIPine 5 mg tablet  Commonly known as: Norvasc      Take 1 tablet (5 mg) by mouth once daily.       buPROPion  mg 24 hr tablet  Commonly known as: Wellbutrin XL      Take 1 tablet (150 mg) by mouth once daily  in the morning. Take before meals.       EPINEPHrine 0.3 mg/0.3 mL injection syringe  Commonly known as: Epipen           escitalopram 10 mg tablet  Commonly known as: Lexapro      Take 1 tablet (10 mg) by mouth once daily.       melatonin 5 mg tablet      Take 1 tablet (5 mg) by mouth once daily.       metoprolol succinate XL 50 mg 24 hr tablet  Commonly known as: Toprol-XL      Take 1 tablet (50 mg) by mouth once daily. Do not crush or chew.       omeprazole 20 mg DR capsule  Commonly known as: PriLOSEC      Take 1 capsule (20 mg) by mouth once daily. Do not crush or chew.       spironolactone 50 mg tablet  Commonly known as: Aldactone      Take 1 tablet (50 mg) by mouth once daily. Pt taking every other day, due to increase of urination              STOP taking these medications      methylphenidate LA 10 mg 24 hr capsule  Commonly known as: Ritalin LA                  Where to Get Your Medications        These medications were sent to [facility]  [address], [address], [facility] [address]      Hours: 9 AM to 6 PM Mon-Fri, 9 AM to 1 PM Sat Phone: [phone]   furosemide 40 mg tablet  melatonin 5 mg tablet  metoprolol succinate XL 50 mg 24 hr tablet  spironolactone 50 mg tablet  thiamine 100 mg tablet         Test Results Pending At Discharge  Pending Labs       Order Current Status    Extra Urine Gray Tube Collected (09/08/24 1720)    Urinalysis with Reflex Culture and Microscopic In process    Sterile Fluid Culture/Smear Preliminary result            Hospital Course  Patient presented abdominal pain which was felt to be due to recurrent very large alcohol cirrhosis related ascites.  Pain improved after drainage which was ultrasound-guided done at [facility] by  fluid analysis was unremarkable.  Diuretics were adjusted by consultant GI service.  He is asymptomatic today ready to go home.  We spoke about that abstinence from alcohol physician important, low-salt diet, need for repeat labs  and follow-up with physicians    Pertinent Physical Exam At Time of Discharge  Alert oriented x 3 cooperative no distress  Chest clear  Heart regular  Abdomen soft nontender  Extremities no edema  Neurologic exam nonfocal    Labs from today reviewed    Outpatient Follow-Up  Per chart    Time spent on discharge arrangement:  45 minutes    Vasquez Denise MD

## 2024-09-12 ENCOUNTER — DOCUMENTATION (OUTPATIENT)
Dept: CARDIOLOGY | Facility: HOSPITAL | Age: 54
End: 2024-09-12
Payer: MEDICARE

## 2024-09-12 LAB
BACTERIA FLD CULT: NORMAL
GRAM STN SPEC: NORMAL
GRAM STN SPEC: NORMAL

## 2024-09-13 LAB
ATRIAL RATE: 82 BPM
P AXIS: 18 DEGREES
P OFFSET: 195 MS
P ONSET: 155 MS
PR INTERVAL: 128 MS
Q ONSET: 219 MS
QRS COUNT: 14 BEATS
QRS DURATION: 86 MS
QT INTERVAL: 374 MS
QTC CALCULATION(BAZETT): 436 MS
QTC FREDERICIA: 415 MS
R AXIS: 6 DEGREES
T AXIS: -5 DEGREES
T OFFSET: 406 MS
VENTRICULAR RATE: 82 BPM

## 2024-09-16 ENCOUNTER — HOSPITAL ENCOUNTER (EMERGENCY)
Facility: HOSPITAL | Age: 54
Discharge: HOME | End: 2024-09-16
Payer: MEDICARE

## 2024-09-16 ENCOUNTER — APPOINTMENT (OUTPATIENT)
Dept: RADIOLOGY | Facility: HOSPITAL | Age: 54
End: 2024-09-16
Payer: MEDICARE

## 2024-09-16 VITALS
HEART RATE: 66 BPM | SYSTOLIC BLOOD PRESSURE: 120 MMHG | HEIGHT: 72 IN | OXYGEN SATURATION: 99 % | DIASTOLIC BLOOD PRESSURE: 66 MMHG | TEMPERATURE: 97.2 F | WEIGHT: 170 LBS | RESPIRATION RATE: 15 BRPM | BODY MASS INDEX: 23.03 KG/M2

## 2024-09-16 DIAGNOSIS — M10.9 GOUTY ARTHRITIS OF LEFT KNEE: Primary | ICD-10-CM

## 2024-09-16 LAB
ALBUMIN SERPL BCP-MCNC: 4.2 G/DL (ref 3.4–5)
ALP SERPL-CCNC: 239 U/L (ref 33–120)
ALT SERPL W P-5'-P-CCNC: 34 U/L (ref 10–52)
ANION GAP SERPL CALC-SCNC: 11 MMOL/L (ref 10–20)
AST SERPL W P-5'-P-CCNC: 41 U/L (ref 9–39)
BASOPHILS # BLD AUTO: 0.14 X10*3/UL (ref 0–0.1)
BASOPHILS NFR BLD AUTO: 1.4 %
BILIRUB SERPL-MCNC: 0.6 MG/DL (ref 0–1.2)
BUN SERPL-MCNC: 19 MG/DL (ref 6–23)
CALCIUM SERPL-MCNC: 10 MG/DL (ref 8.6–10.3)
CHLORIDE SERPL-SCNC: 100 MMOL/L (ref 98–107)
CO2 SERPL-SCNC: 29 MMOL/L (ref 21–32)
CREAT SERPL-MCNC: 0.98 MG/DL (ref 0.5–1.3)
CRP SERPL-MCNC: 0.57 MG/DL
EGFRCR SERPLBLD CKD-EPI 2021: >90 ML/MIN/1.73M*2
EOSINOPHIL # BLD AUTO: 0.16 X10*3/UL (ref 0–0.7)
EOSINOPHIL NFR BLD AUTO: 1.6 %
ERYTHROCYTE [DISTWIDTH] IN BLOOD BY AUTOMATED COUNT: 14.5 % (ref 11.5–14.5)
ERYTHROCYTE [SEDIMENTATION RATE] IN BLOOD BY WESTERGREN METHOD: 19 MM/H (ref 0–20)
GLUCOSE SERPL-MCNC: 104 MG/DL (ref 74–99)
HCT VFR BLD AUTO: 40.4 % (ref 41–52)
HGB BLD-MCNC: 13.2 G/DL (ref 13.5–17.5)
IMM GRANULOCYTES # BLD AUTO: 0.05 X10*3/UL (ref 0–0.7)
IMM GRANULOCYTES NFR BLD AUTO: 0.5 % (ref 0–0.9)
LACTATE SERPL-SCNC: 1.2 MMOL/L (ref 0.4–2)
LYMPHOCYTES # BLD AUTO: 2.06 X10*3/UL (ref 1.2–4.8)
LYMPHOCYTES NFR BLD AUTO: 21.1 %
MCH RBC QN AUTO: 31.1 PG (ref 26–34)
MCHC RBC AUTO-ENTMCNC: 32.7 G/DL (ref 32–36)
MCV RBC AUTO: 95 FL (ref 80–100)
MONOCYTES # BLD AUTO: 0.81 X10*3/UL (ref 0.1–1)
MONOCYTES NFR BLD AUTO: 8.3 %
NEUTROPHILS # BLD AUTO: 6.53 X10*3/UL (ref 1.2–7.7)
NEUTROPHILS NFR BLD AUTO: 67.1 %
NRBC BLD-RTO: 0 /100 WBCS (ref 0–0)
PLATELET # BLD AUTO: 242 X10*3/UL (ref 150–450)
POTASSIUM SERPL-SCNC: 3.9 MMOL/L (ref 3.5–5.3)
PROT SERPL-MCNC: 7.8 G/DL (ref 6.4–8.2)
RBC # BLD AUTO: 4.25 X10*6/UL (ref 4.5–5.9)
SODIUM SERPL-SCNC: 136 MMOL/L (ref 136–145)
WBC # BLD AUTO: 9.8 X10*3/UL (ref 4.4–11.3)

## 2024-09-16 PROCEDURE — 2500000001 HC RX 250 WO HCPCS SELF ADMINISTERED DRUGS (ALT 637 FOR MEDICARE OP): Performed by: EMERGENCY MEDICINE

## 2024-09-16 PROCEDURE — 85652 RBC SED RATE AUTOMATED: CPT

## 2024-09-16 PROCEDURE — 80053 COMPREHEN METABOLIC PANEL: CPT

## 2024-09-16 PROCEDURE — 73562 X-RAY EXAM OF KNEE 3: CPT | Mod: LT

## 2024-09-16 PROCEDURE — 96375 TX/PRO/DX INJ NEW DRUG ADDON: CPT

## 2024-09-16 PROCEDURE — 2500000004 HC RX 250 GENERAL PHARMACY W/ HCPCS (ALT 636 FOR OP/ED)

## 2024-09-16 PROCEDURE — 83605 ASSAY OF LACTIC ACID: CPT

## 2024-09-16 PROCEDURE — 86140 C-REACTIVE PROTEIN: CPT

## 2024-09-16 PROCEDURE — 99284 EMERGENCY DEPT VISIT MOD MDM: CPT | Mod: 25

## 2024-09-16 PROCEDURE — 85025 COMPLETE CBC W/AUTO DIFF WBC: CPT

## 2024-09-16 PROCEDURE — 36415 COLL VENOUS BLD VENIPUNCTURE: CPT

## 2024-09-16 PROCEDURE — 96374 THER/PROPH/DIAG INJ IV PUSH: CPT

## 2024-09-16 PROCEDURE — 73562 X-RAY EXAM OF KNEE 3: CPT | Mod: LEFT SIDE | Performed by: RADIOLOGY

## 2024-09-16 RX ORDER — ONDANSETRON HYDROCHLORIDE 2 MG/ML
INJECTION, SOLUTION INTRAVENOUS
Status: COMPLETED
Start: 2024-09-16 | End: 2024-09-16

## 2024-09-16 RX ORDER — TRAMADOL HYDROCHLORIDE 50 MG/1
50 TABLET ORAL EVERY 6 HOURS PRN
Qty: 12 TABLET | Refills: 0 | Status: SHIPPED | OUTPATIENT
Start: 2024-09-16 | End: 2024-09-19

## 2024-09-16 RX ORDER — TRAMADOL HYDROCHLORIDE 50 MG/1
50 TABLET ORAL EVERY 6 HOURS PRN
Status: DISCONTINUED | OUTPATIENT
Start: 2024-09-16 | End: 2024-09-16 | Stop reason: HOSPADM

## 2024-09-16 RX ORDER — ONDANSETRON HYDROCHLORIDE 2 MG/ML
4 INJECTION, SOLUTION INTRAVENOUS ONCE
Status: COMPLETED | OUTPATIENT
Start: 2024-09-16 | End: 2024-09-16

## 2024-09-16 RX ORDER — MORPHINE SULFATE 4 MG/ML
4 INJECTION, SOLUTION INTRAMUSCULAR; INTRAVENOUS ONCE
Status: DISCONTINUED | OUTPATIENT
Start: 2024-09-16 | End: 2024-09-16 | Stop reason: HOSPADM

## 2024-09-16 RX ORDER — INDOMETHACIN 25 MG/1
50 CAPSULE ORAL 3 TIMES DAILY PRN
Qty: 15 CAPSULE | Refills: 0 | Status: SHIPPED | OUTPATIENT
Start: 2024-09-16 | End: 2024-09-21

## 2024-09-16 ASSESSMENT — PAIN DESCRIPTION - LOCATION
LOCATION: KNEE

## 2024-09-16 ASSESSMENT — PAIN SCALES - GENERAL
PAINLEVEL_OUTOF10: 9
PAINLEVEL_OUTOF10: 8
PAINLEVEL_OUTOF10: 9

## 2024-09-16 ASSESSMENT — PAIN - FUNCTIONAL ASSESSMENT: PAIN_FUNCTIONAL_ASSESSMENT: 0-10

## 2024-09-16 ASSESSMENT — PAIN DESCRIPTION - FREQUENCY: FREQUENCY: CONSTANT/CONTINUOUS

## 2024-09-16 ASSESSMENT — PAIN DESCRIPTION - DESCRIPTORS: DESCRIPTORS: ACHING;POUNDING

## 2024-09-16 ASSESSMENT — PAIN DESCRIPTION - ORIENTATION
ORIENTATION: LEFT

## 2024-09-16 ASSESSMENT — PAIN DESCRIPTION - PAIN TYPE: TYPE: ACUTE PAIN

## 2024-09-16 NOTE — ED PROVIDER NOTES
I performed a history and physical examination of Aamir Garcia and discussed his management with the  MLP  I agree with the history, physical, assessment, and plan of care.    S: Patient complains of several days of left knee pain.  There is no history of knee trauma.  He denies fever chills or signs systemic injury.  The knee is red hot and swollen.  Patient denies any trauma.  He has not had gout in his knee in the past.  The patient has a history of gout.  He takes Indocin on a preventative basis once a day.  He has history of cirrhosis.    O: Visit Vitals  /77 (BP Location: Left arm, Patient Position: Sitting)   Pulse 83   Temp 36.2 °C (97.2 °F) (Temporal)   Resp 14   Alert male in no acute distress  The left knee is red warm to the touch and tender.  There is no lymphangitis.  There is no inguinal adenopathy.  There is increased pain with palpation and movement.  There is no ligamentous instability.  There is no purulent drainage.  There is anterior patellar tenderness to light touch and posterior popliteal space tenderness to palpation as well.      A: Patient appears have a gouty flareup in his knee.  There is no evidence of acute septic joint.  His white count is 9.8 hemoglobin 13.2 electrolytes markable for glucose of 104.  Lactate is 1.2.  Sed rate is 19.  CRP is 0.57 patient is discharged home with a diagnosis of gouty flareup    All laboratory and radiology results have been personally reviewed by myself   LABS:  Labs Reviewed   CBC WITH AUTO DIFFERENTIAL - Abnormal       Result Value    WBC 9.8      nRBC 0.0      RBC 4.25 (*)     Hemoglobin 13.2 (*)     Hematocrit 40.4 (*)     MCV 95      MCH 31.1      MCHC 32.7      RDW 14.5      Platelets 242      Neutrophils % 67.1      Immature Granulocytes %, Automated 0.5      Lymphocytes % 21.1      Monocytes % 8.3      Eosinophils % 1.6      Basophils % 1.4      Neutrophils Absolute 6.53      Immature Granulocytes Absolute, Automated 0.05      Lymphocytes  Absolute 2.06      Monocytes Absolute 0.81      Eosinophils Absolute 0.16      Basophils Absolute 0.14 (*)    COMPREHENSIVE METABOLIC PANEL - Abnormal    Glucose 104 (*)     Sodium 136      Potassium 3.9      Chloride 100      Bicarbonate 29      Anion Gap 11      Urea Nitrogen 19      Creatinine 0.98      eGFR >90      Calcium 10.0      Albumin 4.2      Alkaline Phosphatase 239 (*)     Total Protein 7.8      AST 41 (*)     Bilirubin, Total 0.6      ALT 34     LACTATE - Normal    Lactate 1.2      Narrative:     Venipuncture immediately after or during the administration of Metamizole may lead to falsely low results. Testing should be performed immediately  prior to Metamizole dosing.   C-REACTIVE PROTEIN - Normal    C-Reactive Protein 0.57     SEDIMENTATION RATE, AUTOMATED - Normal    Sedimentation Rate 19           RADIOLOGY:  Interpreted by Radiologist.  XR knee left 3 views   Final Result   Normal radiographs of the left knee             MACRO:   None        Signed by: Joseph Sigala 9/16/2024 3:02 PM   Dictation workstation:   [id number]          Encounter Date: 09/08/24   Electrocardiogram, 12-lead PRN ACS symptoms   Result Value    Ventricular Rate 82    Atrial Rate 82    LA Interval 128    QRS Duration 86    QT Interval 374    QTC Calculation(Bazett) 436    P Axis 18    R Axis 6    T Axis -5    QRS Count 14    Q Onset 219    P Onset 155    P Offset 195    T Offset 406    QTC Fredericia 415    Narrative    Normal sinus rhythm  Normal ECG  No previous ECGs available  Confirmed by Olivier Amaral ([id number]) on 9/13/2024 1:09:30 PM       Clinical Course: Patient was discharged home diagnosis of gouty flare.  He is to take his and Indocin 3 times per day for 5 days, he is to take colchicine twice per day.  Advised follow-up with primary care doctor on Friday if not completely better.        MDM:    Discharged to home    Diagnosis: Gouty arthritis  Disposition: Home    P. Lee Howard,  MD  09/16/24 0125

## 2024-09-16 NOTE — ED PROVIDER NOTES
HPI   Chief Complaint   Patient presents with    Knee Pain     Pt with redness and swelling to left knee, no known injury       Patient is a 54-year-old male with significant PMH of cirrhosis and hypertension presents to the ED with cc of left knee pain x 2 days.  Patient states it is painful to bear weight.  Patient states he takes colchicine daily for gout.  Patient has never had gout in his knee only in his toes.  Patient denies any injury to the knee.  Patient states he has pain with ranging the knee.  Patient denies any fever chills nausea vomiting chest pain shortness of breath abdominal pain diarrhea constipation numbness or tingling.  Patient is a former alcohol abuse user denies any tobacco or street drug abuse.              Patient History   Past Medical History:   Diagnosis Date    Alcohol dependence (Multi)     Alcoholic cirrhosis of liver with ascites (Multi)     GERD (gastroesophageal reflux disease)     Hyperlipidemia     Hypertension      Past Surgical History:   Procedure Laterality Date    PARACENTESIS      2x in 2024    SINUS SURGERY  2013    Sinus Surgery     Family History   Problem Relation Name Age of Onset    Hypertension Mother           age 64    Heart attack Father           at age 62    Other (Glioblastoma) Father      Diabetes Father      Cancer Father       Social History     Tobacco Use    Smoking status: Every Day     Current packs/day: 0.50     Types: Cigarettes     Passive exposure: Current    Smokeless tobacco: Never   Vaping Use    Vaping status: Some Days    Substances: Nicotine    Devices: Disposable, Pre-filled or refillable cartridge   Substance Use Topics    Alcohol use: Not Currently     Comment: quit 10 days ago    Drug use: Yes     Types: Marijuana     Comment: bedtime to sleep       Physical Exam   ED Triage Vitals [24 1413]   Temperature Heart Rate Respirations BP   36.2 °C (97.2 °F) 83 14 127/77      Pulse Ox Temp Source Heart Rate Source  Patient Position   99 % Temporal Monitor Sitting      BP Location FiO2 (%)     Left arm --       Physical Exam  HENT:      Head: Normocephalic.   Cardiovascular:      Pulses: Normal pulses.   Pulmonary:      Effort: Pulmonary effort is normal.      Breath sounds: Normal breath sounds. No wheezing, rhonchi or rales.   Musculoskeletal:         General: Tenderness (Pain with flexion of the left knee pain on palpation to the anterior and medial left knee) present. Normal range of motion.   Skin:     Capillary Refill: Capillary refill takes less than 2 seconds.      Findings: Erythema present.      Comments: Erythema edema of the left knee   Neurological:      General: No focal deficit present.      Mental Status: He is alert and oriented to person, place, and time. Mental status is at baseline.           ED Course & MDM   Diagnoses as of 09/16/24 1636   Gouty arthritis of left knee                 No data recorded     Tristin Coma Scale Score: 15 (09/16/24 1441 : Thea Aj RN)                           Medical Decision Making  Medical Decision Making:  Patient presented as described in HPI. Patient case including ROS, PE, and treatment and plan discussed with ED attending if attached as cosigner. Due to patients presentation orders completed include as documented.  Patient presents to the ED with cc of left knee pain x 2 days.  Patient denies any known injury.  Patient states he does have history of gout.  Patient is nontoxic-appearing, lung sounds are clear pain with flexion of the left knee, Pain with flexion of the left knee pain on palpation to the anterior and medial left knee,, good pulses, erythema edema of the left knee.  Patient given Dilaudid for symptoms here along with Zofran.  Labs are unremarkable.  X-ray is unremarkable.  Patient educated these findings.  Patient educated likely gout discharged home with Indocin.  Educated on ice and RICE therapy.  Patient educated any worse symptoms return.   Patient remained stable and discharged.  Patient was advised to follow up with PCP or recommended provider in 2-3 days for another evaluation and exam. I advised patient/guardian to return or go to closest emergency room immediately if symptoms change, get worse, new symptoms develop prior to follow up. If there is no improvement in symptoms in the next 24 hours they are advised to return for further evaluation and exam. I also explained the plan and treatment course. Patient/guardian is in agreement with plan, treatment course, and follow up and states verbally that they will comply.      Patient care discussed with: N/A  Social Determinants affecting care: N/A    Final diagnosis and disposition as below.  See CI    Homegoing. I discussed the differential; results and discharge plan with the patient and/or family/friend/caregiver if present.  I emphasized the importance of follow-up with the physician I referred them to in the timeframe recommended.  I explained reasons for the patient to return to the Emergency Department. They agreed that if they feel their condition is worsening or if they have any other concern they should call 911 immediately for further assistance. I gave the patient an opportunity to ask all questions they had and answered all of them accordingly. They understand return precautions and discharge instructions. The patient and/or family/friend/caregiver expressed understanding verbally and that they would comply.       Disposition:  Discharge      This note has been transcribed using voice recognition and may contain grammatical errors, misplaced words, incorrect words, incorrect phrases or other errors.        XR knee left 3 views   Final Result   Normal radiographs of the left knee             MACRO:   None        Signed by: Joseph Sigala 9/16/2024 3:02 PM   Dictation workstation:   [id number]         Labs Reviewed   CBC WITH AUTO DIFFERENTIAL - Abnormal       Result Value    WBC 9.8       nRBC 0.0      RBC 4.25 (*)     Hemoglobin 13.2 (*)     Hematocrit 40.4 (*)     MCV 95      MCH 31.1      MCHC 32.7      RDW 14.5      Platelets 242      Neutrophils % 67.1      Immature Granulocytes %, Automated 0.5      Lymphocytes % 21.1      Monocytes % 8.3      Eosinophils % 1.6      Basophils % 1.4      Neutrophils Absolute 6.53      Immature Granulocytes Absolute, Automated 0.05      Lymphocytes Absolute 2.06      Monocytes Absolute 0.81      Eosinophils Absolute 0.16      Basophils Absolute 0.14 (*)    COMPREHENSIVE METABOLIC PANEL - Abnormal    Glucose 104 (*)     Sodium 136      Potassium 3.9      Chloride 100      Bicarbonate 29      Anion Gap 11      Urea Nitrogen 19      Creatinine 0.98      eGFR >90      Calcium 10.0      Albumin 4.2      Alkaline Phosphatase 239 (*)     Total Protein 7.8      AST 41 (*)     Bilirubin, Total 0.6      ALT 34     LACTATE - Normal    Lactate 1.2      Narrative:     Venipuncture immediately after or during the administration of Metamizole may lead to falsely low results. Testing should be performed immediately  prior to Metamizole dosing.   C-REACTIVE PROTEIN - Normal    C-Reactive Protein 0.57     SEDIMENTATION RATE, AUTOMATED - Normal    Sedimentation Rate 19          Procedure  Procedures     Rosalina Ansari PA-C  09/16/24 1639       Charly Howard MD  09/18/24 0733

## 2024-09-17 ENCOUNTER — APPOINTMENT (OUTPATIENT)
Dept: PRIMARY CARE | Facility: CLINIC | Age: 54
End: 2024-09-17
Payer: MEDICARE

## 2024-09-23 DIAGNOSIS — I10 HYPERTENSION, UNSPECIFIED TYPE: ICD-10-CM

## 2024-09-23 NOTE — TELEPHONE ENCOUNTER
Please deny-pt in process of switching to new PCP as he indicates we are not an in-network provider for his ins.

## 2024-09-24 RX ORDER — LOSARTAN POTASSIUM 100 MG/1
100 TABLET ORAL DAILY
Qty: 90 TABLET | Refills: 2 | OUTPATIENT
Start: 2024-09-24

## 2024-09-24 RX ORDER — AMLODIPINE BESYLATE 5 MG/1
5 TABLET ORAL DAILY
Qty: 100 TABLET | Refills: 1 | OUTPATIENT
Start: 2024-09-24

## 2024-09-25 ENCOUNTER — HOSPITAL ENCOUNTER (EMERGENCY)
Facility: HOSPITAL | Age: 54
Discharge: HOME | End: 2024-09-25
Attending: EMERGENCY MEDICINE
Payer: MEDICARE

## 2024-09-25 VITALS
TEMPERATURE: 98.1 F | RESPIRATION RATE: 16 BRPM | DIASTOLIC BLOOD PRESSURE: 83 MMHG | WEIGHT: 170 LBS | SYSTOLIC BLOOD PRESSURE: 120 MMHG | BODY MASS INDEX: 23.03 KG/M2 | OXYGEN SATURATION: 98 % | HEART RATE: 76 BPM | HEIGHT: 72 IN

## 2024-09-25 DIAGNOSIS — M25.562 ACUTE PAIN OF LEFT KNEE: Primary | ICD-10-CM

## 2024-09-25 DIAGNOSIS — Z99.89 CRUTCHES AS AMBULATION AID: ICD-10-CM

## 2024-09-25 PROCEDURE — 96372 THER/PROPH/DIAG INJ SC/IM: CPT | Performed by: EMERGENCY MEDICINE

## 2024-09-25 PROCEDURE — 99285 EMERGENCY DEPT VISIT HI MDM: CPT

## 2024-09-25 PROCEDURE — 2500000004 HC RX 250 GENERAL PHARMACY W/ HCPCS (ALT 636 FOR OP/ED): Performed by: EMERGENCY MEDICINE

## 2024-09-25 PROCEDURE — 99283 EMERGENCY DEPT VISIT LOW MDM: CPT | Mod: 25

## 2024-09-25 PROCEDURE — 96372 THER/PROPH/DIAG INJ SC/IM: CPT

## 2024-09-25 PROCEDURE — 2500000004 HC RX 250 GENERAL PHARMACY W/ HCPCS (ALT 636 FOR OP/ED)

## 2024-09-25 PROCEDURE — 20610 DRAIN/INJ JOINT/BURSA W/O US: CPT | Performed by: EMERGENCY MEDICINE

## 2024-09-25 PROCEDURE — 2500000005 HC RX 250 GENERAL PHARMACY W/O HCPCS

## 2024-09-25 RX ORDER — MORPHINE SULFATE 4 MG/ML
8 INJECTION, SOLUTION INTRAMUSCULAR; INTRAVENOUS ONCE
Status: COMPLETED | OUTPATIENT
Start: 2024-09-25 | End: 2024-09-25

## 2024-09-25 RX ORDER — ONDANSETRON HYDROCHLORIDE 2 MG/ML
INJECTION, SOLUTION INTRAVENOUS
Status: COMPLETED
Start: 2024-09-25 | End: 2024-09-25

## 2024-09-25 RX ORDER — OXYCODONE AND ACETAMINOPHEN 5; 325 MG/1; MG/1
1 TABLET ORAL EVERY 6 HOURS PRN
Qty: 12 TABLET | Refills: 0 | Status: SHIPPED | OUTPATIENT
Start: 2024-09-25 | End: 2024-09-27 | Stop reason: WASHOUT

## 2024-09-25 RX ORDER — LIDOCAINE HYDROCHLORIDE 20 MG/ML
INJECTION, SOLUTION INFILTRATION; PERINEURAL
Status: COMPLETED
Start: 2024-09-25 | End: 2024-09-25

## 2024-09-25 RX ORDER — LIDOCAINE HYDROCHLORIDE 20 MG/ML
10 INJECTION, SOLUTION INFILTRATION; PERINEURAL ONCE
Status: COMPLETED | OUTPATIENT
Start: 2024-09-25 | End: 2024-09-25

## 2024-09-25 RX ORDER — ONDANSETRON HYDROCHLORIDE 2 MG/ML
4 INJECTION, SOLUTION INTRAVENOUS ONCE
Status: COMPLETED | OUTPATIENT
Start: 2024-09-25 | End: 2024-09-25

## 2024-09-25 ASSESSMENT — PAIN DESCRIPTION - DESCRIPTORS: DESCRIPTORS: ACHING

## 2024-09-25 ASSESSMENT — ENCOUNTER SYMPTOMS
DYSURIA: 0
JOINT SWELLING: 1
CHILLS: 0
SEIZURES: 0
ABDOMINAL PAIN: 0
HEMATURIA: 0
PALPITATIONS: 0
VOMITING: 0
ARTHRALGIAS: 0
SHORTNESS OF BREATH: 0
BACK PAIN: 0
COUGH: 0
COLOR CHANGE: 0
SORE THROAT: 0
EYE PAIN: 0
FEVER: 0

## 2024-09-25 ASSESSMENT — PAIN DESCRIPTION - LOCATION
LOCATION: KNEE
LOCATION: KNEE

## 2024-09-25 ASSESSMENT — COLUMBIA-SUICIDE SEVERITY RATING SCALE - C-SSRS
2. HAVE YOU ACTUALLY HAD ANY THOUGHTS OF KILLING YOURSELF?: NO
1. IN THE PAST MONTH, HAVE YOU WISHED YOU WERE DEAD OR WISHED YOU COULD GO TO SLEEP AND NOT WAKE UP?: NO
6. HAVE YOU EVER DONE ANYTHING, STARTED TO DO ANYTHING, OR PREPARED TO DO ANYTHING TO END YOUR LIFE?: NO

## 2024-09-25 ASSESSMENT — PAIN SCALES - GENERAL
PAINLEVEL_OUTOF10: 0 - NO PAIN
PAINLEVEL_OUTOF10: 9
PAINLEVEL_OUTOF10: 9

## 2024-09-25 ASSESSMENT — PAIN DESCRIPTION - ORIENTATION
ORIENTATION: LEFT
ORIENTATION: LEFT

## 2024-09-25 ASSESSMENT — PAIN - FUNCTIONAL ASSESSMENT: PAIN_FUNCTIONAL_ASSESSMENT: 0-10

## 2024-09-25 NOTE — ED TRIAGE NOTES
Left knee pain for a week. Seen last week for it and diagnosed with gout. Pain subsided temporarily and has now returned worse. Denies any injury. A/ox4. VSS

## 2024-09-25 NOTE — ED PROVIDER NOTES
HPI   Chief Complaint   Patient presents with    Knee Pain     Left knee pain for a week. Seen last week for it and diagnosed with gout. Pain subsided temporarily and has now returned worse. Denies any injury. A/ox4. VSS         54-year-old male, history of liver issues, not currently drinking, presents with left knee pain.  Seen 1 week ago.  Treated presumptively for gout given basically unremarkable labs and imaging.  No arthrocentesis done at that time.  Presents back with waxing and waning pain particularly worse this morning.  States it feels hot at times.  No fevers chills or systemic symptomatology otherwise.  No new injury or trauma.                          Tristin Coma Scale Score: 15                  Patient History   Past Medical History:   Diagnosis Date    Alcohol dependence (Multi)     Alcoholic cirrhosis of liver with ascites (Multi)     GERD (gastroesophageal reflux disease)     Hyperlipidemia     Hypertension      Past Surgical History:   Procedure Laterality Date    PARACENTESIS      2x in 2024    SINUS SURGERY  2013    Sinus Surgery     Family History   Problem Relation Name Age of Onset    Hypertension Mother           age 64    Heart attack Father           at age 62    Other (Glioblastoma) Father      Diabetes Father      Cancer Father       Social History     Tobacco Use    Smoking status: Every Day     Current packs/day: 0.50     Types: Cigarettes     Passive exposure: Current    Smokeless tobacco: Never   Vaping Use    Vaping status: Some Days    Substances: Nicotine    Devices: Disposable, Pre-filled or refillable cartridge   Substance Use Topics    Alcohol use: Not Currently     Comment: quit 10 days ago    Drug use: Yes     Types: Marijuana     Comment: bedtime to sleep       Review of Systems   Constitutional:  Negative for chills and fever.   HENT:  Negative for ear pain and sore throat.    Eyes:  Negative for pain and visual disturbance.   Respiratory:  Negative  for cough and shortness of breath.    Cardiovascular:  Negative for chest pain and palpitations.   Gastrointestinal:  Negative for abdominal pain and vomiting.   Genitourinary:  Negative for dysuria and hematuria.   Musculoskeletal:  Positive for joint swelling. Negative for arthralgias and back pain.        Left knee pain swelling   Skin:  Negative for color change and rash.   Neurological:  Negative for seizures and syncope.   All other systems reviewed and are negative.       Physical Exam   ED Triage Vitals   Temp Pulse Resp BP   -- -- -- --      SpO2 Temp src Heart Rate Source Patient Position   -- -- -- --      BP Location FiO2 (%)     -- --       Physical Exam  Vitals reviewed.   Constitutional:       General: He is not in acute distress.     Appearance: He is well-developed.   HENT:      Head: Normocephalic and atraumatic.      Right Ear: External ear normal.      Left Ear: External ear normal.      Nose: Nose normal.      Mouth/Throat:      Mouth: Mucous membranes are moist.      Pharynx: Oropharynx is clear.   Eyes:      Conjunctiva/sclera: Conjunctivae normal.      Pupils: Pupils are equal, round, and reactive to light.   Neck:      Vascular: No JVD.   Cardiovascular:      Rate and Rhythm: Normal rate and regular rhythm.      Pulses: Normal pulses.   Pulmonary:      Effort: Pulmonary effort is normal. No accessory muscle usage or respiratory distress.      Breath sounds: Normal breath sounds.   Abdominal:      General: Abdomen is flat. There is no distension.      Palpations: Abdomen is soft. There is no mass.      Tenderness: There is no abdominal tenderness.   Musculoskeletal:         General: Swelling and tenderness present. Normal range of motion.      Cervical back: Normal range of motion and neck supple.      Comments: Left knee pain.  Mild swelling.  No significant erythema or warmth appreciated but pain with range of motion.  He states significant pain with range of motion.  The joint does not  appear again significantly erythematous or hot, but likely does have some component of effusion.   Lymphadenopathy:      Cervical: No cervical adenopathy.   Skin:     General: Skin is warm and dry.      Capillary Refill: Capillary refill takes less than 2 seconds.      Comments: No zoster rash seen   Neurological:      General: No focal deficit present.      Mental Status: He is alert. Mental status is at baseline.   Psychiatric:         Mood and Affect: Mood normal.                 ECG if performed, ordered and interpreted by ED physician:        Labs Reviewed   STERILE FLUID CULTURE/SMEAR   BODY FLUID CELL COUNT WITH DIFFERENTIAL    Narrative:     The following orders were created for panel order Body Fluid Cell Count With Differential.  Procedure                               Abnormality         Status                     ---------                               -----------         ------                     Body Fluid Cell Count[[id number]]                                                       Body Fluid Differential[[id number]]                                                       Please view results for these tests on the individual orders.   BODY FLUID CRYSTAL AND PATHOLOGIST REVIEW    Narrative:     The following orders were created for panel order Crystal Identification and Pathologist Review Synovial Fluid.  Procedure                               Abnormality         Status                     ---------                               -----------         ------                     Crystal Identification, ...[[id number]]                                                   Please view results for these tests on the individual orders.   GLUCOSE, FLUID    Narrative:     The following orders were created for panel order Glucose, Fluid.  Procedure                               Abnormality         Status                     ---------                               -----------         ------                     Glucose,  Fluid[[id number]]                                                              Review Testing- Fluid So...[[id number]]                                                   Please view results for these tests on the individual orders.   PROTEIN, TOTAL FLUID    Narrative:     The following orders were created for panel order Protein, Total Fluid.  Procedure                               Abnormality         Status                     ---------                               -----------         ------                     Protein, Total Fluid[[id number]]                                                        Review Testing- Fluid So...[[id number]]                                                   Please view results for these tests on the individual orders.   BODY FLUID CELL COUNT   BODY FLUID CRYSTAL   GLUCOSE, FLUID   PROTEIN, TOTAL FLUID   BODY FLUID CELL DIFFERENTIAL   REVIEW TESTING-FLUID SOURCE   REVIEW TESTING-FLUID SOURCE          No orders to display            ED Course & MDM     ED Medication Administration from 09/25/2024 0641 to 09/25/2024 0854         Date/Time Order Dose Route Action Action by     09/25/2024 0835 EDT morphine injection 8 mg 8 mg intramuscular Given BROOKLYN Martínez     09/25/2024 0835 EDT ondansetron (Zofran) injection 4 mg 4 mg intramuscular Given BROOKLYN Martínez                   Diagnoses as of 09/25/24 0854   Acute pain of left knee   Crutches as ambulation aid       Medical Decision Making  Patient consented verbally to arthrocentesis.  I attempted arthrocentesis but was unsuccessful and aspirate any fluid.    Patient will be discharged with Ace wrap, crutches, continued analgesics and outpatient orthopedic follow-up.    Discharge stable condition.          Procedure  Arthrocentesis    Performed by: Godwin Miranda MD MPH  Authorized by: Godwin Miranda MD MPH    Consent:     Consent obtained:  Verbal    Risks discussed:  Bleeding and infection    Alternatives discussed:  No treatment  Universal  protocol:     Procedure explained and questions answered to patient or proxy's satisfaction: yes      Imaging studies available: yes    Location:     Location:  Knee    Knee:  L knee  Anesthesia:     Anesthesia method:  Local infiltration  Procedure details:     Needle gauge:  18 G    Ultrasound guidance: no      Approach:  Medial    Aspirate characteristics: Unsuccessful aspiration of any fluid.    Specimen collected: no    Comments:      Unsuccessful aspiration.  No fluid obtained.  18-gauge needle was introduced into the joint without difficulty, but no significant fluid aspirated.           Godwin Miranda MD MPH  09/25/24 6159

## 2024-09-25 NOTE — ED NOTES
Ace wrap applied to left knee. Patient instructed on crutch use. Return demonstration on proper use showed patient to ambulate with crutch and maintain balance. Patient unable to contact ride and refused to stay longer.     Amanda Martínez RN  09/25/24 9409

## 2024-09-25 NOTE — DISCHARGE INSTRUCTIONS
Follow-up closely with your primary care and/or with orthopedics as above.  Pain medications as written.  Use crutches if needed for aid in walking.  Return for any other issues.

## 2024-09-26 ENCOUNTER — APPOINTMENT (OUTPATIENT)
Dept: RADIOLOGY | Facility: HOSPITAL | Age: 54
DRG: 603 | End: 2024-09-26
Payer: MEDICARE

## 2024-09-26 ENCOUNTER — HOSPITAL ENCOUNTER (INPATIENT)
Facility: HOSPITAL | Age: 54
DRG: 603 | End: 2024-09-26
Attending: EMERGENCY MEDICINE | Admitting: EMERGENCY MEDICINE
Payer: MEDICARE

## 2024-09-26 DIAGNOSIS — K70.30 ALCOHOLIC CIRRHOSIS OF LIVER WITHOUT ASCITES (MULTI): ICD-10-CM

## 2024-09-26 DIAGNOSIS — M70.42 PREPATELLAR BURSITIS OF LEFT KNEE: Primary | ICD-10-CM

## 2024-09-26 DIAGNOSIS — K70.31 ALCOHOLIC CIRRHOSIS OF LIVER WITH ASCITES (MULTI): ICD-10-CM

## 2024-09-26 DIAGNOSIS — I10 HYPERTENSION, UNSPECIFIED TYPE: ICD-10-CM

## 2024-09-26 DIAGNOSIS — M70.52 BURSITIS OF LEFT KNEE, UNSPECIFIED BURSA: ICD-10-CM

## 2024-09-26 DIAGNOSIS — L03.116 CELLULITIS OF LEFT KNEE: ICD-10-CM

## 2024-09-26 DIAGNOSIS — R10.84 GENERALIZED ABDOMINAL PAIN: ICD-10-CM

## 2024-09-26 LAB
ALBUMIN SERPL BCP-MCNC: 4.5 G/DL (ref 3.4–5)
ALP SERPL-CCNC: 213 U/L (ref 33–120)
ALT SERPL W P-5'-P-CCNC: 27 U/L (ref 10–52)
ANION GAP SERPL CALC-SCNC: 15 MMOL/L (ref 10–20)
AST SERPL W P-5'-P-CCNC: 31 U/L (ref 9–39)
BASOPHILS # BLD AUTO: 0.1 X10*3/UL (ref 0–0.1)
BASOPHILS NFR BLD AUTO: 0.8 %
BILIRUB SERPL-MCNC: 0.8 MG/DL (ref 0–1.2)
BUN SERPL-MCNC: 11 MG/DL (ref 6–23)
CALCIUM SERPL-MCNC: 10.2 MG/DL (ref 8.6–10.6)
CHLORIDE SERPL-SCNC: 98 MMOL/L (ref 98–107)
CO2 SERPL-SCNC: 26 MMOL/L (ref 21–32)
CREAT SERPL-MCNC: 0.82 MG/DL (ref 0.5–1.3)
CRP SERPL-MCNC: 1.02 MG/DL
EGFRCR SERPLBLD CKD-EPI 2021: >90 ML/MIN/1.73M*2
EOSINOPHIL # BLD AUTO: 0.39 X10*3/UL (ref 0–0.7)
EOSINOPHIL NFR BLD AUTO: 3.1 %
ERYTHROCYTE [DISTWIDTH] IN BLOOD BY AUTOMATED COUNT: 14.3 % (ref 11.5–14.5)
ERYTHROCYTE [SEDIMENTATION RATE] IN BLOOD BY WESTERGREN METHOD: 30 MM/H (ref 0–20)
GLUCOSE SERPL-MCNC: 96 MG/DL (ref 74–99)
HCT VFR BLD AUTO: 41.2 % (ref 41–52)
HGB BLD-MCNC: 14.3 G/DL (ref 13.5–17.5)
IMM GRANULOCYTES # BLD AUTO: 0.04 X10*3/UL (ref 0–0.7)
IMM GRANULOCYTES NFR BLD AUTO: 0.3 % (ref 0–0.9)
LYMPHOCYTES # BLD AUTO: 2.27 X10*3/UL (ref 1.2–4.8)
LYMPHOCYTES NFR BLD AUTO: 18.2 %
MCH RBC QN AUTO: 30.4 PG (ref 26–34)
MCHC RBC AUTO-ENTMCNC: 34.7 G/DL (ref 32–36)
MCV RBC AUTO: 88 FL (ref 80–100)
MONOCYTES # BLD AUTO: 0.99 X10*3/UL (ref 0.1–1)
MONOCYTES NFR BLD AUTO: 8 %
NEUTROPHILS # BLD AUTO: 8.66 X10*3/UL (ref 1.2–7.7)
NEUTROPHILS NFR BLD AUTO: 69.6 %
NRBC BLD-RTO: 0 /100 WBCS (ref 0–0)
PLATELET # BLD AUTO: 187 X10*3/UL (ref 150–450)
POTASSIUM SERPL-SCNC: 3.9 MMOL/L (ref 3.5–5.3)
PROT SERPL-MCNC: 8.1 G/DL (ref 6.4–8.2)
RBC # BLD AUTO: 4.71 X10*6/UL (ref 4.5–5.9)
SODIUM SERPL-SCNC: 135 MMOL/L (ref 136–145)
URATE SERPL-MCNC: 5.8 MG/DL (ref 4–7.5)
WBC # BLD AUTO: 12.5 X10*3/UL (ref 4.4–11.3)

## 2024-09-26 PROCEDURE — 96361 HYDRATE IV INFUSION ADD-ON: CPT

## 2024-09-26 PROCEDURE — 80053 COMPREHEN METABOLIC PANEL: CPT

## 2024-09-26 PROCEDURE — 2500000002 HC RX 250 W HCPCS SELF ADMINISTERED DRUGS (ALT 637 FOR MEDICARE OP, ALT 636 FOR OP/ED): Performed by: PHYSICIAN ASSISTANT

## 2024-09-26 PROCEDURE — 2500000001 HC RX 250 WO HCPCS SELF ADMINISTERED DRUGS (ALT 637 FOR MEDICARE OP): Performed by: PHYSICIAN ASSISTANT

## 2024-09-26 PROCEDURE — 96366 THER/PROPH/DIAG IV INF ADDON: CPT

## 2024-09-26 PROCEDURE — 96376 TX/PRO/DX INJ SAME DRUG ADON: CPT

## 2024-09-26 PROCEDURE — G0378 HOSPITAL OBSERVATION PER HR: HCPCS

## 2024-09-26 PROCEDURE — 2500000004 HC RX 250 GENERAL PHARMACY W/ HCPCS (ALT 636 FOR OP/ED)

## 2024-09-26 PROCEDURE — 2500000004 HC RX 250 GENERAL PHARMACY W/ HCPCS (ALT 636 FOR OP/ED): Performed by: NURSE PRACTITIONER

## 2024-09-26 PROCEDURE — 2500000004 HC RX 250 GENERAL PHARMACY W/ HCPCS (ALT 636 FOR OP/ED): Mod: JZ | Performed by: PHYSICIAN ASSISTANT

## 2024-09-26 PROCEDURE — 85025 COMPLETE CBC W/AUTO DIFF WBC: CPT

## 2024-09-26 PROCEDURE — 84550 ASSAY OF BLOOD/URIC ACID: CPT

## 2024-09-26 PROCEDURE — 36415 COLL VENOUS BLD VENIPUNCTURE: CPT

## 2024-09-26 PROCEDURE — 96365 THER/PROPH/DIAG IV INF INIT: CPT

## 2024-09-26 PROCEDURE — 2500000001 HC RX 250 WO HCPCS SELF ADMINISTERED DRUGS (ALT 637 FOR MEDICARE OP): Performed by: NURSE PRACTITIONER

## 2024-09-26 PROCEDURE — 73562 X-RAY EXAM OF KNEE 3: CPT | Mod: LT

## 2024-09-26 PROCEDURE — 73562 X-RAY EXAM OF KNEE 3: CPT | Mod: LEFT SIDE | Performed by: RADIOLOGY

## 2024-09-26 PROCEDURE — 86140 C-REACTIVE PROTEIN: CPT

## 2024-09-26 PROCEDURE — 87040 BLOOD CULTURE FOR BACTERIA: CPT

## 2024-09-26 PROCEDURE — 82077 ASSAY SPEC XCP UR&BREATH IA: CPT | Performed by: NURSE PRACTITIONER

## 2024-09-26 PROCEDURE — 2500000004 HC RX 250 GENERAL PHARMACY W/ HCPCS (ALT 636 FOR OP/ED): Mod: JZ

## 2024-09-26 PROCEDURE — 96375 TX/PRO/DX INJ NEW DRUG ADDON: CPT

## 2024-09-26 PROCEDURE — 85652 RBC SED RATE AUTOMATED: CPT

## 2024-09-26 RX ORDER — HYDROMORPHONE HYDROCHLORIDE 1 MG/ML
0.5 INJECTION, SOLUTION INTRAMUSCULAR; INTRAVENOUS; SUBCUTANEOUS ONCE
Status: COMPLETED | OUTPATIENT
Start: 2024-09-26 | End: 2024-09-26

## 2024-09-26 RX ORDER — CEFAZOLIN SODIUM 1 G/50ML
1 SOLUTION INTRAVENOUS EVERY 8 HOURS
Status: DISCONTINUED | OUTPATIENT
Start: 2024-09-26 | End: 2024-10-01 | Stop reason: HOSPADM

## 2024-09-26 RX ORDER — PANTOPRAZOLE SODIUM 40 MG/1
40 TABLET, DELAYED RELEASE ORAL
Status: DISCONTINUED | OUTPATIENT
Start: 2024-09-27 | End: 2024-10-01 | Stop reason: HOSPADM

## 2024-09-26 RX ORDER — CEFAZOLIN SODIUM 1 G/50ML
1 SOLUTION INTRAVENOUS ONCE
Status: DISCONTINUED | OUTPATIENT
Start: 2024-09-26 | End: 2024-09-26

## 2024-09-26 RX ORDER — METOPROLOL SUCCINATE 50 MG/1
50 TABLET, EXTENDED RELEASE ORAL DAILY
Status: DISCONTINUED | OUTPATIENT
Start: 2024-09-26 | End: 2024-10-01 | Stop reason: HOSPADM

## 2024-09-26 RX ORDER — ESCITALOPRAM OXALATE 10 MG/1
10 TABLET ORAL DAILY
Status: DISCONTINUED | OUTPATIENT
Start: 2024-09-26 | End: 2024-10-01 | Stop reason: HOSPADM

## 2024-09-26 RX ORDER — CEFAZOLIN SODIUM 1 G/50ML
1 SOLUTION INTRAVENOUS ONCE
Status: COMPLETED | OUTPATIENT
Start: 2024-09-26 | End: 2024-09-26

## 2024-09-26 RX ORDER — KETOROLAC TROMETHAMINE 15 MG/ML
15 INJECTION, SOLUTION INTRAMUSCULAR; INTRAVENOUS EVERY 8 HOURS PRN
Status: DISCONTINUED | OUTPATIENT
Start: 2024-09-26 | End: 2024-09-27

## 2024-09-26 RX ORDER — BUPROPION HYDROCHLORIDE 150 MG/1
150 TABLET ORAL
Status: DISCONTINUED | OUTPATIENT
Start: 2024-09-27 | End: 2024-10-01 | Stop reason: HOSPADM

## 2024-09-26 RX ORDER — ACETAMINOPHEN 325 MG/1
650 TABLET ORAL EVERY 6 HOURS PRN
Status: DISCONTINUED | OUTPATIENT
Start: 2024-09-26 | End: 2024-09-27

## 2024-09-26 RX ORDER — ACETAMINOPHEN 500 MG
5 TABLET ORAL DAILY
Status: DISCONTINUED | OUTPATIENT
Start: 2024-09-26 | End: 2024-10-01 | Stop reason: HOSPADM

## 2024-09-26 RX ORDER — FUROSEMIDE 40 MG/1
40 TABLET ORAL DAILY
Status: DISCONTINUED | OUTPATIENT
Start: 2024-09-26 | End: 2024-10-01 | Stop reason: HOSPADM

## 2024-09-26 RX ORDER — SPIRONOLACTONE 25 MG/1
50 TABLET ORAL DAILY
Status: DISCONTINUED | OUTPATIENT
Start: 2024-09-26 | End: 2024-10-01 | Stop reason: HOSPADM

## 2024-09-26 RX ORDER — DIPHENHYDRAMINE HYDROCHLORIDE 50 MG/ML
25 INJECTION INTRAMUSCULAR; INTRAVENOUS ONCE
Status: COMPLETED | OUTPATIENT
Start: 2024-09-26 | End: 2024-09-26

## 2024-09-26 RX ORDER — OXYCODONE HYDROCHLORIDE 5 MG/1
5 TABLET ORAL EVERY 8 HOURS PRN
Status: DISCONTINUED | OUTPATIENT
Start: 2024-09-26 | End: 2024-09-27

## 2024-09-26 ASSESSMENT — PAIN DESCRIPTION - ORIENTATION
ORIENTATION: LEFT

## 2024-09-26 ASSESSMENT — PAIN DESCRIPTION - PAIN TYPE
TYPE: ACUTE PAIN

## 2024-09-26 ASSESSMENT — PAIN DESCRIPTION - DESCRIPTORS
DESCRIPTORS: THROBBING

## 2024-09-26 ASSESSMENT — PAIN SCALES - GENERAL
PAINLEVEL_OUTOF10: 10 - WORST POSSIBLE PAIN
PAINLEVEL_OUTOF10: 2
PAINLEVEL_OUTOF10: 3
PAINLEVEL_OUTOF10: 5 - MODERATE PAIN
PAINLEVEL_OUTOF10: 10 - WORST POSSIBLE PAIN
PAINLEVEL_OUTOF10: 10 - WORST POSSIBLE PAIN
PAINLEVEL_OUTOF10: 8
PAINLEVEL_OUTOF10: 10 - WORST POSSIBLE PAIN
PAINLEVEL_OUTOF10: 8
PAINLEVEL_OUTOF10: 10 - WORST POSSIBLE PAIN
PAINLEVEL_OUTOF10: 9

## 2024-09-26 ASSESSMENT — PAIN - FUNCTIONAL ASSESSMENT
PAIN_FUNCTIONAL_ASSESSMENT: 0-10

## 2024-09-26 ASSESSMENT — PAIN DESCRIPTION - LOCATION
LOCATION: KNEE

## 2024-09-26 ASSESSMENT — PAIN DESCRIPTION - FREQUENCY
FREQUENCY: CONSTANT/CONTINUOUS

## 2024-09-26 ASSESSMENT — PAIN DESCRIPTION - PROGRESSION
CLINICAL_PROGRESSION: GRADUALLY IMPROVING
CLINICAL_PROGRESSION: GRADUALLY IMPROVING

## 2024-09-26 NOTE — CONSULTS
Orthopaedic Surgery Consult H&P    HPI:   Orthopaedic Problems/Injuries: L knee prepatellar bursitis  Other Injuries: None    54M (alcoholic cirrhosis, gout) presents with 2 weeks of the left knee pain.  He was previously seen last week at [place] ED, where he was given indomethacin and discharged.  He reports improvement in his pain for a few days, then acutely worsened yesterday.  He was seen at an outside ED yesterday where knee joint aspiration was attempted, with no fluid out. denies numbness, tingling, and open wounds on the affected limb.     PMH: per above/EMR  PSH: per above/EMR  SocHx:      -  Denies tobacco use      -  Denies EtOH use      -  Denies other drug use  FamHx:  Non-contributory to this patient's acute orthopaedic problem.   Allergies: Reviewed in EMR  Meds: Reviewed in EMR    ROS      - 14 point ROS negative except as above    Physical Exam:  Gen: AOx3, NAD  HEENT: normocephalic atraumatic  Psych: appropriate mood and affect  Resp: nonlabored breathing  Cardiac: Extremities WWP, RRR to peripheral palpation  Neuro: CN 2-12 grossly intact  Skin: no rashes    Left lower extremity:  - Skin intact   - Palpable and tender effusion overlying patella, without skin changes.  - No pain with short arcs  - Fires EHL/DF/PF.  - Sensation intact to light touch in sural, saphenous, superficial/deep peroneal, tibial nerve distributions.  - 2+ DP pulse, < 2 seconds capillary refill.    A full secondary exam was performed and all relevant findings discussed and noted above.    This patient was seen and staffed with the on-call orthopedic trauma attending, Dr. Issa.    Imaging:  Xray with no osseous abnormality. Prepatellar effusion.    Assessment:  Orthopaedic Problems/Injuries: L knee prepatellar bursitis.    54M (alcoholic cirrhosis, gout) p/w 2 weeks of L knee pain. On indomethacin without improvement. WBC 12.5, ESR 30, CRP 1.02.    Plan:  - No acute orthopedic intervention.   - WB: WBAT  - Abx: IV ancef  while in house. Can discharge on 10 days Bactrim DS.  - DVT: Not indicated  - Dispo: Can discharge following administration of IV abx  - Follow up OP as needed with Dr. Issa.    Tan Jimenez MD  PGY-1 Orthopaedic Surgery  On-call Resident

## 2024-09-26 NOTE — ED PROVIDER NOTES
History of Present Illness     History provided by: Patient  Limitations to History: None  External Records Reviewed with Brief Summary: None    HPI:  Aamir Garcia is a 54 y.o. male with history of alcoholic cirrhosis with ascites, hypertension, and gout who presented to the ED with left knee pain and swelling.  He said that 2 weeks ago he was diagnosed with gout of the left knee, he was given medications for this but the pain never fully subsided.  He said he return to the ED yesterday to have the knee drained but they were unable to aspirate any fluid.  He is unable to fully bend his knee due to pain.  He denied any trauma to this area.  He said he did feel like he had chills this afternoon.  He normally gets gout in his left toe, but has not gotten it in his knee before.  He was able to walk to the hospital this morning, but since the start was aspirated.  He is having difficulty bearing weight on the leg.    Physical Exam   Triage vitals:  T 37.5 °C (99.5 °F)  HR 92  /76  RR 18  O2 97 % None (Room air)    General: Awake, alert, in no acute distress  Eyes: Gaze conjugate.  No scleral icterus or injection  HENT: Normo-cephalic, atraumatic. No stridor  CV: Regular rate, regular rhythm. Radial pulses 2+ bilaterally  Resp: Breathing non-labored, speaking in full sentences.  Clear to auscultation bilaterally  GI: Distended, non-tender. No rebound or guarding.  MSK/Extremities: No gross bony deformities.  Moderate swelling of left knee.  Left knee joint is tender and warm to touch.  Decreased range of motion in left knee due to pain.  Tibial pulses intact bilaterally.  Skin: Warm.  Erythematous over left knee joint.  Neuro: Alert. Oriented. Face symmetric. Speech is fluent.  Gross strength and sensation intact in b/l UE and LEs  Psych: Appropriate mood and affect    Medical Decision Making & ED Course   Medical Decision Makin y.o. male with history of alcoholic cirrhosis with ascites, gout, and  hypertension who presented to the ED with chief complaint of left knee swelling.  Left knee was aspirated at a different ED earlier today, but no fluid return was seen.  Patient endorsed increased pain after this procedure.  On physical exam, joint is swollen and warm and there is limited range of motion, there was concern for septic joint this point.  Blood work was ordered as well as x-ray of left knee.  Dilaudid given for pain control.  ESR and CRP were elevated, and there was a white count on CBC.  X-ray did not show joint effusion, foreign body, or gas in the joint.  Ultrasound of joint showed a small amount of fluid around the patellar ligament.  Concern for septic joint versus prepatellar bursitis.  Ortho was consulted for possible arthrocentesis at this point, as no large pocket of fluid seen on ultrasound to do in the ED arthrocentesis if needed.  Patient does have ascites on exam, but did not endorse any belly pain.  He said he had a paracentesis last month for this.    At this point patient was signed out.  Please see oncoming residents note for further workup.    ----      Differential diagnoses considered include but are not limited to: Septic joint, cellulitis, prepatellar bursitis, gout, foreign body, fracture, ligament tear, nec fasc, arthritis     Social Determinants of Health which Significantly Impact Care: None identified     EKG Independent Interpretation: EKG not obtained    Independent Result Review and Interpretation: Relevant laboratory and radiographic results were reviewed and independently interpreted by myself.  As necessary, they are commented on in the ED Course.    Chronic conditions affecting the patient's care: As documented above in [facility]    The patient was discussed with the following consultants/services:  Orthopedics    Care Considerations: As documented above in [facility]    ED Course:  ED Course as of 09/26/24 2328   Thu Sep 26, 2024   1469 I evaluated this patient as the day  attending physician-2 weeks of left knee pain and redness-also fever and chills-patient is immunosuppressed because of alcoholic cirrhosis-patient's been to 2 ERs-POCUS here showed a small amount of fluid in the knee, also negative plain films, patient does have cirrhosis with ascites but no abdominal symptoms-all labs reviewed-exam shows a left knee which is warm and red-sed rate of 30 and CRP of 1.02-will have orthopedics see in consultation and then make final disposition including IV antibiotics if they elect not to take the patient to the operating room [HD]      ED Course User Index  [HD] Nir Garcia MD         Diagnoses as of 09/26/24 5251   Prepatellar bursitis of left knee     Disposition   Patient was signed out to Dr. Prakash at 7 AM pending completion of their work-up.  Please see the next provider's transition of care note for the remainder of the patient's care.     Procedures   Procedures    Patient seen and discussed with ED attending physician.    Sharon Field,   Emergency Medicine     Sharon Field DO  Resident  09/26/24 0042

## 2024-09-26 NOTE — H&P
CDU  History & Physical    Date of Placement in CDU: 9/26  Time Placed in Observation: 1115    Patient History  Mr. Garcia is a 54-year-old male with history of alcohol induced cirrhosis with ascites needing occasional therapeutic therapeutic paracentesis, HTN, gout who presented to the emergency department complaining of left knee pain.  Was seen 1 week ago at an outside hospital for his left knee pain at [facility].  Arthrocentesis was attempted however unsuccessful.  States he has had pain and swelling ever since a few days before that visit.  Pain and swelling have worsened including now limiting his ability to ambulate with it.  States he did have fevers for couple days now.  He was treated for gout of his left knee however did not have resolution therefore presented to ED for reevaluation. The acute evaluation included laboratory work with WBCs 12.5, H&H 14.3/41.2, normal electrolytes outside of chronically elevated alkaline phosphatase, mildly elevated CRP at 1.02, mildly elevated ESR at 30.  Orthopedics was consulted and felt that most likely has infrapatellar bursitis, they recommended IV antibiotics and follow-up in 2 weeks.  Patient received a dose of Ancef and admitted to CDU for further dosing and management.    Upon admission to the Clinical Decision Unit, states he does not feel improved.  He is lying in bed comfortably    Past Medical History: Reviewed, see EMR and HPI  Past Surgical History: Reviewed, see EMR  Social History: History of alcohol use disorder, sober x 3 weeks    Medications  Reviewed, see EMR      Review of Systems  Recent subjective fevers and chills, left knee pain x 3 weeks  - Denies headache, cough, weakness, paresthesias, ataxia, chest pain, shortness of breath, nausea, vomiting,      Physical Exam:   GENERAL.: Vitals noted. No distress.  Normocephalic atraumatic.   EYES:  PERRLA, EOMs intact  OROPHARYNX:  No erythema or exudate.  Mucosa moist.  NECK: Supple. No  adenopathy.  CARDIAC: Regular rate rhythm. No murmur noted.  PULMONARY: Equal breath sounds bilaterally.  No wheezes rales or rhonchi  ABDOMEN: Soft, nontender, nonsurgical.  Mild to moderate ascites at baseline.  No guarding. Normoactive bowel sounds. No bruits, no masses  EXTREMITIES: Left knee with mild to moderate diffuse swelling worse infrapatellar with diffuse erythema and warmth, diffusely tender, intact neurovascular distally  SKIN: Intact, warm and dry  NEURO: Alert and oriented x 3, speech is clear, no obvious deficits noted.       Diagnostic Evaluation  Diagnostic studies and ED interventions germane to this period of clinical observation will include:   IV Unasyn, analgesics    Consultants (if applicable)  1) orthopedics reportedly signed off    Impression and Plan  In summary, Mr. Garcia is admitted to the [facility] Clinical Decision Unit for left knee bursitis. Dr. Jocelyn Barnes is the CDU admission attending.    This patient has been risk-stratified based on available history, physical exam, and related study findings. Admission to the observation status for further diagnosis/treatment/monitoring of this patient is warranted clinically. This extended period of observation is specifically required to determine the need for hospitalization.     The goals of this admission based on the patient´s clinical problem list are:  1) left knee bursitis  - IV Ancef every 8 hour  - Ace bandage applied  - As needed analgesics  - Will reevaluate    When met, appropriate disposition will be arranged  Rian Carrera PA-C

## 2024-09-26 NOTE — PROGRESS NOTES
Emergency Department Transition of Care Note       Signout   I received Aamir Garcia in signout from Dr. Field.  Please see the ED Provider Note for all HPI, PE and MDM up to the time of signout at 7am.  This is in addition to the primary record.    In brief Aamir Garcia is an 54 y.o. male history of alcohol induced cirrhosis with ascites, hypertension, and gout who presented to the ED with left knee pain and swelling. He reports pain for 2 weeks, was previously diagnosed with gout and given pain medication.  Presented yesterday to outside ED where they were unable to aspirate any fluid.  Today's labs are remarkable for evaded ESR and CRP with mild leukocytosis.  He did not show joint effusion.  Ultrasound of joint showed small fluid around patellar ligament.  Ortho was consulted.  Of note patient has ascites on exam, he has no abdominal symptoms at this time.  He states that he had a paracentesis last month, he has needed a total of 4 this year.  At the time of signout we were awaiting:  Ortho recommendations    ED Course & Medical Decision Making   Medical Decision Making:  Under my care, orthopedics recommended for treatment of prepatellar bursitis 1 g of Ancef followed by 10 days of bactrim DS, compressive wrap and follow up with Dr. Issa in 2 weeks.  Patient continues to have significant pain, given 0.5 of Dilaudid for pain control.  Patient placed in CDU for second dose of Ancef and further pain control.    ED Course:  ED Course as of 09/26/24 1255   Thu Sep 26, 2024   0990 I evaluated this patient as the day attending physician-2 weeks of left knee pain and redness-also fever and chills-patient is immunosuppressed because of alcoholic cirrhosis-patient's been to 2 ERs-POCUS here showed a small amount of fluid in the knee, also negative plain films, patient does have cirrhosis with ascites but no abdominal symptoms-all labs reviewed-exam shows a left knee which is warm and red-sed rate of 30 and CRP of  1.02-will have orthopedics see in consultation and then make final disposition including IV antibiotics if they elect not to take the patient to the operating room [HD]      ED Course User Index  [HD] Nir Garcia MD         Diagnoses as of 09/26/24 1255   Prepatellar bursitis of left knee       Disposition   CDU    Procedures   Procedures    Patient seen and discussed with ED attending physician.    Eli Prakash, DO  Emergency Medicine

## 2024-09-26 NOTE — ED TRIAGE NOTES
Left knee pain pt states that he went to the ED this AM pt states that they attempted to aspirate his joint and was unsuccessful however since his discharge his pain has increased and his edema has increased and now he is unable to ambulate

## 2024-09-27 PROBLEM — R51.9 FACIAL PAIN: Status: RESOLVED | Noted: 2023-11-09 | Resolved: 2024-09-27

## 2024-09-27 PROBLEM — K70.30 ALCOHOLIC CIRRHOSIS OF LIVER WITHOUT ASCITES (MULTI): Status: RESOLVED | Noted: 2024-03-08 | Resolved: 2024-09-27

## 2024-09-27 PROBLEM — K59.4 RECTAL SPASM: Status: RESOLVED | Noted: 2023-11-09 | Resolved: 2024-09-27

## 2024-09-27 PROBLEM — F17.200 NICOTINE DEPENDENCE: Status: RESOLVED | Noted: 2023-11-09 | Resolved: 2024-09-27

## 2024-09-27 PROBLEM — F10.11 ALCOHOL ABUSE, IN REMISSION: Status: RESOLVED | Noted: 2023-11-09 | Resolved: 2024-09-27

## 2024-09-27 PROBLEM — M70.42 PREPATELLAR BURSITIS OF LEFT KNEE: Status: ACTIVE | Noted: 2024-09-27

## 2024-09-27 PROBLEM — T78.3XXA ANGIOEDEMA: Status: RESOLVED | Noted: 2023-11-09 | Resolved: 2024-09-27

## 2024-09-27 PROBLEM — F10.929 ALCOHOLIC INTOXICATION (CMS-HCC): Status: RESOLVED | Noted: 2024-09-11 | Resolved: 2024-09-27

## 2024-09-27 PROBLEM — B35.1 ONYCHOMYCOSIS: Status: RESOLVED | Noted: 2023-11-09 | Resolved: 2024-09-27

## 2024-09-27 PROBLEM — M70.52 PATELLAR BURSITIS OF LEFT KNEE: Status: RESOLVED | Noted: 2024-09-26 | Resolved: 2024-09-27

## 2024-09-27 PROBLEM — N17.9 ACUTE RENAL FAILURE (ARF) (CMS-HCC): Status: RESOLVED | Noted: 2024-03-28 | Resolved: 2024-09-27

## 2024-09-27 PROBLEM — R51.9 HEADACHE: Status: RESOLVED | Noted: 2023-11-09 | Resolved: 2024-09-27

## 2024-09-27 PROBLEM — R11.2 NAUSEA AND VOMITING: Status: RESOLVED | Noted: 2023-11-09 | Resolved: 2024-09-27

## 2024-09-27 PROBLEM — R06.89 DIFFICULTY BREATHING: Status: RESOLVED | Noted: 2023-11-09 | Resolved: 2024-09-27

## 2024-09-27 PROBLEM — R10.9 ABDOMINAL PAIN: Status: RESOLVED | Noted: 2024-05-31 | Resolved: 2024-09-27

## 2024-09-27 PROBLEM — R19.8 TENESMUS: Status: RESOLVED | Noted: 2023-11-09 | Resolved: 2024-09-27

## 2024-09-27 PROBLEM — M51.26 DISPLACEMENT OF LUMBAR INTERVERTEBRAL DISC WITHOUT MYELOPATHY: Status: RESOLVED | Noted: 2023-11-13 | Resolved: 2024-09-27

## 2024-09-27 LAB — ETHANOL SERPL-MCNC: <10 MG/DL

## 2024-09-27 PROCEDURE — 2500000001 HC RX 250 WO HCPCS SELF ADMINISTERED DRUGS (ALT 637 FOR MEDICARE OP): Performed by: PHYSICIAN ASSISTANT

## 2024-09-27 PROCEDURE — 2500000004 HC RX 250 GENERAL PHARMACY W/ HCPCS (ALT 636 FOR OP/ED): Performed by: PHYSICIAN ASSISTANT

## 2024-09-27 PROCEDURE — 96366 THER/PROPH/DIAG IV INF ADDON: CPT

## 2024-09-27 PROCEDURE — 99223 1ST HOSP IP/OBS HIGH 75: CPT | Performed by: NURSE PRACTITIONER

## 2024-09-27 PROCEDURE — 2500000002 HC RX 250 W HCPCS SELF ADMINISTERED DRUGS (ALT 637 FOR MEDICARE OP, ALT 636 FOR OP/ED): Performed by: PHYSICIAN ASSISTANT

## 2024-09-27 PROCEDURE — 2500000001 HC RX 250 WO HCPCS SELF ADMINISTERED DRUGS (ALT 637 FOR MEDICARE OP): Performed by: NURSE PRACTITIONER

## 2024-09-27 PROCEDURE — 2500000004 HC RX 250 GENERAL PHARMACY W/ HCPCS (ALT 636 FOR OP/ED): Mod: JZ | Performed by: NURSE PRACTITIONER

## 2024-09-27 PROCEDURE — 96361 HYDRATE IV INFUSION ADD-ON: CPT

## 2024-09-27 PROCEDURE — 1100000001 HC PRIVATE ROOM DAILY

## 2024-09-27 PROCEDURE — S4991 NICOTINE PATCH NONLEGEND: HCPCS | Performed by: PHYSICIAN ASSISTANT

## 2024-09-27 PROCEDURE — 2500000002 HC RX 250 W HCPCS SELF ADMINISTERED DRUGS (ALT 637 FOR MEDICARE OP, ALT 636 FOR OP/ED): Performed by: NURSE PRACTITIONER

## 2024-09-27 RX ORDER — MULTIVIT WITH MINERALS/HERBS
1 TABLET ORAL DAILY
COMMUNITY

## 2024-09-27 RX ORDER — LOSARTAN POTASSIUM 100 MG/1
100 TABLET ORAL
Status: DISCONTINUED | OUTPATIENT
Start: 2024-09-28 | End: 2024-10-01 | Stop reason: HOSPADM

## 2024-09-27 RX ORDER — LOSARTAN POTASSIUM 100 MG/1
1 TABLET ORAL
COMMUNITY
Start: 2024-09-20 | End: 2024-10-01 | Stop reason: HOSPADM

## 2024-09-27 RX ORDER — IBUPROFEN 200 MG
1 TABLET ORAL DAILY
Status: DISCONTINUED | OUTPATIENT
Start: 2024-09-27 | End: 2024-10-01 | Stop reason: HOSPADM

## 2024-09-27 RX ORDER — ACETAMINOPHEN 325 MG/1
975 TABLET ORAL EVERY 8 HOURS
Status: DISCONTINUED | OUTPATIENT
Start: 2024-09-27 | End: 2024-09-29

## 2024-09-27 RX ORDER — FOLIC ACID 1 MG/1
1 TABLET ORAL DAILY
COMMUNITY

## 2024-09-27 RX ORDER — KETOROLAC TROMETHAMINE 15 MG/ML
15 INJECTION, SOLUTION INTRAMUSCULAR; INTRAVENOUS EVERY 6 HOURS PRN
Status: DISCONTINUED | OUTPATIENT
Start: 2024-09-27 | End: 2024-09-28

## 2024-09-27 RX ORDER — OXYCODONE HYDROCHLORIDE 5 MG/1
10 TABLET ORAL EVERY 6 HOURS PRN
Status: DISCONTINUED | OUTPATIENT
Start: 2024-09-27 | End: 2024-09-28

## 2024-09-27 ASSESSMENT — PAIN SCALES - GENERAL
PAINLEVEL_OUTOF10: 8
PAINLEVEL_OUTOF10: 7
PAINLEVEL_OUTOF10: 0 - NO PAIN
PAINLEVEL_OUTOF10: 0 - NO PAIN
PAINLEVEL_OUTOF10: 8
PAINLEVEL_OUTOF10: 7

## 2024-09-27 ASSESSMENT — ENCOUNTER SYMPTOMS
CONSTITUTIONAL NEGATIVE: 1
CARDIOVASCULAR NEGATIVE: 1
JOINT SWELLING: 1
MYALGIAS: 1
EYES NEGATIVE: 1
ABDOMINAL DISTENTION: 1

## 2024-09-27 ASSESSMENT — PAIN - FUNCTIONAL ASSESSMENT
PAIN_FUNCTIONAL_ASSESSMENT: 0-10
PAIN_FUNCTIONAL_ASSESSMENT: 0-10

## 2024-09-27 NOTE — H&P
HPI     Mr Garcia is a 54y male with PMHx: Gout, cirrhosis w/ascites, HTN, anxiety, GERD who originally presented to [facility] last week with c/o left knee gout.  He was treated with steroids and pain medication and sent home.  Patient then proceeded to at first get better but then yesterday it suddenly worsened again,  Denies any falls or injury.  Patient again went to [facility] where they attempted to drain the left knee w/out success. They discharged him home and he came to [facility] where he was seen by ortho and admitted to the CDU.  Patient was still in a lot of pain and unable to bear weight while in the CDU.  While in the ED his CRP was very slightly elevated at 1.02 and ESR 30, WBC 12.5 and Uric acid was WNL.  Patient is known to have alcohol abuse and states no consumption for about 2 months his ETOH level on arrival to ED was negative.    PT  is being admitted observation for Left knee gout.      ROS/EXAM     Review of Systems   Constitutional: Negative.    HENT: Negative.     Eyes: Negative.    Cardiovascular: Negative.    Gastrointestinal:  Positive for abdominal distention.   Genitourinary: Negative.    Musculoskeletal:  Positive for gait problem, joint swelling and myalgias.   Skin: Negative.    All other systems reviewed and are negative.    Physical Exam  Vitals reviewed.   Constitutional:       Appearance: Normal appearance.   HENT:      Head: Normocephalic.      Mouth/Throat:      Mouth: Mucous membranes are dry.   Eyes:      Extraocular Movements: Extraocular movements intact.      Conjunctiva/sclera: Conjunctivae normal.      Pupils: Pupils are equal, round, and reactive to light.   Cardiovascular:      Rate and Rhythm: Normal rate and regular rhythm.      Pulses: Normal pulses.      Heart sounds: Normal heart sounds, S1 normal and S2 normal.   Pulmonary:      Effort: Pulmonary effort is normal.      Breath sounds: Normal breath sounds and air entry.   Abdominal:      General: Abdomen is flat.  Bowel sounds are normal.      Palpations: Abdomen is soft.   Musculoskeletal:         General: Swelling present. Normal range of motion.      Cervical back: Full passive range of motion without pain and normal range of motion.      Left lower leg: Edema present.   Skin:     General: Skin is warm and dry.   Neurological:      General: No focal deficit present.      Mental Status: He is alert and oriented to person, place, and time. Mental status is at baseline.   Psychiatric:         Attention and Perception: Attention normal.         Mood and Affect: Mood normal.         Speech: Speech normal.         Histories     Past Medical History:   Diagnosis Date    Alcohol dependence (Multi)     Alcoholic cirrhosis of liver with ascites (Multi)     GERD (gastroesophageal reflux disease)     Gout     Hyperlipidemia     Hypertension      Past Surgical History:   Procedure Laterality Date    PARACENTESIS      2x in 2024    SINUS SURGERY  2013    Sinus Surgery     Family History   Problem Relation Name Age of Onset    Hypertension Mother           age 64    Heart attack Father           at age 62    Other (Glioblastoma) Father      Diabetes Father      Cancer Father        reports that he has been smoking cigarettes. He has been exposed to tobacco smoke. He has never used smokeless tobacco. He reports current alcohol use. He reports current drug use. Drug: Marijuana.    Vitals/LABS/RESULTS     Last Recorded Vitals  Blood pressure 99/64, pulse 94, temperature 36.6 °C (97.8 °F), resp. rate 18, height 1.829 m (6'), weight 77.1 kg (170 lb), SpO2 (!) 93%.  Intake/Output last 3 Shifts:  I/O last 3 completed shifts:  In: - (0 mL/kg)   Out: 800 (10.4 mL/kg) [Urine:800 (0.3 mL/kg/hr)]  Weight: 77.1 kg     Relevant Results  Lab Results   Component Value Date    WBC 12.5 (H) 2024    HGB 14.3 2024    HCT 41.2 2024    MCV 88 2024     2024      Lab Results   Component Value Date     GLUCOSE 96 09/26/2024    CALCIUM 10.2 09/26/2024     (L) 09/26/2024    K 3.9 09/26/2024    CO2 26 09/26/2024    CL 98 09/26/2024    BUN 11 09/26/2024    CREATININE 0.82 09/26/2024     XR knee left 3 views    Result Date: 9/26/2024  STUDY: Knee Radiographs INDICATION: Left knee swelling COMPARISON: None Available. ACCESSION NUMBER(S): [id number] ORDERING CLINICIAN: HARIS FAJARDO TECHNIQUE:  Three view(s) of the left knee. FINDINGS:  There is no displaced fracture.  The alignment is anatomic.  No soft tissue abnormality is seen.  There is no joint effusion.    No acute osseous abnormality. Signed by Uriel Olmos MD    XR knee left 3 views    Result Date: 9/16/2024  Interpreted By:  Joseph Sigala, STUDY: XR KNEE LEFT 3 VIEWS; ;  9/16/2024 2:58 pm   INDICATION: Signs/Symptoms:pain and swelling.     COMPARISON: None.   ACCESSION NUMBER(S): [id number]   ORDERING CLINICIAN: NORIS AKERS   FINDINGS: Left knee, three views   There is no fracture. There is no dislocation. There are no degenerative changes. There is no effusion seen. There is no soft tissue abnormality seen.       Normal radiographs of the left knee     MACRO: None   Signed by: Joseph Sigala 9/16/2024 3:02 PM Dictation workstation:   [id number]    Medications     Scheduled medications  acetaminophen, 975 mg, oral, q8h  buPROPion XL, 150 mg, oral, Daily before breakfast  ceFAZolin, 1 g, intravenous, q8h  escitalopram, 10 mg, oral, Daily  furosemide, 40 mg, oral, Daily  [START ON 9/28/2024] losartan, 100 mg, oral, Daily  melatonin, 5 mg, oral, Daily  metoprolol succinate XL, 50 mg, oral, Daily  nicotine, 1 patch, transdermal, Daily  pantoprazole, 40 mg, oral, Daily before breakfast  spironolactone, 50 mg, oral, Daily      Continuous medications     PRN medications  PRN medications: ketorolac, oxyCODONE    PLAN     Mr Garcia is a 54y male with PMHx: Gout, cirrhosis w/ascites, HTN, anxiety, GERD who originally presented to [facility] last  week with c/o left knee gout.  He was treated with steroids and pain medication and sent home.  Patient then proceeded to at first get better but then yesterday it suddenly worsened again,  Denies any falls or injury.  Patient again went to [facility] where they attempted to drain the left knee w/out success. They discharged him home and [place] [facility] where he was seen by ortho and admitted to the CDU.  Patient was still in a lot of pain and unable to bear weight while in the CDU.  While in the ED his CRP was very slightly elevated at 1.02 and ESR 30, WBC 12.5 and Uric acid was WNL.  Patient is known to have alcohol abuse and states no consumption for about 2 months his ETOH level on arrival to ED was negative.    PT  is being admitted observation for Left knee gout.    Assessment/Plan:    Left knee gout  -ortho consulted and apprec recs  -c/w ancef 1gm IV q8 hours for now will transition to PO  -Pain: Adan Tylenol, PRN: Toradol pain 4-6 and oxy 5 pain 7-10  -compression wrap  -Follow up with Dr Issa in 2 weeks    Cirrhosis  -c/w home spironlactone 50mg daily  -c/w home losartan 100mg daily  - patient needs outpatient follow up with hepatology    HTN  -hold home amlodipine  -c/w home lasix 40mg daily  -c/w home metop succ 50mg daily    GERD  -c/w home omeprazole as protonix    Anxiety  -c/w home wellbutrin 150mg daily  -c/w Lexapro 10mg daily    Fluids: monitor and replete as needed  Electrolytes: monitor and replete as needed  Nutrition: Regular diet   GI prophylaxis: Protonix 40mg QD  DVT prophylaxis: SCD  Code Status: full code  PCP  Pharmacy    Disposition:  Admitted for above diagnoses. Plan per above. Anticipate observation stay      Melba Tariq, APRN-CNP         I spent 75 minutes in the professional and overall care on this encounter before, during and after the visit, examining the patient, reviewing labs, writing orders and documenting the note

## 2024-09-27 NOTE — PROGRESS NOTES
Disposition Note  [facility] CLINICAL DECISION  Patient: Aamir Garcia  MRN: [id number]  : 1970  Date of Evaluation: 2024  ED Provider: Mino Wren PA-C      Limitations to history: None  Independent Historian: Yes  External Records Reviewed: Recent and relevant inpatient and outpatient notes in EMR      Subjective:    Aamir Garcia is a 54 y.o. male has undergone comprehensive diagnostic evaluation and therapeutic management in accordance with the CDU guidelines for left prepatellar bursitis. Based on Mr. Garcia's clinical response and diagnostic information during this period of observation, it has been determined that the patient will be admitted to the hospital.    The acute evaluation included:  Orders Placed This Encounter   Procedures    Blood Culture    Blood Culture    Sterile Fluid Culture/Smear    Point of Care Ultrasound    XR knee left 3 views    CBC and Auto Differential    Comprehensive metabolic panel    C-Reactive Protein    Sedimentation Rate    Body Fluid Cell Count With Differential    Crystal Identification and Pathologist Review Synovial Fluid    Body Fluid Cell Count    Crystal Identification, Synovial Fluid    Uric acid    Adult diet Regular    Inpatient consult to orthopaedic surgery    OT eval and treat    PT eval and treat    Send to CDU    Initiate observation status    Initiate observation status    Admit to inpatient         Placed in observation at: 1020       Past History     Past Medical History:   Diagnosis Date    Alcohol dependence (Multi)     Alcoholic cirrhosis of liver with ascites (Multi)     GERD (gastroesophageal reflux disease)     Hyperlipidemia     Hypertension      Past Surgical History:   Procedure Laterality Date    PARACENTESIS      2x in 2024    SINUS SURGERY  2013    Sinus Surgery     Social History     Socioeconomic History    Marital status:    Tobacco Use    Smoking status: Every Day     Current packs/day: 0.50      Types: Cigarettes     Passive exposure: Current    Smokeless tobacco: Never   Vaping Use    Vaping status: Some Days    Substances: Nicotine    Devices: Disposable, Pre-filled or refillable cartridge   Substance and Sexual Activity    Alcohol use: Not Currently     Comment: quit 10 days ago    Drug use: Yes     Types: Marijuana     Comment: bedtime to sleep     Social Determinants of Health     Financial Resource Strain: Low Risk  (9/9/2024)    Overall Financial Resource Strain (CARDIA)     Difficulty of Paying Living Expenses: Not hard at all   Food Insecurity: No Food Insecurity (9/9/2024)    Hunger Vital Sign     Worried About Running Out of Food in the Last Year: Never true     Ran Out of Food in the Last Year: Never true   Transportation Needs: No Transportation Needs (9/9/2024)    PRAPARE - Transportation     Lack of Transportation (Medical): No     Lack of Transportation (Non-Medical): No   Physical Activity: Inactive (9/8/2024)    Exercise Vital Sign     Days of Exercise per Week: 0 days     Minutes of Exercise per Session: 0 min   Stress: Patient Declined (9/8/2024)    [place] [employer] - Occupational Stress Questionnaire     Feeling of Stress : Patient declined   Social Connections: Patient Declined (9/8/2024)    Social Connection and Isolation Panel [NHANES]     Frequency of Communication with Friends and Family: Patient declined     Frequency of Social Gatherings with Friends and Family: Patient declined     Attends [employer] Services: Patient declined     Active Member of Clubs or Organizations: Patient declined     Attends Club or Organization Meetings: Patient declined     Marital Status: Patient declined   Intimate Partner Violence: Not At Risk (9/9/2024)    Humiliation, Afraid, Rape, and Kick questionnaire     Fear of Current or Ex-Partner: No     Emotionally Abused: No     Physically Abused: No     Sexually Abused: No   Housing Stability: Low Risk  (9/9/2024)    Housing  Stability Vital Sign     Unable to Pay for Housing in the Last Year: No     Number of Times Moved in the Last Year: 0     Homeless in the Last Year: No         Medications/Allergies     Previous Medications    AMLODIPINE (NORVASC) 5 MG TABLET    Take 1 tablet (5 mg) by mouth once daily.    BUPROPION XL (WELLBUTRIN XL) 150 MG 24 HR TABLET    Take 1 tablet (150 mg) by mouth once daily in the morning. Take before meals.    BUSPIRONE (BUSPAR) 10 MG TABLET    Take by mouth.    EPINEPHRINE 0.3 MG/0.3 ML INJECTION SYRINGE    INJECT INTO THE MUSCLE ONCE AS NEEDED FOR ALLERGIC REACTIONS    ESCITALOPRAM (LEXAPRO) 10 MG TABLET    Take 1 tablet (10 mg) by mouth once daily.    FUROSEMIDE (LASIX) 40 MG TABLET    Take 1 tablet (40 mg) by mouth once daily.    MELATONIN 5 MG TABLET    Take 1 tablet (5 mg) by mouth once daily.    METOPROLOL SUCCINATE XL (TOPROL-XL) 50 MG 24 HR TABLET    Take 1 tablet (50 mg) by mouth once daily. Do not crush or chew.    OMEPRAZOLE (PRILOSEC) 20 MG DR CAPSULE    Take 1 capsule (20 mg) by mouth once daily. Do not crush or chew.    OXYCODONE-ACETAMINOPHEN (PERCOCET) 5-325 MG TABLET    Take 1 tablet by mouth every 6 hours if needed for severe pain (7 - 10) for up to 3 days.    SPIRONOLACTONE (ALDACTONE) 50 MG TABLET    Take 1 tablet (50 mg) by mouth once daily.    THIAMINE (VITAMIN B-1) 100 MG TABLET    Take 1 tablet (100 mg) by mouth once daily.     Allergies   Allergen Reactions    Clams Shortness of breath         Review of Systems  All systems reviewed and otherwise negative, except as stated above in HPI.    Diagnostics reviewed by Mino Wren PA-C     Labs:  Results for orders placed or performed during the hospital encounter of 09/26/24   Blood Culture    Specimen: Peripheral Venipuncture; Blood culture   Result Value Ref Range    Blood Culture No growth at 1 day    Blood Culture    Specimen: Peripheral Venipuncture; Blood culture   Result Value Ref Range    Blood Culture No growth at 1 day     CBC and Auto Differential   Result Value Ref Range    WBC 12.5 (H) 4.4 - 11.3 x10*3/uL    nRBC 0.0 0.0 - 0.0 /100 WBCs    RBC 4.71 4.50 - 5.90 x10*6/uL    Hemoglobin 14.3 13.5 - 17.5 g/dL    Hematocrit 41.2 41.0 - 52.0 %    MCV 88 80 - 100 fL    MCH 30.4 26.0 - 34.0 pg    MCHC 34.7 32.0 - 36.0 g/dL    RDW 14.3 11.5 - 14.5 %    Platelets 187 150 - 450 x10*3/uL    Neutrophils % 69.6 40.0 - 80.0 %    Immature Granulocytes %, Automated 0.3 0.0 - 0.9 %    Lymphocytes % 18.2 13.0 - 44.0 %    Monocytes % 8.0 2.0 - 10.0 %    Eosinophils % 3.1 0.0 - 6.0 %    Basophils % 0.8 0.0 - 2.0 %    Neutrophils Absolute 8.66 (H) 1.20 - 7.70 x10*3/uL    Immature Granulocytes Absolute, Automated 0.04 0.00 - 0.70 x10*3/uL    Lymphocytes Absolute 2.27 1.20 - 4.80 x10*3/uL    Monocytes Absolute 0.99 0.10 - 1.00 x10*3/uL    Eosinophils Absolute 0.39 0.00 - 0.70 x10*3/uL    Basophils Absolute 0.10 0.00 - 0.10 x10*3/uL   Comprehensive metabolic panel   Result Value Ref Range    Glucose 96 74 - 99 mg/dL    Sodium 135 (L) 136 - 145 mmol/L    Potassium 3.9 3.5 - 5.3 mmol/L    Chloride 98 98 - 107 mmol/L    Bicarbonate 26 21 - 32 mmol/L    Anion Gap 15 10 - 20 mmol/L    Urea Nitrogen 11 6 - 23 mg/dL    Creatinine 0.82 0.50 - 1.30 mg/dL    eGFR >90 >60 mL/min/1.73m*2    Calcium 10.2 8.6 - 10.6 mg/dL    Albumin 4.5 3.4 - 5.0 g/dL    Alkaline Phosphatase 213 (H) 33 - 120 U/L    Total Protein 8.1 6.4 - 8.2 g/dL    AST 31 9 - 39 U/L    Bilirubin, Total 0.8 0.0 - 1.2 mg/dL    ALT 27 10 - 52 U/L   C-Reactive Protein   Result Value Ref Range    C-Reactive Protein 1.02 (H) <1.00 mg/dL   Sedimentation Rate   Result Value Ref Range    Sedimentation Rate 30 (H) 0 - 20 mm/h   Uric acid   Result Value Ref Range    Uric Acid 5.8 4.0 - 7.5 mg/dL     Radiographs:  XR knee left 3 views   Final Result   No acute osseous abnormality.   Signed by Uriel Olmos MD      Point of Care Ultrasound    (Results Pending)           Physical Exam     Visit Vitals  BP 99/64    Pulse 94   Temp 36.6 °C (97.8 °F)   Resp 18   Ht 1.829 m (6')   Wt 77.1 kg (170 lb)   SpO2 (!) 93%   BMI 23.06 kg/m²   Smoking Status Every Day   BSA 1.98 m²       Physical exam  VS: As documented in the triage note and EMR flowsheet from this visit were reviewed.    General: Patient is AAOx3, appears well developed, well nourished, is a good historian, answers questions appropriately    HEENT: head normocephalic, atraumatic, EOMs intact, oropharynx without erythema or exudate, buccal mucosa intact without lesions, nose is patent bilateral    Neck: supple, full ROM, negative for lymphadenopathy, JVD, thyromegaly, tracheal deviation, nuchal rigidity    Pulmonary: Clear to auscultation bilaterally, No wheezing, rales, or rhonchi, no accessory muscle use, able to speak full clear sentences    Cardiac: Normal rate and rhythm, no murmurs, rubs or gallops    GI: soft, non-tender, non-distended, normoactive bowelsounds in all four quadrants, no masses or organomegaly, no guarding or CVA tenderness noted    Musculoskeletal: Decreased active range of motion of the left knee with moderate swelling and tenderness to palpation, no joint effusions or clubbing noted    Skin: Warm, dry, intact, no lesions or rashes noted, turgor is good.    Neuro: patient follow commands, cranial nerves 2-12 grossly intact, motor strengths 5/5 upper and lower extremities, sensation are symmetrical. No focal deficits.    Psych: Appropriate mood and affect for situation      Consultants  1) Orthopedic surgery: Please see EMR for details      Impression and Plan    In summary, Aamir Garcia has been cared for according to the standard [facility] Clinical Decision Unit observation protocol for Bursitis of left knee, unspecified bursa. This extended period of observation was specifically required to determine the need for hospitalization. Prior to discharge from observation, the final physical exam is documented above.      Significant events during the course of observation based on the goals of the clinical problem list include:   1) left knee pain  2) ambulatory dysfunction    Based on the patient's condition and test results, the patient will be admitted to the hospital    Total length of observation was 30 hours. Dr. Talbot is the CDU disposition attending.      Discharge Diagnosis  Bursitis of left knee, unspecified bursa    Issues Requiring Follow-Up  See below    Discharge Meds     Your medication list        ASK your doctor about these medications        Instructions Last Dose Given Next Dose Due   amLODIPine 5 mg tablet  Commonly known as: Norvasc      Take 1 tablet (5 mg) by mouth once daily.       buPROPion  mg 24 hr tablet  Commonly known as: Wellbutrin XL      Take 1 tablet (150 mg) by mouth once daily in the morning. Take before meals.       busPIRone 10 mg tablet  Commonly known as: Buspar           EPINEPHrine 0.3 mg/0.3 mL injection syringe  Commonly known as: Epipen           escitalopram 10 mg tablet  Commonly known as: Lexapro      Take 1 tablet (10 mg) by mouth once daily.       furosemide 40 mg tablet  Commonly known as: Lasix      Take 1 tablet (40 mg) by mouth once daily.       indomethacin 25 mg capsule  Commonly known as: Indocin  Ask about: Should I take this medication?      Take 2 capsules (50 mg) by mouth 3 times a day as needed for mild pain (1 - 3) for up to 5 days.       melatonin 5 mg tablet      Take 1 tablet (5 mg) by mouth once daily.       metoprolol succinate XL 50 mg 24 hr tablet  Commonly known as: Toprol-XL      Take 1 tablet (50 mg) by mouth once daily. Do not crush or chew.       omeprazole 20 mg DR capsule  Commonly known as: PriLOSEC      Take 1 capsule (20 mg) by mouth once daily. Do not crush or chew.       oxyCODONE-acetaminophen 5-325 mg tablet  Commonly known as: Percocet      Take 1 tablet by mouth every 6 hours if needed for severe pain (7 - 10) for up to 3 days.        spironolactone 50 mg tablet  Commonly known as: Aldactone      Take 1 tablet (50 mg) by mouth once daily.       thiamine 100 mg tablet  Commonly known as: Vitamin B-1      Take 1 tablet (100 mg) by mouth once daily.       traMADol 50 mg tablet  Commonly known as: Ultram  Ask about: Should I take this medication?      Take 1 tablet (50 mg) by mouth every 6 hours if needed for severe pain (7 - 10) for up to 3 days.                Test Results Pending At Discharge  Pending Labs       Order Current Status    Blood Culture Preliminary result    Blood Culture Preliminary result            Hospital Course   Mr. Garcia was admitted to the clinical decision unit for atraumatic left knee pain and swelling.  Medical workup included laboratory studies and imaging.  Diagnostic information was obtained, reviewed and discussed with the patient.  Laboratory studies revealed leukocytosis and elevated ESR and CRP.  X-ray did not show a joint effusion, foreign body or gas in the joint.  Ultrasound revealed a small amount of fluid around the patellar ligament.  Orthopedic surgery was contacted and formal consult was requested.  Patient was evaluated by the orthopedic surgical team who recommended continuing antibiotics.  No indication for any acute surgical interventions at this time.  Patient was treated with scheduled Ancef and also oral and IV analgesics as needed.  Despite treatment provided, patient symptoms persisted, continue to endorse 8 out of 10 pain and difficulty flexing the left knee and weightbearing.  Due to his ongoing symptoms we will admit to the hospital for further evaluation and management.  I discussed this plan of care with the patient and he verbalized understanding and is in agreement and will be admitted to the medicine service in stable condition.      Assessment/plan    Prepatellar bursitis, left  -Admission to the hospital    Outpatient Follow-Up  No future appointments.      Mino Wren PA-C

## 2024-09-27 NOTE — PROGRESS NOTES
Aamir Garcia is a 54 y.o. male on day 0 of admission presenting with Bursitis of left knee, unspecified bursa.    Subjective   Patient was admitted to the CDU for left knee prepatellar bursitis/left knee cellulitis. Patient is receiving Ancef.       Objective     Physical Exam    Physical Exam:    Appearance: Alert, oriented , cooperative,  in no acute distress. Well nourished & well hydrated.    Skin: Erythema over the left knee.    Eyes: PERRLA, EOMs intact,  Conjunctiva pink with no redness or exudates.     ENT: Hearing grossly intact. External auditory canals patent, tympanic membranes intact with visible landmarks. Nares patent, mucus membranes moist. Dentition without lesions. Pharynx clear, uvula midline.     Neck: Supple, without meningismus.     Pulmonary: Clear bilaterally with good chest wall excursion. No rales, rhonchi or wheezing. No accessory muscle use or stridor.    Cardiac: Normal S1, S2 without murmur, rub, gallop or extrasystole. No JVD, Carotids without bruits.    Abdomen: Soft, nontender, active bowel sounds.  No palpable organomegaly.  No rebound or guarding.  No CVA tenderness.    Musculoskeletal: Limited ROM of the left knee. Pulses full and equal. Swelling to the left knee.    Neurological:  Normal sensation, no weakness, no focal findings identified.    Psychiatric: Appropriate mood and affect.         Last Recorded Vitals  Blood pressure 105/60, pulse 84, temperature 36.4 °C (97.6 °F), resp. rate 16, height 1.829 m (6'), weight 77.1 kg (170 lb), SpO2 (!) 92%.  Intake/Output last 3 Shifts:  No intake/output data recorded.    Relevant Results  Results for orders placed or performed during the hospital encounter of 09/26/24 (from the past 24 hour(s))   CBC and Auto Differential   Result Value Ref Range    WBC 12.5 (H) 4.4 - 11.3 x10*3/uL    nRBC 0.0 0.0 - 0.0 /100 WBCs    RBC 4.71 4.50 - 5.90 x10*6/uL    Hemoglobin 14.3 13.5 - 17.5 g/dL    Hematocrit 41.2 41.0 - 52.0 %    MCV 88 80 - 100 fL     MCH 30.4 26.0 - 34.0 pg    MCHC 34.7 32.0 - 36.0 g/dL    RDW 14.3 11.5 - 14.5 %    Platelets 187 150 - 450 x10*3/uL    Neutrophils % 69.6 40.0 - 80.0 %    Immature Granulocytes %, Automated 0.3 0.0 - 0.9 %    Lymphocytes % 18.2 13.0 - 44.0 %    Monocytes % 8.0 2.0 - 10.0 %    Eosinophils % 3.1 0.0 - 6.0 %    Basophils % 0.8 0.0 - 2.0 %    Neutrophils Absolute 8.66 (H) 1.20 - 7.70 x10*3/uL    Immature Granulocytes Absolute, Automated 0.04 0.00 - 0.70 x10*3/uL    Lymphocytes Absolute 2.27 1.20 - 4.80 x10*3/uL    Monocytes Absolute 0.99 0.10 - 1.00 x10*3/uL    Eosinophils Absolute 0.39 0.00 - 0.70 x10*3/uL    Basophils Absolute 0.10 0.00 - 0.10 x10*3/uL   Comprehensive metabolic panel   Result Value Ref Range    Glucose 96 74 - 99 mg/dL    Sodium 135 (L) 136 - 145 mmol/L    Potassium 3.9 3.5 - 5.3 mmol/L    Chloride 98 98 - 107 mmol/L    Bicarbonate 26 21 - 32 mmol/L    Anion Gap 15 10 - 20 mmol/L    Urea Nitrogen 11 6 - 23 mg/dL    Creatinine 0.82 0.50 - 1.30 mg/dL    eGFR >90 >60 mL/min/1.73m*2    Calcium 10.2 8.6 - 10.6 mg/dL    Albumin 4.5 3.4 - 5.0 g/dL    Alkaline Phosphatase 213 (H) 33 - 120 U/L    Total Protein 8.1 6.4 - 8.2 g/dL    AST 31 9 - 39 U/L    Bilirubin, Total 0.8 0.0 - 1.2 mg/dL    ALT 27 10 - 52 U/L   C-Reactive Protein   Result Value Ref Range    C-Reactive Protein 1.02 (H) <1.00 mg/dL   Sedimentation Rate   Result Value Ref Range    Sedimentation Rate 30 (H) 0 - 20 mm/h   Blood Culture    Specimen: Peripheral Venipuncture; Blood culture   Result Value Ref Range    Blood Culture Loaded on Instrument - Culture in progress    Blood Culture    Specimen: Peripheral Venipuncture; Blood culture   Result Value Ref Range    Blood Culture Loaded on Instrument - Culture in progress    Uric acid   Result Value Ref Range    Uric Acid 5.8 4.0 - 7.5 mg/dL       Imaging Results  XR knee left 3 views    Result Date: 9/26/2024  STUDY: Knee Radiographs INDICATION: Left knee swelling COMPARISON: None Available.  ACCESSION NUMBER(S): [id number] ORDERING CLINICIAN: HARIS FAJARDO TECHNIQUE:  Three view(s) of the left knee. FINDINGS:  There is no displaced fracture.  The alignment is anatomic.  No soft tissue abnormality is seen.  There is no joint effusion.    No acute osseous abnormality. Signed by Uriel Olmos MD    XR knee left 3 views    Result Date: 9/16/2024  Interpreted By:  Joseph Sigala, STUDY: XR KNEE LEFT 3 VIEWS; ;  9/16/2024 2:58 pm   INDICATION: Signs/Symptoms:pain and swelling.     COMPARISON: None.   ACCESSION NUMBER(S): [id number]   ORDERING CLINICIAN: NORIS AKERS   FINDINGS: Left knee, three views   There is no fracture. There is no dislocation. There are no degenerative changes. There is no effusion seen. There is no soft tissue abnormality seen.       Normal radiographs of the left knee     MACRO: None   Signed by: Joseph Sigala 9/16/2024 3:02 PM Dictation workstation:   [id number]    Electrocardiogram, 12-lead PRN ACS symptoms    Result Date: 9/13/2024  Normal sinus rhythm Normal ECG No previous ECGs available Confirmed by Olivier Amaral ([id number]) on 9/13/2024 1:09:30 PM    US guided abdominal paracentesis    Result Date: 9/12/2024  Interpreted By:  Pravin Callahan, STUDY: US GUIDED ABDOMINAL PARACENTESIS; 9/9/2024 3:05 pm   INDICATION: Ascites.   COMPARISON: None.   ACCESSION NUMBER(S): [id number]   ORDERING CLINICIAN: CHELY LIZAMA   TECHNIQUE: Ultrasound-guided paracentesis   FINDINGS: Informed consent obtained. Patient positioned supine. Right lower quadrant prepped, draped and anesthetized. Under ultrasound guidance, 8 Malay centesis sheath needle inserted into peritoneal cavity. 8.8 Lof clear yellowfluid aspiratedwith sample sent for analysis. Patient tolerated procedure well       Ultrasound-guided paracentesis.   Signed by: Pravin Callahan 9/12/2024 9:03 AM Dictation workstation:   [id number]    US right upper quadrant    Result Date: 9/8/2024  Interpreted By:  Diane Salcedo,  STUDY: US RIGHT UPPER QUADRANT;  9/8/2024 5:00 pm   INDICATION: Signs/Symptoms:ascites, known liver disease.   COMPARISON: CT 05/31/2024, ultrasound 06/01/2024   ACCESSION NUMBER(S): [id number]   ORDERING CLINICIAN: CODY PERKINS   TECHNIQUE: Grayscale and color Doppler sonographic imaging of the right upper quadrant.   FINDINGS: LIVER: Enlarged, 20 cm in craniocaudad length, stable. Nodular contour of the liver and coarse echogenicity suggesting cirrhosis. No focal abnormalities.   BILE DUCTS: No intrahepatic or extrahepatic bile duct dilatation. Extrahepatic bile duct = 7 mm.   GALLBLADDER: No stones, pericholecystic fluid, wall thickening, or localized tenderness.   PANCREAS: Obscured by bowel gas.   RIGHT KIDNEY: Normal size, no hydronephrosis.   PERITONEUM: Moderate volume ascites.       Similar appearance of hepatomegaly and hepatic cirrhosis.   Moderate ascites.       MACRO: None   Signed by: Diane Salcedo 9/8/2024 5:33 PM Dictation workstation:   [id number]    CT abdomen pelvis w IV contrast    Result Date: 9/7/2024  CT ABDOMEN AND PELVIS W CONT HISTORY: Abdominal pain, acute, nonlocalized.  Bloating.  Paracentesis scheduled for 9/9/2024. COMPARISON STUDY: None. TECHNIQUE: CT scan of the abdomen and pelvis performed with IV no oral contrast. 100 mL of Omnipaque 300 was injected intravenously without complication. Coronal and sagittal reformats generated and reviewed. All CT scans at this facility use dose modulation, iterative reconstruction, and/or weight based dosing when appropriate to reduce radiation dose to as low as reasonably achievable. FINDINGS: LOWER THORAX: Mild dependent bibasilar atelectasis.  At least mild degree of coronary artery calcification, not well characterized due to cardiac motion artifact.  Otherwise the visualized lower thorax is unremarkable. HEPATOBILIARY: Heterogenous liver without significant nodular hepatic contour. No focal aggressive appearing hepatic lesions.  Few  scattered tiny low-attenuation lesions are likely cysts, however too small to accurately characterize.  Recannulization of the umbilical vein along with enlarged main portal vein (1.7 cm), can be seen in setting of portal hypertension. No biliary ductal dilatation. Gallbladder is present. SPLEEN: Splenomegaly measuring up to 15.9 cm in the craniocaudal dimension. PANCREAS: No focal masses or ductal dilatation. ADRENALS: No large adrenal nodules. KIDNEYS/URETERS: Symmetric renal nephrograms No aggressive appearing mass lesion.  No obstructive renal calculus. No collecting system dilatation. Bladder: Within normal limits. PELVIC ORGANS: Dystrophic calcifications in the prostate. GI TRACT: No acute bowel obstruction.  Air-fluid levels throughout nondilated small bowel loops compatible with a mild ileus.  Sigmoid diverticulosis without CT evidence of diverticulitis. Retroperitoneal fat stranding superiorly which is not centered around the pancreas as well as fat stranding of the omentum likely related to liver disease. LYMPH NODES: Scattered prominent aortic and portacaval lymph nodes are not enlarged by size criteria, likely reactive. VESSELS: Patent portal and splenic veins. No abdominal aortic aneurysm.  Retroaortic left renal vein. BONES AND SOFT TISSUES: No suspicious osseous lesion. Scattered degenerative change without significant vertebral body height loss.  Chronic right L5 spondylolysis without spondylolisthesis at L5-S1.  Serpiginous sclerosis of the bilateral femoral heads consistent with avascular necrosis, without subchondral collapse. PERITONEUM/RETROPERITONEUM: No free air.  Moderate volume ascites. ABDOMINAL WALL: Unremarkable. _______________________________ IMPRESSION: *  Moderate volume ascites. *  Secondary signs of portal hypertension along with a heterogenous appearing liver.  Findings raising suspicion for underlying cirrhosis. *  No bowel obstruction.  Air-fluid levels in nondilated small bowel  compatible with an ileus. *  Chronic changes detailed above including bilateral avascular necrosis of the hips.      Approved by Resident Phil Caceres MD  on 9/7/2024 7:26 AM I, Tal Mckeon MD have personally reviewed the image(s) and agree with and/or edited the report Workstation:[id number] Finalized by Tal Mckeon MD on 9/7/2024 8:15 AM      Medications:  [START ON 9/27/2024] buPROPion XL, 150 mg, oral, Daily before breakfast  ceFAZolin, 1 g, intravenous, q8h  escitalopram, 10 mg, oral, Daily  furosemide, 40 mg, oral, Daily  melatonin, 5 mg, oral, Daily  metoprolol succinate XL, 50 mg, oral, Daily  [START ON 9/27/2024] pantoprazole, 40 mg, oral, Daily before breakfast  spironolactone, 50 mg, oral, Daily       PRN medications: acetaminophen, ketorolac, oxyCODONE     Assessment/Plan     DVT Prophylaxis:  -Lovenox  -Sequential teds    Left knee prepatellar bursitis:  - Continue Ancef IV, transition to Bactrim DS PO  - Tylenol/Toradol/Oxycodone prn for pain    @OBS@    LILO Keith-CNP

## 2024-09-28 LAB
ALBUMIN SERPL BCP-MCNC: 3.6 G/DL (ref 3.4–5)
ANION GAP SERPL CALC-SCNC: 12 MMOL/L (ref 10–20)
BUN SERPL-MCNC: 16 MG/DL (ref 6–23)
CALCIUM SERPL-MCNC: 9.2 MG/DL (ref 8.6–10.6)
CHLORIDE SERPL-SCNC: 103 MMOL/L (ref 98–107)
CO2 SERPL-SCNC: 24 MMOL/L (ref 21–32)
CREAT SERPL-MCNC: 0.73 MG/DL (ref 0.5–1.3)
CRP SERPL-MCNC: 1.25 MG/DL
EGFRCR SERPLBLD CKD-EPI 2021: >90 ML/MIN/1.73M*2
ERYTHROCYTE [DISTWIDTH] IN BLOOD BY AUTOMATED COUNT: 14.4 % (ref 11.5–14.5)
ERYTHROCYTE [SEDIMENTATION RATE] IN BLOOD BY WESTERGREN METHOD: 47 MM/H (ref 0–20)
GLUCOSE SERPL-MCNC: 90 MG/DL (ref 74–99)
HCT VFR BLD AUTO: 37.9 % (ref 41–52)
HGB BLD-MCNC: 12.6 G/DL (ref 13.5–17.5)
MCH RBC QN AUTO: 30.6 PG (ref 26–34)
MCHC RBC AUTO-ENTMCNC: 33.2 G/DL (ref 32–36)
MCV RBC AUTO: 92 FL (ref 80–100)
NRBC BLD-RTO: 0 /100 WBCS (ref 0–0)
PHOSPHATE SERPL-MCNC: 4.2 MG/DL (ref 2.5–4.9)
PLATELET # BLD AUTO: 143 X10*3/UL (ref 150–450)
POTASSIUM SERPL-SCNC: 4.1 MMOL/L (ref 3.5–5.3)
RBC # BLD AUTO: 4.12 X10*6/UL (ref 4.5–5.9)
SODIUM SERPL-SCNC: 135 MMOL/L (ref 136–145)
WBC # BLD AUTO: 8.1 X10*3/UL (ref 4.4–11.3)

## 2024-09-28 PROCEDURE — 2500000002 HC RX 250 W HCPCS SELF ADMINISTERED DRUGS (ALT 637 FOR MEDICARE OP, ALT 636 FOR OP/ED): Performed by: NURSE PRACTITIONER

## 2024-09-28 PROCEDURE — 2500000001 HC RX 250 WO HCPCS SELF ADMINISTERED DRUGS (ALT 637 FOR MEDICARE OP): Performed by: NURSE PRACTITIONER

## 2024-09-28 PROCEDURE — 2500000002 HC RX 250 W HCPCS SELF ADMINISTERED DRUGS (ALT 637 FOR MEDICARE OP, ALT 636 FOR OP/ED): Performed by: STUDENT IN AN ORGANIZED HEALTH CARE EDUCATION/TRAINING PROGRAM

## 2024-09-28 PROCEDURE — 86140 C-REACTIVE PROTEIN: CPT | Performed by: STUDENT IN AN ORGANIZED HEALTH CARE EDUCATION/TRAINING PROGRAM

## 2024-09-28 PROCEDURE — 80069 RENAL FUNCTION PANEL: CPT | Performed by: NURSE PRACTITIONER

## 2024-09-28 PROCEDURE — S4991 NICOTINE PATCH NONLEGEND: HCPCS | Performed by: NURSE PRACTITIONER

## 2024-09-28 PROCEDURE — 2500000004 HC RX 250 GENERAL PHARMACY W/ HCPCS (ALT 636 FOR OP/ED): Mod: JZ | Performed by: NURSE PRACTITIONER

## 2024-09-28 PROCEDURE — 36415 COLL VENOUS BLD VENIPUNCTURE: CPT | Performed by: NURSE PRACTITIONER

## 2024-09-28 PROCEDURE — 85027 COMPLETE CBC AUTOMATED: CPT | Performed by: NURSE PRACTITIONER

## 2024-09-28 PROCEDURE — 2500000004 HC RX 250 GENERAL PHARMACY W/ HCPCS (ALT 636 FOR OP/ED): Performed by: STUDENT IN AN ORGANIZED HEALTH CARE EDUCATION/TRAINING PROGRAM

## 2024-09-28 PROCEDURE — 99233 SBSQ HOSP IP/OBS HIGH 50: CPT | Performed by: STUDENT IN AN ORGANIZED HEALTH CARE EDUCATION/TRAINING PROGRAM

## 2024-09-28 PROCEDURE — 2500000004 HC RX 250 GENERAL PHARMACY W/ HCPCS (ALT 636 FOR OP/ED): Performed by: NURSE PRACTITIONER

## 2024-09-28 PROCEDURE — 85652 RBC SED RATE AUTOMATED: CPT | Performed by: STUDENT IN AN ORGANIZED HEALTH CARE EDUCATION/TRAINING PROGRAM

## 2024-09-28 PROCEDURE — 2500000001 HC RX 250 WO HCPCS SELF ADMINISTERED DRUGS (ALT 637 FOR MEDICARE OP): Performed by: STUDENT IN AN ORGANIZED HEALTH CARE EDUCATION/TRAINING PROGRAM

## 2024-09-28 PROCEDURE — 1100000001 HC PRIVATE ROOM DAILY

## 2024-09-28 RX ORDER — COLCHICINE 0.6 MG/1
0.6 TABLET ORAL 2 TIMES DAILY
Status: COMPLETED | OUTPATIENT
Start: 2024-09-28 | End: 2024-09-30

## 2024-09-28 RX ORDER — SULFAMETHOXAZOLE AND TRIMETHOPRIM 800; 160 MG/1; MG/1
1 TABLET ORAL EVERY 12 HOURS SCHEDULED
Status: DISCONTINUED | OUTPATIENT
Start: 2024-09-28 | End: 2024-10-01 | Stop reason: HOSPADM

## 2024-09-28 RX ORDER — KETOROLAC TROMETHAMINE 15 MG/ML
15 INJECTION, SOLUTION INTRAMUSCULAR; INTRAVENOUS EVERY 6 HOURS PRN
Status: DISCONTINUED | OUTPATIENT
Start: 2024-09-28 | End: 2024-10-01 | Stop reason: HOSPADM

## 2024-09-28 SDOH — SOCIAL STABILITY: SOCIAL INSECURITY: WITHIN THE LAST YEAR, HAVE YOU BEEN HUMILIATED OR EMOTIONALLY ABUSED IN OTHER WAYS BY YOUR PARTNER OR EX-PARTNER?: NO

## 2024-09-28 SDOH — ECONOMIC STABILITY: FOOD INSECURITY: WITHIN THE PAST 12 MONTHS, YOU WORRIED THAT YOUR FOOD WOULD RUN OUT BEFORE YOU GOT MONEY TO BUY MORE.: NEVER TRUE

## 2024-09-28 SDOH — ECONOMIC STABILITY: INCOME INSECURITY: HOW HARD IS IT FOR YOU TO PAY FOR THE VERY BASICS LIKE FOOD, HOUSING, MEDICAL CARE, AND HEATING?: NOT VERY HARD

## 2024-09-28 SDOH — ECONOMIC STABILITY: INCOME INSECURITY: IN THE PAST 12 MONTHS, HAS THE ELECTRIC, GAS, OIL, OR WATER COMPANY THREATENED TO SHUT OFF SERVICE IN YOUR HOME?: NO

## 2024-09-28 SDOH — ECONOMIC STABILITY: FOOD INSECURITY: WITHIN THE PAST 12 MONTHS, THE FOOD YOU BOUGHT JUST DIDN'T LAST AND YOU DIDN'T HAVE MONEY TO GET MORE.: NEVER TRUE

## 2024-09-28 SDOH — SOCIAL STABILITY: SOCIAL INSECURITY: ABUSE: ADULT

## 2024-09-28 SDOH — SOCIAL STABILITY: SOCIAL INSECURITY: DO YOU FEEL UNSAFE GOING BACK TO THE PLACE WHERE YOU ARE LIVING?: NO

## 2024-09-28 SDOH — SOCIAL STABILITY: SOCIAL INSECURITY: DO YOU FEEL ANYONE HAS EXPLOITED OR TAKEN ADVANTAGE OF YOU FINANCIALLY OR OF YOUR PERSONAL PROPERTY?: NO

## 2024-09-28 SDOH — SOCIAL STABILITY: SOCIAL INSECURITY: HAVE YOU HAD THOUGHTS OF HARMING ANYONE ELSE?: NO

## 2024-09-28 SDOH — SOCIAL STABILITY: SOCIAL INSECURITY: ARE THERE ANY APPARENT SIGNS OF INJURIES/BEHAVIORS THAT COULD BE RELATED TO ABUSE/NEGLECT?: NO

## 2024-09-28 SDOH — SOCIAL STABILITY: SOCIAL INSECURITY: WITHIN THE LAST YEAR, HAVE YOU BEEN AFRAID OF YOUR PARTNER OR EX-PARTNER?: NO

## 2024-09-28 SDOH — SOCIAL STABILITY: SOCIAL INSECURITY: ARE YOU OR HAVE YOU BEEN THREATENED OR ABUSED PHYSICALLY, EMOTIONALLY, OR SEXUALLY BY ANYONE?: NO

## 2024-09-28 SDOH — SOCIAL STABILITY: SOCIAL INSECURITY: HAVE YOU HAD ANY THOUGHTS OF HARMING ANYONE ELSE?: NO

## 2024-09-28 SDOH — SOCIAL STABILITY: SOCIAL INSECURITY: WERE YOU ABLE TO COMPLETE ALL THE BEHAVIORAL HEALTH SCREENINGS?: YES

## 2024-09-28 SDOH — SOCIAL STABILITY: SOCIAL INSECURITY: HAS ANYONE EVER THREATENED TO HURT YOUR FAMILY OR YOUR PETS?: NO

## 2024-09-28 SDOH — SOCIAL STABILITY: SOCIAL INSECURITY: DOES ANYONE TRY TO KEEP YOU FROM HAVING/CONTACTING OTHER FRIENDS OR DOING THINGS OUTSIDE YOUR HOME?: NO

## 2024-09-28 ASSESSMENT — COGNITIVE AND FUNCTIONAL STATUS - GENERAL
MOBILITY SCORE: 23
PATIENT BASELINE BEDBOUND: NO
CLIMB 3 TO 5 STEPS WITH RAILING: A LITTLE
CLIMB 3 TO 5 STEPS WITH RAILING: A LITTLE
MOBILITY SCORE: 23
DAILY ACTIVITIY SCORE: 24
DAILY ACTIVITIY SCORE: 24

## 2024-09-28 ASSESSMENT — PAIN - FUNCTIONAL ASSESSMENT
PAIN_FUNCTIONAL_ASSESSMENT: 0-10

## 2024-09-28 ASSESSMENT — PAIN SCALES - GENERAL
PAINLEVEL_OUTOF10: 9
PAINLEVEL_OUTOF10: 7
PAINLEVEL_OUTOF10: 5 - MODERATE PAIN
PAINLEVEL_OUTOF10: 4
PAINLEVEL_OUTOF10: 7
PAINLEVEL_OUTOF10: 8
PAINLEVEL_OUTOF10: 8

## 2024-09-28 ASSESSMENT — ACTIVITIES OF DAILY LIVING (ADL)
PATIENT'S MEMORY ADEQUATE TO SAFELY COMPLETE DAILY ACTIVITIES?: YES
FEEDING YOURSELF: INDEPENDENT
TOILETING: INDEPENDENT
LACK_OF_TRANSPORTATION: NO
HEARING - RIGHT EAR: FUNCTIONAL
ADEQUATE_TO_COMPLETE_ADL: YES
WALKS IN HOME: INDEPENDENT
GROOMING: INDEPENDENT
DRESSING YOURSELF: INDEPENDENT
JUDGMENT_ADEQUATE_SAFELY_COMPLETE_DAILY_ACTIVITIES: YES
HEARING - LEFT EAR: FUNCTIONAL
BATHING: INDEPENDENT

## 2024-09-28 ASSESSMENT — PAIN DESCRIPTION - ORIENTATION
ORIENTATION: LEFT

## 2024-09-28 ASSESSMENT — PATIENT HEALTH QUESTIONNAIRE - PHQ9
1. LITTLE INTEREST OR PLEASURE IN DOING THINGS: NOT AT ALL
SUM OF ALL RESPONSES TO PHQ9 QUESTIONS 1 & 2: 0
2. FEELING DOWN, DEPRESSED OR HOPELESS: NOT AT ALL

## 2024-09-28 ASSESSMENT — LIFESTYLE VARIABLES
SKIP TO QUESTIONS 9-10: 1
HOW OFTEN DO YOU HAVE 6 OR MORE DRINKS ON ONE OCCASION: NEVER
AUDIT-C TOTAL SCORE: 1
HOW OFTEN DO YOU HAVE A DRINK CONTAINING ALCOHOL: MONTHLY OR LESS
HOW MANY STANDARD DRINKS CONTAINING ALCOHOL DO YOU HAVE ON A TYPICAL DAY: 1 OR 2
AUDIT-C TOTAL SCORE: 1

## 2024-09-28 ASSESSMENT — PAIN DESCRIPTION - LOCATION
LOCATION: KNEE

## 2024-09-28 NOTE — PROGRESS NOTES
Assessment/Plan   Mr Garcia is a 54y male with PMHx: Gout, cirrhosis w/ascites, HTN, anxiety, GERD who originally presented to [facility] last week with c/o left knee pain and swelling. Initially presented and treated with indomethacin and pt takes colchine prn at home for gout, he represented and had athrothrocentesis negative for fluid, discharged with percocet. He represented with worsening pain, inabiity to bend the joint. Ortho evaluated in the Ed and recommended treating with ancef followed by bactrim course outpatient. Pt admitted to medicine.     Left knee pain  H/o gout  - unclear at this time, swelling, warmth and edema noted. No joint effusion based on prior negative joint aspiration. Prepatellar bursitis (possibly septic) v. Gout. Treating with colchine emperically x3 days and will monitor repsonse. Continue oxy as ordered. Will add bactrim to ancef for broader coverage given worsen. Check esr/crp. Check mri knee then will discuss further with ortho.   - not responsive to analgesics, indomethacin, prn home colcihcine but has not taken many colchicine doses.   - denies trauma  - pt/ot      Cirrhosis  -c/w home spironlactone 50mg daily, lasix 40 mg daily  - patient needs outpatient follow up with hepatology     HTN  -hold home amlodipine, losartan  -c/w home lasix 40mg daily  -c/w home metop succ 50mg daily     GERD  -c/w home omeprazole as protonix     Anxiety  -c/w home wellbutrin 150mg daily  -c/w Lexapro 10mg daily        Scheduled outpatient appointments in system:   No future appointments.  ---------------------------------------------------------------------------------------------------  Subjective   No events. Pain controlled at rest but difficulty tolerating exam. Discussed course, pain, swelling, warmth is worsening, difficulty bending knee, very tender to palpation. No f/c.      ---------------------------------------------------------------------------------------------------  Objective   Last  Recorded Vitals  Blood pressure 106/74, pulse 75, temperature 36.8 °C (98.2 °F), temperature source Temporal, resp. rate 16, height 1.829 m (6'), weight 77.1 kg (170 lb), SpO2 100%.  Intake/Output last 3 Shifts:  I/O last 3 completed shifts:  In: 250 (3.2 mL/kg) [IV Piggyback:250]  Out: 1800 (23.3 mL/kg) [Urine:1800 (0.6 mL/kg/hr)]  Weight: 77.1 kg     Physical Exam  Vitals and nursing note reviewed.   Constitutional:       General: He is not in acute distress.     Appearance: Normal appearance. He is not ill-appearing or toxic-appearing.   HENT:      Head: Normocephalic and atraumatic.      Mouth/Throat:      Mouth: Mucous membranes are moist.   Eyes:      General: No scleral icterus.     Extraocular Movements: Extraocular movements intact.      Conjunctiva/sclera: Conjunctivae normal.   Cardiovascular:      Rate and Rhythm: Normal rate and regular rhythm.      Heart sounds: S1 normal and S2 normal. No murmur heard.  Pulmonary:      Effort: Pulmonary effort is normal. No respiratory distress.      Breath sounds: No wheezing, rhonchi or rales.   Abdominal:      General: Bowel sounds are normal. There is distension.      Palpations: Abdomen is soft.      Tenderness: There is no abdominal tenderness. There is no guarding or rebound.   Musculoskeletal:         General: No swelling or deformity.      Cervical back: Neck supple.      Comments: Left knee appears to have warmth, edema particularly at inferior side of joint around the patella, significnat ttp and reduced ROM due to pain.    Skin:     General: Skin is warm and dry.      Findings: No rash.   Neurological:      General: No focal deficit present.      Mental Status: He is alert. Mental status is at baseline.   Psychiatric:         Mood and Affect: Mood normal.         Relevant Results  Lab Results   Component Value Date    WBC 8.1 09/28/2024    HGB 12.6 (L) 09/28/2024    HCT 37.9 (L) 09/28/2024    MCV 92 09/28/2024     (L) 09/28/2024      Lab Results    Component Value Date    GLUCOSE 90 09/28/2024    CALCIUM 9.2 09/28/2024     (L) 09/28/2024    K 4.1 09/28/2024    CO2 24 09/28/2024     09/28/2024    BUN 16 09/28/2024    CREATININE 0.73 09/28/2024     Scheduled medications  acetaminophen, 975 mg, oral, q8h  buPROPion XL, 150 mg, oral, Daily before breakfast  ceFAZolin, 1 g, intravenous, q8h  colchicine, 0.6 mg, oral, BID  escitalopram, 10 mg, oral, Daily  furosemide, 40 mg, oral, Daily  losartan, 100 mg, oral, Daily  melatonin, 5 mg, oral, Daily  metoprolol succinate XL, 50 mg, oral, Daily  nicotine, 1 patch, transdermal, Daily  pantoprazole, 40 mg, oral, Daily before breakfast  spironolactone, 50 mg, oral, Daily  sulfamethoxazole-trimethoprim, 1 tablet, oral, q12h ELIANE      Continuous medications     PRN medications  PRN medications: oxyCODONE    Mil Guzman MD

## 2024-09-28 NOTE — PROGRESS NOTES
"Pharmacy Medication History Review    Aamir Garcia is a 54 y.o. male admitted for Bursitis of left knee, unspecified bursa. Pharmacy reviewed the patient's [id number] medications and allergies for accuracy.    Medications ADDED:  Folic Acid 1 mg tablet   Vitamin B Complex tablet   Medications CHANGED:  Amlodipine 5 mg tablet - updated note in sig to \" Pt taking differently ; Daily PRN\"   Thiamine 100 mg tablet - updated note in sig to \" Pt has not yet picked up from pharmacy.\"    Medications REMOVED / NOT TAKING :   Tramadol 50 mg tablet - Patient not taking , therapy completed.     The list below reflects the updated PTA list.   Prior to Admission Medications   Prescriptions Last Dose Informant   amLODIPine (Norvasc) 5 mg tablet  Self   Sig: Take 1 tablet (5 mg) by mouth once daily.   Patient taking differently: Take 1 tablet (5 mg) by mouth once daily as needed.   buPROPion XL (Wellbutrin XL) 150 mg 24 hr tablet 9/27/2024 Self   Sig: Take 1 tablet (150 mg) by mouth once daily in the morning. Take before meals.   escitalopram (Lexapro) 10 mg tablet 9/27/2024 Self   Sig: Take 1 tablet (10 mg) by mouth once daily.   folic acid (Folvite) 1 mg tablet 9/27/2024 Self   Sig: Take 1 tablet (1 mg) by mouth once daily.   furosemide (Lasix) 40 mg tablet 9/27/2024 Self   Sig: Take 1 tablet (40 mg) by mouth once daily.   losartan (Cozaar) 100 mg tablet 9/27/2024 Self   Sig: Take 1 tablet (100 mg) by mouth early in the morning..   melatonin 5 mg tablet 9/26/2024 Self   Sig: Take 1 tablet (5 mg) by mouth once daily.   metoprolol succinate XL (Toprol-XL) 50 mg 24 hr tablet 9/27/2024 Self   Sig: Take 1 tablet (50 mg) by mouth once daily. Do not crush or chew.   omeprazole (PriLOSEC) 20 mg DR capsule 9/27/2024 Self   Sig: Take 1 capsule (20 mg) by mouth once daily. Do not crush or chew.   spironolactone (Aldactone) 50 mg tablet 9/27/2024 Self   Sig: Take 1 tablet (50 mg) by mouth once daily.   thiamine (Vitamin B-1) 100 mg " "tablet Not Taking Self   Sig: Take 1 tablet (100 mg) by mouth once daily.   Patient not taking: Reported on 9/27/2024   traMADol (Ultram) 50 mg tablet Not Taking    Sig: Take 1 tablet (50 mg) by mouth every 6 hours if needed for severe pain (7 - 10) for up to 3 days.   Patient not taking: Reported on 9/27/2024   vitamin B complex tablet 9/27/2024 Self   Sig: Take 1 tablet by mouth once daily.      Facility-Administered Medications: None        The list below reflects the updated allergy list. Please review each documented allergy for additional clarification and justification.  Allergies  Reviewed by Roseann Blanchard on 9/27/2024        Severity Reactions Comments    [place] Shortness of breath             Patient accepts M2B at discharge.     Sources:   [facility] - 09/25/2024 Oxycodone - Acetaminophen 5-325 3 days / 12 #                     - 09/16/2024 Tramadol Hcl 50 mg tablet 3 days / 12 #                       - 06/04/2024 Methylphenidate 10 mg capsule 14 days / 14 #   Patient interview   Patient dispense [facility] Chart Review   Care Everywhere     Additional Comments:    Patient states he is taking Amlodipine 5 mg tablets  differently than prescribed. Patient states he takes this medication only PRN. Prescribed sig states to \" Take 1 tablet by mouth once daily.\"     Patient states he has not picked up Thiamine 100 mg tablets yet from his pharmacy and is currently not taking. Pt reports he takes OTC Vitamin B12 complex tablets instead.     Patient is a reliable historian and appears compliant with all home medications.     Roseann Blanchard  Pharmacy Technician  09/27/24     Secure Chat preferred   If no response call [phone] or CIQUAL \"Med Rec\"   "

## 2024-09-28 NOTE — CARE PLAN
The patient's goals for the shift include      The clinical goals for the shift include Patient's pain will be managed throughout this shift    Patient remained safe and stable this shift, c/o pain with some relief from pain meds. Slept intermittently at night.

## 2024-09-29 VITALS
HEART RATE: 73 BPM | RESPIRATION RATE: 18 BRPM | WEIGHT: 170 LBS | SYSTOLIC BLOOD PRESSURE: 117 MMHG | TEMPERATURE: 97.3 F | OXYGEN SATURATION: 97 % | DIASTOLIC BLOOD PRESSURE: 71 MMHG | HEIGHT: 72 IN | BODY MASS INDEX: 23.03 KG/M2

## 2024-09-29 LAB
BACTERIA BLD CULT: NORMAL
BACTERIA BLD CULT: NORMAL
ERYTHROCYTE [DISTWIDTH] IN BLOOD BY AUTOMATED COUNT: 14.6 % (ref 11.5–14.5)
HCT VFR BLD AUTO: 38.5 % (ref 41–52)
HGB BLD-MCNC: 12.7 G/DL (ref 13.5–17.5)
MCH RBC QN AUTO: 30.8 PG (ref 26–34)
MCHC RBC AUTO-ENTMCNC: 33 G/DL (ref 32–36)
MCV RBC AUTO: 93 FL (ref 80–100)
NRBC BLD-RTO: 0 /100 WBCS (ref 0–0)
PLATELET # BLD AUTO: 142 X10*3/UL (ref 150–450)
RBC # BLD AUTO: 4.12 X10*6/UL (ref 4.5–5.9)
WBC # BLD AUTO: 8.2 X10*3/UL (ref 4.4–11.3)

## 2024-09-29 PROCEDURE — S4991 NICOTINE PATCH NONLEGEND: HCPCS | Performed by: NURSE PRACTITIONER

## 2024-09-29 PROCEDURE — 2500000001 HC RX 250 WO HCPCS SELF ADMINISTERED DRUGS (ALT 637 FOR MEDICARE OP): Performed by: STUDENT IN AN ORGANIZED HEALTH CARE EDUCATION/TRAINING PROGRAM

## 2024-09-29 PROCEDURE — 2500000001 HC RX 250 WO HCPCS SELF ADMINISTERED DRUGS (ALT 637 FOR MEDICARE OP): Performed by: NURSE PRACTITIONER

## 2024-09-29 PROCEDURE — 99232 SBSQ HOSP IP/OBS MODERATE 35: CPT | Performed by: STUDENT IN AN ORGANIZED HEALTH CARE EDUCATION/TRAINING PROGRAM

## 2024-09-29 PROCEDURE — 97161 PT EVAL LOW COMPLEX 20 MIN: CPT | Mod: GP

## 2024-09-29 PROCEDURE — 36415 COLL VENOUS BLD VENIPUNCTURE: CPT | Performed by: STUDENT IN AN ORGANIZED HEALTH CARE EDUCATION/TRAINING PROGRAM

## 2024-09-29 PROCEDURE — 85027 COMPLETE CBC AUTOMATED: CPT | Performed by: STUDENT IN AN ORGANIZED HEALTH CARE EDUCATION/TRAINING PROGRAM

## 2024-09-29 PROCEDURE — 2500000004 HC RX 250 GENERAL PHARMACY W/ HCPCS (ALT 636 FOR OP/ED): Performed by: STUDENT IN AN ORGANIZED HEALTH CARE EDUCATION/TRAINING PROGRAM

## 2024-09-29 PROCEDURE — 2500000002 HC RX 250 W HCPCS SELF ADMINISTERED DRUGS (ALT 637 FOR MEDICARE OP, ALT 636 FOR OP/ED): Performed by: NURSE PRACTITIONER

## 2024-09-29 PROCEDURE — 2500000004 HC RX 250 GENERAL PHARMACY W/ HCPCS (ALT 636 FOR OP/ED): Mod: JZ | Performed by: NURSE PRACTITIONER

## 2024-09-29 PROCEDURE — 1100000001 HC PRIVATE ROOM DAILY

## 2024-09-29 PROCEDURE — 2500000002 HC RX 250 W HCPCS SELF ADMINISTERED DRUGS (ALT 637 FOR MEDICARE OP, ALT 636 FOR OP/ED): Performed by: STUDENT IN AN ORGANIZED HEALTH CARE EDUCATION/TRAINING PROGRAM

## 2024-09-29 RX ORDER — ACETAMINOPHEN 325 MG/1
325 TABLET ORAL 3 TIMES DAILY PRN
Status: DISCONTINUED | OUTPATIENT
Start: 2024-09-29 | End: 2024-10-01 | Stop reason: HOSPADM

## 2024-09-29 ASSESSMENT — COGNITIVE AND FUNCTIONAL STATUS - GENERAL
DAILY ACTIVITIY SCORE: 24
CLIMB 3 TO 5 STEPS WITH RAILING: A LITTLE
CLIMB 3 TO 5 STEPS WITH RAILING: A LITTLE
MOVING TO AND FROM BED TO CHAIR: A LITTLE
STANDING UP FROM CHAIR USING ARMS: A LITTLE
MOBILITY SCORE: 23
MOBILITY SCORE: 23
MOBILITY SCORE: 20
CLIMB 3 TO 5 STEPS WITH RAILING: A LITTLE
DAILY ACTIVITIY SCORE: 24
WALKING IN HOSPITAL ROOM: A LITTLE

## 2024-09-29 ASSESSMENT — PAIN SCALES - GENERAL
PAINLEVEL_OUTOF10: 6
PAINLEVEL_OUTOF10: 6
PAINLEVEL_OUTOF10: 7
PAINLEVEL_OUTOF10: 6
PAINLEVEL_OUTOF10: 0 - NO PAIN
PAINLEVEL_OUTOF10: 7
PAINLEVEL_OUTOF10: 4
PAINLEVEL_OUTOF10: 7
PAINLEVEL_OUTOF10: 7

## 2024-09-29 ASSESSMENT — PAIN DESCRIPTION - ORIENTATION
ORIENTATION: LEFT
ORIENTATION: LEFT

## 2024-09-29 ASSESSMENT — PAIN - FUNCTIONAL ASSESSMENT
PAIN_FUNCTIONAL_ASSESSMENT: 0-10

## 2024-09-29 ASSESSMENT — PAIN DESCRIPTION - LOCATION
LOCATION: KNEE
LOCATION: KNEE

## 2024-09-29 NOTE — PROGRESS NOTES
Assessment/Plan   Mr Garcia is a 54y male with PMHx: Gout, cirrhosis w/ascites, HTN, anxiety, GERD who originally presented to [facility] last week with c/o left knee pain and swelling. Initially presented and treated with indomethacin and pt takes colchine prn at home for gout, he represented and had athrothrocentesis negative for fluid, discharged with percocet. He represented with worsening pain, inabiity to bend the joint. Ortho evaluated in the Ed and recommended treating with ancef followed by bactrim course outpatient. Pt admitted to medicine. Started on ancef/bactrim, also emperically with scheduled colchicine. Pending mri. Swelling apears to be improving but still painful and warm.     Left knee pain  H/o gout  - unclear at this time, swelling, warmth and edema noted. No joint effusion based on prior negative joint aspiration. Prepatellar bursitis (possibly septic) v. Gout. Treating with colchine emperically x3 days and will monitor repsonse. Continue oxy as ordered. Will add bactrim to ancef for broader coverage given worsen. Check esr/crp. Milldy elevated. Check mri knee then will discuss further with ortho.   - not responsive to analgesics, indomethacin, prn home colcihcine but has not taken many colchicine doses.   - denies trauma  - pt/ot   - pt does not want to use oxycodone, pain beter controlled with toradol. Reduce tylenol due to cirrhosis. Likely transition to meloxicam for once daily dosing if renal function stable.      Cirrhosis  -c/w home spironlactone 50mg daily, lasix 40 mg daily  - patient needs outpatient follow up with hepatology     HTN  -hold home amlodipine, losartan  -c/w home lasix 40mg daily  -c/w home metop succ 50mg daily     GERD  -c/w home omeprazole as protonix     Anxiety  -c/w home wellbutrin 150mg daily  -c/w Lexapro 10mg daily    Thrombocytopenia  - likely 2/2 cirrhosis, monitor      Scheduled outpatient appointments in system:   No future  appointments.  ---------------------------------------------------------------------------------------------------  Subjective   No events. Pain controlled with toradol at rest but difficulty tolerating exam. Swelling improving but  pain and warmth still present, difficulty bending knee, very tender to palpation. No f/c.      ---------------------------------------------------------------------------------------------------  Objective   Last Recorded Vitals  Blood pressure 105/61, pulse 66, temperature 36.6 °C (97.9 °F), temperature source Temporal, resp. rate 16, height 1.829 m (6'), weight 77.1 kg (170 lb), SpO2 92%.  Intake/Output last 3 Shifts:  I/O last 3 completed shifts:  In: 250 (3.2 mL/kg) [IV Piggyback:250]  Out: 1000 (13 mL/kg) [Urine:1000 (0.4 mL/kg/hr)]  Weight: 77.1 kg     Physical Exam  Vitals and nursing note reviewed.   Constitutional:       General: He is not in acute distress.     Appearance: Normal appearance. He is not ill-appearing or toxic-appearing.   HENT:      Head: Normocephalic and atraumatic.      Mouth/Throat:      Mouth: Mucous membranes are moist.   Eyes:      General: No scleral icterus.     Extraocular Movements: Extraocular movements intact.      Conjunctiva/sclera: Conjunctivae normal.   Cardiovascular:      Rate and Rhythm: Normal rate and regular rhythm.      Heart sounds: S1 normal and S2 normal. No murmur heard.  Pulmonary:      Effort: Pulmonary effort is normal. No respiratory distress.      Breath sounds: No wheezing, rhonchi or rales.   Abdominal:      General: Bowel sounds are normal. There is distension.      Palpations: Abdomen is soft.      Tenderness: There is no abdominal tenderness. There is no guarding or rebound.   Musculoskeletal:         General: No swelling or deformity.      Cervical back: Neck supple.      Comments: Left knee appears to have warmth, edema particularly at inferior side of joint around the patella, significnat ttp and reduced ROM due to pain.     Skin:     General: Skin is warm and dry.      Findings: No rash.   Neurological:      General: No focal deficit present.      Mental Status: He is alert. Mental status is at baseline.   Psychiatric:         Mood and Affect: Mood normal.         Relevant Results  Lab Results   Component Value Date    WBC 8.1 09/28/2024    HGB 12.6 (L) 09/28/2024    HCT 37.9 (L) 09/28/2024    MCV 92 09/28/2024     (L) 09/28/2024      Lab Results   Component Value Date    GLUCOSE 90 09/28/2024    CALCIUM 9.2 09/28/2024     (L) 09/28/2024    K 4.1 09/28/2024    CO2 24 09/28/2024     09/28/2024    BUN 16 09/28/2024    CREATININE 0.73 09/28/2024     Scheduled medications  acetaminophen, 975 mg, oral, q8h  buPROPion XL, 150 mg, oral, Daily before breakfast  ceFAZolin, 1 g, intravenous, q8h  colchicine, 0.6 mg, oral, BID  escitalopram, 10 mg, oral, Daily  furosemide, 40 mg, oral, Daily  losartan, 100 mg, oral, Daily  melatonin, 5 mg, oral, Daily  metoprolol succinate XL, 50 mg, oral, Daily  nicotine, 1 patch, transdermal, Daily  pantoprazole, 40 mg, oral, Daily before breakfast  spironolactone, 50 mg, oral, Daily  sulfamethoxazole-trimethoprim, 1 tablet, oral, q12h ELIANE      Continuous medications     PRN medications  PRN medications: ketorolac    Mil Guzman MD

## 2024-09-29 NOTE — PROGRESS NOTES
Physical Therapy    Physical Therapy Evaluation    Patient Name: Aamir Garcia  MRN: [id number]  Today's Date: 9/29/2024   Room: 20732073A  Time Calculation  Start Time: 1133  Stop Time: 1154  Time Calculation (min): 21 min    Assessment/Plan   PT Assessment  PT Assessment Results: Decreased strength, Decreased range of motion, Decreased endurance, Impaired balance, Decreased mobility, Pain  Rehab Prognosis: Good  Barriers to Participation: Comorbidities  End of Session Communication: Bedside nurse  Assessment Comment: Pt tolerated PT eval fairly well. At baseline is indep with mobility, reports recently received crutches but unable to use without home. Currentlyu limited by pain, ROM, strength and activity tolerance. Educated pt on sequencing steps with FWW with proper return demo. Will continue to follow  End of Session Patient Position: Bed, 3 rail up, Alarm off, not on at start of session  IP OR SWING BED PT PLAN  Inpatient or Swing Bed: Inpatient  PT Plan  Treatment/Interventions: Bed mobility, Transfer training, Stair training, Gait training, Balance training, Range of motion, Therapeutic exercise, Home exercise program  PT Plan: Ongoing PT  PT Frequency: 2 times per week  PT Discharge Recommendations: Low intensity level of continued care  PT - OK to Discharge: Yes (eval complete, d/c recommendation made)    Subjective   General Visit Information:  Reason for Referral: L knee pain and swelling  Past Medical History Relevant to Rehab: Gout, cirrhosis w/ascites, HTN, anxiety, GERD  Prior to Session Communication: Bedside nurse  Patient Position Received: Bed, 3 rail up, Alarm off, not on at start of session  Family/Caregiver Present: No  General Comment: supine in bed, cooperative   Home Living:  Home Living  Type of Home:  ([facility])  Lives With: Alone  Home Adaptive Equipment: Crutches  Home Layout: Multi-level, Stairs to alternate level with rails  Alternate Level Stairs-Number of Steps: 3  Prior Level of  Function:  Prior Function Per Pt/Caregiver Report  Level of [place]: Independent with ADLs and functional transfers, Independent with homemaking with ambulation  Prior Function Comments: daughter lives close  Precautions:  Precautions  LE Weight Bearing Status: Weight Bearing as Tolerated  Medical Precautions: Fall precautions    Objective     Pain:  Pain Assessment  Pain Assessment: 0-10  0-10 (Numeric) Pain Score: 6  Pain Type: Acute pain  Pain Location: Knee  Pain Orientation: Left  Pain Interventions: Repositioned  Cognition:  Cognition  Overall Cognitive Status: Within Functional Limits      Extremity/Trunk Assessments:  Strength:           RLE   RLE : Within Functional Limits  LLE   LLE :  (limiteda ssessment at knee and ankle d/t inc pain, hip strength WFL)    General Assessments:         Activity Tolerance  Endurance: Endurance does not limit participation in activity  Sensation  Light Touch: No apparent deficits     Static Sitting Balance  Static Sitting-Balance Support: Feet supported  Static Sitting-Level of Assistance: Independent  Static Standing Balance  Static Standing-Balance Support: Bilateral upper extremity supported  Static Standing-Level of Assistance: Distant supervision    Functional Assessments:  Bed Mobility  Bed Mobility: Yes  Bed Mobility 1  Bed Mobility 1: Supine to sitting, Sitting to supine  Level of Assistance 1: Distant supervision  Bed Mobility Comments 1: HOB elevated  Transfers  Transfer: Yes  Transfer 1  Transfer From 1: Bed to, Stand to  Transfer to 1: Stand, Bed  Technique 1: Sit to stand, Stand to sit  Transfer Device 1: Walker  Transfer Level of Assistance 1: Close supervision  Trials/Comments 1: cues for UE placement  Ambulation/Gait Training  Ambulation/Gait Training Performed: Yes  Ambulation/Gait Training 1  Surface 1: Level tile  Device 1: Rolling walker  Assistance 1: Close supervision  Quality of Gait 1:  (slow pace with step to pattern, lacks L heel strike with pt  maintaining L forefoot in contact with ground only, minimal weight bearing on LLE throughout)  Comments/Distance (ft) 1: 20ft  Stairs  Stairs: No       Outcome Measures:  [facility] Basic Mobility  Turning from your back to your side while in a flat bed without using bedrails: None  Moving from lying on your back to sitting on the side of a flat bed without using bedrails: None  Moving to and from bed to chair (including a wheelchair): A little  Standing up from a chair using your arms (e.g. wheelchair or bedside chair): A little  To walk in hospital room: A little  Climbing 3-5 steps with railing: A little  Basic Mobility - Total Score: 20                               Encounter Problems       Encounter Problems (Active)       Mobility       Pt will amb 150ft with LRAD mod I (Progressing)       Start:  09/29/24    Expected End:  10/06/24            Pt will ascend/descend 3 stairs with LRAD mod I (Progressing)       Start:  09/29/24    Expected End:  10/06/24               PT Transfers       Pt will perform sit<>stand with LRAD mod I (Progressing)       Start:  09/29/24    Expected End:  10/06/24               Pain - Adult              Education Documentation  Body Mechanics, taught by Mary Olea PT at 9/29/2024  1:07 PM.  Learner: Patient  Readiness: Acceptance  Method: Explanation  Response: Verbalizes Understanding  Comment: progression of mobility    Mobility Training, taught by Mary Olea PT at 9/29/2024  1:07 PM.  Learner: Patient  Readiness: Acceptance  Method: Explanation  Response: Verbalizes Understanding  Comment: progression of mobility    Education Comments  No comments found.            09/29/24 at 1:08 PM   Mary Olea PT   Rehab Office: [phone]

## 2024-09-29 NOTE — CARE PLAN
Patient calm and cooperative with care, pain managed with medications.     Problem: Pain - Adult  Goal: Verbalizes/displays adequate comfort level or baseline comfort level  Outcome: Progressing     Problem: Safety - Adult  Goal: Free from fall injury  Outcome: Progressing     Problem: Pain  Goal: Takes deep breaths with improved pain control throughout the shift  Outcome: Progressing  Goal: Walks with improved pain control throughout the shift  Outcome: Progressing  Goal: Performs ADL's with improved pain control throughout shift  Outcome: Progressing     Problem: Fall/Injury  Goal: Not fall by end of shift  Outcome: Progressing  Goal: Be free from injury by end of the shift  Outcome: Progressing  Goal: Verbalize understanding of personal risk factors for fall in the hospital  Outcome: Progressing   The patient's goals for the shift include      The clinical goals for the shift include patient will remain free from falls or injuries throughout the shift    Over the shift, the patient did make progress toward the following goals.

## 2024-09-30 ENCOUNTER — APPOINTMENT (OUTPATIENT)
Dept: RADIOLOGY | Facility: HOSPITAL | Age: 54
DRG: 603 | End: 2024-09-30
Payer: MEDICARE

## 2024-09-30 LAB
ALBUMIN SERPL BCP-MCNC: 3.6 G/DL (ref 3.4–5)
ANION GAP SERPL CALC-SCNC: 14 MMOL/L (ref 10–20)
BACTERIA BLD CULT: NORMAL
BACTERIA BLD CULT: NORMAL
BUN SERPL-MCNC: 15 MG/DL (ref 6–23)
CALCIUM SERPL-MCNC: 9.6 MG/DL (ref 8.6–10.6)
CHLORIDE SERPL-SCNC: 104 MMOL/L (ref 98–107)
CO2 SERPL-SCNC: 23 MMOL/L (ref 21–32)
CREAT SERPL-MCNC: 0.97 MG/DL (ref 0.5–1.3)
EGFRCR SERPLBLD CKD-EPI 2021: >90 ML/MIN/1.73M*2
ERYTHROCYTE [DISTWIDTH] IN BLOOD BY AUTOMATED COUNT: 14.5 % (ref 11.5–14.5)
GLUCOSE SERPL-MCNC: 103 MG/DL (ref 74–99)
HCT VFR BLD AUTO: 39.4 % (ref 41–52)
HGB BLD-MCNC: 13.2 G/DL (ref 13.5–17.5)
MCH RBC QN AUTO: 30.6 PG (ref 26–34)
MCHC RBC AUTO-ENTMCNC: 33.5 G/DL (ref 32–36)
MCV RBC AUTO: 91 FL (ref 80–100)
NRBC BLD-RTO: 0 /100 WBCS (ref 0–0)
PHOSPHATE SERPL-MCNC: 4.4 MG/DL (ref 2.5–4.9)
PLATELET # BLD AUTO: 167 X10*3/UL (ref 150–450)
POTASSIUM SERPL-SCNC: 4.5 MMOL/L (ref 3.5–5.3)
RBC # BLD AUTO: 4.31 X10*6/UL (ref 4.5–5.9)
SODIUM SERPL-SCNC: 136 MMOL/L (ref 136–145)
WBC # BLD AUTO: 8.7 X10*3/UL (ref 4.4–11.3)

## 2024-09-30 PROCEDURE — S4991 NICOTINE PATCH NONLEGEND: HCPCS | Performed by: NURSE PRACTITIONER

## 2024-09-30 PROCEDURE — 2500000002 HC RX 250 W HCPCS SELF ADMINISTERED DRUGS (ALT 637 FOR MEDICARE OP, ALT 636 FOR OP/ED): Performed by: STUDENT IN AN ORGANIZED HEALTH CARE EDUCATION/TRAINING PROGRAM

## 2024-09-30 PROCEDURE — 80069 RENAL FUNCTION PANEL: CPT | Performed by: STUDENT IN AN ORGANIZED HEALTH CARE EDUCATION/TRAINING PROGRAM

## 2024-09-30 PROCEDURE — 2500000001 HC RX 250 WO HCPCS SELF ADMINISTERED DRUGS (ALT 637 FOR MEDICARE OP): Performed by: NURSE PRACTITIONER

## 2024-09-30 PROCEDURE — 1100000001 HC PRIVATE ROOM DAILY

## 2024-09-30 PROCEDURE — 73723 MRI JOINT LWR EXTR W/O&W/DYE: CPT | Mod: LT

## 2024-09-30 PROCEDURE — 2500000004 HC RX 250 GENERAL PHARMACY W/ HCPCS (ALT 636 FOR OP/ED): Mod: JZ | Performed by: NURSE PRACTITIONER

## 2024-09-30 PROCEDURE — 99232 SBSQ HOSP IP/OBS MODERATE 35: CPT | Performed by: STUDENT IN AN ORGANIZED HEALTH CARE EDUCATION/TRAINING PROGRAM

## 2024-09-30 PROCEDURE — 2500000002 HC RX 250 W HCPCS SELF ADMINISTERED DRUGS (ALT 637 FOR MEDICARE OP, ALT 636 FOR OP/ED): Performed by: NURSE PRACTITIONER

## 2024-09-30 PROCEDURE — 2550000001 HC RX 255 CONTRASTS: Performed by: STUDENT IN AN ORGANIZED HEALTH CARE EDUCATION/TRAINING PROGRAM

## 2024-09-30 PROCEDURE — A9575 INJ GADOTERATE MEGLUMI 0.1ML: HCPCS | Performed by: STUDENT IN AN ORGANIZED HEALTH CARE EDUCATION/TRAINING PROGRAM

## 2024-09-30 PROCEDURE — 2500000001 HC RX 250 WO HCPCS SELF ADMINISTERED DRUGS (ALT 637 FOR MEDICARE OP): Performed by: STUDENT IN AN ORGANIZED HEALTH CARE EDUCATION/TRAINING PROGRAM

## 2024-09-30 PROCEDURE — 36415 COLL VENOUS BLD VENIPUNCTURE: CPT | Performed by: STUDENT IN AN ORGANIZED HEALTH CARE EDUCATION/TRAINING PROGRAM

## 2024-09-30 PROCEDURE — 85027 COMPLETE CBC AUTOMATED: CPT | Performed by: STUDENT IN AN ORGANIZED HEALTH CARE EDUCATION/TRAINING PROGRAM

## 2024-09-30 PROCEDURE — 2500000004 HC RX 250 GENERAL PHARMACY W/ HCPCS (ALT 636 FOR OP/ED): Performed by: STUDENT IN AN ORGANIZED HEALTH CARE EDUCATION/TRAINING PROGRAM

## 2024-09-30 RX ORDER — GADOTERATE MEGLUMINE 376.9 MG/ML
15 INJECTION INTRAVENOUS
Status: COMPLETED | OUTPATIENT
Start: 2024-09-30 | End: 2024-09-30

## 2024-09-30 ASSESSMENT — COGNITIVE AND FUNCTIONAL STATUS - GENERAL
DAILY ACTIVITIY SCORE: 24
MOBILITY SCORE: 23
DAILY ACTIVITIY SCORE: 24
CLIMB 3 TO 5 STEPS WITH RAILING: A LITTLE
CLIMB 3 TO 5 STEPS WITH RAILING: A LITTLE
MOBILITY SCORE: 23

## 2024-09-30 ASSESSMENT — PAIN - FUNCTIONAL ASSESSMENT
PAIN_FUNCTIONAL_ASSESSMENT: 0-10

## 2024-09-30 ASSESSMENT — PAIN SCALES - GENERAL
PAINLEVEL_OUTOF10: 0 - NO PAIN
PAINLEVEL_OUTOF10: 6
PAINLEVEL_OUTOF10: 8
PAINLEVEL_OUTOF10: 5 - MODERATE PAIN

## 2024-09-30 ASSESSMENT — ACTIVITIES OF DAILY LIVING (ADL): LACK_OF_TRANSPORTATION: NO

## 2024-09-30 ASSESSMENT — PAIN SCALES - WONG BAKER: WONGBAKER_NUMERICALRESPONSE: NO HURT

## 2024-09-30 NOTE — CARE PLAN
Problem: Pain - Adult  Goal: Verbalizes/displays adequate comfort level or baseline comfort level  Outcome: Progressing     Problem: Safety - Adult  Goal: Free from fall injury  Outcome: Progressing     Problem: Pain  Goal: Takes deep breaths with improved pain control throughout the shift  Outcome: Progressing  Goal: Walks with improved pain control throughout the shift  Outcome: Progressing   The patient's goals for the shift include      The clinical goals for the shift include Patients pain will be a 4 or lower throughout the shift    Over the shift, the patient did  make progress toward the following goals.

## 2024-09-30 NOTE — PROGRESS NOTES
Aamir Garcia is a 54 y.o. male on Hospital Day: 5 of admission presenting with Bursitis of left knee, unspecified bursa.    Subjective   No acute events overnight.  Patient awaiting MRI today.  States that his pain is manageable with pain control.  Denies some concerns regarding his labs which were discussed this a.m.       Objective     Physical Exam  Vitals and nursing note reviewed.   Constitutional:       General: He is not in acute distress.  Eyes:      General: No scleral icterus.     Extraocular Movements: Extraocular movements intact.   Cardiovascular:      Rate and Rhythm: Normal rate and regular rhythm.      Heart sounds: No murmur heard.  Pulmonary:      Effort: Pulmonary effort is normal. No respiratory distress.      Breath sounds: Normal breath sounds.   Abdominal:      General: Abdomen is flat. There is no distension.      Palpations: Abdomen is soft.      Tenderness: There is no abdominal tenderness.   Musculoskeletal:         General: No swelling. Normal range of motion.      Left knee: Tenderness present.      Comments: Tenderness most prominent on medial aspect   Skin:     General: Skin is warm and dry.   Neurological:      Mental Status: He is alert and oriented to person, place, and time.         Last Recorded Vitals  Blood pressure 117/71, pulse 73, temperature 36.3 °C (97.3 °F), resp. rate 18, height 1.829 m (6'), weight 77.1 kg (170 lb), SpO2 97%.  Intake/Output last 3 Shifts:  I/O last 3 completed shifts:  In: 200 (2.6 mL/kg) [IV Piggyback:200]  Out: - (0 mL/kg)   Weight: 77.1 kg     Relevant Results  Results reviewed       Scheduled Medications:  buPROPion XL, 150 mg, oral, Daily before breakfast  ceFAZolin, 1 g, intravenous, q8h  colchicine, 0.6 mg, oral, BID  escitalopram, 10 mg, oral, Daily  furosemide, 40 mg, oral, Daily  losartan, 100 mg, oral, Daily  melatonin, 5 mg, oral, Daily  metoprolol succinate XL, 50 mg, oral, Daily  nicotine, 1 patch, transdermal, Daily  pantoprazole, 40 mg,  oral, Daily before breakfast  spironolactone, 50 mg, oral, Daily  sulfamethoxazole-trimethoprim, 1 tablet, oral, q12h ELIANE      Continuous Medications:     PRN Medications:  PRN medications: acetaminophen, ketorolac       Assessment/Plan   Aamir Garcia is a 54 y.o. male on Hospital Day: 5 of admission presenting with Bursitis of left knee, unspecified bursa and Knee pain.  Initially presented and treated with indomethacin and pt takes colchine prn at home for gout, he represented and had athrothrocentesis negative for fluid, discharged with percocet. He represented with worsening pain, inabiity to bend the joint. Ortho evaluated in the Ed and recommended treating with ancef followed by bactrim course outpatient. Pt admitted to medicine. Started on ancef/bactrim, also emperically with scheduled colchicine. Swelling apears to be improving but still painful and warm. PT recommending low intensity at DC. Today MRI pending    #Left knee pain  #H/o gout  - unclear at this time, swelling, warmth and edema noted. No joint effusion based on prior negative joint aspiration. Prepatellar bursitis (possibly septic) v. Gout. Treating with colchine emperically x3 days and will monitor repsonse. Continue oxy as ordered. Will add bactrim to ancef for broader coverage given worsen. Check esr/crp. Milldy elevated. Check mri knee then will discuss further with ortho.   - not responsive to analgesics, indomethacin, prn home colcihcine but has not taken many colchicine doses.   - denies trauma  - pt does not want to use oxycodone, pain beter controlled with toradol. Reduce tylenol due to cirrhosis. Likely transition to meloxicam for once daily dosing if renal function stable.      #Cirrhosis  -c/w home spironlactone 50mg daily, lasix 40 mg daily  - patient needs outpatient follow up with hepatology     #HTN  -hold home amlodipine, losartan  -c/w home lasix 40mg daily  -c/w home metop succ 50mg daily     #GERD  -c/w home omeprazole as  protonix     #Anxiety  -c/w home wellbutrin 150mg daily  -c/w Lexapro 10mg daily     #Thrombocytopenia  - likely 2/2 cirrhosis, monitor    Disposition: Pending MRI for further evaluation and possible Ortho recs.      I spent 35 minutes in the professional and overall care of this patient.             Clifton Bose MD

## 2024-09-30 NOTE — PROGRESS NOTES
09/30/24 0731   Discharge Planning   Living Arrangements Alone   Support Systems Children   Assistance Needed Await PT/OT evaluations   Type of Residence Private residence  (During the summer months, pt stays at the camp grounds in [place].)   Do you have animals or pets at home? Yes   Type of Animals or Pets Dog   Who is requesting discharge planning? Patient   Home or Post Acute Services   (Pending)   Expected Discharge Disposition Home   Does the patient need discharge transport arranged? No  (Dtr will provide transport home.)   Financial Resource Strain   How hard is it for you to pay for the very basics like food, housing, medical care, and heating? Not very   Housing Stability   In the last 12 months, was there a time when you were not able to pay the mortgage or rent on time? N   At any time in the past 12 months, were you homeless or living in a shelter (including now)? N   Transportation Needs   In the past 12 months, has lack of transportation kept you from medical appointments or from getting medications? no   In the past 12 months, has lack of transportation kept you from meetings, work, or from getting things needed for daily living? No     Assessment Note:  Met with pt and introduced myself as care coordinator and member of the Care Transitions team for discharge planning.   Pt was independent at home.  Pt's address, phone number and contact information was verified.  Pt has a [employer] insurance plan and says his  PCP and Gi providers are not covered under his insurance, so he will need to contact his insurance and see who is covered. Pt states he is a smoker but denies any alcohol use.  Pt does not have any other questions/concerns at this time.     Previous Home Care: None  DME: Crutches (at bedside).  Pharmacy: [facility] in [place]  Falls: None  PCP:   [place] Dr. Pascale Wood (last visit was 3 months ago).      Readmission:  Pt was recently admitted from 9/8-9/11 with abdominal pain  and distention (also had another admission at [facility] for the same complaints).  Pt felt well at discharge.  Pt presents this admission with complaints of left knee pain.  Drs are aware of the previous admissions.    Anny Cutler MSN, RN-BC  Transitional Care Coordinator (TCC)  [phone]

## 2024-09-30 NOTE — PROGRESS NOTES
Pt has a LACE score of 83 which is high for readmission.  Met with pt to discuss PCP follow up appointment.  He last saw Dr. Wood at  [facility] 2-3 months ago.  Pt stated he can't see Dr. Wood anymore he changed insurance and he needs to call to who who is in network and set up a new PCP.  Pt was encouraged to do it while he was in the hospital so he can have a new PCP and a follow  appointment before he is discharged.  Care transitions will continue to follow for discharge planning as needed.  Brandy FORBES, MARIE-S  (ex [phone])

## 2024-09-30 NOTE — PROGRESS NOTES
Transitional Care Coordinator Progress Note:  PT is recommending low intensity, discussed therapy recommendations with pt.  Pt would prefer to go to outpatient PT.  Pt was provided with a list of  outpatient rehab locations.  Pt will also need to contact his insurance to see if he has coverage for therapy ([employer]).  Physicians will provide a prescription for outpatient PT at discharge.      Anny SWEET, RN-BC  Transitional Care Coordinator (TCC)  [phone]

## 2024-09-30 NOTE — CARE PLAN
The patient's goals for the shift include  having his MRI completed.    The clinical goals for the shift include Pt will rate his pain a 4/10 or less by the end of this shift.    Over the shift, the patient did make progress toward the following goals. Pt reported decreased pain after receiving his PRN pain medication. Pt is still awaiting MRI of knee.

## 2024-10-01 VITALS
SYSTOLIC BLOOD PRESSURE: 101 MMHG | WEIGHT: 170 LBS | HEART RATE: 85 BPM | RESPIRATION RATE: 18 BRPM | DIASTOLIC BLOOD PRESSURE: 62 MMHG | HEIGHT: 72 IN | OXYGEN SATURATION: 95 % | TEMPERATURE: 97.7 F | BODY MASS INDEX: 23.03 KG/M2

## 2024-10-01 PROBLEM — M10.9 GOUT: Status: RESOLVED | Noted: 2023-11-09 | Resolved: 2024-10-01

## 2024-10-01 PROBLEM — M70.52 BURSITIS OF LEFT KNEE, UNSPECIFIED BURSA: Status: RESOLVED | Noted: 2024-09-26 | Resolved: 2024-10-01

## 2024-10-01 LAB
ALBUMIN SERPL BCP-MCNC: 3.9 G/DL (ref 3.4–5)
ANION GAP SERPL CALC-SCNC: 17 MMOL/L (ref 10–20)
BUN SERPL-MCNC: 15 MG/DL (ref 6–23)
CALCIUM SERPL-MCNC: 9.9 MG/DL (ref 8.6–10.6)
CHLORIDE SERPL-SCNC: 104 MMOL/L (ref 98–107)
CO2 SERPL-SCNC: 21 MMOL/L (ref 21–32)
CREAT SERPL-MCNC: 1.02 MG/DL (ref 0.5–1.3)
EGFRCR SERPLBLD CKD-EPI 2021: 87 ML/MIN/1.73M*2
ERYTHROCYTE [DISTWIDTH] IN BLOOD BY AUTOMATED COUNT: 14.6 % (ref 11.5–14.5)
GLUCOSE SERPL-MCNC: 111 MG/DL (ref 74–99)
HCT VFR BLD AUTO: 40.2 % (ref 41–52)
HGB BLD-MCNC: 13.3 G/DL (ref 13.5–17.5)
MCH RBC QN AUTO: 30.2 PG (ref 26–34)
MCHC RBC AUTO-ENTMCNC: 33.1 G/DL (ref 32–36)
MCV RBC AUTO: 91 FL (ref 80–100)
NRBC BLD-RTO: 0 /100 WBCS (ref 0–0)
PHOSPHATE SERPL-MCNC: 4.3 MG/DL (ref 2.5–4.9)
PLATELET # BLD AUTO: 185 X10*3/UL (ref 150–450)
POTASSIUM SERPL-SCNC: 4.8 MMOL/L (ref 3.5–5.3)
RBC # BLD AUTO: 4.41 X10*6/UL (ref 4.5–5.9)
SODIUM SERPL-SCNC: 137 MMOL/L (ref 136–145)
WBC # BLD AUTO: 9.8 X10*3/UL (ref 4.4–11.3)

## 2024-10-01 PROCEDURE — 99239 HOSP IP/OBS DSCHRG MGMT >30: CPT | Performed by: INTERNAL MEDICINE

## 2024-10-01 PROCEDURE — 2500000002 HC RX 250 W HCPCS SELF ADMINISTERED DRUGS (ALT 637 FOR MEDICARE OP, ALT 636 FOR OP/ED): Performed by: NURSE PRACTITIONER

## 2024-10-01 PROCEDURE — 2500000004 HC RX 250 GENERAL PHARMACY W/ HCPCS (ALT 636 FOR OP/ED): Mod: JZ | Performed by: NURSE PRACTITIONER

## 2024-10-01 PROCEDURE — S4991 NICOTINE PATCH NONLEGEND: HCPCS | Performed by: NURSE PRACTITIONER

## 2024-10-01 PROCEDURE — 2500000002 HC RX 250 W HCPCS SELF ADMINISTERED DRUGS (ALT 637 FOR MEDICARE OP, ALT 636 FOR OP/ED): Performed by: STUDENT IN AN ORGANIZED HEALTH CARE EDUCATION/TRAINING PROGRAM

## 2024-10-01 PROCEDURE — 2500000001 HC RX 250 WO HCPCS SELF ADMINISTERED DRUGS (ALT 637 FOR MEDICARE OP): Performed by: NURSE PRACTITIONER

## 2024-10-01 PROCEDURE — 2500000001 HC RX 250 WO HCPCS SELF ADMINISTERED DRUGS (ALT 637 FOR MEDICARE OP): Performed by: INTERNAL MEDICINE

## 2024-10-01 PROCEDURE — 85027 COMPLETE CBC AUTOMATED: CPT | Performed by: STUDENT IN AN ORGANIZED HEALTH CARE EDUCATION/TRAINING PROGRAM

## 2024-10-01 PROCEDURE — 80069 RENAL FUNCTION PANEL: CPT | Performed by: STUDENT IN AN ORGANIZED HEALTH CARE EDUCATION/TRAINING PROGRAM

## 2024-10-01 PROCEDURE — 2500000001 HC RX 250 WO HCPCS SELF ADMINISTERED DRUGS (ALT 637 FOR MEDICARE OP): Performed by: STUDENT IN AN ORGANIZED HEALTH CARE EDUCATION/TRAINING PROGRAM

## 2024-10-01 PROCEDURE — 36415 COLL VENOUS BLD VENIPUNCTURE: CPT | Performed by: STUDENT IN AN ORGANIZED HEALTH CARE EDUCATION/TRAINING PROGRAM

## 2024-10-01 RX ORDER — METOPROLOL SUCCINATE 50 MG/1
50 TABLET, EXTENDED RELEASE ORAL DAILY
Start: 2024-10-01

## 2024-10-01 RX ORDER — DICLOFENAC SODIUM 10 MG/G
4 GEL TOPICAL 4 TIMES DAILY
Status: DISCONTINUED | OUTPATIENT
Start: 2024-10-01 | End: 2024-10-01 | Stop reason: HOSPADM

## 2024-10-01 RX ORDER — AMLODIPINE BESYLATE 5 MG/1
5 TABLET ORAL DAILY PRN
Start: 2024-10-01

## 2024-10-01 RX ORDER — FUROSEMIDE 40 MG/1
40 TABLET ORAL DAILY
Start: 2024-10-01

## 2024-10-01 RX ORDER — SULFAMETHOXAZOLE AND TRIMETHOPRIM 800; 160 MG/1; MG/1
1 TABLET ORAL EVERY 12 HOURS SCHEDULED
Qty: 20 TABLET | Refills: 0 | Status: SHIPPED | OUTPATIENT
Start: 2024-10-01

## 2024-10-01 RX ORDER — DICLOFENAC SODIUM 10 MG/G
4 GEL TOPICAL 4 TIMES DAILY PRN
Qty: 450 G | Refills: 3 | Status: SHIPPED | OUTPATIENT
Start: 2024-10-01

## 2024-10-01 RX ORDER — KETOROLAC TROMETHAMINE 10 MG/1
10 TABLET, FILM COATED ORAL EVERY 6 HOURS PRN
Qty: 56 TABLET | Refills: 1 | Status: SHIPPED | OUTPATIENT
Start: 2024-10-01

## 2024-10-01 RX ORDER — SPIRONOLACTONE 50 MG/1
50 TABLET, FILM COATED ORAL DAILY
Start: 2024-10-01

## 2024-10-01 RX ORDER — LIDOCAINE 50 MG/G
OINTMENT TOPICAL 4 TIMES DAILY PRN
Qty: 240 G | Refills: 3 | Status: SHIPPED | OUTPATIENT
Start: 2024-10-01

## 2024-10-01 ASSESSMENT — COGNITIVE AND FUNCTIONAL STATUS - GENERAL
DAILY ACTIVITIY SCORE: 24
MOBILITY SCORE: 23
CLIMB 3 TO 5 STEPS WITH RAILING: A LITTLE

## 2024-10-01 ASSESSMENT — PAIN SCALES - GENERAL
PAINLEVEL_OUTOF10: 0 - NO PAIN
PAINLEVEL_OUTOF10: 7
PAINLEVEL_OUTOF10: 3

## 2024-10-01 ASSESSMENT — PAIN DESCRIPTION - LOCATION
LOCATION: KNEE
LOCATION: LEG

## 2024-10-01 ASSESSMENT — PAIN DESCRIPTION - ORIENTATION: ORIENTATION: LEFT

## 2024-10-01 NOTE — PROGRESS NOTES
Occupational Therapy                 Therapy Communication Note    Patient Name: Aamir Garcia  MRN: [id number]  Department: [place] [address]  Room: 2079/2079-A  Today's Date: 10/1/2024     Discipline: Occupational Therapy    Missed Visit Reason: Missed Visit Reason: Patient refused (Pt reports not wanting to get up until after CT of LE, will reattempt as able.) MRI results pending as of this morning but did not see order for CT. Will follow up as able.    Missed Time: Attempt    Shannan Davidson OT  10/1/2024

## 2024-10-01 NOTE — DISCHARGE SUMMARY
Discharge Diagnosis  Cellulitis left knee    Issues Requiring Follow-Up  Please establish yourself with a new primary care doctor.  If your insurance will allow it please follow-up with orthopedic surgery.    Discharge Meds     Medication List      START taking these medications     diclofenac sodium 1 % gel; Commonly known as: Voltaren; Apply 4.5 inches   (4 g) topically 4 times a day as needed (pain). To left knee.   ketorolac 10 mg tablet; Commonly known as: Toradol; Take 1 tablet (10   mg) by mouth every 6 hours if needed for moderate pain (4 - 6) or severe   pain (7 - 10).   lidocaine 5 % ointment; Commonly known as: Xylocaine; Apply topically 4   times a day as needed (pain in knee). Apply to left knee   sulfamethoxazole-trimethoprim 800-160 mg tablet; Commonly known as:   Bactrim DS; Take 1 tablet by mouth every 12 hours.   thiamine 100 mg tablet; Commonly known as: Vitamin B-1; Take 1 tablet   (100 mg) by mouth once daily.     CHANGE how you take these medications     amLODIPine 5 mg tablet; Commonly known as: Norvasc; Take 1 tablet (5 mg)   by mouth once daily as needed (for systolic blood pressure > 180).; What   changed: when to take this, reasons to take this   furosemide 40 mg tablet; Commonly known as: Lasix; Take 1 tablet (40 mg)   by mouth once daily. Hold if systolic blood pressure < 110; What changed:   additional instructions   metoprolol succinate XL 50 mg 24 hr tablet; Commonly known as:   Toprol-XL; Take 1 tablet (50 mg) by mouth once daily. Do not crush or   chew. Hold if systolic blood pressure < 120; What changed: additional   instructions   spironolactone 50 mg tablet; Commonly known as: Aldactone; Take 1 tablet   (50 mg) by mouth once daily. Hold if systolic blood pressure < 100; What   changed: additional instructions     CONTINUE taking these medications     buPROPion  mg 24 hr tablet; Commonly known as: Wellbutrin XL; Take   1 tablet (150 mg) by mouth once daily in the morning.  Take before meals.   escitalopram 10 mg tablet; Commonly known as: Lexapro; Take 1 tablet (10   mg) by mouth once daily.   folic acid 1 mg tablet; Commonly known as: Folvite   melatonin 5 mg tablet; Take 1 tablet (5 mg) by mouth once daily.   omeprazole 20 mg DR capsule; Commonly known as: PriLOSEC; Take 1 capsule   (20 mg) by mouth once daily. Do not crush or chew.   vitamin B complex tablet     STOP taking these medications     losartan 100 mg tablet; Commonly known as: Cozaar   traMADol 50 mg tablet; Commonly known as: Ultram       Test Results Pending At Discharge  None.    Hospital Course  Mr Garcia is a 54y male with PMHx: Gout, cirrhosis w/ascites, HTN, anxiety, GERD who originally presented to [facility] last week with c/o left knee gout. He was treated with steroids and pain medication and sent home. Patient then proceeded to at first get better but then yesterday it suddenly worsened again, Denies any falls or injury. Patient again went to [facility] where they attempted to drain the left knee w/out success. They discharged him home and he came to [facility] ED where he was seen by ortho and admitted to the CDU. Patient was still in a lot of pain and unable to bear weight while in the CDU. While in the ED his CRP was very slightly elevated at 1.02 and ESR 30, WBC 12.5 and Uric acid was WNL.  Previous fluid aspiration had not shown significant concerns for infection.  Orthopedic surgery recommended no surgical invention, recommended IV Ancef while in-house and then discharged on oral Bactrim DS.  Patient started on limited amount of Tylenol as needed, Toradol as needed.  Per patient oxycodone does not work well for him and Toradol seems to work much better.  MRI of the left knee done shows signs of cellulitis and prepatellar phlegmon changes but no abscess.  Discussed with orthopedic surgery again and they still recommend 10 days of oral Bactrim DS and no surgical intervention and no other testing.  Discussed with  patient and he feels that he is moving around adequately and does not want to be evaluated for placement.  Patient requesting outpatient physical therapy.  Patient will be discharged home on oral Bactrim DS for 10 days, Toradol orally as needed since he feels this helped him a lot, Voltaren gel and lidocaine ointment to knee as needed.  Patient also recommend to ice his knee 3 times a day.    Assessment and plan:    Left knee cellulitis with prepatellar phlegmon changes  -No abscess seen on imaging.  -Discussed with orthopedic surgery.  -Discharged home on 10 days of oral Bactrim DS 1 tablet twice a day.  -Schedule request has been made for patient to follow-up with orthopedic surgery outpatient in 2 weeks.  -Patient also advised to find new primary care provider to establish with.    History of liver cirrhosis  -c/w home spironlactone 50mg daily and beta-blockers but with hold parameters.     HTN  -Patient reports at home he uses amlodipine as needed, was recently started on losartan from last admission at [facility].  Since he has been here multiple medications have needed to be held intermittently due to low normal blood pressures systolic in the 100s.  -c/w home lasix 40mg daily, home metop succ 50mg daily, spironolactone 50 mg daily but with hold parameters.  Patient reports he has a blood pressure cuff at home and will measure his blood pressure prior to taking.  -Will plan to discontinue losartan since it will benefit patient more to take his diuretics and his beta-blockers for his cirrhosis and his blood pressure has been running low even when not taking multiple of his blood pressure medications.     GERD  -c/w home omeprazole      Anxiety  -c/w home wellbutrin 150mg daily  -c/w Lexapro 10mg daily    Time spent caring for patient and coordinating discharge total 35 minutes.    Pertinent Physical Exam At Time of Discharge  Constitutional:       General: He is not in acute distress.  Eyes:      General: No  scleral icterus.     Extraocular Movements: Extraocular movements intact.   Cardiovascular:      Rate and Rhythm: Normal rate and regular rhythm.      Heart sounds: No murmur heard.  Pulmonary:      Effort: Pulmonary effort is normal. No respiratory distress.      Breath sounds: Normal breath sounds.   Abdominal:      General: Abdomen is flat. There is no distension.      Palpations: Abdomen is soft.      Tenderness: There is no abdominal tenderness.   Musculoskeletal:         General: No swelling. Normal range of motion.      Left knee: Tenderness present.      Comments: Tenderness most prominent on medial aspect   Skin:     General: Skin is warm and dry.   Neurological:      Mental Status: He is alert and oriented to person, place, and time.     Outpatient Follow-Up  No future appointments.      Roma Rosado MD

## 2024-10-01 NOTE — PROGRESS NOTES
"Physical Therapy                 Therapy Communication Note    Patient Name: Aamir Garcia  MRN: [id number]  Department: [place] [address]  Room: 2079/2079-A  Today's Date: 10/1/2024     Discipline: Physical Therapy    Missed Visit Reason: Missed Visit Reason:  (Attempted follow up however MD leaving pt.'s room stated \"dont worry about him, he is leaving\")    Missed Time: Attempt    Comment:  "

## 2024-10-01 NOTE — CARE PLAN
The patient's goals for the shift include      The clinical goals for the shift include pt will remain free from pain this shift -tr    Over the shift, the patient did not make progress toward the following goals. Barriers to progression include   . Recommendations to address these barriers include   .

## 2024-10-01 NOTE — CARE PLAN
Problem: Pain - Adult  Goal: Verbalizes/displays adequate comfort level or baseline comfort level  Outcome: Progressing   The patient's goals for the shift include      The clinical goals for the shift include Pt will have his MRI done during this shift

## 2024-10-08 ENCOUNTER — OFFICE VISIT (OUTPATIENT)
Dept: ORTHOPEDIC SURGERY | Facility: CLINIC | Age: 54
End: 2024-10-08
Payer: MEDICARE

## 2024-10-08 VITALS — BODY MASS INDEX: 23.03 KG/M2 | HEIGHT: 72 IN | WEIGHT: 170 LBS

## 2024-10-08 DIAGNOSIS — S83.242A ACUTE MEDIAL MENISCUS TEAR OF LEFT KNEE, INITIAL ENCOUNTER: ICD-10-CM

## 2024-10-08 DIAGNOSIS — M17.12 PRIMARY OSTEOARTHRITIS OF LEFT KNEE: ICD-10-CM

## 2024-10-08 DIAGNOSIS — M25.562 ACUTE PAIN OF LEFT KNEE: Primary | ICD-10-CM

## 2024-10-08 DIAGNOSIS — L03.116 CELLULITIS OF LEFT KNEE: ICD-10-CM

## 2024-10-08 DIAGNOSIS — M70.42 PREPATELLAR BURSITIS OF LEFT KNEE: ICD-10-CM

## 2024-10-08 PROCEDURE — 3008F BODY MASS INDEX DOCD: CPT | Performed by: PHYSICIAN ASSISTANT

## 2024-10-08 PROCEDURE — 4004F PT TOBACCO SCREEN RCVD TLK: CPT | Performed by: PHYSICIAN ASSISTANT

## 2024-10-08 PROCEDURE — 99203 OFFICE O/P NEW LOW 30 MIN: CPT | Performed by: PHYSICIAN ASSISTANT

## 2024-10-08 PROCEDURE — 99213 OFFICE O/P EST LOW 20 MIN: CPT | Performed by: PHYSICIAN ASSISTANT

## 2024-10-08 ASSESSMENT — PAIN SCALES - GENERAL
PAINLEVEL: 2
PAINLEVEL_OUTOF10: 2

## 2024-10-08 ASSESSMENT — LIFESTYLE VARIABLES
SKIP TO QUESTIONS 9-10: 1
HOW OFTEN DURING THE LAST YEAR HAVE YOU BEEN UNABLE TO REMEMBER WHAT HAPPENED THE NIGHT BEFORE BECAUSE YOU HAD BEEN DRINKING: NEVER
HOW OFTEN DURING THE LAST YEAR HAVE YOU FAILED TO DO WHAT WAS NORMALLY EXPECTED FROM YOU BECAUSE OF DRINKING: NEVER
HOW OFTEN DO YOU HAVE A DRINK CONTAINING ALCOHOL: NEVER
HAS A RELATIVE, FRIEND, DOCTOR, OR ANOTHER HEALTH PROFESSIONAL EXPRESSED CONCERN ABOUT YOUR DRINKING OR SUGGESTED YOU CUT DOWN: NO
HOW OFTEN DURING THE LAST YEAR HAVE YOU NEEDED AN ALCOHOLIC DRINK FIRST THING IN THE MORNING TO GET YOURSELF GOING AFTER A NIGHT OF HEAVY DRINKING: NEVER
HAVE YOU OR SOMEONE ELSE BEEN INJURED AS A RESULT OF YOUR DRINKING: NO
AUDIT TOTAL SCORE: 0
HOW OFTEN DURING THE LAST YEAR HAVE YOU HAD A FEELING OF GUILT OR REMORSE AFTER DRINKING: NEVER
HOW OFTEN DO YOU HAVE SIX OR MORE DRINKS ON ONE OCCASION: NEVER
HOW OFTEN DURING THE LAST YEAR HAVE YOU FOUND THAT YOU WERE NOT ABLE TO STOP DRINKING ONCE YOU HAD STARTED: NEVER
AUDIT-C TOTAL SCORE: 0
HOW MANY STANDARD DRINKS CONTAINING ALCOHOL DO YOU HAVE ON A TYPICAL DAY: PATIENT DOES NOT DRINK

## 2024-10-08 ASSESSMENT — PAIN - FUNCTIONAL ASSESSMENT: PAIN_FUNCTIONAL_ASSESSMENT: 0-10

## 2024-10-08 ASSESSMENT — PAIN DESCRIPTION - DESCRIPTORS: DESCRIPTORS: SHARP;THROBBING

## 2024-10-08 ASSESSMENT — ENCOUNTER SYMPTOMS
OCCASIONAL FEELINGS OF UNSTEADINESS: 0
LOSS OF SENSATION IN FEET: 0
DEPRESSION: 0

## 2024-10-08 NOTE — PATIENT INSTRUCTIONS
Thank you for coming to see us today!     Continue to use tylenol for pain control.   Rest, ice and elevate   Continue antibiotics     Follow up  6-8 weeks with Dr. Daly

## 2024-10-08 NOTE — LETTER
October 8, 2024     Patient: Aamir Garcia   YOB: 1970   Date of Visit: 10/8/2024       To Whom It May Concern:    It is my medical opinion that Aamir Garcia may return to work on 10-8-2024 .    If you have any questions or concerns, please don't hesitate to call.         Sincerely,        Jacquelyn Vu PA-C    CC: No Recipients

## 2024-10-08 NOTE — PROGRESS NOTES
Subjective      Chief Complaint   Patient presents with    Left Knee - Pain        Past Surgical History:   Procedure Laterality Date    PARACENTESIS      2x in 2024    SINUS SURGERY  2013    Sinus Surgery        Past Medical History:   Diagnosis Date    Alcohol dependence     Alcoholic cirrhosis of liver with ascites (Multi)     GERD (gastroesophageal reflux disease)     Gout     Hyperlipidemia     Hypertension         HPI  This 54 year old patient presents today for hospital follow up for left knee prepatellar bursitis. The patient states that he was admitted to the hospital on 2024 with cellulitis of the left knee and left knee prepatellar bursitis.  An attempt was made at [facility] to aspirate the left knee prepatellar bursa with no aspirate obtained.  The patient was treated with IV antibiotics and was discharged home on Bactrim oral.  He continues to take oral antibiotics.  He states that his left knee erythema and pain is improving.  However some left knee pain persists.  MRIs reviewed of the left knee in office today which shows a medial meniscus tear.  The patient denies trauma or injury to the left knee. The patient states that the left knee pain is worse with and aggravated by prolonged walking and standing. The patient states that this left knee pain impairs their ability to complete normal activities of daily living. The patient has tried Tylenol and ibuprofen with no relief.  He denies any fever chills nausea vomiting shortness of breath.    Allergies   Allergen Reactions    Clams Shortness of breath        Family History   Problem Relation Name Age of Onset    Hypertension Mother           age 64    Heart attack Father           at age 62    Other (Glioblastoma) Father      Diabetes Father      Cancer Father          Social History     Socioeconomic History    Marital status:      Spouse name: Not on file    Number of children: Not on file    Years of education:  Not on file    Highest education level: Not on file   Occupational History    Not on file   Tobacco Use    Smoking status: Former     Current packs/day: 0.50     Types: Cigarettes     Passive exposure: Current    Smokeless tobacco: Never   Vaping Use    Vaping status: Some Days    Substances: Nicotine    Devices: Disposable, Pre-filled or refillable cartridge   Substance and Sexual Activity    Alcohol use: Not Currently     Comment: quit 10 days ago    Drug use: Yes     Types: Marijuana     Comment: bedtime to sleep   has marijuana card    Sexual activity: Yes   Other Topics Concern    Not on file   Social History Narrative    Not on file     Social Determinants of Health     Financial Resource Strain: Low Risk  (9/30/2024)    Overall Financial Resource Strain (CARDIA)     Difficulty of Paying Living Expenses: Not very hard   Food Insecurity: No Food Insecurity (9/28/2024)    Hunger Vital Sign     Worried About Running Out of Food in the Last Year: Never true     Ran Out of Food in the Last Year: Never true   Transportation Needs: No Transportation Needs (9/30/2024)    PRAPARE - Transportation     Lack of Transportation (Medical): No     Lack of Transportation (Non-Medical): No   Physical Activity: Inactive (9/8/2024)    Exercise Vital Sign     Days of Exercise per Week: 0 days     Minutes of Exercise per Session: 0 min   Stress: Patient Declined (9/8/2024)    [employer] - Occupational Stress Questionnaire     Feeling of Stress : Patient declined   Social Connections: Patient Declined (9/8/2024)    Social Connection and Isolation Panel [NHANES]     Frequency of Communication with Friends and Family: Patient declined     Frequency of Social Gatherings with Friends and Family: Patient declined     Attends Gnosticism Services: Patient declined     Active Member of Clubs or Organizations: Patient declined     Attends Club or Organization Meetings: Patient declined     Marital Status: Patient  declined   Intimate Partner Violence: Not At Risk (9/28/2024)    Humiliation, Afraid, Rape, and Kick questionnaire     Fear of Current or Ex-Partner: No     Emotionally Abused: No     Physically Abused: No     Sexually Abused: No   Housing Stability: Low Risk  (9/30/2024)    Housing Stability Vital Sign     Unable to Pay for Housing in the Last Year: No     Number of Times Moved in the Last Year: 1     Homeless in the Last Year: No        ROS  CARDIOLOGY:   Negative for chest pain, shortness of breath.   RESPIRATORY:   Negative for chest pain, shortness of breath.   MUSCULOSKELETAL:   See HPI for details.   NEUROLOGY:   Negative for tingling, numbness, weakness.    Objective    Body mass index is 23.06 kg/m².     Physical Exam  GENERAL:          General Appearance:  This is a pleasant patient with appropriate affect, in no acute distress.   DERMATOLOGY:          Skin: skin at the neck, upper and lower back, and trunk is intact. There is no evidence of skin rash, skin breakdown or ulceration, or atrophic skin change.   EXTREMITIES:          Vascular:  Right, left hands and feet are warm with good color and pulses. Right and left calf and thigh are nontender and nonswollen.   NEUROLOGICAL:          Orientation:  Patient is alert and oriented to person, place, time and situation. Right and left upper and lower extremity motor and sensory examinations are intact.  MUSCULOSKELETAL: Neck: No tenderness. No pain or limitation with range of motion. Back: No tenderness. Straight leg test negative bilaterally. Right and left hips: No tenderness over the right or left greater trochanter. Right and left lower extremity in good position. Right knee: Nontender. No pain with gentle ROM. left knee: There is no diffuse tenderness over the knee there is no evidence of prepatellar bursitis on exam today.  There is no evidence of infection cellulitis or septic knee on examination today.  The skin is clean and dry.. The patient has ROM  from 0-120 degrees. McMurrays is positive. Anterior drawer and lachmans are negative. There is not an effusion present. The left knee is stable to valgus and varus stressing. Nontender in the left calf. Compartments are soft. Neurovascular is intact to light touch. The patient walks with a stable gait favoring the left  knee while walking.    MR knee left w and wo IV contrast    Result Date: 10/1/2024  Interpreted By:  Joseph Sigala and Stephens Katherine STUDY: MRI of the  left knee with and without IV contrast;  9/30/2024 11:12 pm   INDICATION: Signs/Symptoms:severe left knee pain, swelling, warmth, ? septic bursitis v. joint.     COMPARISON: Left knee radiographs 09/26/2024   ACCESSION NUMBER(S): [id number]   ORDERING CLINICIAN: TITO HERNANDES   TECHNIQUE: MR imaging of the  left knee was obtained  including sequences performed after the administration of  ml Multihance intravenous contrast.   FINDINGS: LIGAMENTS AND TENDONS: The anterior cruciate ligament and the posterior cruciate ligaments are intact. The medial collateral ligament is intact. The lateral collateral ligament, the biceps femoris tendon, the popliteus tendon and the iliotibial band are intact. The quadriceps tendon and the patellar tendon are intact.   MENISCI: Truncation of the medial meniscus body with small horizontal tear of the posterior horn. The lateral meniscus is intact and without evidence of tear.   JOINTS: Mild fibrillation of the medial femoral condyle and medial tibial plateau articular cartilage. No full-thickness defect. The articular cartilage of the lateral femoral condyle and lateral tibial plateau is intact and without evidence of full thickness defect. The patellofemoral articulation is intact and without evidence of subluxation or articular cartilage defect. No evidence of significant joint effusion or popliteal cyst.   OSSEOUS STRUCTURES: No focal marrow replacing lesions are identified. There is no fracture.    SOFT TISSUES: Extensive enhancing prepatellar fat stranding consistent with phlegmonous changes and cellulitis. No effusion, muscular involvement, deep extension, or abscess. There is no muscle atrophy or tear. The common peroneal nerve is intact.       1. Prepatellar phlegmonous changes and cellulitis without discrete abscess. No deep extension seen into the knee. 2. Truncation and horizontal tear of the medial meniscus posterior horn. 3. Mild osteoarthrosis of the medial compartment.     I personally reviewed the images/study and I agree with Deepa Post DO's (radiology resident) findings as stated. This study was interpreted at [place], [place].   MACRO: None   Signed by: Joseph Sigala 10/1/2024 10:33 AM Dictation workstation:   [id number]    XR knee left 3 views    Result Date: 9/26/2024  STUDY: Knee Radiographs INDICATION: Left knee swelling COMPARISON: None Available. ACCESSION NUMBER(S): [id number] ORDERING CLINICIAN: HARIS FAJARDO TECHNIQUE:  Three view(s) of the left knee. FINDINGS:  There is no displaced fracture.  The alignment is anatomic.  No soft tissue abnormality is seen.  There is no joint effusion.    No acute osseous abnormality. Signed by Uriel Olmos MD    XR knee left 3 views    Result Date: 9/16/2024  Interpreted By:  Joseph Sigala, STUDY: XR KNEE LEFT 3 VIEWS; ;  9/16/2024 2:58 pm   INDICATION: Signs/Symptoms:pain and swelling.     COMPARISON: None.   ACCESSION NUMBER(S): [id number]   ORDERING CLINICIAN: NORIS AKERS   FINDINGS: Left knee, three views   There is no fracture. There is no dislocation. There are no degenerative changes. There is no effusion seen. There is no soft tissue abnormality seen.       Normal radiographs of the left knee     MACRO: None   Signed by: Joseph Sigala 9/16/2024 3:02 PM Dictation workstation:   [id number]    Electrocardiogram, 12-lead PRN ACS symptoms    Result Date: 9/13/2024  Normal  sinus rhythm Normal ECG No previous ECGs available Confirmed by Olivier Amaral ([id number]) on 9/13/2024 1:09:30 PM    US guided abdominal paracentesis    Result Date: 9/12/2024  Interpreted By:  Pravin Callahan, STUDY: US GUIDED ABDOMINAL PARACENTESIS; 9/9/2024 3:05 pm   INDICATION: Ascites.   COMPARISON: None.   ACCESSION NUMBER(S): [id number]   ORDERING CLINICIAN: CHELY LIZAMA   TECHNIQUE: Ultrasound-guided paracentesis   FINDINGS: Informed consent obtained. Patient positioned supine. Right lower quadrant prepped, draped and anesthetized. Under ultrasound guidance, 8 [place] centesis sheath needle inserted into peritoneal cavity. 8.8 Lof clear yellowfluid aspiratedwith sample sent for analysis. Patient tolerated procedure well       Ultrasound-guided paracentesis.   Signed by: Pravin Callahan 9/12/2024 9:03 AM Dictation workstation:   [id number]    US right upper quadrant    Result Date: 9/8/2024  Interpreted By:  Diane Salcedo, STUDY: US RIGHT UPPER QUADRANT;  9/8/2024 5:00 pm   INDICATION: Signs/Symptoms:ascites, known liver disease.   COMPARISON: CT 05/31/2024, ultrasound 06/01/2024   ACCESSION NUMBER(S): [id number]   ORDERING CLINICIAN: CODY PERKINS   TECHNIQUE: Grayscale and color Doppler sonographic imaging of the right upper quadrant.   FINDINGS: LIVER: Enlarged, 20 cm in craniocaudad length, stable. Nodular contour of the liver and coarse echogenicity suggesting cirrhosis. No focal abnormalities.   BILE DUCTS: No intrahepatic or extrahepatic bile duct dilatation. Extrahepatic bile duct = 7 mm.   GALLBLADDER: No stones, pericholecystic fluid, wall thickening, or localized tenderness.   PANCREAS: Obscured by bowel gas.   RIGHT KIDNEY: Normal size, no hydronephrosis.   PERITONEUM: Moderate volume ascites.       Similar appearance of hepatomegaly and hepatic cirrhosis.   Moderate ascites.       MACRO: None   Signed by: Diane Salcedo 9/8/2024 5:33 PM Dictation workstation:   [id number]       Aamir was seen today  for pain.  Diagnoses and all orders for this visit:  Acute pain of left knee (Primary)  Cellulitis of left knee  Prepatellar bursitis of left knee  Acute medial meniscus tear of left knee, initial encounter  Primary osteoarthritis of left knee  Options are discussed with the patient in detail. The patient is instructed to continue his oral antibiotics as prescribed.  He is instructed regarding infection precautions and to immediately contact our office if his symptoms of infection return including redness, drainage, pain, foul smell.  We reviewed his left knee MRI which shows a small medial meniscus tear in office today.  The patient is scheduled in 6 weeks for surgical consult with Dr. Daly if his left knee pain persists.  The patient is instructed regarding activity modification and risk for further injury with falling or trauma, ice, provider directed at home gentle strengthening and ROM exercises, and the appropriate use of Tylenol as needed for pain with its potential adverse reactions and side effects. The patient understands.  Return in 6 weeks or sooner as needed.  Please note that this report has been produced using speech recognition software.  It may contain errors related to grammar, punctuation or spelling.  Electronically signed, but not reviewed.    Jacquelyn Vu PA-C

## 2024-10-15 ENCOUNTER — LAB (OUTPATIENT)
Dept: LAB | Facility: LAB | Age: 54
End: 2024-10-15
Payer: MEDICARE

## 2024-10-15 DIAGNOSIS — K74.60 UNSPECIFIED CIRRHOSIS OF LIVER (MULTI): Primary | ICD-10-CM

## 2024-10-15 LAB
ANION GAP SERPL CALCULATED.3IONS-SCNC: 11 MMOL/L (ref 10–20)
BUN SERPL-MCNC: 19 MG/DL (ref 6–23)
CALCIUM SERPL-MCNC: 10.1 MG/DL (ref 8.6–10.3)
CHLORIDE SERPL-SCNC: 100 MMOL/L (ref 98–107)
CO2 SERPL-SCNC: 31 MMOL/L (ref 21–32)
CREAT SERPL-MCNC: 0.92 MG/DL (ref 0.5–1.3)
EGFRCR SERPLBLD CKD-EPI 2021: >90 ML/MIN/1.73M*2
GLUCOSE SERPL-MCNC: 136 MG/DL (ref 74–99)
POTASSIUM SERPL-SCNC: 3.9 MMOL/L (ref 3.5–5.3)
SODIUM SERPL-SCNC: 138 MMOL/L (ref 136–145)

## 2024-10-15 PROCEDURE — 80048 BASIC METABOLIC PNL TOTAL CA: CPT

## 2024-10-15 PROCEDURE — 36415 COLL VENOUS BLD VENIPUNCTURE: CPT

## 2024-10-16 ENCOUNTER — OFFICE VISIT (OUTPATIENT)
Dept: GASTROENTEROLOGY | Facility: HOSPITAL | Age: 54
End: 2024-10-16
Payer: MEDICARE

## 2024-10-16 VITALS
WEIGHT: 175 LBS | HEART RATE: 78 BPM | BODY MASS INDEX: 23.7 KG/M2 | TEMPERATURE: 97.4 F | HEIGHT: 72 IN | DIASTOLIC BLOOD PRESSURE: 85 MMHG | OXYGEN SATURATION: 99 % | SYSTOLIC BLOOD PRESSURE: 134 MMHG

## 2024-10-16 DIAGNOSIS — K70.30 ALCOHOLIC CIRRHOSIS OF LIVER WITHOUT ASCITES (MULTI): Primary | ICD-10-CM

## 2024-10-16 DIAGNOSIS — I85.00 ESOPHAGEAL VARICES WITHOUT BLEEDING, UNSPECIFIED ESOPHAGEAL VARICES TYPE (MULTI): ICD-10-CM

## 2024-10-16 DIAGNOSIS — R18.8 ASCITES CONTROLLED WITH MEDICATION: ICD-10-CM

## 2024-10-16 PROCEDURE — 4004F PT TOBACCO SCREEN RCVD TLK: CPT | Performed by: INTERNAL MEDICINE

## 2024-10-16 PROCEDURE — 99215 OFFICE O/P EST HI 40 MIN: CPT | Performed by: INTERNAL MEDICINE

## 2024-10-16 PROCEDURE — 3079F DIAST BP 80-89 MM HG: CPT | Performed by: INTERNAL MEDICINE

## 2024-10-16 PROCEDURE — 3075F SYST BP GE 130 - 139MM HG: CPT | Performed by: INTERNAL MEDICINE

## 2024-10-16 PROCEDURE — 99205 OFFICE O/P NEW HI 60 MIN: CPT | Performed by: INTERNAL MEDICINE

## 2024-10-16 PROCEDURE — 3008F BODY MASS INDEX DOCD: CPT | Performed by: INTERNAL MEDICINE

## 2024-10-16 ASSESSMENT — PAIN SCALES - GENERAL: PAINLEVEL_OUTOF10: 0-NO PAIN

## 2024-10-16 NOTE — PATIENT INSTRUCTIONS
Welcome to Dr. [facility].  Dr. Landin sees patients at the following sites:  [place] [address] [facility] at [facility] [address], [address] at [facility] at [facility] [address]    Dr. Landin's hepatology care coordinator, Aria SCHRADER, can be reached at [phone].  Dr. Landin's , Theresa Asher, can be reached at [phone].    Should you have worsening distension and ascites, please contact my office and we can arrange for a paracentesis procedure with fluid analysis.    I would also be inclined to increase your spironolactone.

## 2024-10-16 NOTE — LETTER
October 22, 2024     Sarwat Sullivan MD  [address] [place] [address]  [address]  [place] [address]    Patient: Aamir Garcia   YOB: 1970   Date of Visit: 10/16/2024       Dear Dr. Sarwat Sullivan MD:    Thank you for referring Aaimr Garcia to me for evaluation. Below are my notes for this consultation.  If you have questions, please do not hesitate to call me. I look forward to following your patient along with you.       Sincerely,     Yared Landin MD      CC: No Recipients  ______________________________________________________________________________________    Subjective    Initial consult for alcohol-related liver disease and cirrhosis.    History of Present Illness:   Aamir Garcia is a 54 y.o. male who presents to the [facility] for an initial evaluation of alcohol related liver disease.    Referred by Dr. Sullivan.    He has a longstanding history of alcohol abuse.  He explains that his first attempt at sobriety included a hospitalization for alcohol detoxification in December 2022.  At that time he reported heavy drinking.  He developed acute shortness of breath and called an ambulance for help.  After acute detoxification went on to alcohol rehabilitation for about 30 days.  Unfortunately he relapsed.  Soon after he was diagnosed with liver disease and cirrhosis in January 2023.  There was actually doing a period of time when he was away in [place].  Since then he has had struggles with staying sober and multiple relapses.      He explains that he had a bad divorce moved to [place] then moved back to [place].  He has continued to drink.  He has had patterns of stopping for several days and then relapsing.  Most recently at the end of August early September he sought out a rehab program in [facility].  At that time he explains he knew he could not do it on his own and he felt that this program offered some hope.  Unfortunately after 2 days he developed abdominal distention which led to  severe discomfort.  He was in significant pain.  The program brought him to the emergency room.  After that he felt like he could not stay in the program he went home..    He has been alcohol free and sober for for up 44 days.  He is attending AA meetings daily.  He has a sponsor.  He reports no alcohol cravings at this time.    Regarding his ascites, after the initial paracentesis in [facility] he has not had another one.  He recently saw his gastroenterologist Dr. Sullivan.  He was on Lasix 40 mg.  When he felt like his distention was worsening he increase his dose to 80 mg for few days which helped significantly.  He also takes spironolactone 50 mg daily.    Review of Systems  Refer to the new patient questionnaire.     Past Medical History   has a past medical history of Alcohol dependence, Alcoholic cirrhosis of liver with ascites (Multi), GERD (gastroesophageal reflux disease), Gout, Hyperlipidemia, and Hypertension.     History of bursitis and cellulitis of left knee.    Social History   reports that he has quit smoking. His smoking use included cigarettes. He has been exposed to tobacco smoke. He has never used smokeless tobacco. He reports current alcohol use. He reports current drug use. Drug: Marijuana.     Family History  family history includes Cancer in his father; Diabetes in his father; Glioblastoma in his father; Heart attack in his father; Hypertension in his mother.     Allergies  No Known Allergies    Medications  Current Outpatient Medications   Medication Instructions   • buPROPion XL (WELLBUTRIN XL) 150 mg, oral, Daily before breakfast   • folic acid (FOLVITE) 1 mg, Daily   • furosemide (LASIX) 40 mg, oral, Daily, Hold if systolic blood pressure < 110   • metoprolol succinate XL (TOPROL-XL) 50 mg, oral, Daily, Do not crush or chew. Hold if systolic blood pressure < 120   • omeprazole (PRILOSEC) 20 mg, oral, Daily, Do not crush or chew.   • spironolactone (ALDACTONE) 50 mg, oral, Daily,  Hold if systolic blood pressure < 100   • vitamin B complex tablet 1 tablet, Daily        Objective  Visit Vitals  /85   Pulse 78   Temp 36.3 °C (97.4 °F)          10/1/2024     5:00 AM 10/1/2024    10:00 AM 10/1/2024    12:03 PM 10/1/2024    12:04 PM 10/8/2024     2:36 PM 10/16/2024     1:37 PM 10/22/2024    10:05 AM   Vitals   Systolic 118 107 100 101  134 122   Diastolic 70 66 63 62  85 68   Heart Rate 62 72 85   78 71   Temp 36.3 °C (97.3 °F) 36.5 °C (97.7 °F)    36.3 °C (97.4 °F)    Resp 16 18        Height (in)     1.829 m (6') 1.829 m (6') 1.829 m (6')   Weight (lb)     170 175 178   BMI     23.06 kg/m2 23.73 kg/m2 24.14 kg/m2   BSA (m2)     1.98 m2 2.01 m2 2.02 m2   Visit Report     Report Report Report     Physical Exam  Vitals and nursing note reviewed.   Constitutional:       General: He is awake.      Appearance: Normal appearance. He is ill-appearing.   HENT:      Head: Normocephalic and atraumatic.      Nose: Nose normal.      Mouth/Throat:      Mouth: Mucous membranes are moist.   Eyes:      Pupils: Pupils are equal, round, and reactive to light.   Neck:      Thyroid: No thyroid mass.      Trachea: Phonation normal.   Cardiovascular:      Rate and Rhythm: Normal rate and regular rhythm.      Heart sounds: Normal heart sounds. No murmur heard.     No gallop.   Pulmonary:      Effort: Pulmonary effort is normal. No respiratory distress.      Breath sounds: Normal air entry. No decreased breath sounds, wheezing, rhonchi or rales.   Abdominal:      General: Bowel sounds are normal. There is no distension.      Palpations: Abdomen is soft.      Tenderness: There is no abdominal tenderness.   Musculoskeletal:      Cervical back: Neck supple.      Right lower leg: No edema.      Left lower leg: No edema.   Skin:     General: Skin is warm.      Capillary Refill: Capillary refill takes less than 2 seconds.   Neurological:      General: No focal deficit present.      Mental Status: He is alert and  oriented to person, place, and time. Mental status is at baseline.      Cranial Nerves: Cranial nerves 2-12 are intact.      Motor: Motor function is intact.   Psychiatric:         Attention and Perception: Attention and perception normal.         Mood and Affect: Mood normal.         Speech: Speech normal.         Behavior: Behavior normal.         Labs    WBC   Date/Time Value Ref Range Status   10/17/2024 01:02 PM 8.4 4.4 - 11.3 x10*3/uL Final     Hemoglobin   Date/Time Value Ref Range Status   10/17/2024 01:02 PM 13.9 13.5 - 17.5 g/dL Final     Hematocrit   Date/Time Value Ref Range Status   10/17/2024 01:02 PM 42.7 41.0 - 52.0 % Final     MCV   Date/Time Value Ref Range Status   10/17/2024 01:02 PM 92 80 - 100 fL Final     Platelets   Date/Time Value Ref Range Status   10/17/2024 01:02  150 - 450 x10*3/uL Final        Total Protein   Date/Time Value Ref Range Status   10/17/2024 01:02 PM 8.4 (H) 6.4 - 8.2 g/dL Final     Albumin   Date/Time Value Ref Range Status   10/17/2024 01:02 PM 4.8 3.4 - 5.0 g/dL Final     AST   Date/Time Value Ref Range Status   10/17/2024 01:02 PM 33 9 - 39 U/L Final     ALT   Date/Time Value Ref Range Status   10/17/2024 01:02 PM 31 10 - 52 U/L Final     Comment:     Patients treated with Sulfasalazine may generate falsely decreased results for ALT.     Alkaline Phosphatase   Date/Time Value Ref Range Status   10/17/2024 01:02  (H) 33 - 120 U/L Final     Bilirubin, Total   Date/Time Value Ref Range Status   10/17/2024 01:02 PM 0.9 0.0 - 1.2 mg/dL Final     Bilirubin, Direct   Date/Time Value Ref Range Status   10/17/2024 01:02 PM 0.3 0.0 - 0.3 mg/dL Final        Vitamin D, 25-Hydroxy, Total   Date/Time Value Ref Range Status   03/28/2024 10:16 AM 32 30 - 100 ng/mL Final        AST   Date/Time Value Ref Range Status   10/17/2024 01:02 PM 33 9 - 39 U/L Final     ALT   Date/Time Value Ref Range Status   10/17/2024 01:02 PM 31 10 - 52 U/L Final     Comment:     Patients treated  with Sulfasalazine may generate falsely decreased results for ALT.     Alkaline Phosphatase   Date/Time Value Ref Range Status   10/17/2024 01:02  (H) 33 - 120 U/L Final     Bilirubin, Total   Date/Time Value Ref Range Status   10/17/2024 01:02 PM 0.9 0.0 - 1.2 mg/dL Final     Bilirubin, Direct   Date/Time Value Ref Range Status   10/17/2024 01:02 PM 0.3 0.0 - 0.3 mg/dL Final     Albumin   Date/Time Value Ref Range Status   10/17/2024 01:02 PM 4.8 3.4 - 5.0 g/dL Final     Total Protein   Date/Time Value Ref Range Status   10/17/2024 01:02 PM 8.4 (H) 6.4 - 8.2 g/dL Final        Protime   Date/Time Value Ref Range Status   10/17/2024 01:02 PM 12.4 9.3 - 12.7 seconds Final     INR   Date/Time Value Ref Range Status   10/17/2024 01:02 PM 1.2 0.9 - 1.2 Final     MELD 3.0: 10 at 10/17/2024  1:02 PM  MELD-Na: 10 at 10/17/2024  1:02 PM  Calculated from:  Serum Creatinine: 1.2 mg/dL at 10/17/2024  1:02 PM  Serum Sodium: 138 mmol/L (Using max of 137 mmol/L) at 10/17/2024  1:02 PM  Total Bilirubin: 0.9 mg/dL (Using min of 1 mg/dL) at 10/17/2024  1:02 PM  Serum Albumin: 4.8 g/dL (Using max of 3.5 g/dL) at 10/17/2024  1:02 PM  INR(ratio): 1.2 at 10/17/2024  1:02 PM  Age at listing (hypothetical): 54 years  Sex: Male at 10/17/2024  1:02 PM    CT abdomen and pelvis 9/7/2024    HEPATOBILIARY: Heterogenous liver without significant nodular hepatic contour. No focal aggressive appearing hepatic lesions.  Few scattered tiny low-attenuation lesions are likely cysts, however too small to accurately characterize.  Recannulization of the umbilical vein along with enlarged main   portal vein (1.7 cm), can be seen in setting of portal hypertension. No biliary ductal dilatation. Gallbladder is present.     SPLEEN: Splenomegaly measuring up to 15.9 cm in the craniocaudal dimension.     IMPRESSION:     * Moderate volume ascites.   *  Secondary signs of portal hypertension along with a heterogenous appearing liver.  Findings raising  suspicion for underlying cirrhosis.   *  No bowel obstruction.  Air-fluid levels in nondilated small bowel compatible with an ileus.   *  Chronic changes detailed above including bilateral avascular necrosis of the hips.     EGD in 2022 with small esophageal varices -no banding indicated at that time.    Assessment/Plan  Aamir Garcia is a 54 y.o. male who presents to Hepatology clinic for an initial evaluation.    Alcohol related cirrhosis with clinically significant portal hypertension.  Signs of portal hypertension include splenomegaly and esophageal varices.  Most recently he is also developed some ascites.  This appears to be controlled with medication at this time and has improved with sobriety and abstinence from all alcohol use.    After today's office visit, lab work confirmed a low MELD score of 10.    I have encouraged the patient to continue in his alcohol recovery.  If necessary we can adjust his diuretics further.   Should he need another paracentesis due to symptomatic ascites -I recommend contacting our office so that we can arrange for fluid analysis and studies.    Recommend liver cancer screening every 6 months.    At this time, with his low MELD score and early sobriety -I do not believe liver transplant evaluation is justified.     I would like him to follow-up closely with my office.      Instructions      Yared Landin MD

## 2024-10-17 ENCOUNTER — LAB (OUTPATIENT)
Dept: LAB | Facility: LAB | Age: 54
End: 2024-10-17
Payer: MEDICARE

## 2024-10-17 DIAGNOSIS — K70.30 ALCOHOLIC CIRRHOSIS OF LIVER WITHOUT ASCITES (MULTI): ICD-10-CM

## 2024-10-17 LAB
ALBUMIN SERPL BCP-MCNC: 4.8 G/DL (ref 3.4–5)
ALP SERPL-CCNC: 221 U/L (ref 33–120)
ALT SERPL W P-5'-P-CCNC: 31 U/L (ref 10–52)
ANION GAP SERPL CALCULATED.3IONS-SCNC: 13 MMOL/L (ref 10–20)
AST SERPL W P-5'-P-CCNC: 33 U/L (ref 9–39)
BASOPHILS # BLD AUTO: 0.07 X10*3/UL (ref 0–0.1)
BASOPHILS NFR BLD AUTO: 0.8 %
BILIRUB DIRECT SERPL-MCNC: 0.3 MG/DL (ref 0–0.3)
BILIRUB SERPL-MCNC: 0.9 MG/DL (ref 0–1.2)
BUN SERPL-MCNC: 16 MG/DL (ref 6–23)
CALCIUM SERPL-MCNC: 10.5 MG/DL (ref 8.6–10.3)
CHLORIDE SERPL-SCNC: 100 MMOL/L (ref 98–107)
CO2 SERPL-SCNC: 29 MMOL/L (ref 21–32)
CREAT SERPL-MCNC: 1.2 MG/DL (ref 0.5–1.3)
EGFRCR SERPLBLD CKD-EPI 2021: 72 ML/MIN/1.73M*2
EOSINOPHIL # BLD AUTO: 0.25 X10*3/UL (ref 0–0.7)
EOSINOPHIL NFR BLD AUTO: 3 %
ERYTHROCYTE [DISTWIDTH] IN BLOOD BY AUTOMATED COUNT: 14.5 % (ref 11.5–14.5)
GLUCOSE SERPL-MCNC: 71 MG/DL (ref 74–99)
HCT VFR BLD AUTO: 42.7 % (ref 41–52)
HGB BLD-MCNC: 13.9 G/DL (ref 13.5–17.5)
IMM GRANULOCYTES # BLD AUTO: 0.03 X10*3/UL (ref 0–0.7)
IMM GRANULOCYTES NFR BLD AUTO: 0.4 % (ref 0–0.9)
INR PPP: 1.2 (ref 0.9–1.2)
LYMPHOCYTES # BLD AUTO: 2.48 X10*3/UL (ref 1.2–4.8)
LYMPHOCYTES NFR BLD AUTO: 29.6 %
MCH RBC QN AUTO: 30.1 PG (ref 26–34)
MCHC RBC AUTO-ENTMCNC: 32.6 G/DL (ref 32–36)
MCV RBC AUTO: 92 FL (ref 80–100)
MONOCYTES # BLD AUTO: 0.75 X10*3/UL (ref 0.1–1)
MONOCYTES NFR BLD AUTO: 8.9 %
NEUTROPHILS # BLD AUTO: 4.8 X10*3/UL (ref 1.2–7.7)
NEUTROPHILS NFR BLD AUTO: 57.3 %
NRBC BLD-RTO: 0 /100 WBCS (ref 0–0)
PLATELET # BLD AUTO: 187 X10*3/UL (ref 150–450)
POTASSIUM SERPL-SCNC: 4.3 MMOL/L (ref 3.5–5.3)
PROT SERPL-MCNC: 8.4 G/DL (ref 6.4–8.2)
PROTHROMBIN TIME: 12.4 SECONDS (ref 9.3–12.7)
RBC # BLD AUTO: 4.62 X10*6/UL (ref 4.5–5.9)
SODIUM SERPL-SCNC: 138 MMOL/L (ref 136–145)
WBC # BLD AUTO: 8.4 X10*3/UL (ref 4.4–11.3)

## 2024-10-17 PROCEDURE — 85025 COMPLETE CBC W/AUTO DIFF WBC: CPT

## 2024-10-17 PROCEDURE — 80053 COMPREHEN METABOLIC PANEL: CPT

## 2024-10-17 PROCEDURE — 36415 COLL VENOUS BLD VENIPUNCTURE: CPT

## 2024-10-17 PROCEDURE — 82248 BILIRUBIN DIRECT: CPT

## 2024-10-17 PROCEDURE — 85610 PROTHROMBIN TIME: CPT

## 2024-10-17 PROCEDURE — 80321 ALCOHOLS BIOMARKERS 1OR 2: CPT

## 2024-10-21 LAB
LABORATORY REPORT: NORMAL
PETH INTERPRETATION: NORMAL
PLPETH BLD-MCNC: <10 NG/ML
POPETH BLD-MCNC: 16 NG/ML

## 2024-10-22 ENCOUNTER — OFFICE VISIT (OUTPATIENT)
Dept: PRIMARY CARE | Facility: CLINIC | Age: 54
End: 2024-10-22
Payer: MEDICARE

## 2024-10-22 VITALS
WEIGHT: 178 LBS | SYSTOLIC BLOOD PRESSURE: 122 MMHG | HEIGHT: 72 IN | OXYGEN SATURATION: 99 % | BODY MASS INDEX: 24.11 KG/M2 | HEART RATE: 71 BPM | DIASTOLIC BLOOD PRESSURE: 68 MMHG

## 2024-10-22 DIAGNOSIS — F32.5 MAJOR DEPRESSIVE DISORDER WITH SINGLE EPISODE, IN FULL REMISSION (CMS-HCC): ICD-10-CM

## 2024-10-22 DIAGNOSIS — Z76.89 ENCOUNTER TO ESTABLISH CARE: Primary | ICD-10-CM

## 2024-10-22 DIAGNOSIS — N62 GYNECOMASTIA: ICD-10-CM

## 2024-10-22 DIAGNOSIS — K70.31 ALCOHOLIC CIRRHOSIS OF LIVER WITH ASCITES (MULTI): ICD-10-CM

## 2024-10-22 DIAGNOSIS — I10 PRIMARY HYPERTENSION: ICD-10-CM

## 2024-10-22 DIAGNOSIS — F41.9 ANXIETY: ICD-10-CM

## 2024-10-22 DIAGNOSIS — K21.9 GASTROESOPHAGEAL REFLUX DISEASE WITHOUT ESOPHAGITIS: ICD-10-CM

## 2024-10-22 PROBLEM — K40.20 BILATERAL INGUINAL HERNIA: Status: RESOLVED | Noted: 2023-11-09 | Resolved: 2024-10-22

## 2024-10-22 PROBLEM — M70.42 PREPATELLAR BURSITIS OF LEFT KNEE: Status: RESOLVED | Noted: 2024-09-27 | Resolved: 2024-10-22

## 2024-10-22 PROBLEM — M25.469 KNEE JOINT EFFUSION: Status: RESOLVED | Noted: 2023-11-09 | Resolved: 2024-10-22

## 2024-10-22 PROBLEM — M25.562 ACUTE PAIN OF LEFT KNEE: Status: RESOLVED | Noted: 2024-10-08 | Resolved: 2024-10-22

## 2024-10-22 PROBLEM — K59.4 ANAL SPASM: Status: RESOLVED | Noted: 2023-11-09 | Resolved: 2024-10-22

## 2024-10-22 PROBLEM — R73.9 HYPERGLYCEMIA: Status: RESOLVED | Noted: 2023-11-09 | Resolved: 2024-10-22

## 2024-10-22 PROBLEM — L03.116 CELLULITIS OF LEFT KNEE: Status: RESOLVED | Noted: 2024-10-08 | Resolved: 2024-10-22

## 2024-10-22 PROBLEM — E44.0 MALNUTRITION OF MODERATE DEGREE (MULTI): Status: RESOLVED | Noted: 2022-12-02 | Resolved: 2024-10-22

## 2024-10-22 PROBLEM — S83.242A ACUTE MEDIAL MENISCUS TEAR OF LEFT KNEE: Status: RESOLVED | Noted: 2024-10-08 | Resolved: 2024-10-22

## 2024-10-22 PROCEDURE — 3078F DIAST BP <80 MM HG: CPT

## 2024-10-22 PROCEDURE — 3008F BODY MASS INDEX DOCD: CPT

## 2024-10-22 PROCEDURE — 99214 OFFICE O/P EST MOD 30 MIN: CPT

## 2024-10-22 PROCEDURE — 4004F PT TOBACCO SCREEN RCVD TLK: CPT

## 2024-10-22 PROCEDURE — 3074F SYST BP LT 130 MM HG: CPT

## 2024-10-22 ASSESSMENT — ENCOUNTER SYMPTOMS
CHILLS: 0
NAUSEA: 0
VOMITING: 0
DIZZINESS: 0
PALPITATIONS: 0
SHORTNESS OF BREATH: 0
LIGHT-HEADEDNESS: 0
FATIGUE: 0
NERVOUS/ANXIOUS: 0
FEVER: 0
DYSPHORIC MOOD: 0

## 2024-10-22 ASSESSMENT — PAIN SCALES - GENERAL: PAINLEVEL_OUTOF10: 0-NO PAIN

## 2024-10-22 NOTE — PROGRESS NOTES
Subjective     Initial consult for alcohol-related liver disease and cirrhosis.    History of Present Illness:   Aamir Garcia is a 54 y.o. male who presents to the [facility] for an initial evaluation of alcohol related liver disease.    Referred by Dr. Sullivan.    He has a longstanding history of alcohol abuse.  He explains that his first attempt at sobriety included a hospitalization for alcohol detoxification in December 2022.  At that time he reported heavy drinking.  He developed acute shortness of breath and called an ambulance for help.  After acute detoxification went on to alcohol rehabilitation for about 30 days.  Unfortunately he relapsed.  Soon after he was diagnosed with liver disease and cirrhosis in January 2023.  There was actually doing a period of time when he was away in [place].  Since then he has had struggles with staying sober and multiple relapses.      He explains that he had a bad divorce moved to [place] then moved back to [place].  He has continued to drink.  He has had patterns of stopping for several days and then relapsing.  Most recently at the end of August early September he sought out a rehab program in [facility].  At that time he explains he knew he could not do it on his own and he felt that this program offered some hope.  Unfortunately after 2 days he developed abdominal distention which led to severe discomfort.  He was in significant pain.  The program brought him to the emergency room.  After that he felt like he could not stay in the program he went home..    He has been alcohol free and sober for for up 44 days.  He is attending AA meetings daily.  He has a sponsor.  He reports no alcohol cravings at this time.    Regarding his ascites, after the initial paracentesis in [facility] he has not had another one.  He recently saw his gastroenterologist Dr. Sullivan.  He was on Lasix 40 mg.  When he felt like his distention was worsening he increase his dose  to 80 mg for few days which helped significantly.  He also takes spironolactone 50 mg daily.    Review of Systems  Refer to the new patient questionnaire.     Past Medical History   has a past medical history of Alcohol dependence, Alcoholic cirrhosis of liver with ascites (Multi), GERD (gastroesophageal reflux disease), Gout, Hyperlipidemia, and Hypertension.     History of bursitis and cellulitis of left knee.    Social History   reports that he has quit smoking. His smoking use included cigarettes. He has been exposed to tobacco smoke. He has never used smokeless tobacco. He reports current alcohol use. He reports current drug use. Drug: Marijuana.     Family History  family history includes Cancer in his father; Diabetes in his father; Glioblastoma in his father; Heart attack in his father; Hypertension in his mother.     Allergies  No Known Allergies    Medications  Current Outpatient Medications   Medication Instructions    buPROPion XL (WELLBUTRIN XL) 150 mg, oral, Daily before breakfast    folic acid (FOLVITE) 1 mg, Daily    furosemide (LASIX) 40 mg, oral, Daily, Hold if systolic blood pressure < 110    metoprolol succinate XL (TOPROL-XL) 50 mg, oral, Daily, Do not crush or chew. Hold if systolic blood pressure < 120    omeprazole (PRILOSEC) 20 mg, oral, Daily, Do not crush or chew.    spironolactone (ALDACTONE) 50 mg, oral, Daily, Hold if systolic blood pressure < 100    vitamin B complex tablet 1 tablet, Daily        Objective   Visit Vitals  /85   Pulse 78   Temp 36.3 °C (97.4 °F)          10/1/2024     5:00 AM 10/1/2024    10:00 AM 10/1/2024    12:03 PM 10/1/2024    12:04 PM 10/8/2024     2:36 PM 10/16/2024     1:37 PM 10/22/2024    10:05 AM   Vitals   Systolic 118 107 100 101  134 122   Diastolic 70 66 63 62  85 68   Heart Rate 62 72 85   78 71   Temp 36.3 °C (97.3 °F) 36.5 °C (97.7 °F)    36.3 °C (97.4 °F)    Resp 16 18        Height (in)     1.829 m (6') 1.829 m (6') 1.829 m (6')   Weight (lb)      170 175 178   BMI     23.06 kg/m2 23.73 kg/m2 24.14 kg/m2   BSA (m2)     1.98 m2 2.01 m2 2.02 m2   Visit Report     Report Report Report     Physical Exam  Vitals and nursing note reviewed.   Constitutional:       General: He is awake.      Appearance: Normal appearance. He is ill-appearing.   HENT:      Head: Normocephalic and atraumatic.      Nose: Nose normal.      Mouth/Throat:      Mouth: Mucous membranes are moist.   Eyes:      Pupils: Pupils are equal, round, and reactive to light.   Neck:      Thyroid: No thyroid mass.      Trachea: Phonation normal.   Cardiovascular:      Rate and Rhythm: Normal rate and regular rhythm.      Heart sounds: Normal heart sounds. No murmur heard.     No gallop.   Pulmonary:      Effort: Pulmonary effort is normal. No respiratory distress.      Breath sounds: Normal air entry. No decreased breath sounds, wheezing, rhonchi or rales.   Abdominal:      General: Bowel sounds are normal. There is no distension.      Palpations: Abdomen is soft.      Tenderness: There is no abdominal tenderness.   Musculoskeletal:      Cervical back: Neck supple.      Right lower leg: No edema.      Left lower leg: No edema.   Skin:     General: Skin is warm.      Capillary Refill: Capillary refill takes less than 2 seconds.   Neurological:      General: No focal deficit present.      Mental Status: He is alert and oriented to person, place, and time. Mental status is at baseline.      Cranial Nerves: Cranial nerves 2-12 are intact.      Motor: Motor function is intact.   Psychiatric:         Attention and Perception: Attention and perception normal.         Mood and Affect: Mood normal.         Speech: Speech normal.         Behavior: Behavior normal.         Labs    WBC   Date/Time Value Ref Range Status   10/17/2024 01:02 PM 8.4 4.4 - 11.3 x10*3/uL Final     Hemoglobin   Date/Time Value Ref Range Status   10/17/2024 01:02 PM 13.9 13.5 - 17.5 g/dL Final     Hematocrit   Date/Time Value Ref Range  Status   10/17/2024 01:02 PM 42.7 41.0 - 52.0 % Final     MCV   Date/Time Value Ref Range Status   10/17/2024 01:02 PM 92 80 - 100 fL Final     Platelets   Date/Time Value Ref Range Status   10/17/2024 01:02  150 - 450 x10*3/uL Final        Total Protein   Date/Time Value Ref Range Status   10/17/2024 01:02 PM 8.4 (H) 6.4 - 8.2 g/dL Final     Albumin   Date/Time Value Ref Range Status   10/17/2024 01:02 PM 4.8 3.4 - 5.0 g/dL Final     AST   Date/Time Value Ref Range Status   10/17/2024 01:02 PM 33 9 - 39 U/L Final     ALT   Date/Time Value Ref Range Status   10/17/2024 01:02 PM 31 10 - 52 U/L Final     Comment:     Patients treated with Sulfasalazine may generate falsely decreased results for ALT.     Alkaline Phosphatase   Date/Time Value Ref Range Status   10/17/2024 01:02  (H) 33 - 120 U/L Final     Bilirubin, Total   Date/Time Value Ref Range Status   10/17/2024 01:02 PM 0.9 0.0 - 1.2 mg/dL Final     Bilirubin, Direct   Date/Time Value Ref Range Status   10/17/2024 01:02 PM 0.3 0.0 - 0.3 mg/dL Final        Vitamin D, 25-Hydroxy, Total   Date/Time Value Ref Range Status   03/28/2024 10:16 AM 32 30 - 100 ng/mL Final        AST   Date/Time Value Ref Range Status   10/17/2024 01:02 PM 33 9 - 39 U/L Final     ALT   Date/Time Value Ref Range Status   10/17/2024 01:02 PM 31 10 - 52 U/L Final     Comment:     Patients treated with Sulfasalazine may generate falsely decreased results for ALT.     Alkaline Phosphatase   Date/Time Value Ref Range Status   10/17/2024 01:02  (H) 33 - 120 U/L Final     Bilirubin, Total   Date/Time Value Ref Range Status   10/17/2024 01:02 PM 0.9 0.0 - 1.2 mg/dL Final     Bilirubin, Direct   Date/Time Value Ref Range Status   10/17/2024 01:02 PM 0.3 0.0 - 0.3 mg/dL Final     Albumin   Date/Time Value Ref Range Status   10/17/2024 01:02 PM 4.8 3.4 - 5.0 g/dL Final     Total Protein   Date/Time Value Ref Range Status   10/17/2024 01:02 PM 8.4 (H) 6.4 - 8.2 g/dL Final         Protime   Date/Time Value Ref Range Status   10/17/2024 01:02 PM 12.4 9.3 - 12.7 seconds Final     INR   Date/Time Value Ref Range Status   10/17/2024 01:02 PM 1.2 0.9 - 1.2 Final     MELD 3.0: 10 at 10/17/2024  1:02 PM  MELD-Na: 10 at 10/17/2024  1:02 PM  Calculated from:  Serum Creatinine: 1.2 mg/dL at 10/17/2024  1:02 PM  Serum Sodium: 138 mmol/L (Using max of 137 mmol/L) at 10/17/2024  1:02 PM  Total Bilirubin: 0.9 mg/dL (Using min of 1 mg/dL) at 10/17/2024  1:02 PM  Serum Albumin: 4.8 g/dL (Using max of 3.5 g/dL) at 10/17/2024  1:02 PM  INR(ratio): 1.2 at 10/17/2024  1:02 PM  Age at listing (hypothetical): 54 years  Sex: Male at 10/17/2024  1:02 PM    CT abdomen and pelvis 9/7/2024    HEPATOBILIARY: Heterogenous liver without significant nodular hepatic contour. No focal aggressive appearing hepatic lesions.  Few scattered tiny low-attenuation lesions are likely cysts, however too small to accurately characterize.  Recannulization of the umbilical vein along with enlarged main   portal vein (1.7 cm), can be seen in setting of portal hypertension. No biliary ductal dilatation. Gallbladder is present.     SPLEEN: Splenomegaly measuring up to 15.9 cm in the craniocaudal dimension.     IMPRESSION:     * Moderate volume ascites.   *  Secondary signs of portal hypertension along with a heterogenous appearing liver.  Findings raising suspicion for underlying cirrhosis.   *  No bowel obstruction.  Air-fluid levels in nondilated small bowel compatible with an ileus.   *  Chronic changes detailed above including bilateral avascular necrosis of the hips.     EGD in 2022 with small esophageal varices -no banding indicated at that time.    Assessment/Plan   Aamir Garcia is a 54 y.o. male who presents to Hepatology clinic for an initial evaluation.    Alcohol related cirrhosis with clinically significant portal hypertension.  Signs of portal hypertension include splenomegaly and esophageal varices.  Most recently he is  also developed some ascites.  This appears to be controlled with medication at this time and has improved with sobriety and abstinence from all alcohol use.    After today's office visit, lab work confirmed a low MELD score of 10.    I have encouraged the patient to continue in his alcohol recovery.  If necessary we can adjust his diuretics further.   Should he need another paracentesis due to symptomatic ascites -I recommend contacting our office so that we can arrange for fluid analysis and studies.    Recommend liver cancer screening every 6 months.    At this time, with his low MELD score and early sobriety -I do not believe liver transplant evaluation is justified.     I would like him to follow-up closely with my office.      Instructions      Yared Landin MD

## 2024-10-22 NOTE — PROGRESS NOTES
Subjective   Patient ID: Aamir Garcia is a 54 y.o. male who presents for Establish Care (Lump on lt side chest).    Hx alcohol use (50 days sober, goes to AA), cirrhosis of liver, HTN, GERD, depression with anxiety    Other providers: Dr. Landin (hepatologist), Dr. Balderas (GI),     HTN/Cirrhosis/Ascitics: controlled. On Metoprolol Succ 50 mg, Lasix 40 mg, Spirolactone 50 mg. No medication side effects. Denies chest pain, heart palpitations, SOB, dizziness, headaches, changes of vision, syncope, leg swelling.      GERD: controlled on rx of 20 mg Omeprazole.    Depression with anxiety: controlled on 150 mg Wellbutrin. Denies SI.     Acute concerns: lump near of chest wall, left, slightly painful. Not sure how long it has been present, but has been painful now for a week. Has not changed size. Denies rash, drainage, fever, chills.            Review of Systems   Constitutional:  Negative for chills, fatigue and fever.   Eyes:  Negative for visual disturbance.   Respiratory:  Negative for shortness of breath.    Cardiovascular:  Negative for chest pain, palpitations and leg swelling.   Gastrointestinal:  Negative for nausea and vomiting.   Skin:  Negative for rash.   Neurological:  Negative for dizziness, syncope and light-headedness.   Psychiatric/Behavioral:  Negative for dysphoric mood and suicidal ideas. The patient is not nervous/anxious.        Objective   /68   Pulse 71   Ht 1.829 m (6')   Wt 80.7 kg (178 lb)   SpO2 99%   BMI 24.14 kg/m²     Physical Exam  Constitutional:       General: He is not in acute distress.     Appearance: Normal appearance.   Cardiovascular:      Rate and Rhythm: Normal rate and regular rhythm.      Heart sounds: No murmur heard.  Pulmonary:      Effort: Pulmonary effort is normal.      Breath sounds: Normal breath sounds. No wheezing, rhonchi or rales.   Chest:   Breasts:     Left: Swelling and tenderness present. No nipple discharge or skin change.          Comments:  Enlarged tender tissue under left nipple  Musculoskeletal:      Right lower leg: No edema.      Left lower leg: No edema.   Skin:     General: Skin is warm and dry.      Findings: No rash.   Neurological:      Mental Status: He is alert.   Psychiatric:         Mood and Affect: Mood and affect normal.         Assessment/Plan   Diagnoses and all orders for this visit:  Encounter to establish care  Primary hypertension  Major depressive disorder with single episode, in full remission (CMS-HCC)  Anxiety  Gastroesophageal reflux disease without esophagitis  Alcoholic cirrhosis of liver with ascites (Multi)  Gynecomastia  Call Dr. Balderas who is prescribing his Spirolactone and let him know having Gynecomastia, and see if can reduce dose. Follow-up 3 months. Stay on Wellbutrin for now, can consider coming off when sober for longer period.

## 2024-10-30 ENCOUNTER — APPOINTMENT (OUTPATIENT)
Dept: PRIMARY CARE | Facility: CLINIC | Age: 54
End: 2024-10-30
Payer: MEDICARE

## 2024-11-08 ENCOUNTER — OFFICE VISIT (OUTPATIENT)
Dept: PRIMARY CARE | Facility: CLINIC | Age: 54
End: 2024-11-08
Payer: MEDICARE

## 2024-11-08 VITALS
BODY MASS INDEX: 25.33 KG/M2 | OXYGEN SATURATION: 99 % | HEART RATE: 77 BPM | DIASTOLIC BLOOD PRESSURE: 86 MMHG | WEIGHT: 187 LBS | SYSTOLIC BLOOD PRESSURE: 112 MMHG | HEIGHT: 72 IN

## 2024-11-08 DIAGNOSIS — N64.4 BREAST PAIN IN MALE: Primary | ICD-10-CM

## 2024-11-08 PROCEDURE — 99213 OFFICE O/P EST LOW 20 MIN: CPT

## 2024-11-08 PROCEDURE — 3079F DIAST BP 80-89 MM HG: CPT

## 2024-11-08 PROCEDURE — 4004F PT TOBACCO SCREEN RCVD TLK: CPT

## 2024-11-08 PROCEDURE — 3074F SYST BP LT 130 MM HG: CPT

## 2024-11-08 PROCEDURE — 3008F BODY MASS INDEX DOCD: CPT

## 2024-11-08 ASSESSMENT — PATIENT HEALTH QUESTIONNAIRE - PHQ9
SUM OF ALL RESPONSES TO PHQ9 QUESTIONS 1 AND 2: 0
2. FEELING DOWN, DEPRESSED OR HOPELESS: NOT AT ALL
1. LITTLE INTEREST OR PLEASURE IN DOING THINGS: NOT AT ALL

## 2024-11-08 ASSESSMENT — PAIN SCALES - GENERAL: PAINLEVEL_OUTOF10: 1

## 2024-11-08 NOTE — PROGRESS NOTES
Subjective   Patient ID: Aamir Garcia is a 54 y.o. male who presents for Follow-up.    Hx alcohol use (50 days sober, goes to AA), cirrhosis of liver, HTN, GERD, depression with anxiety    Other providers: Dr. Landin (hepatologist), Dr. Balderas (GI),     Acute concerns: f/u for gynecomastia of left chest. Patient had GI decrease his spirolactone from 50 mg to 25 mg. Left side has decreased in size since decrease in medication, however now both nipple/breast are now painful. Denies rash, drainage, fever, chills.     Not discussed:  HTN/Cirrhosis/Ascitics: controlled. On Metoprolol Succ 50 mg, Lasix 40 mg, Spirolactone 25 mg. No medication side effects. Denies chest pain, heart palpitations, SOB, dizziness, headaches, changes of vision, syncope, leg swelling.    GERD: controlled on rx of 20 mg Omeprazole.  Depression with anxiety: controlled on 150 mg Wellbutrin. Denies SI.          Review of Systems    Objective   /86   Pulse 77   Ht 1.829 m (6')   Wt 84.8 kg (187 lb)   SpO2 99%   BMI 25.36 kg/m²     Physical Exam  Constitutional:       Appearance: Normal appearance.   Pulmonary:      Effort: Pulmonary effort is normal.   Chest:   Breasts:     Right: Tenderness present. No inverted nipple, nipple discharge or skin change.      Left: Tenderness present. No inverted nipple, nipple discharge or skin change.      Comments: Enlargement of nipples, but lump under left nipple significantly decreased from past exam.   Skin:     Findings: No rash.   Neurological:      Mental Status: He is alert.   Psychiatric:         Mood and Affect: Mood and affect normal.         Assessment/Plan   Diagnoses and all orders for this visit:  Breast pain in male  -     BI US breast complete bilateral; Future  Will check US due to increase pain despite lower medication. If US normal, will contact Ana Ingram and discuss stopping spirolactone.

## 2024-11-12 ENCOUNTER — TELEPHONE (OUTPATIENT)
Dept: PRIMARY CARE | Facility: CLINIC | Age: 54
End: 2024-11-12
Payer: MEDICARE

## 2024-11-12 DIAGNOSIS — N64.4 BREAST PAIN IN MALE: Primary | ICD-10-CM

## 2024-11-12 NOTE — TELEPHONE ENCOUNTER
Radiologist contacted and informed needed to add additional test to your order, they have been added and you should be able to schedule now

## 2024-11-12 NOTE — TELEPHONE ENCOUNTER
----- Message from Autumn ALARCON sent at 11/11/2024  3:51 PM EST -----  Regarding: Secondary Order Needed  Good Evening Irina,    The following patient called to schedule a breast ultrasound, however I was not able to schedule him.  Mr. Garcia will also need an order for a Diagnostic Bilateral Mammogram with Chris to compliment the ultrasound of the breast order, customary scheduling for all male patients.    Please review and follow with the patient to inform him of the placement of that secondary order.    Best regards,    Autumn Hope BA  Radiology Scheduling Dept.

## 2024-11-15 ENCOUNTER — LAB (OUTPATIENT)
Dept: LAB | Facility: LAB | Age: 54
End: 2024-11-15
Payer: MEDICARE

## 2024-11-15 DIAGNOSIS — K74.60 UNSPECIFIED CIRRHOSIS OF LIVER (MULTI): ICD-10-CM

## 2024-11-15 LAB
ANION GAP SERPL CALCULATED.3IONS-SCNC: 11 MMOL/L (ref 10–20)
BUN SERPL-MCNC: 13 MG/DL (ref 6–23)
CALCIUM SERPL-MCNC: 9.9 MG/DL (ref 8.6–10.3)
CHLORIDE SERPL-SCNC: 102 MMOL/L (ref 98–107)
CO2 SERPL-SCNC: 30 MMOL/L (ref 21–32)
CREAT SERPL-MCNC: 0.9 MG/DL (ref 0.5–1.3)
EGFRCR SERPLBLD CKD-EPI 2021: >90 ML/MIN/1.73M*2
GLUCOSE SERPL-MCNC: 146 MG/DL (ref 74–99)
POTASSIUM SERPL-SCNC: 4 MMOL/L (ref 3.5–5.3)
SODIUM SERPL-SCNC: 139 MMOL/L (ref 136–145)

## 2024-11-15 PROCEDURE — 36415 COLL VENOUS BLD VENIPUNCTURE: CPT

## 2024-11-15 PROCEDURE — 80048 BASIC METABOLIC PNL TOTAL CA: CPT

## 2024-11-27 ENCOUNTER — HOSPITAL ENCOUNTER (OUTPATIENT)
Dept: RADIOLOGY | Facility: HOSPITAL | Age: 54
Discharge: HOME | End: 2024-11-27
Payer: MEDICARE

## 2024-11-27 VITALS — WEIGHT: 187 LBS | HEIGHT: 72 IN | BODY MASS INDEX: 25.33 KG/M2

## 2024-11-27 DIAGNOSIS — N64.4 BREAST PAIN IN MALE: ICD-10-CM

## 2024-11-27 PROCEDURE — 76642 ULTRASOUND BREAST LIMITED: CPT | Mod: 50

## 2024-11-27 PROCEDURE — 77062 BREAST TOMOSYNTHESIS BI: CPT

## 2024-11-27 PROCEDURE — 76642 ULTRASOUND BREAST LIMITED: CPT | Performed by: RADIOLOGY

## 2024-11-27 PROCEDURE — 76981 USE PARENCHYMA: CPT | Mod: 50,LT

## 2024-11-27 PROCEDURE — 77066 DX MAMMO INCL CAD BI: CPT | Performed by: RADIOLOGY

## 2024-11-27 PROCEDURE — 77062 BREAST TOMOSYNTHESIS BI: CPT | Performed by: RADIOLOGY

## 2024-11-29 ENCOUNTER — TELEPHONE (OUTPATIENT)
Dept: PRIMARY CARE | Facility: CLINIC | Age: 54
End: 2024-11-29
Payer: MEDICARE

## 2024-11-29 NOTE — TELEPHONE ENCOUNTER
Breast imaging normal outside of the gynecomastia. I suggest stopping Spirolactone completely, and then gynecomastia can take a few months to resolve.

## 2024-12-02 ENCOUNTER — OFFICE VISIT (OUTPATIENT)
Dept: PRIMARY CARE | Facility: CLINIC | Age: 54
End: 2024-12-02
Payer: MEDICARE

## 2024-12-02 VITALS
OXYGEN SATURATION: 98 % | SYSTOLIC BLOOD PRESSURE: 120 MMHG | DIASTOLIC BLOOD PRESSURE: 82 MMHG | BODY MASS INDEX: 27.09 KG/M2 | HEART RATE: 78 BPM | WEIGHT: 200 LBS | HEIGHT: 72 IN

## 2024-12-02 DIAGNOSIS — J00 NASOPHARYNGITIS: ICD-10-CM

## 2024-12-02 DIAGNOSIS — J02.9 SORE THROAT: Primary | ICD-10-CM

## 2024-12-02 LAB — POC RAPID STREP: NEGATIVE

## 2024-12-02 PROCEDURE — 3079F DIAST BP 80-89 MM HG: CPT

## 2024-12-02 PROCEDURE — 3008F BODY MASS INDEX DOCD: CPT

## 2024-12-02 PROCEDURE — 99213 OFFICE O/P EST LOW 20 MIN: CPT

## 2024-12-02 PROCEDURE — 4004F PT TOBACCO SCREEN RCVD TLK: CPT

## 2024-12-02 PROCEDURE — 87081 CULTURE SCREEN ONLY: CPT

## 2024-12-02 PROCEDURE — 3074F SYST BP LT 130 MM HG: CPT

## 2024-12-02 PROCEDURE — 87880 STREP A ASSAY W/OPTIC: CPT

## 2024-12-02 RX ORDER — CETIRIZINE HYDROCHLORIDE 10 MG/1
10 TABLET ORAL DAILY
Qty: 30 TABLET | Refills: 0 | Status: SHIPPED | OUTPATIENT
Start: 2024-12-02 | End: 2025-03-02

## 2024-12-02 RX ORDER — GUAIFENESIN 600 MG/1
600 TABLET, EXTENDED RELEASE ORAL 2 TIMES DAILY
Qty: 20 TABLET | Refills: 0 | Status: SHIPPED | OUTPATIENT
Start: 2024-12-02 | End: 2024-12-12

## 2024-12-02 ASSESSMENT — PATIENT HEALTH QUESTIONNAIRE - PHQ9
1. LITTLE INTEREST OR PLEASURE IN DOING THINGS: NOT AT ALL
SUM OF ALL RESPONSES TO PHQ9 QUESTIONS 1 AND 2: 0
2. FEELING DOWN, DEPRESSED OR HOPELESS: NOT AT ALL

## 2024-12-02 ASSESSMENT — ENCOUNTER SYMPTOMS
HEADACHES: 1
NAUSEA: 0
DIARRHEA: 0
FATIGUE: 0
TROUBLE SWALLOWING: 1
SHORTNESS OF BREATH: 0
SORE THROAT: 1
FEVER: 0
CHILLS: 0
WHEEZING: 0
SINUS PAIN: 0
VOMITING: 0
RHINORRHEA: 1
SINUS PRESSURE: 0

## 2024-12-02 ASSESSMENT — PAIN SCALES - GENERAL: PAINLEVEL_OUTOF10: 2

## 2024-12-02 NOTE — PROGRESS NOTES
Subjective   Patient ID: Aamir Garcia is a 54 y.o. male who presents for Sick Visit (Patient started to having symptoms Friday night (sleeping is most painful), has pain only on left side/dd) and Shoulder Pain (Right shoulder for the last few weeks/dd).      Sore throat x 4 days. Friday went to sleep, pain located in left throat and left ear pain. Worse at night when laying on right side. When swallows feels huge ball in throat, Feels hoarse. Denies fever, chills, cough, sinus pressure.     Sore Throat   Associated symptoms include congestion, ear pain, headaches and trouble swallowing. Pertinent negatives include no diarrhea, ear discharge, shortness of breath or vomiting.        Review of Systems   Constitutional:  Negative for chills, fatigue and fever.   HENT:  Positive for congestion, ear pain, rhinorrhea, sore throat and trouble swallowing. Negative for ear discharge, postnasal drip, sinus pressure and sinus pain.    Respiratory:  Negative for shortness of breath and wheezing.    Gastrointestinal:  Negative for diarrhea, nausea and vomiting.   Neurological:  Positive for headaches.       Objective   /82   Pulse 78   Ht 1.829 m (6')   Wt 90.7 kg (200 lb)   SpO2 98%   BMI 27.12 kg/m²     Physical Exam  Constitutional:       Appearance: Normal appearance.   HENT:      Right Ear: Tympanic membrane and ear canal normal.      Left Ear: Tympanic membrane and ear canal normal.      Nose: Congestion present. No rhinorrhea.      Right Turbinates: Enlarged and swollen. Not pale.      Left Turbinates: Enlarged and swollen. Not pale.      Right Sinus: No maxillary sinus tenderness or frontal sinus tenderness.      Left Sinus: No maxillary sinus tenderness or frontal sinus tenderness.      Mouth/Throat:      Mouth: Mucous membranes are moist. No oral lesions.      Pharynx: Uvula midline. Posterior oropharyngeal erythema present. No oropharyngeal exudate.   Eyes:      Conjunctiva/sclera: Conjunctivae normal.    Cardiovascular:      Rate and Rhythm: Normal rate and regular rhythm.      Heart sounds: No murmur heard.  Pulmonary:      Effort: Pulmonary effort is normal.      Breath sounds: Normal breath sounds. No wheezing, rhonchi or rales.   Lymphadenopathy:      Cervical: No cervical adenopathy.   Skin:     General: Skin is warm and dry.   Neurological:      Mental Status: He is alert.   Psychiatric:         Mood and Affect: Mood and affect normal.         Assessment/Plan   Diagnoses and all orders for this visit:  Sore throat  -     POCT Rapid Strep A manually resulted  -     Group A Streptococcus, Culture  Nasopharyngitis  -     guaiFENesin (Mucinex) 600 mg 12 hr tablet; Take 1 tablet (600 mg) by mouth 2 times a day for 10 days. Do not crush, chew, or split.  -     cetirizine (ZyrTEC) 10 mg tablet; Take 1 tablet (10 mg) by mouth once daily.  Call Friday if not improving. Honey and tea as well.

## 2024-12-05 ENCOUNTER — OFFICE VISIT (OUTPATIENT)
Dept: ORTHOPEDIC SURGERY | Facility: CLINIC | Age: 54
End: 2024-12-05
Payer: MEDICARE

## 2024-12-05 ENCOUNTER — HOSPITAL ENCOUNTER (OUTPATIENT)
Dept: RADIOLOGY | Facility: CLINIC | Age: 54
Discharge: HOME | End: 2024-12-05
Payer: MEDICARE

## 2024-12-05 VITALS — BODY MASS INDEX: 27.09 KG/M2 | HEIGHT: 72 IN | WEIGHT: 200 LBS

## 2024-12-05 DIAGNOSIS — M75.51 ACUTE BURSITIS OF RIGHT SHOULDER: Primary | ICD-10-CM

## 2024-12-05 DIAGNOSIS — R52 PAIN: ICD-10-CM

## 2024-12-05 DIAGNOSIS — M25.511 RIGHT SHOULDER PAIN, UNSPECIFIED CHRONICITY: ICD-10-CM

## 2024-12-05 DIAGNOSIS — M19.011 PRIMARY OSTEOARTHRITIS OF RIGHT SHOULDER: ICD-10-CM

## 2024-12-05 DIAGNOSIS — S43.491A SPRAIN OF OTHER PART OF RIGHT SHOULDER REGION, INITIAL ENCOUNTER: ICD-10-CM

## 2024-12-05 DIAGNOSIS — M75.41 IMPINGEMENT SYNDROME OF RIGHT SHOULDER: ICD-10-CM

## 2024-12-05 PROBLEM — S43.401A SPRAIN OF SHOULDER, RIGHT: Status: ACTIVE | Noted: 2024-12-05

## 2024-12-05 LAB — S PYO THROAT QL CULT: NORMAL

## 2024-12-05 PROCEDURE — 99213 OFFICE O/P EST LOW 20 MIN: CPT | Performed by: ORTHOPAEDIC SURGERY

## 2024-12-05 PROCEDURE — 73030 X-RAY EXAM OF SHOULDER: CPT | Mod: RT

## 2024-12-05 PROCEDURE — 4004F PT TOBACCO SCREEN RCVD TLK: CPT | Performed by: ORTHOPAEDIC SURGERY

## 2024-12-05 PROCEDURE — 3008F BODY MASS INDEX DOCD: CPT | Performed by: ORTHOPAEDIC SURGERY

## 2024-12-05 PROCEDURE — 2500000004 HC RX 250 GENERAL PHARMACY W/ HCPCS (ALT 636 FOR OP/ED): Performed by: ORTHOPAEDIC SURGERY

## 2024-12-05 PROCEDURE — 20610 DRAIN/INJ JOINT/BURSA W/O US: CPT | Mod: RT | Performed by: ORTHOPAEDIC SURGERY

## 2024-12-05 RX ORDER — TRIAMCINOLONE ACETONIDE 40 MG/ML
10 INJECTION, SUSPENSION INTRA-ARTICULAR; INTRAMUSCULAR
Status: COMPLETED | OUTPATIENT
Start: 2024-12-05 | End: 2024-12-05

## 2024-12-05 RX ORDER — LIDOCAINE HYDROCHLORIDE 10 MG/ML
1 INJECTION, SOLUTION INFILTRATION; PERINEURAL
Status: COMPLETED | OUTPATIENT
Start: 2024-12-05 | End: 2024-12-05

## 2024-12-05 ASSESSMENT — LIFESTYLE VARIABLES
HOW OFTEN DO YOU HAVE SIX OR MORE DRINKS ON ONE OCCASION: NEVER
HOW MANY STANDARD DRINKS CONTAINING ALCOHOL DO YOU HAVE ON A TYPICAL DAY: PATIENT DOES NOT DRINK
SKIP TO QUESTIONS 9-10: 1
HOW OFTEN DO YOU HAVE A DRINK CONTAINING ALCOHOL: NEVER
AUDIT-C TOTAL SCORE: 0

## 2024-12-05 ASSESSMENT — ENCOUNTER SYMPTOMS
OCCASIONAL FEELINGS OF UNSTEADINESS: 0
LOSS OF SENSATION IN FEET: 0
DEPRESSION: 0

## 2024-12-05 ASSESSMENT — PAIN - FUNCTIONAL ASSESSMENT: PAIN_FUNCTIONAL_ASSESSMENT: 0-10

## 2024-12-05 ASSESSMENT — PAIN SCALES - GENERAL: PAINLEVEL_OUTOF10: 8

## 2024-12-05 NOTE — PROGRESS NOTES
Subjective      Chief Complaint   Patient presents with    Right Shoulder - Pain        Past Surgical History:   Procedure Laterality Date    PARACENTESIS      2x in January 2024    SINUS SURGERY  04/29/2013    Sinus Surgery        HPI  This 54 year old patient presents today with right shoulder pain (7/10). The patient states that the right shoulder pain has been present after exercising in [employer] two weeks ago. The patient denies trauma or injury. The patient states that the right shoulder pain is worse with and aggravated by reaching and lifting. The patient states that this shoulder pain is disabling and presents today to discuss further options. The patient states that they have tried tylenol and ibuprofen with no relief.    CARDIOLOGY:   Negative for chest pain, shortness of breath.   RESPIRATORY:   Negative for chest pain, shortness of breath.   MUSCULOSKELETAL:   See HPI for details.   NEUROLOGY:   Negative for tingling, numbness, weakness.    Objective      There were no vitals taken for this visit.     SHOULDER EXAM  Constitutional: Appears stated age. No apparent distress  Labored Breathing: No  Psychiatric: Normal mood and effect.   Neurological: alert and oriented x3  Skin: intact  HEENT: No bruising, otorrhea, rhinorrhea.  MUSCULOSKELETAL: Neck: No tenderness. No pain or limitation with range of motion. Back: No tenderness. Straight leg test negative bilaterally. right shoulder: There is tenderness anteriorly and laterally. Active abduction and active flexion are 0-120 degrees but with crepitus, pain and guarding. There is pain with and limitation of active and passive internal and external rotation. Comparments are soft. Neurovascular is intact.     AP and lateral xrays of the right shoulder done and read in office today show no evidence of acute fracture or bone destruction.  Osteoarthritis of the right shoulder is seen with spurring at the acromioclavicular joint and apparent narrowing at the  glenohumeral compartment.  I reviewed these x-rays with the patient in the office today.    Patient ID: Aamir Garcia is a 54 y.o. male.    L Inj/Asp: R subacromial bursa on 12/5/2024 10:10 AM  Indications: pain  Details: 22 G needle, lateral approach  Medications: 1 mL lidocaine 10 mg/mL (1 %); 10 mg triamcinolone acetonide 40 mg/mL  Outcome: tolerated well, no immediate complications  Procedure, treatment alternatives, risks and benefits explained, specific risks discussed. Immediately prior to procedure a time out was called to verify the correct patient, procedure, equipment, support staff and site/side marked as required. Patient was prepped and draped in the usual sterile fashion.         Aamir was seen today for pain.  Diagnoses and all orders for this visit:  Acute bursitis of right shoulder (Primary)  Right shoulder pain, unspecified chronicity  -     Referral to Orthopaedic Surgery  Sprain of other part of right shoulder region, initial encounter  Primary osteoarthritis of right shoulder  -     L Inj/Asp  Impingement syndrome of right shoulder  -     L Inj/Asp  Options are discussed with the patient in detail. The patient is instructed regarding activity modification, ice, physician directed at home gentle strengthening and ROM exercises, and the appropriate use of Tylenol as needed for pain with its potential adverse reactions and side effects. The patient understands. The patient states that despite all the treatment listed above that this right shoulder pain is debilitating and  requests a discussion of further options. Cortisone injection to the right shoulder is discussed in the office today. This is done in the office today. See procedures below. Return as needed, Please note that this report has been produced using speech recognition software.  It may contain errors related to grammar, punctuation or spelling.  Electronically signed, but not reviewed.       José Daly MD

## 2024-12-10 ENCOUNTER — APPOINTMENT (OUTPATIENT)
Dept: PRIMARY CARE | Facility: CLINIC | Age: 54
End: 2024-12-10
Payer: MEDICARE

## 2024-12-17 ENCOUNTER — APPOINTMENT (OUTPATIENT)
Dept: PRIMARY CARE | Facility: CLINIC | Age: 54
End: 2024-12-17
Payer: MEDICARE

## 2025-01-02 DIAGNOSIS — F41.9 ANXIETY: ICD-10-CM

## 2025-01-02 RX ORDER — BUPROPION HYDROCHLORIDE 150 MG/1
150 TABLET ORAL
Qty: 90 TABLET | Refills: 0 | Status: SHIPPED | OUTPATIENT
Start: 2025-01-02

## 2025-01-02 RX ORDER — ESCITALOPRAM OXALATE 10 MG/1
10 TABLET ORAL DAILY
Qty: 90 TABLET | Refills: 1 | Status: SHIPPED | OUTPATIENT
Start: 2025-01-02

## 2025-01-09 DIAGNOSIS — K70.30 ALCOHOLIC CIRRHOSIS OF LIVER WITHOUT ASCITES (MULTI): ICD-10-CM

## 2025-01-10 NOTE — PROGRESS NOTES
Subjective      Chief Complaint   Patient presents with    Right Shoulder - Pain        Past Surgical History:   Procedure Laterality Date    PARACENTESIS      2x in January 2024    SINUS SURGERY  04/29/2013    Sinus Surgery        HPI  This 54 year old patient presents today with right shoulder pain (7/10), loss of motion and loss of strength. The patient states that the right shoulder pain has been present after exercising in [employer] 6-8  weeks ago. The patient denies any specific and also a cortisone injection trauma or injury. The patient states that the right shoulder pain is worse with and aggravated by reaching and lifting. The patient states that this shoulder pain is disabling and presents today to discuss further options. The patient states that they have tried tylenol and ibuprofen given on the previous office visit in December of 2024 with no relief.    CARDIOLOGY:   Negative for chest pain, shortness of breath.   RESPIRATORY:   Negative for chest pain, shortness of breath.   MUSCULOSKELETAL:   See HPI for details.   NEUROLOGY:   Negative for tingling, numbness, weakness.    Objective      There were no vitals taken for this visit.     SHOULDER EXAM  Constitutional: Appears stated age. No apparent distress  Labored Breathing: No  Psychiatric: Normal mood and effect.   Neurological: alert and oriented x3  Skin: intact  HEENT: No bruising, otorrhea, rhinorrhea.  MUSCULOSKELETAL: Neck: No tenderness. No pain or limitation with range of motion. Back: No tenderness. Straight leg test negative bilaterally. right shoulder: There is tenderness anteriorly and laterally. Active abduction and active flexion are 0- 90 degrees but with crepitus, pain and guarding. There is pain with and limitation of active and passive internal and external rotation. Comparments are soft. Neurovascular is intact.     AP and lateral xrays of the right shoulder done and read in office show no evidence of acute fracture or bone  destruction.  Osteoarthritis of the right shoulder is seen with spurring at the acromioclavicular joint and apparent narrowing at the glenohumeral compartment.  I reviewed these x-rays with the patient in the office today.    Patient ID: Aamir Garcia is a 54 y.o. male.    Procedures    Aamir was seen today for pain.  Diagnoses and all orders for this visit:  Right shoulder pain, unspecified chronicity  Acute bursitis of right shoulder  Sprain of other part of right shoulder region, initial encounter  Primary osteoarthritis of right shoulder  Impingement syndrome of right shoulder  Options are discussed with the patient in detail. The patient is instructed regarding continuing with activity modification, ice, physician directed at home gentle strengthening and ROM exercises, and the appropriate use of Tylenol as needed for pain with its potential adverse reactions and side effects. The patient understands. The patient states that despite all the treatment listed above that this right shoulder pain is disabling and that his loss of motion and loss of strength are getting worse.  Cortisone injection did not give any relief.  This needs to be evaluated with an MRI of the right shoulder which is ordered today.  Return after MRI is completed or sooner as needed, Please note that this report has been produced using speech recognition software.  It may contain errors related to grammar, punctuation or spelling.  Electronically signed, but not reviewed.       José Daly MD

## 2025-01-13 ENCOUNTER — OFFICE VISIT (OUTPATIENT)
Dept: ORTHOPEDIC SURGERY | Facility: CLINIC | Age: 55
End: 2025-01-13
Payer: MEDICARE

## 2025-01-13 DIAGNOSIS — M75.51 ACUTE BURSITIS OF RIGHT SHOULDER: ICD-10-CM

## 2025-01-13 DIAGNOSIS — M75.41 IMPINGEMENT SYNDROME OF RIGHT SHOULDER: ICD-10-CM

## 2025-01-13 DIAGNOSIS — M19.011 PRIMARY OSTEOARTHRITIS OF RIGHT SHOULDER: ICD-10-CM

## 2025-01-13 DIAGNOSIS — S43.491A SPRAIN OF OTHER PART OF RIGHT SHOULDER REGION, INITIAL ENCOUNTER: ICD-10-CM

## 2025-01-13 DIAGNOSIS — M25.511 RIGHT SHOULDER PAIN, UNSPECIFIED CHRONICITY: Primary | ICD-10-CM

## 2025-01-13 PROCEDURE — 99213 OFFICE O/P EST LOW 20 MIN: CPT | Performed by: ORTHOPAEDIC SURGERY

## 2025-01-13 PROCEDURE — 1036F TOBACCO NON-USER: CPT | Performed by: ORTHOPAEDIC SURGERY

## 2025-01-13 ASSESSMENT — ENCOUNTER SYMPTOMS
OCCASIONAL FEELINGS OF UNSTEADINESS: 0
LOSS OF SENSATION IN FEET: 0
DEPRESSION: 0

## 2025-01-13 ASSESSMENT — PAIN SCALES - GENERAL: PAINLEVEL_OUTOF10: 6

## 2025-01-13 ASSESSMENT — PAIN - FUNCTIONAL ASSESSMENT: PAIN_FUNCTIONAL_ASSESSMENT: 0-10

## 2025-01-13 NOTE — PATIENT INSTRUCTIONS
Thank you for coming to see us today!     Continue to rest, ice, and elevate  Tylenol for pain control  We are ordering an MRI in office today.     Please call central scheduling to schedule your MRI  Once you have a date for your MRI, call our office to schedule a follow up with Dr. Daly

## 2025-01-17 ENCOUNTER — TELEPHONE (OUTPATIENT)
Dept: ORTHOPEDIC SURGERY | Facility: CLINIC | Age: 55
End: 2025-01-17
Payer: MEDICARE

## 2025-01-17 DIAGNOSIS — M25.511 RIGHT SHOULDER PAIN, UNSPECIFIED CHRONICITY: Primary | ICD-10-CM

## 2025-01-17 DIAGNOSIS — S43.491A SPRAIN OF OTHER PART OF RIGHT SHOULDER REGION, INITIAL ENCOUNTER: ICD-10-CM

## 2025-01-17 DIAGNOSIS — M75.51 ACUTE BURSITIS OF RIGHT SHOULDER: ICD-10-CM

## 2025-01-17 NOTE — TELEPHONE ENCOUNTER
Patient MRI has been denied due to no physical therapy being done.,  Patient has been informed that this will need to be done,  call and set up a return appointment after completion and they will reply for the MRI       Please order for physical therapy

## 2025-01-21 ENCOUNTER — LAB (OUTPATIENT)
Dept: LAB | Facility: LAB | Age: 55
End: 2025-01-21
Payer: MEDICARE

## 2025-01-21 DIAGNOSIS — K70.30 ALCOHOLIC CIRRHOSIS OF LIVER WITHOUT ASCITES (MULTI): ICD-10-CM

## 2025-01-21 LAB
ALBUMIN SERPL BCP-MCNC: 4.5 G/DL (ref 3.4–5)
ALP SERPL-CCNC: 130 U/L (ref 33–120)
ALT SERPL W P-5'-P-CCNC: 19 U/L (ref 10–52)
ANION GAP SERPL CALCULATED.3IONS-SCNC: 11 MMOL/L (ref 10–20)
AST SERPL W P-5'-P-CCNC: 22 U/L (ref 9–39)
BASOPHILS # BLD AUTO: 0.11 X10*3/UL (ref 0–0.1)
BASOPHILS NFR BLD AUTO: 1.4 %
BILIRUB DIRECT SERPL-MCNC: 0.1 MG/DL (ref 0–0.3)
BILIRUB SERPL-MCNC: 0.7 MG/DL (ref 0–1.2)
BUN SERPL-MCNC: 10 MG/DL (ref 6–23)
CALCIUM SERPL-MCNC: 10 MG/DL (ref 8.6–10.3)
CHLORIDE SERPL-SCNC: 100 MMOL/L (ref 98–107)
CO2 SERPL-SCNC: 30 MMOL/L (ref 21–32)
CREAT SERPL-MCNC: 1.03 MG/DL (ref 0.5–1.3)
EGFRCR SERPLBLD CKD-EPI 2021: 86 ML/MIN/1.73M*2
EOSINOPHIL # BLD AUTO: 0.12 X10*3/UL (ref 0–0.7)
EOSINOPHIL NFR BLD AUTO: 1.5 %
ERYTHROCYTE [DISTWIDTH] IN BLOOD BY AUTOMATED COUNT: 14.5 % (ref 11.5–14.5)
GLUCOSE SERPL-MCNC: 101 MG/DL (ref 74–99)
HCT VFR BLD AUTO: 40.8 % (ref 41–52)
HGB BLD-MCNC: 13.4 G/DL (ref 13.5–17.5)
IMM GRANULOCYTES # BLD AUTO: 0.02 X10*3/UL (ref 0–0.7)
IMM GRANULOCYTES NFR BLD AUTO: 0.3 % (ref 0–0.9)
INR PPP: 1.2 (ref 0.9–1.2)
LYMPHOCYTES # BLD AUTO: 2.39 X10*3/UL (ref 1.2–4.8)
LYMPHOCYTES NFR BLD AUTO: 30.8 %
MCH RBC QN AUTO: 28.8 PG (ref 26–34)
MCHC RBC AUTO-ENTMCNC: 32.8 G/DL (ref 32–36)
MCV RBC AUTO: 88 FL (ref 80–100)
MONOCYTES # BLD AUTO: 0.64 X10*3/UL (ref 0.1–1)
MONOCYTES NFR BLD AUTO: 8.3 %
NEUTROPHILS # BLD AUTO: 4.47 X10*3/UL (ref 1.2–7.7)
NEUTROPHILS NFR BLD AUTO: 57.7 %
NRBC BLD-RTO: 0 /100 WBCS (ref 0–0)
PLATELET # BLD AUTO: 168 X10*3/UL (ref 150–450)
POTASSIUM SERPL-SCNC: 4 MMOL/L (ref 3.5–5.3)
PROT SERPL-MCNC: 7.6 G/DL (ref 6.4–8.2)
PROTHROMBIN TIME: 12.4 SECONDS (ref 9.3–12.7)
RBC # BLD AUTO: 4.66 X10*6/UL (ref 4.5–5.9)
SODIUM SERPL-SCNC: 137 MMOL/L (ref 136–145)
WBC # BLD AUTO: 7.8 X10*3/UL (ref 4.4–11.3)

## 2025-01-21 PROCEDURE — 82248 BILIRUBIN DIRECT: CPT

## 2025-01-21 PROCEDURE — 85025 COMPLETE CBC W/AUTO DIFF WBC: CPT

## 2025-01-21 PROCEDURE — 80053 COMPREHEN METABOLIC PANEL: CPT

## 2025-01-21 PROCEDURE — 85610 PROTHROMBIN TIME: CPT

## 2025-01-27 ENCOUNTER — APPOINTMENT (OUTPATIENT)
Dept: RADIOLOGY | Facility: CLINIC | Age: 55
End: 2025-01-27
Payer: MEDICARE

## 2025-01-31 ENCOUNTER — OFFICE VISIT (OUTPATIENT)
Dept: URGENT CARE | Age: 55
End: 2025-01-31
Payer: MEDICARE

## 2025-01-31 ENCOUNTER — ANCILLARY PROCEDURE (OUTPATIENT)
Dept: URGENT CARE | Age: 55
End: 2025-01-31
Payer: MEDICARE

## 2025-01-31 VITALS
HEART RATE: 91 BPM | HEIGHT: 72 IN | RESPIRATION RATE: 18 BRPM | WEIGHT: 190 LBS | BODY MASS INDEX: 25.73 KG/M2 | SYSTOLIC BLOOD PRESSURE: 121 MMHG | DIASTOLIC BLOOD PRESSURE: 69 MMHG | OXYGEN SATURATION: 98 % | TEMPERATURE: 99.4 F

## 2025-01-31 DIAGNOSIS — M79.672 LEFT FOOT PAIN: ICD-10-CM

## 2025-01-31 DIAGNOSIS — M79.672 LEFT FOOT PAIN: Primary | ICD-10-CM

## 2025-01-31 PROCEDURE — 73630 X-RAY EXAM OF FOOT: CPT | Mod: LEFT SIDE | Performed by: SURGERY

## 2025-01-31 RX ORDER — KETOROLAC TROMETHAMINE 30 MG/ML
60 INJECTION, SOLUTION INTRAMUSCULAR; INTRAVENOUS ONCE
Status: COMPLETED | OUTPATIENT
Start: 2025-01-31 | End: 2025-01-31

## 2025-01-31 RX ORDER — AMOXICILLIN AND CLAVULANATE POTASSIUM 875; 125 MG/1; MG/1
1 TABLET, FILM COATED ORAL 2 TIMES DAILY
Qty: 14 TABLET | Refills: 0 | Status: SHIPPED | OUTPATIENT
Start: 2025-01-31 | End: 2025-02-03 | Stop reason: WASHOUT

## 2025-01-31 RX ORDER — KETOROLAC TROMETHAMINE 10 MG/1
10 TABLET, FILM COATED ORAL EVERY 6 HOURS PRN
Qty: 20 TABLET | Refills: 0 | Status: SHIPPED | OUTPATIENT
Start: 2025-01-31 | End: 2025-02-05

## 2025-01-31 RX ADMIN — KETOROLAC TROMETHAMINE 60 MG: 30 INJECTION, SOLUTION INTRAMUSCULAR; INTRAVENOUS at 10:15

## 2025-02-03 ENCOUNTER — OFFICE VISIT (OUTPATIENT)
Dept: PRIMARY CARE | Facility: CLINIC | Age: 55
End: 2025-02-03
Payer: MEDICARE

## 2025-02-03 VITALS
SYSTOLIC BLOOD PRESSURE: 150 MMHG | BODY MASS INDEX: 27.63 KG/M2 | DIASTOLIC BLOOD PRESSURE: 82 MMHG | HEIGHT: 72 IN | WEIGHT: 204 LBS | HEART RATE: 96 BPM | OXYGEN SATURATION: 98 %

## 2025-02-03 DIAGNOSIS — I10 BENIGN ESSENTIAL HTN: Primary | ICD-10-CM

## 2025-02-03 DIAGNOSIS — G62.1 ALCOHOL-INDUCED POLYNEUROPATHY (MULTI): ICD-10-CM

## 2025-02-03 DIAGNOSIS — L98.9 SKIN LESION: ICD-10-CM

## 2025-02-03 DIAGNOSIS — F10.21 HISTORY OF ALCOHOL DEPENDENCE (MULTI): ICD-10-CM

## 2025-02-03 PROCEDURE — 3008F BODY MASS INDEX DOCD: CPT

## 2025-02-03 PROCEDURE — 1036F TOBACCO NON-USER: CPT

## 2025-02-03 PROCEDURE — 3079F DIAST BP 80-89 MM HG: CPT

## 2025-02-03 PROCEDURE — 3077F SYST BP >= 140 MM HG: CPT

## 2025-02-03 PROCEDURE — 99214 OFFICE O/P EST MOD 30 MIN: CPT

## 2025-02-03 RX ORDER — GABAPENTIN 300 MG/1
300 CAPSULE ORAL NIGHTLY
Qty: 30 CAPSULE | Refills: 1 | Status: SHIPPED | OUTPATIENT
Start: 2025-02-03 | End: 2025-04-04

## 2025-02-03 RX ORDER — LISINOPRIL 10 MG/1
10 TABLET ORAL DAILY
Qty: 30 TABLET | Refills: 1 | Status: SHIPPED | OUTPATIENT
Start: 2025-02-03 | End: 2025-04-04

## 2025-02-03 ASSESSMENT — PATIENT HEALTH QUESTIONNAIRE - PHQ9
SUM OF ALL RESPONSES TO PHQ9 QUESTIONS 1 AND 2: 0
1. LITTLE INTEREST OR PLEASURE IN DOING THINGS: NOT AT ALL
2. FEELING DOWN, DEPRESSED OR HOPELESS: NOT AT ALL

## 2025-02-03 ASSESSMENT — ENCOUNTER SYMPTOMS
NUMBNESS: 1
JOINT SWELLING: 1
PALPITATIONS: 0
DIZZINESS: 0
ARTHRALGIAS: 1
LIGHT-HEADEDNESS: 0
FATIGUE: 1
SHORTNESS OF BREATH: 0

## 2025-02-03 ASSESSMENT — PAIN SCALES - GENERAL: PAINLEVEL_OUTOF10: 2

## 2025-02-03 NOTE — PROGRESS NOTES
"Subjective   Patient ID: Aamir Garcia is a 54 y.o. male who presents for Foot Pain (Patient is having pain in left foot/dd).    Hx alcohol use (50 days sober, goes to AA), cirrhosis of liver, HTN, GERD, depression with anxiety    Other providers: Dr. Landin (hepatologist), Dr. Balderas (GI),     Acute concerns: left foot pain x 5 days. Patient states has had gout in the past, and thought it was the same and went away in 2 days. Went to  Then came back and now top of foot looks swollen, and \"sensitive\" to touch but not painful to touch. Also numbness/tingling in foot, and had burning sensation from bottom of foot that traveled up entire leg for months. On b12 supplement. Also has abnormal sensation of feet feeling wet.     HTN: elevated, stopped his Metoprolol because was making him tired. Willing to start other medications. No medication side effects. Denies chest pain, heart palpitations, SOB, dizziness, headaches, changes of vision, syncope, leg swelling.      Depression with anxiety: Stopped 150 mg Wellbutrin. Feels controlled without it. Denies SI.     Not discussed:  Cirrhosis/Ascitics: controlled. Lasix 40 mg, Spirolactone 25 mg (stopped due to gynomastia). No medication side effects. Denies chest pain, heart palpitations, SOB, dizziness, headaches, changes of vision, syncope, leg swelling.    GERD: controlled on rx of 20 mg Omeprazole.               Review of Systems   Constitutional:  Positive for fatigue.   Eyes:  Negative for visual disturbance.   Respiratory:  Negative for shortness of breath.    Cardiovascular:  Negative for chest pain, palpitations and leg swelling.   Musculoskeletal:  Positive for arthralgias and joint swelling.   Neurological:  Positive for numbness. Negative for dizziness, syncope and light-headedness.       Objective   /82   Pulse 96   Ht 1.829 m (6')   Wt 92.5 kg (204 lb)   SpO2 98%   BMI 27.67 kg/m²     Physical Exam  Constitutional:       General: He is not in " acute distress.     Appearance: Normal appearance.   Cardiovascular:      Rate and Rhythm: Normal rate and regular rhythm.      Heart sounds: No murmur heard.  Pulmonary:      Effort: Pulmonary effort is normal.      Breath sounds: Normal breath sounds. No wheezing, rhonchi or rales.   Feet:      Right foot:      Skin integrity: Skin integrity normal. No erythema.      Toenail Condition: Right toenails are normal.      Left foot:      Skin integrity: Skin integrity normal. No erythema.      Toenail Condition: Left toenails are normal.   Skin:     General: Skin is warm and dry.      Findings: Lesion present. No rash.          Neurological:      Mental Status: He is alert.   Psychiatric:         Mood and Affect: Mood and affect normal.         Assessment/Plan   Diagnoses and all orders for this visit:  Benign essential HTN  -     lisinopril 10 mg tablet; Take 1 tablet (10 mg) by mouth once daily.  Alcohol-induced polyneuropathy (Multi)  -     gabapentin (Neurontin) 300 mg capsule; Take 1 capsule (300 mg) by mouth once daily at bedtime.  Skin lesion  History of alcohol dependence (Multi)  Stopped Metoprolol due to fatigue while taking, and started on Lisinopril. Also started on gabapentin for neuropathy in feet. Follow-up in one month, will re-check skin lesion when not scabbed over.

## 2025-02-07 ENCOUNTER — PATIENT MESSAGE (OUTPATIENT)
Dept: PRIMARY CARE | Facility: CLINIC | Age: 55
End: 2025-02-07
Payer: MEDICARE

## 2025-02-13 NOTE — PROGRESS NOTES
Chief Complaint   Patient presents with    left foot pain     Pt c/o left foot pain across the top of foot x 2 days (last week just big toe hurt, but went away after 2 days). Increased pain while weight bearing, no trauma, no injury.   Last night had feverish/chilled.   Hx of infection in knee.       Physical Exam Left foot:     Skin: No ecchymosis erythema  Swelling: None  Deformity: None  Tenderness: Dorsum of foot distal MT 1-2  ROM: Unlimited  Joint effusion: None    Imaging:       === 01/31/25 ===    XR FOOT 3+ VIEWS LEFT    - Impression -  Mild arthritic changes as described. No acute fracture or  dislocation. No destructive bone lesion.    MACRO:  None    Signed by: Abhinav Mark 1/31/2025 9:25 AM  Dictation workstation:   [id number]    Assessment:     Encounter Diagnosis   Name Primary?    Left foot pain Yes          Medical Decision Making & Plan:     In clinic: toradol loading dose    Rx Toradol     Made appointment for patient to see podiatry this week           02/12/25 at 9:40 PM - Snow Orozco DO

## 2025-02-18 ENCOUNTER — TELEPHONE (OUTPATIENT)
Dept: PRIMARY CARE | Facility: CLINIC | Age: 55
End: 2025-02-18
Payer: MEDICARE

## 2025-02-18 NOTE — PATIENT COMMUNICATION
Spoke with the patient, he said he would rather see us sooner than later for pain in the abdomen. Moved his appt up from 03/06 to 02/21 for this, he is also waiting on a callback from GI to see if they can evaluate him sooner.

## 2025-02-21 ENCOUNTER — OFFICE VISIT (OUTPATIENT)
Dept: PRIMARY CARE | Facility: CLINIC | Age: 55
End: 2025-02-21
Payer: MEDICARE

## 2025-02-21 VITALS
BODY MASS INDEX: 26.55 KG/M2 | HEART RATE: 77 BPM | WEIGHT: 196 LBS | OXYGEN SATURATION: 98 % | HEIGHT: 72 IN | SYSTOLIC BLOOD PRESSURE: 136 MMHG | DIASTOLIC BLOOD PRESSURE: 80 MMHG

## 2025-02-21 DIAGNOSIS — K70.31 ALCOHOLIC CIRRHOSIS OF LIVER WITH ASCITES (MULTI): ICD-10-CM

## 2025-02-21 DIAGNOSIS — R10.13 EPIGASTRIC PAIN: ICD-10-CM

## 2025-02-21 DIAGNOSIS — G62.1 ALCOHOL-INDUCED POLYNEUROPATHY (MULTI): ICD-10-CM

## 2025-02-21 DIAGNOSIS — I10 PRIMARY HYPERTENSION: ICD-10-CM

## 2025-02-21 DIAGNOSIS — L98.9 SKIN LESION: ICD-10-CM

## 2025-02-21 DIAGNOSIS — R10.11 RIGHT UPPER QUADRANT PAIN: Primary | ICD-10-CM

## 2025-02-21 PROCEDURE — 3008F BODY MASS INDEX DOCD: CPT

## 2025-02-21 PROCEDURE — 3075F SYST BP GE 130 - 139MM HG: CPT

## 2025-02-21 PROCEDURE — 99214 OFFICE O/P EST MOD 30 MIN: CPT

## 2025-02-21 PROCEDURE — 1036F TOBACCO NON-USER: CPT

## 2025-02-21 PROCEDURE — 3079F DIAST BP 80-89 MM HG: CPT

## 2025-02-21 RX ORDER — GABAPENTIN 100 MG/1
100 CAPSULE ORAL NIGHTLY
Qty: 90 CAPSULE | Refills: 1 | Status: SHIPPED | OUTPATIENT
Start: 2025-02-21 | End: 2026-02-21

## 2025-02-21 ASSESSMENT — ENCOUNTER SYMPTOMS
FATIGUE: 0
NAUSEA: 0
LIGHT-HEADEDNESS: 0
CONSTIPATION: 0
BLOOD IN STOOL: 0
VOMITING: 0
DIARRHEA: 0
SHORTNESS OF BREATH: 0
PALPITATIONS: 0
ABDOMINAL PAIN: 1
DIZZINESS: 0

## 2025-02-21 ASSESSMENT — PAIN SCALES - GENERAL: PAINLEVEL_OUTOF10: 0-NO PAIN

## 2025-02-21 NOTE — PATIENT INSTRUCTIONS
Obtain US of liver/gallbladder. Increase Omeprazole to twice daily. Call if abdominal pain worsening otherwise follow-up with hepatologist. Also if pain continue can consider stopping Gabapentin as this can cause abdominal pain as well.

## 2025-02-21 NOTE — PROGRESS NOTES
"Subjective   Patient ID: Aamir Garcia is a 54 y.o. male who presents for Abdominal Pain.    Hx alcohol use (50 days sober, goes to AA), cirrhosis of liver, HTN, GERD, depression with anxiety (controlled off Wellbutrin right not)    Other providers: Dr. Landin (hepatologist), Dr. Balderas (GI)    Abdominal pain located epigastric x 3 weeks. Exacerbated when eating, and last for 1-2 hours. Not every time he eats, and not associated with any patterns of types of food. Occurs every 3 out 4 times he eat. Feels like \"someone punches him in his ribs.\" Increased in burping. Still has gallbladder. Denies nausea, vomiting, diarrhea, blood in stool, fever, chills.     Alcohol-induced polyneuropathy: Started on gabapentin, increased to 300 mg BID, but had side effect on increased dose and felt \"high.\" Went back down to 300 mg, and takes in the morning since gives him energy. Neuropathy is better but usually the morning dose wears off by the end of the day. Recall: numbness/tingling in foot, and had burning sensation from bottom of foot that traveled up entire leg for months. On b12 supplement. Also has abnormal sensation of feet feeling wet.     HTN: controlled. Last visit start 10 mg Lisinopril. Stopped his Metoprolol because was making him tired. Denies chest pain, heart palpitations, SOB, dizziness, headaches, changes of vision, syncope, leg swelling.      Not discussed:  Cirrhosis/Ascitics: controlled. Lasix 40 mg, Spirolactone 25 mg (stopped due to gynomastia). No medication side effects. Denies chest pain, heart palpitations, SOB, dizziness, headaches, changes of vision, syncope, leg swelling.    GERD: controlled on rx of 40 mg Omeprazole.           Review of Systems   Constitutional:  Negative for fatigue.   Eyes:  Negative for visual disturbance.   Respiratory:  Negative for shortness of breath.    Cardiovascular:  Negative for chest pain, palpitations and leg swelling.   Gastrointestinal:  Positive for abdominal " pain. Negative for blood in stool, constipation, diarrhea, nausea and vomiting.   Neurological:  Negative for dizziness, syncope and light-headedness.       Objective   /80   Pulse 77   Ht 1.829 m (6')   Wt 88.9 kg (196 lb)   SpO2 98%   BMI 26.58 kg/m²     Physical Exam  Constitutional:       Appearance: Normal appearance.   Cardiovascular:      Rate and Rhythm: Normal rate and regular rhythm.      Heart sounds: No murmur heard.  Pulmonary:      Effort: Pulmonary effort is normal.      Breath sounds: Normal breath sounds. No wheezing, rhonchi or rales.   Abdominal:      General: Abdomen is flat. Bowel sounds are normal.      Palpations: Abdomen is soft. There is no hepatomegaly, splenomegaly or mass.      Tenderness: There is abdominal tenderness in the right upper quadrant and epigastric area. There is no guarding or rebound. Negative signs include Jeffery's sign.          Comments: Dark papule on right chest   Skin:     General: Skin is warm and dry.      Findings: Lesion present. No rash.   Neurological:      Mental Status: He is alert.   Psychiatric:         Mood and Affect: Mood and affect normal.         Assessment/Plan   Diagnoses and all orders for this visit:  Right upper quadrant pain  -     US right upper quadrant; Future  Alcohol-induced polyneuropathy (Multi)  -     gabapentin (Neurontin) 100 mg capsule; Take 1 capsule (100 mg) by mouth once daily at bedtime.  Alcoholic cirrhosis of liver with ascites (Multi)  -     US right upper quadrant; Future  Primary hypertension  Skin lesion  Epigastric pain    Obtain US of liver/gallbladder. Increase Omeprazole to twice daily. Call if abdominal pain worsening otherwise follow-up with hepatologist. Also if pain continue can consider stopping Gabapentin as this can cause abdominal pain as well.     Schedule skin biopsy, on right abdomen.

## 2025-02-23 ENCOUNTER — HOSPITAL ENCOUNTER (OUTPATIENT)
Dept: RADIOLOGY | Facility: HOSPITAL | Age: 55
Discharge: HOME | End: 2025-02-23
Payer: MEDICARE

## 2025-02-23 DIAGNOSIS — R10.11 RIGHT UPPER QUADRANT PAIN: ICD-10-CM

## 2025-02-23 DIAGNOSIS — K70.31 ALCOHOLIC CIRRHOSIS OF LIVER WITH ASCITES (MULTI): ICD-10-CM

## 2025-02-23 PROCEDURE — 76705 ECHO EXAM OF ABDOMEN: CPT

## 2025-02-23 PROCEDURE — 76705 ECHO EXAM OF ABDOMEN: CPT | Performed by: STUDENT IN AN ORGANIZED HEALTH CARE EDUCATION/TRAINING PROGRAM

## 2025-02-24 ENCOUNTER — TELEPHONE (OUTPATIENT)
Dept: PRIMARY CARE | Facility: CLINIC | Age: 55
End: 2025-02-24
Payer: MEDICARE

## 2025-02-24 NOTE — TELEPHONE ENCOUNTER
US shows normal gallbladder and no new changes in liver.    Continue to try his Omeprazole twice daily and call/message at end of week with update of his symptoms

## 2025-02-26 ENCOUNTER — OFFICE VISIT (OUTPATIENT)
Dept: GASTROENTEROLOGY | Facility: HOSPITAL | Age: 55
End: 2025-02-26
Payer: MEDICARE

## 2025-02-26 ENCOUNTER — PROCEDURE VISIT (OUTPATIENT)
Dept: PRIMARY CARE | Facility: CLINIC | Age: 55
End: 2025-02-26
Payer: MEDICARE

## 2025-02-26 VITALS
HEART RATE: 81 BPM | TEMPERATURE: 98.8 F | SYSTOLIC BLOOD PRESSURE: 136 MMHG | OXYGEN SATURATION: 97 % | WEIGHT: 196 LBS | BODY MASS INDEX: 26.58 KG/M2 | DIASTOLIC BLOOD PRESSURE: 88 MMHG

## 2025-02-26 VITALS
OXYGEN SATURATION: 97 % | SYSTOLIC BLOOD PRESSURE: 136 MMHG | HEART RATE: 81 BPM | WEIGHT: 194 LBS | DIASTOLIC BLOOD PRESSURE: 88 MMHG | BODY MASS INDEX: 26.31 KG/M2

## 2025-02-26 DIAGNOSIS — G62.1 ALCOHOL-INDUCED POLYNEUROPATHY (MULTI): ICD-10-CM

## 2025-02-26 DIAGNOSIS — K70.30 ALCOHOLIC CIRRHOSIS OF LIVER WITHOUT ASCITES (MULTI): Primary | ICD-10-CM

## 2025-02-26 DIAGNOSIS — I10 BENIGN ESSENTIAL HTN: ICD-10-CM

## 2025-02-26 DIAGNOSIS — L98.9 SKIN LESION: Primary | ICD-10-CM

## 2025-02-26 DIAGNOSIS — I85.00 ESOPHAGEAL VARICES WITHOUT BLEEDING, UNSPECIFIED ESOPHAGEAL VARICES TYPE (MULTI): ICD-10-CM

## 2025-02-26 PROCEDURE — G2211 COMPLEX E/M VISIT ADD ON: HCPCS | Performed by: INTERNAL MEDICINE

## 2025-02-26 PROCEDURE — 99214 OFFICE O/P EST MOD 30 MIN: CPT | Performed by: INTERNAL MEDICINE

## 2025-02-26 PROCEDURE — 3079F DIAST BP 80-89 MM HG: CPT | Performed by: INTERNAL MEDICINE

## 2025-02-26 PROCEDURE — 3075F SYST BP GE 130 - 139MM HG: CPT | Performed by: INTERNAL MEDICINE

## 2025-02-26 PROCEDURE — 17000 DESTRUCT PREMALG LESION: CPT

## 2025-02-26 RX ORDER — LISINOPRIL 10 MG/1
10 TABLET ORAL DAILY
Qty: 90 TABLET | Refills: 1 | Status: SHIPPED | OUTPATIENT
Start: 2025-02-26

## 2025-02-26 RX ORDER — GABAPENTIN 300 MG/1
300 CAPSULE ORAL NIGHTLY
Qty: 90 CAPSULE | Refills: 0 | Status: SHIPPED | OUTPATIENT
Start: 2025-02-26 | End: 2025-08-25

## 2025-02-26 SDOH — ECONOMIC STABILITY: FOOD INSECURITY: WITHIN THE PAST 12 MONTHS, YOU WORRIED THAT YOUR FOOD WOULD RUN OUT BEFORE YOU GOT MONEY TO BUY MORE.: NEVER TRUE

## 2025-02-26 SDOH — ECONOMIC STABILITY: FOOD INSECURITY: WITHIN THE PAST 12 MONTHS, THE FOOD YOU BOUGHT JUST DIDN'T LAST AND YOU DIDN'T HAVE MONEY TO GET MORE.: NEVER TRUE

## 2025-02-26 ASSESSMENT — ENCOUNTER SYMPTOMS
COLOR CHANGE: 1
CHILLS: 0
FEVER: 0

## 2025-02-26 ASSESSMENT — PATIENT HEALTH QUESTIONNAIRE - PHQ9
1. LITTLE INTEREST OR PLEASURE IN DOING THINGS: NOT AT ALL
1. LITTLE INTEREST OR PLEASURE IN DOING THINGS: NOT AT ALL
SUM OF ALL RESPONSES TO PHQ9 QUESTIONS 1 AND 2: 0
2. FEELING DOWN, DEPRESSED OR HOPELESS: NOT AT ALL
SUM OF ALL RESPONSES TO PHQ9 QUESTIONS 1 & 2: 0
2. FEELING DOWN, DEPRESSED OR HOPELESS: NOT AT ALL

## 2025-02-26 ASSESSMENT — LIFESTYLE VARIABLES
AUDIT-C TOTAL SCORE: 0
HOW OFTEN DO YOU HAVE SIX OR MORE DRINKS ON ONE OCCASION: NEVER
HOW OFTEN DO YOU HAVE A DRINK CONTAINING ALCOHOL: NEVER
SKIP TO QUESTIONS 9-10: 1
HOW MANY STANDARD DRINKS CONTAINING ALCOHOL DO YOU HAVE ON A TYPICAL DAY: PATIENT DOES NOT DRINK

## 2025-02-26 ASSESSMENT — PAIN SCALES - GENERAL
PAINLEVEL_OUTOF10: 5
PAINLEVEL_OUTOF10: 5

## 2025-02-26 NOTE — PROGRESS NOTES
Subjective   Patient ID: Aamir Garcia is a 54 y.o. male who presents for skin biopsy.    Presenting for skin biopsy of lesion on right torso, has been getting bigger.          Review of Systems   Constitutional:  Negative for chills and fever.   Skin:  Positive for color change.       Objective   /88   Pulse 81   Wt 88 kg (194 lb)   SpO2 97%   BMI 26.31 kg/m²     Physical Exam  Constitutional:       Appearance: Normal appearance.   Pulmonary:      Effort: Pulmonary effort is normal.   Skin:     Findings: Lesion present. No rash.             Comments: Raised plaque circular lesion with mutiple shades of brown on right lateral lower chest   Neurological:      Mental Status: He is alert.   Psychiatric:         Mood and Affect: Mood and affect normal.       Patient ID: Aamir Garcia is a 54 y.o. male.    Incisional Biopsy    Date/Time: 2/26/2025 9:01 AM    Performed by: Irina Cox PA-C  Authorized by: Irina Cox PA-C    Consent:     Consent obtained:  Written    Consent given by:  Patient    Risks, benefits, and alternatives were discussed: yes      Risks discussed:  Infection, poor cosmetic result and bleeding    Alternatives discussed:  Referral  [place] protocol:     Procedure explained and questions answered to patient or proxy's satisfaction: yes      Site/side marked: yes      Immediately prior to procedure, a time out was called: yes      Patient identity confirmed:  Verbally with patient  Indications:     Indications:  Abormal skin lesion  Pre-procedure details:     Skin preparation:  Povidone-iodine  Sedation:     Sedation type:  None  Anesthesia:     Anesthesia method:  Local infiltration    Local anesthetic:  Lidocaine 1% w/o epi  Procedure specific details:      #15 blade used and area cauterized with silver nitrate  Post-procedure details:     Procedure completion:  Tolerated well, no immediate complications      Assessment/Plan   Diagnoses and all orders for this visit:  Skin lesion  -      Surgical Pathology Exam  Other orders  -     Incisional Biopsy

## 2025-02-26 NOTE — LETTER
February 27, 2025     Irina Cox PA-C  [address] [address]  [place] [address]    Patient: Aamir Garcia   YOB: 1970   Date of Visit: 2/26/2025       Dear Dr. Irina Cox PA-C:    Thank you for referring Aamir Garcia to me for evaluation. Below are my notes for this consultation.  If you have questions, please do not hesitate to call me. I look forward to following your patient along with you.       Sincerely,     Yared Landin MD      CC: No Recipients  ______________________________________________________________________________________    Subjective     Follow up visit for alcohol-related liver disease and cirrhosis.    History of Present Illness:   Aamir Garcia is a 54 y.o. male who presents to the [facility] for an a follow up evaluation of alcohol related liver disease.    Referred by Dr. Sullivan.    Interval history:  He feels well. He is exercising No longer on diuretics. Denies ascites or fluid overload symptoms. His mental health is much improved. He is alcohol free for 176 days.     Visit with PCP, earlier this week:    Obtain US of liver/gallbladder. Increase Omeprazole to twice daily. Call if abdominal pain worsening otherwise follow-up with hepatologist     He explains, he was experiencing epigastric abdominal pain for a few weeks. He has increased his PPI dosing to BID per his PCP, which has helped - symptoms have improved.     Summary:  He has a longstanding history of alcohol abuse.  He explains that his first attempt at sobriety included a hospitalization for alcohol detoxification in December 2022.  At that time he reported heavy drinking.  He developed acute shortness of breath and called an ambulance for help.  After acute detoxification went on to alcohol rehabilitation for about 30 days.  Unfortunately he relapsed.  Soon after he was diagnosed with liver disease and cirrhosis in January 2023.  This was actually during a period of time when he was away in  [place].  Since then he has had struggles with staying sober.     He explains that he had a bad divorce moved to [place] then moved back to [place].  He has had patterns of stopping for several days and then relapsing.  Most recently at the end of August early September 2024 he sought out a rehab program in [place], [place].  At that time he explains he knew he could not do it on his own and he felt that this program offered some hope.  Unfortunately after 2 days he developed abdominal distention which led to severe discomfort.  He was in significant pain.  The program brought him to the emergency room.  After that he felt like he could not stay in the program he went home..    At his initial visit with me, he had reported that he was alcohol free and sober for 44 days. Today, 176 days.  He was attending AA meetings daily.  He no longer is doing meeting. Does meeting on demand. Went last week on Tuesday.  He reports no alcohol cravings at this time.    Regarding his ascites, after the initial paracentesis in [facility] he has not had another one.  He recently saw his gastroenterologist Dr. Sullivan.  He was on Lasix 40 mg.  When he felt like his distention was worsening he increase his dose to 80 mg for few days which helped significantly.  He also takes spironolactone 50 mg daily.    Review of Systems  Refer to the new patient questionnaire.     Past Medical History   has a past medical history of Alcohol dependence, Alcoholic cirrhosis of liver with ascites (Multi), GERD (gastroesophageal reflux disease), Gout, Hyperlipidemia, and Hypertension.     History of bursitis and cellulitis of left knee.    Social History   reports that he has quit smoking. His smoking use included cigarettes. He has been exposed to tobacco smoke. He has never used smokeless tobacco. He reports that he does not currently use alcohol. He reports current drug use. Drug: Marijuana.     Family History  family history includes Cancer in his  father; Diabetes in his father; Glioblastoma in his father; Heart attack in his father; Hypertension in his mother.     Allergies  No Known Allergies    Medications  Current Outpatient Medications   Medication Instructions   • cetirizine (ZYRTEC) 10 mg, oral, Daily   • escitalopram (LEXAPRO) 10 mg, oral, Daily   • gabapentin (NEURONTIN) 100 mg, oral, Nightly   • gabapentin (NEURONTIN) 300 mg, oral, Nightly   • lisinopril 10 mg, oral, Daily   • omeprazole (PRILOSEC) 20 mg, oral, Daily, Do not crush or chew.   • vitamin B complex tablet 1 tablet, Daily        Objective   Visit Vitals  /88   Pulse 81   Temp 37.1 °C (98.8 °F)          12/2/2024     1:30 PM 12/5/2024     9:50 AM 1/31/2025     8:36 AM 2/3/2025     7:44 AM 2/21/2025     8:01 AM 2/26/2025     8:01 AM 2/26/2025     3:00 PM   Vitals   Systolic 120  121 150 136 136 136   Diastolic 82  69 82 80 88 88   BP Location   Left arm       Heart Rate 78  91 96 77 81 81   Temp   37.4 °C (99.4 °F)    37.1 °C (98.8 °F)   Resp   18       Height 1.829 m (6') 1.829 m (6') 1.829 m (6') 1.829 m (6') 1.829 m (6')     Weight (lb) 200 200 190 204 196 194 196   BMI 27.12 kg/m2 27.12 kg/m2 25.77 kg/m2 27.67 kg/m2 26.58 kg/m2 26.31 kg/m2 26.58 kg/m2   BSA (m2) 2.15 m2 2.15 m2 2.09 m2 2.17 m2 2.13 m2 2.11 m2 2.13 m2   Visit Report Report Report Report Report Report Report Report     Physical Exam  Vitals and nursing note reviewed.   Constitutional:       General: He is awake.      Appearance: Normal appearance. He is normal weight. He is not ill-appearing.   HENT:      Head: Normocephalic and atraumatic.      Nose: Nose normal.      Mouth/Throat:      Mouth: Mucous membranes are moist.   Eyes:      Pupils: Pupils are equal, round, and reactive to light.   Neck:      Thyroid: No thyroid mass.      Trachea: Phonation normal.   Cardiovascular:      Rate and Rhythm: Normal rate and regular rhythm.      Heart sounds: Normal heart sounds. No murmur heard.     No gallop.   Pulmonary:       Effort: Pulmonary effort is normal. No respiratory distress.      Breath sounds: Normal air entry. No decreased breath sounds, wheezing, rhonchi or rales.   Abdominal:      General: Bowel sounds are normal. There is no distension.      Palpations: Abdomen is soft.      Tenderness: There is no abdominal tenderness.   Musculoskeletal:      Cervical back: Neck supple.      Right lower leg: No edema.      Left lower leg: No edema.   Skin:     General: Skin is warm.      Capillary Refill: Capillary refill takes less than 2 seconds.   Neurological:      General: No focal deficit present.      Mental Status: He is alert and oriented to person, place, and time. Mental status is at baseline.      Cranial Nerves: Cranial nerves 2-12 are intact.      Motor: Motor function is intact.   Psychiatric:         Attention and Perception: Attention and perception normal.         Mood and Affect: Mood normal.         Speech: Speech normal.         Behavior: Behavior normal.       Labs    WBC   Date/Time Value Ref Range Status   01/21/2025 03:47 PM 7.8 4.4 - 11.3 x10*3/uL Final     Hemoglobin   Date/Time Value Ref Range Status   01/21/2025 03:47 PM 13.4 (L) 13.5 - 17.5 g/dL Final     Hematocrit   Date/Time Value Ref Range Status   01/21/2025 03:47 PM 40.8 (L) 41.0 - 52.0 % Final     MCV   Date/Time Value Ref Range Status   01/21/2025 03:47 PM 88 80 - 100 fL Final     Platelets   Date/Time Value Ref Range Status   01/21/2025 03:47  150 - 450 x10*3/uL Final        Total Protein   Date/Time Value Ref Range Status   01/21/2025 03:47 PM 7.6 6.4 - 8.2 g/dL Final     Albumin   Date/Time Value Ref Range Status   01/21/2025 03:47 PM 4.5 3.4 - 5.0 g/dL Final     AST   Date/Time Value Ref Range Status   01/21/2025 03:47 PM 22 9 - 39 U/L Final     ALT   Date/Time Value Ref Range Status   01/21/2025 03:47 PM 19 10 - 52 U/L Final     Comment:     Patients treated with Sulfasalazine may generate falsely decreased results for ALT.      Alkaline Phosphatase   Date/Time Value Ref Range Status   01/21/2025 03:47  (H) 33 - 120 U/L Final     Bilirubin, Total   Date/Time Value Ref Range Status   01/21/2025 03:47 PM 0.7 0.0 - 1.2 mg/dL Final     Bilirubin, Direct   Date/Time Value Ref Range Status   01/21/2025 03:47 PM 0.1 0.0 - 0.3 mg/dL Final        Vitamin D, 25-Hydroxy, Total   Date/Time Value Ref Range Status   03/28/2024 10:16 AM 32 30 - 100 ng/mL Final        AST   Date/Time Value Ref Range Status   01/21/2025 03:47 PM 22 9 - 39 U/L Final     ALT   Date/Time Value Ref Range Status   01/21/2025 03:47 PM 19 10 - 52 U/L Final     Comment:     Patients treated with Sulfasalazine may generate falsely decreased results for ALT.     Alkaline Phosphatase   Date/Time Value Ref Range Status   01/21/2025 03:47  (H) 33 - 120 U/L Final     Bilirubin, Total   Date/Time Value Ref Range Status   01/21/2025 03:47 PM 0.7 0.0 - 1.2 mg/dL Final     Bilirubin, Direct   Date/Time Value Ref Range Status   01/21/2025 03:47 PM 0.1 0.0 - 0.3 mg/dL Final     Albumin   Date/Time Value Ref Range Status   01/21/2025 03:47 PM 4.5 3.4 - 5.0 g/dL Final     Total Protein   Date/Time Value Ref Range Status   01/21/2025 03:47 PM 7.6 6.4 - 8.2 g/dL Final        Protime   Date/Time Value Ref Range Status   01/21/2025 03:47 PM 12.4 9.3 - 12.7 seconds Final     INR   Date/Time Value Ref Range Status   01/21/2025 03:47 PM 1.2 0.9 - 1.2 Final     MELD 3.0: 8 at 1/21/2025  3:47 PM  MELD-Na: 8 at 1/21/2025  3:47 PM  Calculated from:  Serum Creatinine: 1.03 mg/dL at 1/21/2025  3:47 PM  Serum Sodium: 137 mmol/L at 1/21/2025  3:47 PM  Total Bilirubin: 0.7 mg/dL (Using min of 1 mg/dL) at 1/21/2025  3:47 PM  Serum Albumin: 4.5 g/dL (Using max of 3.5 g/dL) at 1/21/2025  3:47 PM  INR(ratio): 1.2 at 1/21/2025  3:47 PM  Age at listing (hypothetical): 54 years  Sex: Male at 1/21/2025  3:47 PM    LIVER IMAGING    CT abdomen and pelvis 9/7/2024    HEPATOBILIARY: Heterogenous liver  without significant nodular hepatic contour. No focal aggressive appearing hepatic lesions.  Few scattered tiny low-attenuation lesions are likely cysts, however too small to accurately characterize.  Recannulization of the umbilical vein along with enlarged main   portal vein (1.7 cm), can be seen in setting of portal hypertension. No biliary ductal dilatation. Gallbladder is present.     SPLEEN: Splenomegaly measuring up to 15.9 cm in the craniocaudal dimension.     IMPRESSION:     * Moderate volume ascites.   *  Secondary signs of portal hypertension along with a heterogenous appearing liver.  Findings raising suspicion for underlying cirrhosis.   *  No bowel obstruction.  Air-fluid levels in nondilated small bowel compatible with an ileus.   *  Chronic changes detailed above including bilateral avascular necrosis of the hips.     EGD in 2022 with small esophageal varices -no banding indicated at that time.    US Liver  2/21/25  IMPRESSION:  1. No acute right upper quadrant pathology.  2. Heterogenous hepatic echotexture could be related to underlying  diffuse hepatocellular dysfunction/cirrhosis without gross lesions.  Advise screening for underlying cirrhosis.  3. Trace perihepatic ascites.    Assessment/Plan   Aamir Garcia is a 54 y.o. male who presents to Hepatology clinic for follow up.    Alcohol related cirrhosis with clinically significant portal hypertension.  Signs of portal hypertension include splenomegaly and esophageal varices.  Last year, he developed some ascites.  This was controlled with medication at this time and has improved with sobriety and abstinence from all alcohol use and now appears to have resolved.    After his initial office visit, lab work confirmed a low MELD score of 10. Recent labs confirm a sustained improvement in liver function --> 8.    I have encouraged the patient to continue in his alcohol recovery.    Recommend liver cancer screening every 6 months.    I have recommended a  FibroScan to confirm a diagnosis of cirrhosis.  I would also like him to schedule a follow up endoscopy with his Gastroenterologist.     At this time, with his low MELD score - I do not believe liver transplant evaluation is justified.    I would like him to continue to follow-up with my office.    Yared Landin MD      Instructions      Yared Landin MD

## 2025-02-26 NOTE — PROGRESS NOTES
Subjective     Follow up visit for alcohol-related liver disease and cirrhosis.    History of Present Illness:   Aamir Garcia is a 54 y.o. male who presents to the [facility] for an a follow up evaluation of alcohol related liver disease.    Referred by Dr. Sullivan.    Interval history:  He feels well. He is exercising No longer on diuretics. Denies ascites or fluid overload symptoms. His mental health is much improved. He is alcohol free for 176 days.     Visit with PCP, earlier this week:    Obtain US of liver/gallbladder. Increase Omeprazole to twice daily. Call if abdominal pain worsening otherwise follow-up with hepatologist     He explains, he was experiencing epigastric abdominal pain for a few weeks. He has increased his PPI dosing to BID per his PCP, which has helped - symptoms have improved.     Summary:  He has a longstanding history of alcohol abuse.  He explains that his first attempt at sobriety included a hospitalization for alcohol detoxification in December 2022.  At that time he reported heavy drinking.  He developed acute shortness of breath and called an ambulance for help.  After acute detoxification went on to alcohol rehabilitation for about 30 days.  Unfortunately he relapsed.  Soon after he was diagnosed with liver disease and cirrhosis in January 2023.  This was actually during a period of time when he was away in [place].  Since then he has had struggles with staying sober.     He explains that he had a bad divorce moved to [place] then moved back to [place].  He has had patterns of stopping for several days and then relapsing.  Most recently at the end of August early September 2024 he sought out a rehab program in [place], [place].  At that time he explains he knew he could not do it on his own and he felt that this program offered some hope.  Unfortunately after 2 days he developed abdominal distention which led to severe discomfort.  He was in significant pain.  The program  brought him to the emergency room.  After that he felt like he could not stay in the program he went home..    At his initial visit with me, he had reported that he was alcohol free and sober for 44 days. Today, 176 days.  He was attending AA meetings daily.  He no longer is doing meeting. Does meeting on demand. Went last week on Tuesday.  He reports no alcohol cravings at this time.    Regarding his ascites, after the initial paracentesis in [facility] he has not had another one.  He recently saw his gastroenterologist Dr. Sullivan.  He was on Lasix 40 mg.  When he felt like his distention was worsening he increase his dose to 80 mg for few days which helped significantly.  He also takes spironolactone 50 mg daily.    Review of Systems  Refer to the new patient questionnaire.     Past Medical History   has a past medical history of Alcohol dependence, Alcoholic cirrhosis of liver with ascites (Multi), GERD (gastroesophageal reflux disease), Gout, Hyperlipidemia, and Hypertension.     History of bursitis and cellulitis of left knee.    Social History   reports that he has quit smoking. His smoking use included cigarettes. He has been exposed to tobacco smoke. He has never used smokeless tobacco. He reports that he does not currently use alcohol. He reports current drug use. Drug: Marijuana.     Family History  family history includes Cancer in his father; Diabetes in his father; Glioblastoma in his father; Heart attack in his father; Hypertension in his mother.     Allergies  No Known Allergies    Medications  Current Outpatient Medications   Medication Instructions    cetirizine (ZYRTEC) 10 mg, oral, Daily    escitalopram (LEXAPRO) 10 mg, oral, Daily    gabapentin (NEURONTIN) 100 mg, oral, Nightly    gabapentin (NEURONTIN) 300 mg, oral, Nightly    lisinopril 10 mg, oral, Daily    omeprazole (PRILOSEC) 20 mg, oral, Daily, Do not crush or chew.    vitamin B complex tablet 1 tablet, Daily        Objective   Visit  Vitals  /88   Pulse 81   Temp 37.1 °C (98.8 °F)          12/2/2024     1:30 PM 12/5/2024     9:50 AM 1/31/2025     8:36 AM 2/3/2025     7:44 AM 2/21/2025     8:01 AM 2/26/2025     8:01 AM 2/26/2025     3:00 PM   Vitals   Systolic 120  121 150 136 136 136   Diastolic 82  69 82 80 88 88   BP Location   Left arm       Heart Rate 78  91 96 77 81 81   Temp   37.4 °C (99.4 °F)    37.1 °C (98.8 °F)   Resp   18       Height 1.829 m (6') 1.829 m (6') 1.829 m (6') 1.829 m (6') 1.829 m (6')     Weight (lb) 200 200 190 204 196 194 196   BMI 27.12 kg/m2 27.12 kg/m2 25.77 kg/m2 27.67 kg/m2 26.58 kg/m2 26.31 kg/m2 26.58 kg/m2   BSA (m2) 2.15 m2 2.15 m2 2.09 m2 2.17 m2 2.13 m2 2.11 m2 2.13 m2   Visit Report Report Report Report Report Report Report Report     Physical Exam  Vitals and nursing note reviewed.   Constitutional:       General: He is awake.      Appearance: Normal appearance. He is normal weight. He is not ill-appearing.   HENT:      Head: Normocephalic and atraumatic.      Nose: Nose normal.      Mouth/Throat:      Mouth: Mucous membranes are moist.   Eyes:      Pupils: Pupils are equal, round, and reactive to light.   Neck:      Thyroid: No thyroid mass.      Trachea: Phonation normal.   Cardiovascular:      Rate and Rhythm: Normal rate and regular rhythm.      Heart sounds: Normal heart sounds. No murmur heard.     No gallop.   Pulmonary:      Effort: Pulmonary effort is normal. No respiratory distress.      Breath sounds: Normal air entry. No decreased breath sounds, wheezing, rhonchi or rales.   Abdominal:      General: Bowel sounds are normal. There is no distension.      Palpations: Abdomen is soft.      Tenderness: There is no abdominal tenderness.   Musculoskeletal:      Cervical back: Neck supple.      Right lower leg: No edema.      Left lower leg: No edema.   Skin:     General: Skin is warm.      Capillary Refill: Capillary refill takes less than 2 seconds.   Neurological:      General: No focal  deficit present.      Mental Status: He is alert and oriented to person, place, and time. Mental status is at baseline.      Cranial Nerves: Cranial nerves 2-12 are intact.      Motor: Motor function is intact.   Psychiatric:         Attention and Perception: Attention and perception normal.         Mood and Affect: Mood normal.         Speech: Speech normal.         Behavior: Behavior normal.       Labs    WBC   Date/Time Value Ref Range Status   01/21/2025 03:47 PM 7.8 4.4 - 11.3 x10*3/uL Final     Hemoglobin   Date/Time Value Ref Range Status   01/21/2025 03:47 PM 13.4 (L) 13.5 - 17.5 g/dL Final     Hematocrit   Date/Time Value Ref Range Status   01/21/2025 03:47 PM 40.8 (L) 41.0 - 52.0 % Final     MCV   Date/Time Value Ref Range Status   01/21/2025 03:47 PM 88 80 - 100 fL Final     Platelets   Date/Time Value Ref Range Status   01/21/2025 03:47  150 - 450 x10*3/uL Final        Total Protein   Date/Time Value Ref Range Status   01/21/2025 03:47 PM 7.6 6.4 - 8.2 g/dL Final     Albumin   Date/Time Value Ref Range Status   01/21/2025 03:47 PM 4.5 3.4 - 5.0 g/dL Final     AST   Date/Time Value Ref Range Status   01/21/2025 03:47 PM 22 9 - 39 U/L Final     ALT   Date/Time Value Ref Range Status   01/21/2025 03:47 PM 19 10 - 52 U/L Final     Comment:     Patients treated with Sulfasalazine may generate falsely decreased results for ALT.     Alkaline Phosphatase   Date/Time Value Ref Range Status   01/21/2025 03:47  (H) 33 - 120 U/L Final     Bilirubin, Total   Date/Time Value Ref Range Status   01/21/2025 03:47 PM 0.7 0.0 - 1.2 mg/dL Final     Bilirubin, Direct   Date/Time Value Ref Range Status   01/21/2025 03:47 PM 0.1 0.0 - 0.3 mg/dL Final        Vitamin D, 25-Hydroxy, Total   Date/Time Value Ref Range Status   03/28/2024 10:16 AM 32 30 - 100 ng/mL Final        AST   Date/Time Value Ref Range Status   01/21/2025 03:47 PM 22 9 - 39 U/L Final     ALT   Date/Time Value Ref Range Status   01/21/2025 03:47 PM  19 10 - 52 U/L Final     Comment:     Patients treated with Sulfasalazine may generate falsely decreased results for ALT.     Alkaline Phosphatase   Date/Time Value Ref Range Status   01/21/2025 03:47  (H) 33 - 120 U/L Final     Bilirubin, Total   Date/Time Value Ref Range Status   01/21/2025 03:47 PM 0.7 0.0 - 1.2 mg/dL Final     Bilirubin, Direct   Date/Time Value Ref Range Status   01/21/2025 03:47 PM 0.1 0.0 - 0.3 mg/dL Final     Albumin   Date/Time Value Ref Range Status   01/21/2025 03:47 PM 4.5 3.4 - 5.0 g/dL Final     Total Protein   Date/Time Value Ref Range Status   01/21/2025 03:47 PM 7.6 6.4 - 8.2 g/dL Final        Protime   Date/Time Value Ref Range Status   01/21/2025 03:47 PM 12.4 9.3 - 12.7 seconds Final     INR   Date/Time Value Ref Range Status   01/21/2025 03:47 PM 1.2 0.9 - 1.2 Final     MELD 3.0: 8 at 1/21/2025  3:47 PM  MELD-Na: 8 at 1/21/2025  3:47 PM  Calculated from:  Serum Creatinine: 1.03 mg/dL at 1/21/2025  3:47 PM  Serum Sodium: 137 mmol/L at 1/21/2025  3:47 PM  Total Bilirubin: 0.7 mg/dL (Using min of 1 mg/dL) at 1/21/2025  3:47 PM  Serum Albumin: 4.5 g/dL (Using max of 3.5 g/dL) at 1/21/2025  3:47 PM  INR(ratio): 1.2 at 1/21/2025  3:47 PM  Age at listing (hypothetical): 54 years  Sex: Male at 1/21/2025  3:47 PM    LIVER IMAGING    CT abdomen and pelvis 9/7/2024    HEPATOBILIARY: Heterogenous liver without significant nodular hepatic contour. No focal aggressive appearing hepatic lesions.  Few scattered tiny low-attenuation lesions are likely cysts, however too small to accurately characterize.  Recannulization of the umbilical vein along with enlarged main   portal vein (1.7 cm), can be seen in setting of portal hypertension. No biliary ductal dilatation. Gallbladder is present.     SPLEEN: Splenomegaly measuring up to 15.9 cm in the craniocaudal dimension.     IMPRESSION:     * Moderate volume ascites.   *  Secondary signs of portal hypertension along with a heterogenous  appearing liver.  Findings raising suspicion for underlying cirrhosis.   *  No bowel obstruction.  Air-fluid levels in nondilated small bowel compatible with an ileus.   *  Chronic changes detailed above including bilateral avascular necrosis of the hips.     EGD in 2022 with small esophageal varices -no banding indicated at that time.    US Liver  2/21/25  IMPRESSION:  1. No acute right upper quadrant pathology.  2. Heterogenous hepatic echotexture could be related to underlying  diffuse hepatocellular dysfunction/cirrhosis without gross lesions.  Advise screening for underlying cirrhosis.  3. Trace perihepatic ascites.    Assessment/Plan   Aamir Garcia is a 54 y.o. male who presents to Hepatology clinic for follow up.    Alcohol related cirrhosis with clinically significant portal hypertension.  Signs of portal hypertension include splenomegaly and esophageal varices.  Last year, he developed some ascites.  This was controlled with medication at this time and has improved with sobriety and abstinence from all alcohol use and now appears to have resolved.    After his initial office visit, lab work confirmed a low MELD score of 10. Recent labs confirm a sustained improvement in liver function --> 8.    I have encouraged the patient to continue in his alcohol recovery.    Recommend liver cancer screening every 6 months.    I have recommended a FibroScan to confirm a diagnosis of cirrhosis.  I would also like him to schedule a follow up endoscopy with his Gastroenterologist.     At this time, with his low MELD score - I do not believe liver transplant evaluation is justified.    I would like him to continue to follow-up with my office.    Yared Landin MD      Instructions      Yared Landin MD

## 2025-02-26 NOTE — PATIENT INSTRUCTIONS
"Welcome to Dr. Y[facility].  Dr. Landin sees patients at the following sites:  [place] [address] [facility] at [facility] [address], [address] at [facility], [facility] [address]    Dr. Landin's hepatology care coordinator, Aria SCHRADER, can be reached at [phone].  Dr. Landin's , Theresa Asher, can be reached at [phone].    Get a Fibroscan of your Liver now.  Do not eat or drink anything 3 hours prior to your exam.   Schedule on the way out from your visit today, or call [phone].  When calling and after prompts, state \"make an appointment,\" and then \"gastro\" to get to the appropriate .      Get an EGD with Dr. Sullivan    Get blood work in June.    Follow up with me in clinic in June 2025. Complete labs prior to.  Schedule your visit on your way out today. If unable, please call [phone] to schedule.  "

## 2025-02-27 ENCOUNTER — CLINICAL SUPPORT (OUTPATIENT)
Dept: GASTROENTEROLOGY | Facility: CLINIC | Age: 55
End: 2025-02-27
Payer: MEDICARE

## 2025-02-27 ENCOUNTER — APPOINTMENT (OUTPATIENT)
Dept: GASTROENTEROLOGY | Facility: CLINIC | Age: 55
End: 2025-02-27
Payer: MEDICARE

## 2025-02-27 DIAGNOSIS — K70.30 ALCOHOLIC CIRRHOSIS OF LIVER WITHOUT ASCITES (MULTI): ICD-10-CM

## 2025-03-04 ENCOUNTER — TELEPHONE (OUTPATIENT)
Dept: ORTHOPEDIC SURGERY | Facility: CLINIC | Age: 55
End: 2025-03-04
Payer: MEDICARE

## 2025-03-04 NOTE — TELEPHONE ENCOUNTER
Sandip called from pre-cert department for patient MRI>  she states that patient called to R/S his MRI and said he completed the physical therapy requirements.  I dont see anything in his chart.  I left a message for the patient to call me back on where he went to get the notes sandip can resubmit the MRI      I WILL call sandip back at [phone]

## 2025-03-05 ENCOUNTER — TELEPHONE (OUTPATIENT)
Dept: PRIMARY CARE | Facility: CLINIC | Age: 55
End: 2025-03-05
Payer: MEDICARE

## 2025-03-05 DIAGNOSIS — G62.1 ALCOHOL-INDUCED POLYNEUROPATHY (MULTI): ICD-10-CM

## 2025-03-05 DIAGNOSIS — I10 BENIGN ESSENTIAL HTN: ICD-10-CM

## 2025-03-05 DIAGNOSIS — K21.9 GASTROESOPHAGEAL REFLUX DISEASE WITHOUT ESOPHAGITIS: ICD-10-CM

## 2025-03-05 RX ORDER — GABAPENTIN 300 MG/1
300 CAPSULE ORAL NIGHTLY
Qty: 15 CAPSULE | Refills: 0 | Status: SHIPPED | OUTPATIENT
Start: 2025-03-05 | End: 2025-09-01

## 2025-03-05 RX ORDER — GABAPENTIN 100 MG/1
100 CAPSULE ORAL EVERY MORNING
Qty: 15 CAPSULE | Refills: 0 | Status: SHIPPED | OUTPATIENT
Start: 2025-03-05 | End: 2026-03-05

## 2025-03-05 RX ORDER — OMEPRAZOLE 40 MG/1
40 CAPSULE, DELAYED RELEASE ORAL
Qty: 30 CAPSULE | Refills: 0 | Status: SHIPPED | OUTPATIENT
Start: 2025-03-05 | End: 2026-03-05

## 2025-03-05 RX ORDER — LISINOPRIL 10 MG/1
10 TABLET ORAL DAILY
Qty: 15 TABLET | Refills: 0 | Status: SHIPPED | OUTPATIENT
Start: 2025-03-05

## 2025-03-05 NOTE — TELEPHONE ENCOUNTER
Pt called stating he's traveled to [facility]  and his luggage was lost with his medications --- he states he is going to be there for x10 days     >He is requesting a small supply of medications to last him until he returns - Pharmacy updated

## 2025-03-06 ENCOUNTER — APPOINTMENT (OUTPATIENT)
Dept: PRIMARY CARE | Facility: CLINIC | Age: 55
End: 2025-03-06
Payer: MEDICARE

## 2025-03-10 ENCOUNTER — TELEPHONE (OUTPATIENT)
Dept: PRIMARY CARE | Facility: CLINIC | Age: 55
End: 2025-03-10
Payer: MEDICARE

## 2025-03-10 LAB
LABORATORY COMMENT REPORT: NORMAL
PATH REPORT.FINAL DX SPEC: NORMAL
PATH REPORT.GROSS SPEC: NORMAL
PATH REPORT.RELEVANT HX SPEC: NORMAL
PATH REPORT.TOTAL CANCER: NORMAL

## 2025-03-10 NOTE — TELEPHONE ENCOUNTER
Pathology of skin lesion came back.    Pathologist states favors and inflamed seborrheic keratosis which is benign lesion we thought it may be, but has some abnormalities at base and therefore squamous skin cancer could not be fully rule-out. Because of this I just want to keep close on on skin lesion and make sure does not grow back. If grows back will need deeper punch biopsy/derm referral.     Schedule his CPE in one month and will re-check lesion then

## 2025-03-17 ENCOUNTER — HOSPITAL ENCOUNTER (OUTPATIENT)
Dept: RADIOLOGY | Facility: CLINIC | Age: 55
Discharge: HOME | End: 2025-03-17
Payer: MEDICARE

## 2025-03-17 DIAGNOSIS — S43.491A SPRAIN OF OTHER PART OF RIGHT SHOULDER REGION, INITIAL ENCOUNTER: ICD-10-CM

## 2025-03-17 DIAGNOSIS — M25.511 RIGHT SHOULDER PAIN, UNSPECIFIED CHRONICITY: ICD-10-CM

## 2025-03-17 PROCEDURE — 73221 MRI JOINT UPR EXTREM W/O DYE: CPT | Mod: RT

## 2025-03-19 ENCOUNTER — APPOINTMENT (OUTPATIENT)
Dept: GASTROENTEROLOGY | Facility: HOSPITAL | Age: 55
End: 2025-03-19
Payer: MEDICARE

## 2025-03-28 ENCOUNTER — TELEPHONE (OUTPATIENT)
Dept: PRIMARY CARE | Facility: CLINIC | Age: 55
End: 2025-03-28
Payer: MEDICARE

## 2025-04-11 ENCOUNTER — APPOINTMENT (OUTPATIENT)
Dept: PRIMARY CARE | Facility: CLINIC | Age: 55
End: 2025-04-11
Payer: MEDICARE

## 2025-06-01 DIAGNOSIS — K70.30 ALCOHOLIC CIRRHOSIS OF LIVER WITHOUT ASCITES (MULTI): ICD-10-CM

## 2025-06-03 ENCOUNTER — TELEPHONE (OUTPATIENT)
Dept: PRIMARY CARE | Facility: CLINIC | Age: 55
End: 2025-06-03
Payer: MEDICARE

## 2025-06-03 NOTE — TELEPHONE ENCOUNTER
Patient was recently at detox facility for ETOH. He was placed on Zoloft and Vivitrol and is wanting to know if Irina can prescribe it?      Irina said she is able to fill Zoloft when patient needs it, informed pt verbally

## 2025-06-19 ENCOUNTER — APPOINTMENT (OUTPATIENT)
Dept: GASTROENTEROLOGY | Facility: CLINIC | Age: 55
End: 2025-06-19
Payer: MEDICARE

## 2025-06-19 DIAGNOSIS — K21.9 GASTROESOPHAGEAL REFLUX DISEASE WITHOUT ESOPHAGITIS: ICD-10-CM

## 2025-06-19 DIAGNOSIS — F32.5 MAJOR DEPRESSIVE DISORDER WITH SINGLE EPISODE, IN FULL REMISSION: Primary | ICD-10-CM

## 2025-06-19 NOTE — TELEPHONE ENCOUNTER
See attached rx, last appt was 02/21/25. You did agree to fill patients Zoloft that he was prescribed at treatment facility.     We do not know dosage or frequency so I left a message for him to call back and let us know.

## 2025-06-20 DIAGNOSIS — K21.9 GASTROESOPHAGEAL REFLUX DISEASE WITHOUT ESOPHAGITIS: ICD-10-CM

## 2025-06-20 DIAGNOSIS — F32.5 MAJOR DEPRESSIVE DISORDER WITH SINGLE EPISODE, IN FULL REMISSION: ICD-10-CM

## 2025-06-20 RX ORDER — SERTRALINE HYDROCHLORIDE 50 MG/1
50 TABLET, FILM COATED ORAL DAILY
Qty: 90 TABLET | Refills: 0 | Status: SHIPPED | OUTPATIENT
Start: 2025-06-20 | End: 2025-06-20 | Stop reason: SDUPTHER

## 2025-06-20 RX ORDER — SERTRALINE HYDROCHLORIDE 50 MG/1
50 TABLET, FILM COATED ORAL DAILY
COMMUNITY
End: 2025-06-20 | Stop reason: SDUPTHER

## 2025-06-20 RX ORDER — OMEPRAZOLE 40 MG/1
40 CAPSULE, DELAYED RELEASE ORAL
Qty: 90 CAPSULE | Refills: 1 | Status: SHIPPED | OUTPATIENT
Start: 2025-06-20 | End: 2025-06-20 | Stop reason: SDUPTHER

## 2025-06-21 RX ORDER — SERTRALINE HYDROCHLORIDE 50 MG/1
50 TABLET, FILM COATED ORAL DAILY
Qty: 90 TABLET | Refills: 0 | Status: SHIPPED | OUTPATIENT
Start: 2025-06-21

## 2025-06-21 RX ORDER — OMEPRAZOLE 40 MG/1
40 CAPSULE, DELAYED RELEASE ORAL
Qty: 90 CAPSULE | Refills: 1 | Status: SHIPPED | OUTPATIENT
Start: 2025-06-21 | End: 2025-09-19

## 2025-07-15 ENCOUNTER — APPOINTMENT (OUTPATIENT)
Dept: CARDIOLOGY | Facility: HOSPITAL | Age: 55
End: 2025-07-15
Payer: MEDICARE

## 2025-07-15 ENCOUNTER — HOSPITAL ENCOUNTER (EMERGENCY)
Facility: HOSPITAL | Age: 55
Discharge: HOME | End: 2025-07-16
Attending: STUDENT IN AN ORGANIZED HEALTH CARE EDUCATION/TRAINING PROGRAM
Payer: MEDICARE

## 2025-07-15 DIAGNOSIS — F10.920 ALCOHOLIC INTOXICATION WITHOUT COMPLICATION: Primary | ICD-10-CM

## 2025-07-15 LAB
ALBUMIN SERPL BCP-MCNC: 4.6 G/DL (ref 3.4–5)
ALP SERPL-CCNC: 146 U/L (ref 33–120)
ALT SERPL W P-5'-P-CCNC: 69 U/L (ref 10–52)
AMPHETAMINES UR QL SCN: ABNORMAL
ANION GAP SERPL CALCULATED.3IONS-SCNC: 16 MMOL/L (ref 10–20)
APPEARANCE UR: CLEAR
AST SERPL W P-5'-P-CCNC: 89 U/L (ref 9–39)
BARBITURATES UR QL SCN: ABNORMAL
BASOPHILS # BLD AUTO: 0.1 X10*3/UL (ref 0–0.1)
BASOPHILS NFR BLD AUTO: 1.3 %
BENZODIAZ UR QL SCN: ABNORMAL
BILIRUB SERPL-MCNC: 0.8 MG/DL (ref 0–1.2)
BILIRUB UR STRIP.AUTO-MCNC: NEGATIVE MG/DL
BUN SERPL-MCNC: 9 MG/DL (ref 6–23)
BZE UR QL SCN: ABNORMAL
CALCIUM SERPL-MCNC: 9.2 MG/DL (ref 8.6–10.3)
CANNABINOIDS UR QL SCN: ABNORMAL
CHLORIDE SERPL-SCNC: 102 MMOL/L (ref 98–107)
CO2 SERPL-SCNC: 23 MMOL/L (ref 21–32)
COLOR UR: COLORLESS
CREAT SERPL-MCNC: 0.92 MG/DL (ref 0.5–1.3)
EGFRCR SERPLBLD CKD-EPI 2021: >90 ML/MIN/1.73M*2
EOSINOPHIL # BLD AUTO: 0.15 X10*3/UL (ref 0–0.7)
EOSINOPHIL NFR BLD AUTO: 1.9 %
ERYTHROCYTE [DISTWIDTH] IN BLOOD BY AUTOMATED COUNT: 17.6 % (ref 11.5–14.5)
ETHANOL SERPL-MCNC: 335 MG/DL
FENTANYL+NORFENTANYL UR QL SCN: ABNORMAL
GLUCOSE SERPL-MCNC: 163 MG/DL (ref 74–99)
GLUCOSE UR STRIP.AUTO-MCNC: NORMAL MG/DL
HCT VFR BLD AUTO: 37.9 % (ref 41–52)
HGB BLD-MCNC: 12.6 G/DL (ref 13.5–17.5)
IMM GRANULOCYTES # BLD AUTO: 0.02 X10*3/UL (ref 0–0.7)
IMM GRANULOCYTES NFR BLD AUTO: 0.3 % (ref 0–0.9)
KETONES UR STRIP.AUTO-MCNC: NEGATIVE MG/DL
LEUKOCYTE ESTERASE UR QL STRIP.AUTO: NEGATIVE
LIPASE SERPL-CCNC: 64 U/L (ref 9–82)
LYMPHOCYTES # BLD AUTO: 3.42 X10*3/UL (ref 1.2–4.8)
LYMPHOCYTES NFR BLD AUTO: 42.8 %
MAGNESIUM SERPL-MCNC: 1.7 MG/DL (ref 1.6–2.4)
MCH RBC QN AUTO: 29 PG (ref 26–34)
MCHC RBC AUTO-ENTMCNC: 33.2 G/DL (ref 32–36)
MCV RBC AUTO: 87 FL (ref 80–100)
METHADONE UR QL SCN: ABNORMAL
MONOCYTES # BLD AUTO: 0.64 X10*3/UL (ref 0.1–1)
MONOCYTES NFR BLD AUTO: 8 %
NEUTROPHILS # BLD AUTO: 3.67 X10*3/UL (ref 1.2–7.7)
NEUTROPHILS NFR BLD AUTO: 45.7 %
NITRITE UR QL STRIP.AUTO: NEGATIVE
NRBC BLD-RTO: 0 /100 WBCS (ref 0–0)
OPIATES UR QL SCN: ABNORMAL
OXYCODONE+OXYMORPHONE UR QL SCN: ABNORMAL
PCP UR QL SCN: ABNORMAL
PH UR STRIP.AUTO: 6.5 [PH]
PLATELET # BLD AUTO: 133 X10*3/UL (ref 150–450)
POTASSIUM SERPL-SCNC: 3.2 MMOL/L (ref 3.5–5.3)
PROT SERPL-MCNC: 8 G/DL (ref 6.4–8.2)
PROT UR STRIP.AUTO-MCNC: NEGATIVE MG/DL
RBC # BLD AUTO: 4.35 X10*6/UL (ref 4.5–5.9)
RBC # UR STRIP.AUTO: ABNORMAL MG/DL
RBC #/AREA URNS AUTO: NORMAL /HPF
SODIUM SERPL-SCNC: 138 MMOL/L (ref 136–145)
SP GR UR STRIP.AUTO: 1
UROBILINOGEN UR STRIP.AUTO-MCNC: NORMAL MG/DL
WBC # BLD AUTO: 8 X10*3/UL (ref 4.4–11.3)
WBC #/AREA URNS AUTO: NORMAL /HPF

## 2025-07-15 PROCEDURE — 85025 COMPLETE CBC W/AUTO DIFF WBC: CPT | Performed by: PHYSICIAN ASSISTANT

## 2025-07-15 PROCEDURE — 81001 URINALYSIS AUTO W/SCOPE: CPT | Performed by: PHYSICIAN ASSISTANT

## 2025-07-15 PROCEDURE — 99284 EMERGENCY DEPT VISIT MOD MDM: CPT | Mod: 25 | Performed by: STUDENT IN AN ORGANIZED HEALTH CARE EDUCATION/TRAINING PROGRAM

## 2025-07-15 PROCEDURE — 83690 ASSAY OF LIPASE: CPT | Performed by: PHYSICIAN ASSISTANT

## 2025-07-15 PROCEDURE — 84075 ASSAY ALKALINE PHOSPHATASE: CPT | Performed by: PHYSICIAN ASSISTANT

## 2025-07-15 PROCEDURE — 36415 COLL VENOUS BLD VENIPUNCTURE: CPT | Performed by: PHYSICIAN ASSISTANT

## 2025-07-15 PROCEDURE — 2500000004 HC RX 250 GENERAL PHARMACY W/ HCPCS (ALT 636 FOR OP/ED): Performed by: PHYSICIAN ASSISTANT

## 2025-07-15 PROCEDURE — 96361 HYDRATE IV INFUSION ADD-ON: CPT

## 2025-07-15 PROCEDURE — 82077 ASSAY SPEC XCP UR&BREATH IA: CPT | Performed by: PHYSICIAN ASSISTANT

## 2025-07-15 PROCEDURE — 93005 ELECTROCARDIOGRAM TRACING: CPT

## 2025-07-15 PROCEDURE — 83735 ASSAY OF MAGNESIUM: CPT | Performed by: PHYSICIAN ASSISTANT

## 2025-07-15 PROCEDURE — 80307 DRUG TEST PRSMV CHEM ANLYZR: CPT | Performed by: PHYSICIAN ASSISTANT

## 2025-07-15 RX ADMIN — SODIUM CHLORIDE 1000 ML: 900 INJECTION, SOLUTION INTRAVENOUS at 17:14

## 2025-07-15 SDOH — HEALTH STABILITY: MENTAL HEALTH: HAVE YOU EVER DONE ANYTHING, STARTED TO DO ANYTHING, OR PREPARED TO DO ANYTHING TO END YOUR LIFE?: NO

## 2025-07-15 SDOH — HEALTH STABILITY: MENTAL HEALTH: HAVE YOU ACTUALLY HAD ANY THOUGHTS OF KILLING YOURSELF?: NO

## 2025-07-15 SDOH — HEALTH STABILITY: MENTAL HEALTH: HAVE YOU WISHED YOU WERE DEAD OR WISHED YOU COULD GO TO SLEEP AND NOT WAKE UP?: NO

## 2025-07-15 SDOH — HEALTH STABILITY: MENTAL HEALTH: SUICIDE ASSESSMENT: ADULT (C-SSRS)

## 2025-07-15 SDOH — HEALTH STABILITY: MENTAL HEALTH: BEHAVIORAL HEALTH(WDL): WITHIN DEFINED LIMITS

## 2025-07-15 ASSESSMENT — PAIN SCALES - GENERAL: PAINLEVEL_OUTOF10: 0 - NO PAIN

## 2025-07-15 ASSESSMENT — LIFESTYLE VARIABLES
NAUSEA AND VOMITING: MILD NAUSEA WITH NO VOMITING
ANXIETY: NO ANXIETY, AT EASE
TOTAL SCORE: 1
PAROXYSMAL SWEATS: NO SWEAT VISIBLE
ORIENTATION AND CLOUDING OF SENSORIUM: ORIENTED AND CAN DO SERIAL ADDITIONS
TREMOR: NO TREMOR
VISUAL DISTURBANCES: NOT PRESENT
PULSE: 113
HEADACHE, FULLNESS IN HEAD: NOT PRESENT
BLOOD PRESSURE: 140/89
AUDITORY DISTURBANCES: NOT PRESENT
AGITATION: NORMAL ACTIVITY

## 2025-07-15 ASSESSMENT — ABNORMAL INVOLUNTARY MOVEMENT SCALE (AIMS)
NECK_SHOULDER_HIPS: NONE, NORMAL
CURRENT_PROBLEMS_TEETH_DENTURES: NO
PATIENT_WEARS_DENTURES: NO
FACIAL_EXPRESSION_MUSCLES: NONE, NORMAL
AIMS_PATIENT_INCAPACITATION: NONE, NORMAL
UPPER_BODY_EXTREMITIES: NONE, NORMAL

## 2025-07-15 ASSESSMENT — PAIN - FUNCTIONAL ASSESSMENT: PAIN_FUNCTIONAL_ASSESSMENT: 0-10

## 2025-07-16 VITALS
HEIGHT: 72 IN | TEMPERATURE: 98.8 F | HEART RATE: 97 BPM | BODY MASS INDEX: 25.26 KG/M2 | WEIGHT: 186.51 LBS | RESPIRATION RATE: 18 BRPM | DIASTOLIC BLOOD PRESSURE: 90 MMHG | OXYGEN SATURATION: 99 % | SYSTOLIC BLOOD PRESSURE: 176 MMHG

## 2025-07-16 LAB
ATRIAL RATE: 109 BPM
HOLD SPECIMEN: NORMAL
P AXIS: 33 DEGREES
P OFFSET: 187 MS
P ONSET: 140 MS
PR INTERVAL: 140 MS
Q ONSET: 210 MS
QRS COUNT: 18 BEATS
QRS DURATION: 92 MS
QT INTERVAL: 328 MS
QTC CALCULATION(BAZETT): 441 MS
QTC FREDERICIA: 400 MS
R AXIS: 3 DEGREES
T AXIS: 17 DEGREES
T OFFSET: 374 MS
VENTRICULAR RATE: 109 BPM

## 2025-07-16 PROCEDURE — 2500000001 HC RX 250 WO HCPCS SELF ADMINISTERED DRUGS (ALT 637 FOR MEDICARE OP): Performed by: STUDENT IN AN ORGANIZED HEALTH CARE EDUCATION/TRAINING PROGRAM

## 2025-07-16 PROCEDURE — 2500000004 HC RX 250 GENERAL PHARMACY W/ HCPCS (ALT 636 FOR OP/ED): Performed by: STUDENT IN AN ORGANIZED HEALTH CARE EDUCATION/TRAINING PROGRAM

## 2025-07-16 PROCEDURE — 96374 THER/PROPH/DIAG INJ IV PUSH: CPT

## 2025-07-16 RX ORDER — LISINOPRIL 10 MG/1
10 TABLET ORAL ONCE
Status: COMPLETED | OUTPATIENT
Start: 2025-07-16 | End: 2025-07-16

## 2025-07-16 RX ORDER — LISINOPRIL 10 MG/1
10 TABLET ORAL DAILY
Status: DISCONTINUED | OUTPATIENT
Start: 2025-07-16 | End: 2025-07-16

## 2025-07-16 RX ORDER — ONDANSETRON HYDROCHLORIDE 2 MG/ML
4 INJECTION, SOLUTION INTRAVENOUS ONCE
Status: COMPLETED | OUTPATIENT
Start: 2025-07-16 | End: 2025-07-16

## 2025-07-16 RX ORDER — ONDANSETRON 4 MG/1
4 TABLET, ORALLY DISINTEGRATING ORAL ONCE
Status: COMPLETED | OUTPATIENT
Start: 2025-07-16 | End: 2025-07-16

## 2025-07-16 RX ADMIN — ONDANSETRON 4 MG: 2 INJECTION, SOLUTION INTRAMUSCULAR; INTRAVENOUS at 05:30

## 2025-07-16 RX ADMIN — LISINOPRIL 10 MG: 10 TABLET ORAL at 05:30

## 2025-07-16 RX ADMIN — ONDANSETRON 4 MG: 4 TABLET, ORALLY DISINTEGRATING ORAL at 10:22

## 2025-07-16 ASSESSMENT — PAIN SCALES - GENERAL
PAINLEVEL_OUTOF10: 0 - NO PAIN
PAINLEVEL_OUTOF10: 0 - NO PAIN

## 2025-07-16 ASSESSMENT — ACTIVITIES OF DAILY LIVING (ADL): LACK_OF_TRANSPORTATION: NO

## 2025-07-16 NOTE — ED PROVIDER NOTES
HPI   Chief Complaint   Patient presents with    Alcohol Problem     Pt states he is a chronic alcoholic.  Has liver cirrhosis.  Has been trying to get into recovery for 2 days, but is being denied treatment.  Pt wants detox.  Last drink was 10 minutes ago.        55-year-old male presented emergency department with a chief complaint of alcoholism.  He drinks alcohol daily.  Typically drinks liquor.  Drink just prior to arrival.  He inpatient detox to 2 months ago.  He relapsed.  He denies suicidality he denies illicit substance use.  He is well-appearing nontoxic afebrile.  No other complaint.              Patient History   Medical History[1]  Surgical History[2]  Family History[3]  Social History[4]    Physical Exam   ED Triage Vitals   Temperature Heart Rate Respirations BP   07/15/25 1639 07/15/25 1639 07/15/25 1639 07/15/25 1639   37.4 °C (99.3 °F) (!) 134 16 (!) 165/111      Pulse Ox Temp src Heart Rate Source Patient Position   07/15/25 1639 -- 07/15/25 1803 --   95 %  Monitor       BP Location FiO2 (%)     -- --             Physical Exam  Vitals and nursing note reviewed.   Constitutional:       Appearance: Normal appearance.   HENT:      Head: Normocephalic.      Mouth/Throat:      Mouth: Mucous membranes are moist.   Cardiovascular:      Rate and Rhythm: Normal rate and regular rhythm.      Pulses: Normal pulses.      Heart sounds: Normal heart sounds.   Pulmonary:      Effort: Pulmonary effort is normal.      Breath sounds: Normal breath sounds.   Abdominal:      General: Abdomen is flat.      Palpations: Abdomen is soft.   Musculoskeletal:         General: Normal range of motion.      Cervical back: Normal range of motion.   Skin:     General: Skin is warm and dry.   Neurological:      General: No focal deficit present.      Mental Status: He is alert and oriented to person, place, and time.   Psychiatric:         Mood and Affect: Mood normal.         Behavior: Behavior normal.           ED Course & MDM    ED Course as of 07/15/25 2003   Tue Jul 15, 2025   1657 EKG obtained at 4:54 PM interpreted by myself sinus tachycardia ventricular rate 110 QTc 441 [JW]      ED Course User Index  [JW] Rian Grider MD         Diagnoses as of 07/15/25 2003   Alcoholic intoxication without complication                 No data recorded     Trisitn Coma Scale Score: 15 (07/15/25 1639 : Troy Atkinson, EMT)                           Medical Decision Making  I have seen and evaluated this patient.  The attending physician has also seen and evaluated this patient.  Vital signs, laboratory testing and diagnostic images if applicable have been reviewed.  All laboratory and imaging is interpreted by myself unless otherwise stated.  Radiology studies are also formally interpreted by radiologist.     CBC without significant leukocytosis or anemia, metabolic panel without significant renal impairment or electrolyte abnormality.    Patient medically cleared from emergency medical standpoint for treatment at inpatient detoxification or psychiatric facility.    Labs Reviewed  CBC WITH AUTO DIFFERENTIAL - Abnormal     WBC                           8.0                    nRBC                          0.0                    RBC                           4.35 (*)               Hemoglobin                    12.6 (*)               Hematocrit                    37.9 (*)               MCV                           87                     MCH                           29.0                   MCHC                          33.2                   RDW                           17.6 (*)               Platelets                     133 (*)                Neutrophils %                 45.7                   Immature Granulocytes %, Automated   0.3                    Lymphocytes %                 42.8                   Monocytes %                   8.0                    Eosinophils %                 1.9                    Basophils %                   1.3                     Neutrophils Absolute          3.67                   Immature Granulocytes Absolute, Au*   0.02                   Lymphocytes Absolute          3.42                   Monocytes Absolute            0.64                   Eosinophils Absolute          0.15                   Basophils Absolute            0.10                COMPREHENSIVE METABOLIC PANEL - Abnormal     Glucose                       163 (*)                Sodium                        138                    Potassium                     3.2 (*)                Chloride                      102                    Bicarbonate                   23                     Anion Gap                     16                     Urea Nitrogen                 9                      Creatinine                    0.92                   eGFR                          >90                    Calcium                       9.2                    Albumin                       4.6                    Alkaline Phosphatase          146 (*)                Total Protein                 8.0                    AST                           89 (*)                 Bilirubin, Total              0.8                    ALT                           69 (*)              DRUG SCREEN,URINE - Abnormal     Amphetamine Screen, Urine                            Barbiturate Screen, Urine                            Benzodiazepines Screen, Urine                          Cannabinoid Screen, Urine       (*)                  Cocaine Metabolite Screen, Urine                          Fentanyl Screen, Urine                               Opiate Screen, Urine                                 Oxycodone Screen, Urine                              PCP Screen, Urine                                    Methadone Screen, Urine                                Narrative: Drug screen results are presumptive and should not be used to assess                 compliance with prescribed medication. Contact the  performing [facility] laboratory                 to add-on definitive confirmatory testing if clinically indicated.                                Toxicology screening results are reported qualitatively. The concentration must                 be greater than or equal to the cutoff to be reported as positive. The concentration                 at which the screening test can detect an individual drug or metabolite varies.                 The absence of expected drug(s) and/or drug metabolite(s) may indicate non-compliance,                 inappropriate timing of specimen collection relative to drug administration, poor drug                 absorption, diluted/adulterated urine, or limitations of testing. For medical purposes                 only; not valid for forensic use.                                Interpretive questions should be directed to the laboratory medical directors.  ALCOHOL - Abnormal     Alcohol                       335 (*)             URINALYSIS WITH REFLEX CULTURE AND MICROSCOPIC - Abnormal     Color, Urine                  Colorless (*)               Appearance, Urine             Clear                  Specific Gravity, Urine       1.004 (*)               pH, Urine                     6.5                    Protein, Urine                NEGATIVE                Glucose, Urine                Normal                 Blood, Urine                    (*)                  Ketones, Urine                NEGATIVE                Bilirubin, Urine              NEGATIVE                Urobilinogen, Urine           Normal                 Nitrite, Urine                NEGATIVE                Leukocyte Esterase, Urine     NEGATIVE             MAGNESIUM - Normal     Magnesium                     1.70                LIPASE - Normal     Lipase                        64                       Narrative: Venipuncture immediately after or during the administration of Metamizole may lead to falsely low results. Testing should  be performed immediately prior to Metamizole dosing.  URINALYSIS MICROSCOPIC WITH REFLEX CULTURE - Normal     WBC, Urine                    NONE                   RBC, Urine                    1-2                 URINALYSIS WITH REFLEX CULTURE AND MICROSCOPIC       Narrative: The following orders were created for panel order Urinalysis with Reflex Culture and Microscopic.                Procedure                               Abnormality         Status                                   ---------                               -----------         ------                                   Urinalysis with Reflex C...[[id number]]  Abnormal            Final result                             Extra Urine Gray Tube[[id number]]                            In process                                               Please view results for these tests on the individual orders.  EXTRA URINE GRAY TUBE  No orders to display  Medications  sodium chloride 0.9 % bolus 1,000 mL (0 mL intravenous Stopped 7/15/25 1813)  New Prescriptions  No medications on file            Procedure  Procedures       [1]   Past Medical History:  Diagnosis Date    Alcohol dependence     Alcoholic cirrhosis of liver with ascites (Multi)     GERD (gastroesophageal reflux disease)     Gout     Hyperlipidemia     Hypertension    [2]   Past Surgical History:  Procedure Laterality Date    PARACENTESIS      2x in 2024    SINUS SURGERY  2013    Sinus Surgery   [3]   Family History  Problem Relation Name Age of Onset    Hypertension Mother           age 64    Heart attack Father           at age 62    Other (Glioblastoma) Father      Diabetes Father      Cancer Father     [4]   Social History  Tobacco Use    Smoking status: Former     Current packs/day: 0.50     Types: Cigarettes     Passive exposure: Current    Smokeless tobacco: Never   Vaping Use    Vaping status: Every Day    Substances: Nicotine    Devices: Disposable, Pre-filled pod    Substance Use Topics    Alcohol use: Not Currently    Drug use: Yes     Types: Marijuana     Comment: bedtime to sleep   has marijuana card        Reynold Mccauley PA-C  07/16/25 0011

## 2025-07-16 NOTE — ED NOTES
Assumed care for pt and pt moved to  13 for comfort. Pt ambulates independently without difficulty. Pt advised pending consult with [facility] to arrange requested detox services. Pt in a position of comfort and safety maintained. Call light in reach. Denies further needs at this time.     Marjorie Corrales RN  07/15/25 8100

## 2025-07-16 NOTE — PROGRESS NOTES
07/16/25 1024   Discharge Planning   Living Arrangements Alone   Support Systems Children   Assistance Needed Patient is independent of ADLs and IADLs.   Type of Residence Private residence   Who is requesting discharge planning? Provider   Home or Post Acute Services None   Expected Discharge Disposition [facility]  (Inpt detox)   Does the patient need discharge transport arranged? Yes   Financial Resource Strain   How hard is it for you to pay for the very basics like food, housing, medical care, and heating? Not very   Housing Stability   In the last 12 months, was there a time when you were not able to pay the mortgage or rent on time? N   In the past 12 months, how many times have you moved where you were living? 0   At any time in the past 12 months, were you homeless or living in a shelter (including now)? N   Transportation Needs   In the past 12 months, has lack of transportation kept you from medical appointments or from getting medications? no   In the past 12 months, has lack of transportation kept you from meetings, work, or from getting things needed for daily living? No   Stroke Family Assessment   Stroke Family Assessment Needed No   Intensity of Service   Intensity of Service 0-30 min     Notified by ED that patient is wanting inpatient detox treatment for alcohol abuse. Met with patient and discussed the same and he confirmed. Contacted Cele Lamas from [facility] and she states they do not take his insurance but she knows people that do and will call around to obtain options for the patient. Patient made aware of the same and agreeable. Per physician patient is medically cleared for discharge; Cele is aware of the same. Care Transitions updated medical team to status and will continue to follow.     12:58 PM  Met with patient and made him aware that we have been unsuccessful in finding a facility in [place], but did find one in [place]. He declined [place] and states he was recently at [facility] and  would like to return there. Called [facility] and referred the patient. They scheduled an intake assessment for him for today, 7/16, at 3:00 PM. Patient contacted his sister and she will provide him transportation to [facility]. Medical team updated to the same and will discharge.

## 2025-07-16 NOTE — ED PROVIDER NOTES
Emergency Department Provider Note       Sign out from night physician  Pt is resting comfortably. He is here for ETOH detox. Has hx of cirrhosis. Hb is slightly low 12.6. AST 89. Consistent with ETOh abuse. He is cleared medically per my exam. He will need admission and/or out patient therapy for etoh detox.     Will coordinate with care coordinator.     Coordinator has been able to secure outpatient follow up today for detox    Rian Grider MD, SCOTT  Attending Physician Emergency Medicine                                                     Rian Grider MD  07/16/25 5322

## 2025-08-21 ENCOUNTER — TELEPHONE (OUTPATIENT)
Dept: PRIMARY CARE | Facility: CLINIC | Age: 55
End: 2025-08-21
Payer: MEDICARE

## 2025-08-21 DIAGNOSIS — F32.5 MAJOR DEPRESSIVE DISORDER WITH SINGLE EPISODE, IN FULL REMISSION: ICD-10-CM

## 2025-08-21 RX ORDER — METOPROLOL SUCCINATE 50 MG/1
50 TABLET, EXTENDED RELEASE ORAL DAILY
Qty: 90 TABLET | Refills: 2 | OUTPATIENT
Start: 2025-08-21

## 2025-08-21 RX ORDER — SERTRALINE HYDROCHLORIDE 50 MG/1
50 TABLET, FILM COATED ORAL DAILY
Qty: 60 TABLET | Refills: 0 | Status: SHIPPED | OUTPATIENT
Start: 2025-08-21

## 2025-09-25 ENCOUNTER — APPOINTMENT (OUTPATIENT)
Dept: GASTROENTEROLOGY | Facility: CLINIC | Age: 55
End: 2025-09-25
Payer: MEDICARE